# Patient Record
Sex: MALE | Race: OTHER | NOT HISPANIC OR LATINO
[De-identification: names, ages, dates, MRNs, and addresses within clinical notes are randomized per-mention and may not be internally consistent; named-entity substitution may affect disease eponyms.]

---

## 2021-10-12 ENCOUNTER — TRANSCRIPTION ENCOUNTER (OUTPATIENT)
Age: 51
End: 2021-10-12

## 2021-11-02 ENCOUNTER — TRANSCRIPTION ENCOUNTER (OUTPATIENT)
Age: 51
End: 2021-11-02

## 2021-12-03 ENCOUNTER — EMERGENCY (EMERGENCY)
Facility: HOSPITAL | Age: 51
LOS: 0 days | Discharge: HOME | End: 2021-12-03
Attending: EMERGENCY MEDICINE
Payer: COMMERCIAL

## 2021-12-03 ENCOUNTER — INPATIENT (INPATIENT)
Facility: HOSPITAL | Age: 51
LOS: 12 days | Discharge: HOME | DRG: 853 | End: 2021-12-16
Attending: INTERNAL MEDICINE | Admitting: INTERNAL MEDICINE
Payer: COMMERCIAL

## 2021-12-03 VITALS
SYSTOLIC BLOOD PRESSURE: 113 MMHG | DIASTOLIC BLOOD PRESSURE: 70 MMHG | OXYGEN SATURATION: 97 % | HEART RATE: 110 BPM | RESPIRATION RATE: 16 BRPM

## 2021-12-03 VITALS — OXYGEN SATURATION: 100 % | DIASTOLIC BLOOD PRESSURE: 83 MMHG | SYSTOLIC BLOOD PRESSURE: 134 MMHG | HEART RATE: 90 BPM

## 2021-12-03 VITALS — HEIGHT: 69 IN | WEIGHT: 169.98 LBS

## 2021-12-03 DIAGNOSIS — Z20.822 CONTACT WITH AND (SUSPECTED) EXPOSURE TO COVID-19: ICD-10-CM

## 2021-12-03 DIAGNOSIS — G40.89 OTHER SEIZURES: ICD-10-CM

## 2021-12-03 DIAGNOSIS — R45.1 RESTLESSNESS AND AGITATION: ICD-10-CM

## 2021-12-03 DIAGNOSIS — G40.401 OTHER GENERALIZED EPILEPSY AND EPILEPTIC SYNDROMES, NOT INTRACTABLE, WITH STATUS EPILEPTICUS: ICD-10-CM

## 2021-12-03 DIAGNOSIS — B95.61 METHICILLIN SUSCEPTIBLE STAPHYLOCOCCUS AUREUS INFECTION AS THE CAUSE OF DISEASES CLASSIFIED ELSEWHERE: ICD-10-CM

## 2021-12-03 DIAGNOSIS — K42.9 UMBILICAL HERNIA WITHOUT OBSTRUCTION OR GANGRENE: Chronic | ICD-10-CM

## 2021-12-03 DIAGNOSIS — R53.1 WEAKNESS: ICD-10-CM

## 2021-12-03 DIAGNOSIS — B96.89 OTHER SPECIFIED BACTERIAL AGENTS AS THE CAUSE OF DISEASES CLASSIFIED ELSEWHERE: ICD-10-CM

## 2021-12-03 DIAGNOSIS — H70.93 UNSPECIFIED MASTOIDITIS, BILATERAL: ICD-10-CM

## 2021-12-03 DIAGNOSIS — H60.22 MALIGNANT OTITIS EXTERNA, LEFT EAR: ICD-10-CM

## 2021-12-03 DIAGNOSIS — G06.0 INTRACRANIAL ABSCESS AND GRANULOMA: ICD-10-CM

## 2021-12-03 DIAGNOSIS — Z78.1 PHYSICAL RESTRAINT STATUS: ICD-10-CM

## 2021-12-03 DIAGNOSIS — F17.200 NICOTINE DEPENDENCE, UNSPECIFIED, UNCOMPLICATED: ICD-10-CM

## 2021-12-03 DIAGNOSIS — R56.9 UNSPECIFIED CONVULSIONS: ICD-10-CM

## 2021-12-03 DIAGNOSIS — A41.9 SEPSIS, UNSPECIFIED ORGANISM: ICD-10-CM

## 2021-12-03 DIAGNOSIS — G04.90 ENCEPHALITIS AND ENCEPHALOMYELITIS, UNSPECIFIED: ICD-10-CM

## 2021-12-03 DIAGNOSIS — F60.9 PERSONALITY DISORDER, UNSPECIFIED: ICD-10-CM

## 2021-12-03 DIAGNOSIS — M86.9 OSTEOMYELITIS, UNSPECIFIED: ICD-10-CM

## 2021-12-03 DIAGNOSIS — L89.222 PRESSURE ULCER OF LEFT HIP, STAGE 2: ICD-10-CM

## 2021-12-03 DIAGNOSIS — F10.231 ALCOHOL DEPENDENCE WITH WITHDRAWAL DELIRIUM: ICD-10-CM

## 2021-12-03 DIAGNOSIS — G40.901 EPILEPSY, UNSPECIFIED, NOT INTRACTABLE, WITH STATUS EPILEPTICUS: ICD-10-CM

## 2021-12-03 LAB
ALBUMIN SERPL ELPH-MCNC: 3.5 G/DL — SIGNIFICANT CHANGE UP (ref 3.3–5)
ALBUMIN SERPL ELPH-MCNC: 3.6 G/DL — SIGNIFICANT CHANGE UP (ref 3.3–5)
ALP SERPL-CCNC: 101 U/L — SIGNIFICANT CHANGE UP (ref 40–120)
ALP SERPL-CCNC: 86 U/L — SIGNIFICANT CHANGE UP (ref 40–120)
ALT FLD-CCNC: 22 U/L — SIGNIFICANT CHANGE UP (ref 10–45)
ALT FLD-CCNC: 38 U/L — SIGNIFICANT CHANGE UP (ref 12–78)
AMPHET UR-MCNC: NEGATIVE — SIGNIFICANT CHANGE UP
ANION GAP SERPL CALC-SCNC: 12 MMOL/L — SIGNIFICANT CHANGE UP (ref 5–17)
ANION GAP SERPL CALC-SCNC: 9 MMOL/L — SIGNIFICANT CHANGE UP (ref 5–17)
APPEARANCE UR: CLEAR — SIGNIFICANT CHANGE UP
AST SERPL-CCNC: 24 U/L — SIGNIFICANT CHANGE UP (ref 15–37)
AST SERPL-CCNC: 28 U/L — SIGNIFICANT CHANGE UP (ref 10–40)
BACTERIA # UR AUTO: ABNORMAL
BARBITURATES UR SCN-MCNC: NEGATIVE — SIGNIFICANT CHANGE UP
BASE EXCESS BLDA CALC-SCNC: -1.3 MMOL/L — SIGNIFICANT CHANGE UP (ref -2–3)
BASE EXCESS BLDA CALC-SCNC: -7.3 MMOL/L — LOW (ref -2–3)
BASOPHILS # BLD AUTO: 0 K/UL — SIGNIFICANT CHANGE UP (ref 0–0.2)
BASOPHILS # BLD AUTO: 0.06 K/UL — SIGNIFICANT CHANGE UP (ref 0–0.2)
BASOPHILS NFR BLD AUTO: 0 % — SIGNIFICANT CHANGE UP (ref 0–2)
BASOPHILS NFR BLD AUTO: 0.3 % — SIGNIFICANT CHANGE UP (ref 0–2)
BENZODIAZ UR-MCNC: NEGATIVE — SIGNIFICANT CHANGE UP
BILIRUB SERPL-MCNC: 0.3 MG/DL — SIGNIFICANT CHANGE UP (ref 0.2–1.2)
BILIRUB SERPL-MCNC: 0.3 MG/DL — SIGNIFICANT CHANGE UP (ref 0.2–1.2)
BILIRUB UR-MCNC: NEGATIVE — SIGNIFICANT CHANGE UP
BLD GP AB SCN SERPL QL: NEGATIVE — SIGNIFICANT CHANGE UP
BLD GP AB SCN SERPL QL: NEGATIVE — SIGNIFICANT CHANGE UP
BLOOD GAS COMMENTS: SIGNIFICANT CHANGE UP
BLOOD GAS COMMENTS: SIGNIFICANT CHANGE UP
BLOOD GAS SOURCE: SIGNIFICANT CHANGE UP
BUN SERPL-MCNC: 13 MG/DL — SIGNIFICANT CHANGE UP (ref 7–23)
BUN SERPL-MCNC: 19 MG/DL — SIGNIFICANT CHANGE UP (ref 7–23)
CALCIUM SERPL-MCNC: 10.1 MG/DL — SIGNIFICANT CHANGE UP (ref 8.5–10.1)
CALCIUM SERPL-MCNC: 8.3 MG/DL — LOW (ref 8.4–10.5)
CHLORIDE SERPL-SCNC: 105 MMOL/L — SIGNIFICANT CHANGE UP (ref 96–108)
CHLORIDE SERPL-SCNC: 112 MMOL/L — HIGH (ref 96–108)
CO2 BLDA-SCNC: 24 MMOL/L — SIGNIFICANT CHANGE UP (ref 19–24)
CO2 BLDA-SCNC: 25 MMOL/L — HIGH (ref 19–24)
CO2 SERPL-SCNC: 20 MMOL/L — LOW (ref 22–31)
CO2 SERPL-SCNC: 23 MMOL/L — SIGNIFICANT CHANGE UP (ref 22–31)
COCAINE METAB.OTHER UR-MCNC: NEGATIVE — SIGNIFICANT CHANGE UP
COLOR SPEC: YELLOW — SIGNIFICANT CHANGE UP
COMMENT - URINE: SIGNIFICANT CHANGE UP
CREAT SERPL-MCNC: 0.93 MG/DL — SIGNIFICANT CHANGE UP (ref 0.5–1.3)
CREAT SERPL-MCNC: 1.25 MG/DL — SIGNIFICANT CHANGE UP (ref 0.5–1.3)
CRP SERPL-MCNC: 34.1 MG/L — HIGH (ref 0–4)
DIFF PNL FLD: ABNORMAL
EOSINOPHIL # BLD AUTO: 0.09 K/UL — SIGNIFICANT CHANGE UP (ref 0–0.5)
EOSINOPHIL # BLD AUTO: 0.21 K/UL — SIGNIFICANT CHANGE UP (ref 0–0.5)
EOSINOPHIL NFR BLD AUTO: 0.5 % — SIGNIFICANT CHANGE UP (ref 0–6)
EOSINOPHIL NFR BLD AUTO: 1 % — SIGNIFICANT CHANGE UP (ref 0–6)
EPI CELLS # UR: SIGNIFICANT CHANGE UP
ERYTHROCYTE [SEDIMENTATION RATE] IN BLOOD: 10 MM/HR — SIGNIFICANT CHANGE UP
ETHANOL SERPL-MCNC: <10 MG/DL — SIGNIFICANT CHANGE UP (ref 0–10)
FLUAV AG NPH QL: SIGNIFICANT CHANGE UP
FLUBV AG NPH QL: SIGNIFICANT CHANGE UP
GAS PNL BLDA: SIGNIFICANT CHANGE UP
GLUCOSE BLDC GLUCOMTR-MCNC: 106 MG/DL — HIGH (ref 70–99)
GLUCOSE BLDC GLUCOMTR-MCNC: 156 MG/DL — HIGH (ref 70–99)
GLUCOSE BLDC GLUCOMTR-MCNC: 98 MG/DL — SIGNIFICANT CHANGE UP (ref 70–99)
GLUCOSE SERPL-MCNC: 103 MG/DL — HIGH (ref 70–99)
GLUCOSE SERPL-MCNC: 120 MG/DL — HIGH (ref 70–99)
GLUCOSE UR QL: 50 MG/DL
GRAM STN FLD: SIGNIFICANT CHANGE UP
HCO3 BLDA-SCNC: 23 MMOL/L — SIGNIFICANT CHANGE UP (ref 21–28)
HCO3 BLDA-SCNC: 24 MMOL/L — SIGNIFICANT CHANGE UP (ref 21–28)
HCT VFR BLD CALC: 40 % — SIGNIFICANT CHANGE UP (ref 39–50)
HCT VFR BLD CALC: 52.4 % — HIGH (ref 39–50)
HGB BLD-MCNC: 13.4 G/DL — SIGNIFICANT CHANGE UP (ref 13–17)
HGB BLD-MCNC: 16.5 G/DL — SIGNIFICANT CHANGE UP (ref 13–17)
HOROWITZ INDEX BLDA+IHG-RTO: 100 — SIGNIFICANT CHANGE UP
IMM GRANULOCYTES NFR BLD AUTO: 1.1 % — SIGNIFICANT CHANGE UP (ref 0–1.5)
KETONES UR-MCNC: ABNORMAL
LACTATE SERPL-SCNC: 1 MMOL/L — SIGNIFICANT CHANGE UP (ref 0.7–2)
LACTATE SERPL-SCNC: 1.9 MMOL/L — SIGNIFICANT CHANGE UP (ref 0.5–2)
LEUKOCYTE ESTERASE UR-ACNC: NEGATIVE — SIGNIFICANT CHANGE UP
LIDOCAIN IGE QN: 161 U/L — SIGNIFICANT CHANGE UP (ref 73–393)
LYMPHOCYTES # BLD AUTO: 1.96 K/UL — SIGNIFICANT CHANGE UP (ref 1–3.3)
LYMPHOCYTES # BLD AUTO: 25 % — SIGNIFICANT CHANGE UP (ref 13–44)
LYMPHOCYTES # BLD AUTO: 5.22 K/UL — HIGH (ref 1–3.3)
LYMPHOCYTES # BLD AUTO: 9.8 % — LOW (ref 13–44)
MAGNESIUM SERPL-MCNC: 2.6 MG/DL — SIGNIFICANT CHANGE UP (ref 1.6–2.6)
MANUAL SMEAR VERIFICATION: SIGNIFICANT CHANGE UP
MCHC RBC-ENTMCNC: 29.6 PG — SIGNIFICANT CHANGE UP (ref 27–34)
MCHC RBC-ENTMCNC: 30.2 PG — SIGNIFICANT CHANGE UP (ref 27–34)
MCHC RBC-ENTMCNC: 31.5 GM/DL — LOW (ref 32–36)
MCHC RBC-ENTMCNC: 33.5 GM/DL — SIGNIFICANT CHANGE UP (ref 32–36)
MCV RBC AUTO: 90.3 FL — SIGNIFICANT CHANGE UP (ref 80–100)
MCV RBC AUTO: 94.1 FL — SIGNIFICANT CHANGE UP (ref 80–100)
METHADONE UR-MCNC: NEGATIVE — SIGNIFICANT CHANGE UP
MONOCYTES # BLD AUTO: 1.78 K/UL — HIGH (ref 0–0.9)
MONOCYTES # BLD AUTO: 2.92 K/UL — HIGH (ref 0–0.9)
MONOCYTES NFR BLD AUTO: 14 % — SIGNIFICANT CHANGE UP (ref 2–14)
MONOCYTES NFR BLD AUTO: 8.9 % — SIGNIFICANT CHANGE UP (ref 2–14)
NEUTROPHILS # BLD AUTO: 12.53 K/UL — HIGH (ref 1.8–7.4)
NEUTROPHILS # BLD AUTO: 15.87 K/UL — HIGH (ref 1.8–7.4)
NEUTROPHILS NFR BLD AUTO: 60 % — SIGNIFICANT CHANGE UP (ref 43–77)
NEUTROPHILS NFR BLD AUTO: 79.4 % — HIGH (ref 43–77)
NITRITE UR-MCNC: NEGATIVE — SIGNIFICANT CHANGE UP
NRBC # BLD: 0 /100 WBCS — SIGNIFICANT CHANGE UP (ref 0–0)
NRBC # BLD: 0 /100 — SIGNIFICANT CHANGE UP (ref 0–0)
NRBC # BLD: SIGNIFICANT CHANGE UP /100 WBCS (ref 0–0)
NT-PROBNP SERPL-SCNC: 134 PG/ML — HIGH (ref 0–125)
OPIATES UR-MCNC: POSITIVE — SIGNIFICANT CHANGE UP
PCO2 BLDA: 40 MMHG — SIGNIFICANT CHANGE UP (ref 35–48)
PCO2 BLDA: 63 MMHG — HIGH (ref 32–46)
PCP SPEC-MCNC: SIGNIFICANT CHANGE UP
PCP UR-MCNC: NEGATIVE — SIGNIFICANT CHANGE UP
PH BLD: 7.16 — CRITICAL LOW (ref 7.35–7.45)
PH BLDA: 7.38 — SIGNIFICANT CHANGE UP (ref 7.35–7.45)
PH UR: 5 — SIGNIFICANT CHANGE UP (ref 5–8)
PHOSPHATE SERPL-MCNC: 3.2 MG/DL — SIGNIFICANT CHANGE UP (ref 2.5–4.5)
PLAT MORPH BLD: NORMAL — SIGNIFICANT CHANGE UP
PLATELET # BLD AUTO: 336 K/UL — SIGNIFICANT CHANGE UP (ref 150–400)
PLATELET # BLD AUTO: 411 K/UL — HIGH (ref 150–400)
PO2 BLDA: 157 MMHG — HIGH (ref 83–108)
PO2 BLDA: 203 MMHG — HIGH (ref 83–108)
POTASSIUM SERPL-MCNC: 4.9 MMOL/L — SIGNIFICANT CHANGE UP (ref 3.5–5.3)
POTASSIUM SERPL-MCNC: 5.9 MMOL/L — HIGH (ref 3.5–5.3)
POTASSIUM SERPL-SCNC: 4.9 MMOL/L — SIGNIFICANT CHANGE UP (ref 3.5–5.3)
POTASSIUM SERPL-SCNC: 5.9 MMOL/L — HIGH (ref 3.5–5.3)
PROT SERPL-MCNC: 6.8 G/DL — SIGNIFICANT CHANGE UP (ref 6–8.3)
PROT SERPL-MCNC: 8.7 GM/DL — HIGH (ref 6–8.3)
PROT UR-MCNC: 100 MG/DL
RBC # BLD: 4.43 M/UL — SIGNIFICANT CHANGE UP (ref 4.2–5.8)
RBC # BLD: 5.57 M/UL — SIGNIFICANT CHANGE UP (ref 4.2–5.8)
RBC # FLD: 14.1 % — SIGNIFICANT CHANGE UP (ref 10.3–14.5)
RBC # FLD: 14.2 % — SIGNIFICANT CHANGE UP (ref 10.3–14.5)
RBC BLD AUTO: NORMAL — SIGNIFICANT CHANGE UP
RBC CASTS # UR COMP ASSIST: SIGNIFICANT CHANGE UP /HPF (ref 0–4)
RH IG SCN BLD-IMP: NEGATIVE — SIGNIFICANT CHANGE UP
RH IG SCN BLD-IMP: NEGATIVE — SIGNIFICANT CHANGE UP
SAO2 % BLDA: 100 % — HIGH (ref 94–98)
SAO2 % BLDA: 100 % — HIGH (ref 94–98)
SARS-COV-2 RNA SPEC QL NAA+PROBE: SIGNIFICANT CHANGE UP
SODIUM SERPL-SCNC: 137 MMOL/L — SIGNIFICANT CHANGE UP (ref 135–145)
SODIUM SERPL-SCNC: 144 MMOL/L — SIGNIFICANT CHANGE UP (ref 135–145)
SP GR SPEC: 1.03 — HIGH (ref 1.01–1.02)
SPECIMEN SOURCE: SIGNIFICANT CHANGE UP
THC UR QL: POSITIVE — SIGNIFICANT CHANGE UP
UROBILINOGEN FLD QL: NEGATIVE MG/DL — SIGNIFICANT CHANGE UP
WBC # BLD: 19.97 K/UL — HIGH (ref 3.8–10.5)
WBC # BLD: 20.89 K/UL — HIGH (ref 3.8–10.5)
WBC # FLD AUTO: 19.97 K/UL — HIGH (ref 3.8–10.5)
WBC # FLD AUTO: 20.89 K/UL — HIGH (ref 3.8–10.5)
WBC UR QL: SIGNIFICANT CHANGE UP

## 2021-12-03 PROCEDURE — 31500 INSERT EMERGENCY AIRWAY: CPT

## 2021-12-03 PROCEDURE — 71045 X-RAY EXAM CHEST 1 VIEW: CPT | Mod: 26,76

## 2021-12-03 PROCEDURE — 99291 CRITICAL CARE FIRST HOUR: CPT

## 2021-12-03 PROCEDURE — 70450 CT HEAD/BRAIN W/O DYE: CPT | Mod: 26,MA

## 2021-12-03 PROCEDURE — 70487 CT MAXILLOFACIAL W/DYE: CPT | Mod: 26,MG

## 2021-12-03 PROCEDURE — G1004: CPT

## 2021-12-03 PROCEDURE — 71045 X-RAY EXAM CHEST 1 VIEW: CPT | Mod: 26

## 2021-12-03 PROCEDURE — 99291 CRITICAL CARE FIRST HOUR: CPT | Mod: 25

## 2021-12-03 RX ORDER — CEFEPIME 1 G/1
2000 INJECTION, POWDER, FOR SOLUTION INTRAMUSCULAR; INTRAVENOUS EVERY 8 HOURS
Refills: 0 | Status: DISCONTINUED | OUTPATIENT
Start: 2021-12-03 | End: 2021-12-13

## 2021-12-03 RX ORDER — CIPROFLOXACIN HCL 0.3 %
4 DROPS OPHTHALMIC (EYE)
Refills: 0 | Status: DISCONTINUED | OUTPATIENT
Start: 2021-12-03 | End: 2021-12-03

## 2021-12-03 RX ORDER — DEXTROSE 50 % IN WATER 50 %
15 SYRINGE (ML) INTRAVENOUS ONCE
Refills: 0 | Status: DISCONTINUED | OUTPATIENT
Start: 2021-12-03 | End: 2021-12-16

## 2021-12-03 RX ORDER — FENTANYL CITRATE 50 UG/ML
50 INJECTION INTRAVENOUS ONCE
Refills: 0 | Status: DISCONTINUED | OUTPATIENT
Start: 2021-12-03 | End: 2021-12-03

## 2021-12-03 RX ORDER — ROCURONIUM BROMIDE 10 MG/ML
100 VIAL (ML) INTRAVENOUS ONCE
Refills: 0 | Status: COMPLETED | OUTPATIENT
Start: 2021-12-03 | End: 2021-12-03

## 2021-12-03 RX ORDER — PIPERACILLIN AND TAZOBACTAM 4; .5 G/20ML; G/20ML
3.38 INJECTION, POWDER, LYOPHILIZED, FOR SOLUTION INTRAVENOUS EVERY 6 HOURS
Refills: 0 | Status: DISCONTINUED | OUTPATIENT
Start: 2021-12-03 | End: 2021-12-03

## 2021-12-03 RX ORDER — SODIUM CHLORIDE 9 MG/ML
1000 INJECTION INTRAMUSCULAR; INTRAVENOUS; SUBCUTANEOUS ONCE
Refills: 0 | Status: COMPLETED | OUTPATIENT
Start: 2021-12-03 | End: 2021-12-03

## 2021-12-03 RX ORDER — CHLORHEXIDINE GLUCONATE 213 G/1000ML
15 SOLUTION TOPICAL EVERY 12 HOURS
Refills: 0 | Status: DISCONTINUED | OUTPATIENT
Start: 2021-12-03 | End: 2021-12-03

## 2021-12-03 RX ORDER — DEXTROSE 50 % IN WATER 50 %
25 SYRINGE (ML) INTRAVENOUS ONCE
Refills: 0 | Status: DISCONTINUED | OUTPATIENT
Start: 2021-12-03 | End: 2021-12-16

## 2021-12-03 RX ORDER — PROPOFOL 10 MG/ML
5 INJECTION, EMULSION INTRAVENOUS
Qty: 500 | Refills: 0 | Status: DISCONTINUED | OUTPATIENT
Start: 2021-12-03 | End: 2021-12-03

## 2021-12-03 RX ORDER — ENOXAPARIN SODIUM 100 MG/ML
40 INJECTION SUBCUTANEOUS DAILY
Refills: 0 | Status: DISCONTINUED | OUTPATIENT
Start: 2021-12-03 | End: 2021-12-03

## 2021-12-03 RX ORDER — ETOMIDATE 2 MG/ML
20 INJECTION INTRAVENOUS ONCE
Refills: 0 | Status: COMPLETED | OUTPATIENT
Start: 2021-12-03 | End: 2021-12-03

## 2021-12-03 RX ORDER — PROPOFOL 10 MG/ML
5 INJECTION, EMULSION INTRAVENOUS
Qty: 1000 | Refills: 0 | Status: DISCONTINUED | OUTPATIENT
Start: 2021-12-03 | End: 2021-12-03

## 2021-12-03 RX ORDER — INSULIN HUMAN 100 [IU]/ML
INJECTION, SOLUTION SUBCUTANEOUS EVERY 6 HOURS
Refills: 0 | Status: DISCONTINUED | OUTPATIENT
Start: 2021-12-03 | End: 2021-12-10

## 2021-12-03 RX ORDER — VANCOMYCIN HCL 1 G
1500 VIAL (EA) INTRAVENOUS EVERY 12 HOURS
Refills: 0 | Status: COMPLETED | OUTPATIENT
Start: 2021-12-04 | End: 2021-12-04

## 2021-12-03 RX ORDER — SODIUM CHLORIDE 9 MG/ML
1000 INJECTION, SOLUTION INTRAVENOUS
Refills: 0 | Status: DISCONTINUED | OUTPATIENT
Start: 2021-12-03 | End: 2021-12-16

## 2021-12-03 RX ORDER — PIPERACILLIN AND TAZOBACTAM 4; .5 G/20ML; G/20ML
3.38 INJECTION, POWDER, LYOPHILIZED, FOR SOLUTION INTRAVENOUS ONCE
Refills: 0 | Status: COMPLETED | OUTPATIENT
Start: 2021-12-03 | End: 2021-12-03

## 2021-12-03 RX ORDER — INFLUENZA VIRUS VACCINE 15; 15; 15; 15 UG/.5ML; UG/.5ML; UG/.5ML; UG/.5ML
0.5 SUSPENSION INTRAMUSCULAR ONCE
Refills: 0 | Status: COMPLETED | OUTPATIENT
Start: 2021-12-03 | End: 2021-12-14

## 2021-12-03 RX ORDER — DEXMEDETOMIDINE HYDROCHLORIDE IN 0.9% SODIUM CHLORIDE 4 UG/ML
0.2 INJECTION INTRAVENOUS
Qty: 200 | Refills: 0 | Status: DISCONTINUED | OUTPATIENT
Start: 2021-12-03 | End: 2021-12-03

## 2021-12-03 RX ORDER — CEFEPIME 1 G/1
2000 INJECTION, POWDER, FOR SOLUTION INTRAMUSCULAR; INTRAVENOUS EVERY 8 HOURS
Refills: 0 | Status: DISCONTINUED | OUTPATIENT
Start: 2021-12-03 | End: 2021-12-03

## 2021-12-03 RX ORDER — CHLORHEXIDINE GLUCONATE 213 G/1000ML
1 SOLUTION TOPICAL
Refills: 0 | Status: DISCONTINUED | OUTPATIENT
Start: 2021-12-03 | End: 2021-12-16

## 2021-12-03 RX ORDER — LEVETIRACETAM 250 MG/1
1000 TABLET, FILM COATED ORAL ONCE
Refills: 0 | Status: COMPLETED | OUTPATIENT
Start: 2021-12-03 | End: 2021-12-03

## 2021-12-03 RX ORDER — GLUCAGON INJECTION, SOLUTION 0.5 MG/.1ML
1 INJECTION, SOLUTION SUBCUTANEOUS ONCE
Refills: 0 | Status: DISCONTINUED | OUTPATIENT
Start: 2021-12-03 | End: 2021-12-16

## 2021-12-03 RX ORDER — HEPARIN SODIUM 5000 [USP'U]/ML
5000 INJECTION INTRAVENOUS; SUBCUTANEOUS EVERY 8 HOURS
Refills: 0 | Status: DISCONTINUED | OUTPATIENT
Start: 2021-12-03 | End: 2021-12-08

## 2021-12-03 RX ORDER — VANCOMYCIN HCL 1 G
1000 VIAL (EA) INTRAVENOUS ONCE
Refills: 0 | Status: COMPLETED | OUTPATIENT
Start: 2021-12-03 | End: 2021-12-03

## 2021-12-03 RX ORDER — FENTANYL CITRATE 50 UG/ML
0.5 INJECTION INTRAVENOUS
Qty: 2500 | Refills: 0 | Status: DISCONTINUED | OUTPATIENT
Start: 2021-12-03 | End: 2021-12-05

## 2021-12-03 RX ORDER — CHLORHEXIDINE GLUCONATE 213 G/1000ML
15 SOLUTION TOPICAL EVERY 12 HOURS
Refills: 0 | Status: DISCONTINUED | OUTPATIENT
Start: 2021-12-03 | End: 2021-12-06

## 2021-12-03 RX ORDER — PROPOFOL 10 MG/ML
5 INJECTION, EMULSION INTRAVENOUS
Qty: 1000 | Refills: 0 | Status: DISCONTINUED | OUTPATIENT
Start: 2021-12-03 | End: 2021-12-05

## 2021-12-03 RX ORDER — PANTOPRAZOLE SODIUM 20 MG/1
40 TABLET, DELAYED RELEASE ORAL DAILY
Refills: 0 | Status: DISCONTINUED | OUTPATIENT
Start: 2021-12-03 | End: 2021-12-08

## 2021-12-03 RX ADMIN — FENTANYL CITRATE 50 MICROGRAM(S): 50 INJECTION INTRAVENOUS at 23:42

## 2021-12-03 RX ADMIN — Medication 1 MILLIGRAM(S): at 10:10

## 2021-12-03 RX ADMIN — LEVETIRACETAM 400 MILLIGRAM(S): 250 TABLET, FILM COATED ORAL at 11:20

## 2021-12-03 RX ADMIN — Medication 100 MILLIGRAM(S): at 10:25

## 2021-12-03 RX ADMIN — Medication 2 MILLIGRAM(S): at 23:02

## 2021-12-03 RX ADMIN — CHLORHEXIDINE GLUCONATE 1 APPLICATION(S): 213 SOLUTION TOPICAL at 20:26

## 2021-12-03 RX ADMIN — Medication 250 MILLIGRAM(S): at 13:30

## 2021-12-03 RX ADMIN — ETOMIDATE 20 MILLIGRAM(S): 2 INJECTION INTRAVENOUS at 10:25

## 2021-12-03 RX ADMIN — LEVETIRACETAM 1000 MILLIGRAM(S): 250 TABLET, FILM COATED ORAL at 11:45

## 2021-12-03 RX ADMIN — FENTANYL CITRATE 50 MICROGRAM(S): 50 INJECTION INTRAVENOUS at 23:15

## 2021-12-03 RX ADMIN — PROPOFOL 2.31 MICROGRAM(S)/KG/MIN: 10 INJECTION, EMULSION INTRAVENOUS at 11:27

## 2021-12-03 RX ADMIN — PROPOFOL 2.58 MICROGRAM(S)/KG/MIN: 10 INJECTION, EMULSION INTRAVENOUS at 18:34

## 2021-12-03 RX ADMIN — Medication 2 MILLIGRAM(S): at 10:20

## 2021-12-03 RX ADMIN — Medication 1 MILLIGRAM(S): at 10:00

## 2021-12-03 RX ADMIN — HEPARIN SODIUM 5000 UNIT(S): 5000 INJECTION INTRAVENOUS; SUBCUTANEOUS at 22:05

## 2021-12-03 RX ADMIN — CEFEPIME 100 MILLIGRAM(S): 1 INJECTION, POWDER, FOR SOLUTION INTRAMUSCULAR; INTRAVENOUS at 18:35

## 2021-12-03 RX ADMIN — PIPERACILLIN AND TAZOBACTAM 200 GRAM(S): 4; .5 INJECTION, POWDER, LYOPHILIZED, FOR SOLUTION INTRAVENOUS at 13:30

## 2021-12-03 RX ADMIN — PANTOPRAZOLE SODIUM 40 MILLIGRAM(S): 20 TABLET, DELAYED RELEASE ORAL at 18:34

## 2021-12-03 RX ADMIN — SODIUM CHLORIDE 1000 MILLILITER(S): 9 INJECTION INTRAMUSCULAR; INTRAVENOUS; SUBCUTANEOUS at 13:00

## 2021-12-03 RX ADMIN — CEFEPIME 100 MILLIGRAM(S): 1 INJECTION, POWDER, FOR SOLUTION INTRAMUSCULAR; INTRAVENOUS at 22:05

## 2021-12-03 NOTE — H&P ADULT - NSHPLABSRESULTS_GEN_ALL_CORE
LABS:                         16.5   20.89 )-----------( 411      ( 03 Dec 2021 10:37 )             52.4         144  |  112<H>  |  19  ----------------------------<  120<H>  5.9<H>   |  20<L>  |  1.25    Ca    10.1      03 Dec 2021 10:37    TPro  8.7<H>  /  Alb  3.6  /  TBili  0.3  /  DBili  x   /  AST  24  /  ALT  38  /  AlkPhos  101      Urinalysis Basic - ( 03 Dec 2021 11:09 )    Color: Yellow / Appearance: Clear / S.030 / pH: x  Gluc: x / Ketone: Trace  / Bili: Negative / Urobili: Negative mg/dL   Blood: x / Protein: 100 mg/dL / Nitrite: Negative   Leuk Esterase: Negative / RBC: 0-2 /HPF / WBC 0-2   Sq Epi: x / Non Sq Epi: Occasional / Bacteria: Few    Serum Pro-Brain Natriuretic Peptide: 134 pg/mL ( @ 10:37)    Lactate, Blood: 1.9 mmol/L ( @ 16:31)  Lactate, Blood: 1.0 mmol/L ( @ 14:04)    RADIOLOGY, EKG & ADDITIONAL TESTS:

## 2021-12-03 NOTE — H&P ADULT - ATTENDING COMMENTS
Acute onset seizure in setting of ear infection treated as outpt x 2 to 3 months. Active smoker and drinker but apparently has not stopped drinking so DTs unlikely. 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 Involve ENT. Needs further contrast imaging for evaluation. Abx with BBB penetrance; low threshold for fungal coverage. Video EEG.

## 2021-12-03 NOTE — CONSULT NOTE ADULT - SUBJECTIVE AND OBJECTIVE BOX
ENT Consult Note    HARRIET GOLDMAN  [id number]    51y Male w/ unremarkable PMHx (hasn't seen a doctor in many years) presents to [facility] as a transfer for evaluation of seizure of unknown origin. ENT consults for evaluation of left ear infection, r/o malignant ear otitis infections. per wife and brother at bedside, pt has been complaining of bilateral ear pain, left greater than right, since late october. pt went to urgent care at that time and was given oral cipro. pt returned to urgent care one week later after finishing his antibiotics without any ear pain relief. he was given oral and otic     PMHX  SEIZURES    Handoff    Acute mastoiditis, left    Convulsive status epilepticus    Alcohol abuse, daily use    Umbilical hernia    SysAdmin_VstLnk      PSHx  Allergies  No Known Allergies    Meds  chlorhexidine 2% Cloths 1 Application(s) Topical <User Schedule>  dextrose 40% Gel 15 Gram(s) Oral once  dextrose 5%. 1000 milliLiter(s) IV Continuous <Continuous>  dextrose 50% Injectable 25 Gram(s) IV Push once  enoxaparin Injectable 40 milliGRAM(s) SubCutaneous daily  glucagon  Injectable 1 milliGRAM(s) IntraMuscular once  insulin regular  human corrective regimen sliding scale   SubCutaneous every 6 hours  pantoprazole  Injectable 40 milliGRAM(s) IV Push daily  piperacillin/tazobactam IVPB.. 3.375 Gram(s) IV Intermittent every 6 hours      PE  Vitals  T(C): 37.6 (12-03-21 @ 15:45), Max: 37.6 (12-03-21 @ 15:45)  HR: 79 (12-03-21 @ 16:45) (79 - 131)  BP: 122/76 (12-03-21 @ 16:45) (113/70 - 207/110)  RR: 16 (12-03-21 @ 11:32) (16 - 21)  SpO2: 100% (12-03-21 @ 16:45) (95% - 100%)        Labs          AP: ENT Consult Note    HARRIET GOLDMAN  [id number]    51y Male w/ unremarkable PMHx (hasn't seen a doctor in many years) presents to [facility] as a transfer for evaluation of seizure of unknown origin. ENT consults for evaluation of left ear infection, r/o malignant ear otitis infections. per wife and brother at bedside, pt has been complaining of bilateral ear pain, left greater than right, since late october. pt went to urgent care at that time and was given oral cipro. pt returned to urgent care one week later after finishing his antibiotics without any ear pain relief. he was given oral and otic drops, however, sxs did not resolve. pt was then seen by an ENT on Nov 15, who diagnosed him w/ bilateral otitis externa, and placed ear james bilaterally. pt was given ciprodex otic drops and a follow up. Today, wife states  was not acting like his usual self. around noon today, she went to visit him and work and pt seemed very lethargic and was only responding with one word answers. At this time, wife decided to call an ambulance. while being placed in to the ambulance, pt began having is seizure. pt was taken to [facility], and was intubated for airway protection. CT maxillofacial at [place] which showed distal external auditory canal and foci of possible tegmen tympani/mastoideum dehiscence versus severe thinning, concerning for malignant/necrotizing otitis. Per wife, pt had severe otalgia and hearing, but did not have any otorrhea, facial weakness, vertigo, or tinnitus    PMHX  SEIZURES    Handoff    Acute mastoiditis, left    Convulsive status epilepticus    Alcohol abuse, daily use    Umbilical hernia    SysAdmin_VstLnk      PSHx: inguinal hernia    Allergies  No Known Allergies    Meds  chlorhexidine 2% Cloths 1 Application(s) Topical <User Schedule>  dextrose 40% Gel 15 Gram(s) Oral once  dextrose 5%. 1000 milliLiter(s) IV Continuous <Continuous>  dextrose 50% Injectable 25 Gram(s) IV Push once  enoxaparin Injectable 40 milliGRAM(s) SubCutaneous daily  glucagon  Injectable 1 milliGRAM(s) IntraMuscular once  insulin regular  human corrective regimen sliding scale   SubCutaneous every 6 hours  pantoprazole  Injectable 40 milliGRAM(s) IV Push daily  piperacillin/tazobactam IVPB.. 3.375 Gram(s) IV Intermittent every 6 hours      PE  Vitals  T(C): 37.6 (12-03-21 @ 15:45), Max: 37.6 (12-03-21 @ 15:45)  HR: 79 (12-03-21 @ 16:45) (79 - 131)  BP: 122/76 (12-03-21 @ 16:45) (113/70 - 207/110)  RR: 16 (12-03-21 @ 11:32) (16 - 21)  SpO2: 100% (12-03-21 @ 16:45) (95% - 100%)    PHYSICAL EXAM:    CONSTITUTIONAL: Well nourished, well developed, sedated  HEAD: normocephalic, atraumatic.  EARS  AD: Mastoid non-tender/non-edematous. non-proptotic , non-erythematous right pinna. Right EAC edematous; unable to assess beyond membranous canal. no granulation tissue or masses appreciated of limited EAC. Ear wick placed  AS: Mastoid non-tender/non-edematous. non-proptotic pinna. Left pinna edematous and erythematous. Erythema and edema extends medially into EAC. Purulence appreciated. culture taken. unable assess beyond membranous canal. No granulation or masses appreciated.  Ear wick placed  NOSE: Normal external nose.   ORAL CAVITY/OROPHARYNX: normal mucosa. exam limited by ETT  NECK: No cervical lymphadenopathy  RESPIRATORY: intubated connected to vent    Labs                        16.5   20.89 )-----------( 411      ( 03 Dec 2021 10:37 )             52.4     12-03    137  |  105  |  13  ----------------------------<  103<H>  4.9   |  23  |  0.93    Ca    8.3<L>      03 Dec 2021 16:31  Phos  3.2     12-03  Mg     2.6     12-03    TPro  6.8  /  Alb  3.5  /  TBili  0.3  /  DBili  x   /  AST  28  /  ALT  22  /  AlkPhos  86  12-03          AP: 51 M w/ roughly one month hx of bilateral otitis externa, left greater than right, now presenting w/ seizures. PE does not reveal any granulation tissue in left EAC, however, b/l EACs are edematous and tender to manipulation.     -recommend ciprodex bilaterally; 5 drops to each ear wick twice a day  -recommend CT temporal bone w/ and w/o con  -agree w/ MRI IAC to assess for osteo and skull base  -agree w/ ID consult  -agree w/ broad spectrum abx at this time  -f/u left EAC ear culture   -ENT to follow up imaging  -rest of care per primary team    Seen w/ senior resident. Discussed with attending.

## 2021-12-03 NOTE — H&P ADULT - ASSESSMENT
52 yo M with PMHx no past medical history of epilepsy, trauma or CNS infection, who presents to the ED with an episode of sudden onset speech disturbance talking incoherently and repetitive and altered behavior while working witnessed, no fever. In the last 2 days much more pain on left ear and headache. Pt uses frequently his swimming pool has had left ear infection for 2 months with hypoacusia treated with long course ciprofloxacin and a week of prednisone. In ED presented, repetitive generalized seizures. 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 Started on vancomycin and Zosyn IV. Admitted to ICU to continue his care, on cefepime and vanc IV (to cover CNS pseudomonal infection) propofol drip and placed on ventilator.  52 yo M with PMHx no past medical history of epilepsy, trauma or CNS infection, who presents to the ED with an episode of sudden onset speech disturbance talking incoherently and repetitive and altered behavior while working witnessed, no fever. In the last 2 days much more pain on left ear and headache. Pt uses frequently his swimming pool has had left ear infection for 2 months with hypoacusia treated with long course ciprofloxacin and a week of prednisone. In ED presented, repetitive generalized seizures. 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 Started on vancomycin and Zosyn IV. Admitted to ICU to continue his care, on cefepime and vanc IV (to cover CNS pseudomonal infection) propofol drip and placed on ventilator.     NEURO 50 yo M with PMHx no past medical history of epilepsy, trauma or CNS infection, who presents to the ED with an episode of sudden onset speech disturbance talking incoherently and repetitive and altered behavior while working witnessed, no fever. In the last 2 days much more pain on left ear and headache. Pt uses frequently his swimming pool has had left ear infection for 2 months with hypoacusia treated with long course ciprofloxacin and a week of prednisone. In ED presented, repetitive generalized seizures. 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 Started on vancomycin and Zosyn IV. Admitted to ICU to continue his care, on cefepime and vanc IV (to cover CNS pseudomonal infection) propofol drip and placed on ventilator.     NEURO  intubated and sedated  on propofol for sedation and crisis control  daily wean off sedation for neurochecks   MRI IAC with contrast IV  Video EEG    CARDIO  None    RESPIRATORY  Intubated for protecting airways in patient with status epilepticus  - wean vent as tolerated  - spontaneous breathing trials daily  - CxR w/o significant findings    GI  #diet  - NPO today    ENDO  None    RENAL  None    HEM/ONC  None    ID  #External otitis  s/p left mastoiditis s/p encephalities In ED, vanco and Zosyn IV. In ICU, started on vancomycin 1500mg q12h IV and cefepime 2g q8h IV.    F: none  E: replete PRN  N: NPO for today  DVT: Heparin IV q8h  GI: Pantoprazol 40mg IV q24h    FULL code   50 yo M with PMHx no past medical history of epilepsy, trauma or CNS infection, who presents to the ED with an episode of sudden onset speech disturbance talking incoherently and repetitive and altered behavior while working witnessed, no fever. In the last 2 days much more pain on left ear and headache. Pt uses frequently his swimming pool has had left ear infection for 2 months with hypoacusia treated with long course ciprofloxacin and a week of prednisone. In ED presented, repetitive generalized seizures. 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 Started on vancomycin and Zosyn IV. Admitted to ICU to continue his care, on cefepime and vanc IV (to cover CNS pseudomonal infection) propofol drip and placed on ventilator.     NEURO  #Convulsive status epilepticus  Likely 2/2 to left temporal lobe encephalitis 2/2 to left mastoiditis. Frequent baths, high pseudomonal suspicion.  -intubated and sedated  -on propofol for sedation and crisis control  -daily wean off sedation for neurochecks   -MRI IAC with contrast IV  -Continue vEEG monitoring  -Maintain seizure and fall precautions    CARDIO  None.  EKG sinus rhythm 96 bmp    RESPIRATORY  -Intubated for protecting airways in patient with status epilepticus  -wean vent as tolerated  -spontaneous breathing trials daily  -CxR w/o significant findings    GI  #diet  -NPO today    ENDO  None    RENAL  None    HEM/ONC  None    ID  #Left external otitis  -s/p left mastoiditis s/p left temporal lobe encephalitis. In ED, vanco and Zosyn IV. In ICU, started on vancomycin 1500mg q12h IV and cefepime 2g q8h IV.  -cultures if new fever    F: none  E: replete PRN  N: NPO for today  DVT: Heparin IV q8h  GI: Pantoprazol 40mg IV q24h    FULL code

## 2021-12-03 NOTE — ED PROVIDER NOTE - PROGRESS NOTE DETAILS
Pt found to have mastoiditis on CT, ENT consulted, unable to be reached, empiric antibiotics started. Transferred to [facility] for ENT and video EEG. Pt stable in ED.

## 2021-12-03 NOTE — PATIENT PROFILE ADULT - FALL HARM RISK - TYPE OF ASSESSMENT
----- Message from India Hernandez sent at 7/9/2019 12:33 PM CDT -----  Contact: ANJEL----[phone]  Type:  Needs Medical Advice    Who Called: ANJELWould the patient rather a call back   Best Call Back Number:[phone]  Additional Information:    Anjel dropped off some forms to be filled out by the provider. The forms were to be faxed back to Anjel @ [phone]   Admission

## 2021-12-03 NOTE — ED ADULT NURSE NOTE - OBJECTIVE STATEMENT
Pt BIBEMS actively having tonic-clonic seizure, pt last known normal around 9:00am this morning at work while on the phone with his wife he became aphasic and had sudden onset weakness. Wife denies hx of seizures. Per wife, pt is an everyday EtOH drinker, has about 6 beers daily. Wife also states pt has been complaining of right ear pain for weeks, is taking PO abx for an ear infection. No obvious deformities noted. Pt had 3 more seizures while in ED, within the span of 15 min, received a total of 4 mg ativan. Decision made to intubate pt for airway protection. Dr. Kong at bedside.

## 2021-12-03 NOTE — ED ADULT TRIAGE NOTE - CHIEF COMPLAINT QUOTE
Stroke notification to ED with confusion and aphasia, active seizure upon entry to ED, MD summoned to bedside Stroke notification to ED with confusion and aphasia, active seizure upon entry to ED, MD summoned to bedside, while at work changes occurred 9a while speaking to wife on phone, daily beer drinking and doesn't like to go to the doctor however has ear pain for months, on antibiotics currently for ear infection, smoking cigarettes and marijuana usage

## 2021-12-03 NOTE — H&P ADULT - NSICDXPASTMEDICALHX_GEN_ALL_CORE_FT
PAST MEDICAL HISTORY:  Acute mastoiditis, left     Alcohol abuse, daily use     Convulsive status epilepticus

## 2021-12-03 NOTE — H&P ADULT - NSHPPHYSICALEXAM_GEN_ALL_CORE
VITALS:   T(C): 37.6 (12-03-21 @ 15:45), Max: 37.6 (12-03-21 @ 15:45)  HR: 110 (12-03-21 @ 11:32) (93 - 131)  BP: 113/70 (12-03-21 @ 11:32) (113/70 - 207/110)  RR: 16 (12-03-21 @ 11:32) (16 - 21)  SpO2: 97% (12-03-21 @ 11:32) (95% - 97%)    GENERAL: NAD, lying in bed comfortably  HEAD:  Atraumatic, normocephalic  EYES: EOMI, PERRLA, conjunctiva and sclera clear  ENT: Moist mucous membranes  NECK: Supple, no JVD  HEART: Regular rate and rhythm, no murmurs, rubs, or gallops  LUNGS: Unlabored respirations.  Clear to auscultation bilaterally, no crackles, wheezing, or rhonchi  ABDOMEN: Soft, nontender, nondistended, +BS  EXTREMITIES: 2+ peripheral pulses bilaterally. No clubbing, cyanosis, or edema  NERVOUS SYSTEM:  A&Ox3, no focal deficits   SKIN: No rashes or lesions VITALS:   T(C): 37.6 (12-03-21 @ 15:45), Max: 37.6 (12-03-21 @ 15:45)  HR: 110 (12-03-21 @ 11:32) (93 - 131)  BP: 113/70 (12-03-21 @ 11:32) (113/70 - 207/110)  RR: 16 (12-03-21 @ 11:32) (16 - 21)  SpO2: 97% (12-03-21 @ 11:32) (95% - 97%)    GENERAL: Intubated and sedated.  HEAD:  Atraumatic, normocephalic  EYES: conjunctiva and sclera clear  ENT: Moist mucous membranes, rash on left ear  NECK: Supple, no JVD  HEART: Regular rate and rhythm, no murmurs, rubs, or gallops  LUNGS: Unlabored respirations.  Clear to auscultation bilaterally, no crackles, wheezing, or rhonchi  ABDOMEN: Soft, nontender, nondistended, +BS  EXTREMITIES: 2+ peripheral pulses bilaterally. No clubbing, cyanosis, or edema  NERVOUS SYSTEM:  Induced coma (miotic pupils minimally reactive to light), no response to pain.  SKIN: No rashes or lesions

## 2021-12-03 NOTE — H&P ADULT - NSHPSOCIALHISTORY_GEN_ALL_CORE
Lives with wife and son. Works in a [employer]. Takes baths a few times per week in his private swimming pool. Drink (6 packs/carmel, he drank beers). Heavy smoker (1/2 pack/day), Drug use (sometimes smokes marihuana but not often).

## 2021-12-03 NOTE — H&P ADULT - NSHPREVIEWOFSYSTEMS_GEN_ALL_CORE
CONSTITUTIONAL: No weakness, fevers or chills  EYES/ENT: No visual changes;  No vertigo or throat pain   NECK: No pain or stiffness  RESPIRATORY: No cough, wheezing, hemoptysis; No shortness of breath  CARDIOVASCULAR: No chest pain or palpitations  GASTROINTESTINAL: No abdominal or epigastric pain. No nausea, vomiting, or hematemesis; No diarrhea or constipation. No melena or hematochezia.  GENITOURINARY: No dysuria, frequency or hematuria  NEUROLOGICAL: No numbness or weakness  SKIN: No itching, rashes CONSTITUTIONAL: No weakness, fevers or chills  EYES/ENT: No visual changes;  No vertigo or throat pain   NECK: No pain or stiffness  RESPIRATORY: No cough, wheezing, hemoptysis; No shortness of breath  CARDIOVASCULAR: No chest pain or palpitations  GASTROINTESTINAL: No abdominal or epigastric pain. No nausea, vomiting, or hematemesis; No diarrhea or constipation. No melena or hematochezia.  GENITOURINARY: No dysuria, frequency or hematuria  NEUROLOGICAL: No numbness or weakness  SKIN: No itching, rashes    went to work, talking incohorent, sweating, all of a suden 9 am, left 7:30 am and he was fine. last couple of months, and much more in the left ear. no secretion, last two months. Last days he could, but in last 2 days a lot of pain. No fever, pain in ear and heachaid, no rigid neck, rash. Cipro 500mg q12h oral (3-4w), before cipro drops (3-4w), prednisone 20 mg q12h oral (past weeks). Pale and sweeing.    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 CONSTITUTIONAL: No weakness, fevers or chills  EYES/ENT: No visual changes;  No vertigo or throat pain   NECK: No pain or stiffness  RESPIRATORY: No cough, wheezing, hemoptysis; No shortness of breath  CARDIOVASCULAR: No chest pain or palpitations  GASTROINTESTINAL: No abdominal or epigastric pain. No nausea, vomiting, or hematemesis; No diarrhea or constipation. No melena or hematochezia.  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 CONSTITUTIONAL: No weakness, fevers or chills  EYES/ENT: Hypoacusia and ear pain 2/2 to infection. No visual changes;  No vertigo or throat pain   NECK: No pain or stiffness  RESPIRATORY: No cough, wheezing, hemoptysis; No shortness of breath  CARDIOVASCULAR: No chest pain or palpitations  GASTROINTESTINAL: No abdominal or epigastric pain. No nausea, vomiting, or hematemesis; No diarrhea or constipation. No melena or hematochezia.  GENITOURINARY: No dysuria, frequency or hematuria  NEUROLOGICAL: Hedache last 2 days.  SKIN: No itching, rash on left ear

## 2021-12-03 NOTE — H&P ADULT - HISTORY OF PRESENT ILLNESS
52 yo gentleman with no significant past medical history but does not see doctors who presents to the ED with seizure. He had slower comprehension at work, and ambulance witnessed seizure en route. Pt has had L. ear infection and has been on prednisone and Cipro otic and po. Does drink 6 beers daily, has not missed any drinks per wife. No hx of seizure, no trauma, no other complaints. Pt continues to seize in ED.    ED Course:  -Labs: WBC 20.8 K 5.9 Cl 112 HCO3 20 BNP 1341 pH 7.16 pCO2 63 sat O2 100%  Utox: THC and opiate positive  UA: No UTI. Gravity 1030  CxR: bibasilar pneumonia and atelectasis and small effusions.  Head CT and angioCT  There is no evidence of mass or acute intracranial hemorrhage. No midline shift or other significant mass effect is noted. There is no CT evidence of acute territorial infarct. There is opacification of the left mastoid air cells and left middle ear. Orbits and orbital contents are unremarkable.  MGMT: Vanco, Zosyn, mechanical ventilation on propofol and on mechanical ventilation 50 yo gentleman with PMHx of umbilical hernia (5-6 years ago) with no past medical history of epilepsy, trauma or CNS infection, but does not see doctors who presents to the ED with seizure. He had slower comprehension at work, and ambulance witnessed seizure en route. Pt has had L. ear infection and has been on prednisone and Cipro otic and po. Does drink 6 beers daily, has not missed any drinks per wife. No hx of seizure, no trauma, no other complaints. Pt continues to seize in ED.  went to work, talking incohorent, sweating, all of a suden 9 am, left 7:30 am and he was fine. last couple of months, and much more in the left ear. no secretion, last two months. Last days he could, but in last 2 days a lot of pain. No fever, pain in ear and heachaid, no rigid neck, rash. Cipro 500mg q12h oral (3-4w), before cipro drops (3-4w), prednisone 20 mg q12h oral (past weeks). Pale and sweeing.     ED Course:  -Labs: WBC 20.8 K 5.9 Cl 112 HCO3 20 BNP 1341 pH 7.16 pCO2 63 sat O2 100%  -Utox: THC and opiate positive  -UA: No UTI. Gravity 1030  -CxR: bibasilar pneumonia and atelectasis and small effusions.  -Head CT and angioCT  There is no evidence of mass or acute intracranial hemorrhage. No midline shift or other significant mass effect is noted. There is no CT evidence of acute territorial infarct. There is opacification of the left mastoid air cells and left middle ear. Orbits and orbital contents are unremarkable. Likely hypodensity of left temporal lobe in possible relationship with encephalitis.  -MGMT: Vanco 1g IV, Zosyn 3.375mg IV, propofol IV and on mechanical ventilation. 52 yo gentleman with PMHx of umbilical hernia (5-6 years ago) with no past medical history of epilepsy, trauma or CNS infection, who presents to the ED with an episode of sudden onset at 8:45am speech disturbance talking incoherently and altered behavior while working witnessed by his colleagues. His wife called him by the phone at 9am when she was driving to meet him. When she arrives the patient only can repeat "I know" but he is unable to understand or articulate different speech, associated to sweating and paleness. He has slower comprehension, and ambulance witnessed seizure en route. When he is at the emergency department he has repeated seizures (his wife says that maybe 3 or 4, unwitnessed but he had some foam). In the last 2 days much more pain on left ear and and head. Pt has had left ear infection with hypoacusia, no secretion, but rash for two months treated with a course of 2 months of ciprofloxacin 500mg q12h and drops of ciprofloxacin (unknown dose) and in the last week a course of one week of prednisone 20mg q12h oral. His wife and pt relates the infection with his repeated baths on their private swimming pool. He does consistently drink 6 beers daily, has not missed any drinks per wife. No hx of seizure, no trauma, no other complaints. Pt continues to seize in ED.    ED Course:  -Labs: WBC 20.8 K 5.9 Cl 112 HCO3 20 BNP 1341 pH 7.16 pCO2 63 sat O2 100%  -Utox: THC and opiate positive  -UA: No UTI. Gravity 1030  -CxR: bibasilar pneumonia and atelectasis and small effusions.  -Head and maxillo CT and angioCT: There is no evidence of mass or acute intracranial hemorrhage. No midline shift or other significant mass effect is noted. There is no CT evidence of acute territorial infarct. There is opacification of the left mastoid air cells and left middle ear. Orbits and orbital contents are unremarkable. Likely hypodensity of left temporal lobe in possible relationship with encephalitis.  -MGMT: Vanco 1g IV, Zosyn 3.375mg IV, propofol IV and on mechanical ventilation.

## 2021-12-03 NOTE — ED PROVIDER NOTE - CLINICAL SUMMARY MEDICAL DECISION MAKING FREE TEXT BOX
<<-----Click here for Discharge Medication Review Ddx: Status epilepticus/ ro ich/ ro alcohol withdrawal/ mastoiditis  Plan: Cbc, cmp, lactate, ct head, airway protection and intubation, seizure medications/ and benzos, admit.

## 2021-12-03 NOTE — ED PROVIDER NOTE - OBJECTIVE STATEMENT
Pt is a 52 yo gentleman with no significant past medical history but does not see doctors who presents to the ED with seizure. He had slower comprehension at work, and ambulance witnessed seizure en route. Pt has had L. ear infection and has been on prednisone and Cipro otic and po. Does drink 6 beers daily, has not missed any drinks per wife. No hx of seizure, no trauma, no other complaints. Pt continues to seize in ED.

## 2021-12-03 NOTE — H&P ADULT - CRITICAL CARE SERVICES PROVIDED
Pt agrees to sched colonoscopy & cancel appt with Dr Rodriguez info mailed  
Critical care services provided

## 2021-12-03 NOTE — ED ADULT NURSE NOTE - CHIEF COMPLAINT QUOTE
Stroke notification to ED with confusion and aphasia, active seizure upon entry to ED, MD summoned to bedside, while at work changes occurred 9a while speaking to wife on phone, daily beer drinking and doesn't like to go to the doctor however has ear pain for months, on antibiotics currently for ear infection, smoking cigarettes and marijuana usage

## 2021-12-03 NOTE — CHART NOTE - NSCHARTNOTEFT_GEN_A_CORE
Infectious Diseases Anti-infective Approval Note    Medication:  Cefepime  Dose:  2 grams  Route:   IV  Frequency:  q8hrs  Duration:  3 days    **Duration  refers to duration of approval, not recommended duration of antibiotics    Dose may be adjusted as needed for alterations in renal function.    *THIS IS NOT AN INFECTIOUS DISEASES CONSULTATION*

## 2021-12-03 NOTE — PATIENT PROFILE ADULT - FALL HARM RISK - HARM RISK INTERVENTIONS

## 2021-12-04 LAB
A1C WITH ESTIMATED AVERAGE GLUCOSE RESULT: 5.3 % — SIGNIFICANT CHANGE UP (ref 4–5.6)
ALBUMIN SERPL ELPH-MCNC: 3.1 G/DL — LOW (ref 3.3–5)
ALP SERPL-CCNC: 77 U/L — SIGNIFICANT CHANGE UP (ref 40–120)
ALT FLD-CCNC: 17 U/L — SIGNIFICANT CHANGE UP (ref 10–45)
ANION GAP SERPL CALC-SCNC: 7 MMOL/L — SIGNIFICANT CHANGE UP (ref 5–17)
APTT BLD: 32.9 SEC — SIGNIFICANT CHANGE UP (ref 27.5–35.5)
AST SERPL-CCNC: 27 U/L — SIGNIFICANT CHANGE UP (ref 10–40)
BASE EXCESS BLDA CALC-SCNC: 0 MMOL/L — SIGNIFICANT CHANGE UP (ref -2–3)
BASOPHILS # BLD AUTO: 0.05 K/UL — SIGNIFICANT CHANGE UP (ref 0–0.2)
BASOPHILS NFR BLD AUTO: 0.3 % — SIGNIFICANT CHANGE UP (ref 0–2)
BILIRUB SERPL-MCNC: 0.3 MG/DL — SIGNIFICANT CHANGE UP (ref 0.2–1.2)
BUN SERPL-MCNC: 9 MG/DL — SIGNIFICANT CHANGE UP (ref 7–23)
CALCIUM SERPL-MCNC: 8.2 MG/DL — LOW (ref 8.4–10.5)
CHLORIDE SERPL-SCNC: 103 MMOL/L — SIGNIFICANT CHANGE UP (ref 96–108)
CO2 BLDA-SCNC: 27 MMOL/L — HIGH (ref 19–24)
CO2 SERPL-SCNC: 23 MMOL/L — SIGNIFICANT CHANGE UP (ref 22–31)
CREAT SERPL-MCNC: 0.84 MG/DL — SIGNIFICANT CHANGE UP (ref 0.5–1.3)
EOSINOPHIL # BLD AUTO: 0.08 K/UL — SIGNIFICANT CHANGE UP (ref 0–0.5)
EOSINOPHIL NFR BLD AUTO: 0.4 % — SIGNIFICANT CHANGE UP (ref 0–6)
ESTIMATED AVERAGE GLUCOSE: 105 MG/DL — SIGNIFICANT CHANGE UP (ref 68–114)
GLUCOSE BLDC GLUCOMTR-MCNC: 83 MG/DL — SIGNIFICANT CHANGE UP (ref 70–99)
GLUCOSE BLDC GLUCOMTR-MCNC: 88 MG/DL — SIGNIFICANT CHANGE UP (ref 70–99)
GLUCOSE BLDC GLUCOMTR-MCNC: 90 MG/DL — SIGNIFICANT CHANGE UP (ref 70–99)
GLUCOSE BLDC GLUCOMTR-MCNC: 95 MG/DL — SIGNIFICANT CHANGE UP (ref 70–99)
GLUCOSE BLDC GLUCOMTR-MCNC: 97 MG/DL — SIGNIFICANT CHANGE UP (ref 70–99)
GLUCOSE SERPL-MCNC: 115 MG/DL — HIGH (ref 70–99)
HCO3 BLDA-SCNC: 25 MMOL/L — SIGNIFICANT CHANGE UP (ref 21–28)
HCT VFR BLD CALC: 38.8 % — LOW (ref 39–50)
HGB BLD-MCNC: 12.7 G/DL — LOW (ref 13–17)
IMM GRANULOCYTES NFR BLD AUTO: 0.7 % — SIGNIFICANT CHANGE UP (ref 0–1.5)
INR BLD: 0.98 — SIGNIFICANT CHANGE UP (ref 0.88–1.16)
LYMPHOCYTES # BLD AUTO: 1.9 K/UL — SIGNIFICANT CHANGE UP (ref 1–3.3)
LYMPHOCYTES # BLD AUTO: 10.3 % — LOW (ref 13–44)
MAGNESIUM SERPL-MCNC: 2.5 MG/DL — SIGNIFICANT CHANGE UP (ref 1.6–2.6)
MCHC RBC-ENTMCNC: 30.2 PG — SIGNIFICANT CHANGE UP (ref 27–34)
MCHC RBC-ENTMCNC: 32.7 GM/DL — SIGNIFICANT CHANGE UP (ref 32–36)
MCV RBC AUTO: 92.2 FL — SIGNIFICANT CHANGE UP (ref 80–100)
MONOCYTES # BLD AUTO: 1.9 K/UL — HIGH (ref 0–0.9)
MONOCYTES NFR BLD AUTO: 10.3 % — SIGNIFICANT CHANGE UP (ref 2–14)
NEUTROPHILS # BLD AUTO: 14.37 K/UL — HIGH (ref 1.8–7.4)
NEUTROPHILS NFR BLD AUTO: 78 % — HIGH (ref 43–77)
NRBC # BLD: 0 /100 WBCS — SIGNIFICANT CHANGE UP (ref 0–0)
PCO2 BLDA: 43 MMHG — SIGNIFICANT CHANGE UP (ref 35–48)
PH BLDA: 7.38 — SIGNIFICANT CHANGE UP (ref 7.35–7.45)
PHOSPHATE SERPL-MCNC: 2.4 MG/DL — LOW (ref 2.5–4.5)
PLATELET # BLD AUTO: 320 K/UL — SIGNIFICANT CHANGE UP (ref 150–400)
PO2 BLDA: 94 MMHG — SIGNIFICANT CHANGE UP (ref 83–108)
POTASSIUM SERPL-MCNC: 4 MMOL/L — SIGNIFICANT CHANGE UP (ref 3.5–5.3)
POTASSIUM SERPL-SCNC: 4 MMOL/L — SIGNIFICANT CHANGE UP (ref 3.5–5.3)
PROT SERPL-MCNC: 6.3 G/DL — SIGNIFICANT CHANGE UP (ref 6–8.3)
PROTHROM AB SERPL-ACNC: 11.8 SEC — SIGNIFICANT CHANGE UP (ref 10.6–13.6)
RBC # BLD: 4.21 M/UL — SIGNIFICANT CHANGE UP (ref 4.2–5.8)
RBC # FLD: 14.3 % — SIGNIFICANT CHANGE UP (ref 10.3–14.5)
SAO2 % BLDA: 98.5 % — HIGH (ref 94–98)
SODIUM SERPL-SCNC: 133 MMOL/L — LOW (ref 135–145)
WBC # BLD: 18.42 K/UL — HIGH (ref 3.8–10.5)
WBC # FLD AUTO: 18.42 K/UL — HIGH (ref 3.8–10.5)

## 2021-12-04 PROCEDURE — 70480 CT ORBIT/EAR/FOSSA W/O DYE: CPT | Mod: 26

## 2021-12-04 PROCEDURE — 99291 CRITICAL CARE FIRST HOUR: CPT

## 2021-12-04 RX ORDER — SODIUM CHLORIDE 9 MG/ML
1000 INJECTION, SOLUTION INTRAVENOUS
Refills: 0 | Status: DISCONTINUED | OUTPATIENT
Start: 2021-12-04 | End: 2021-12-04

## 2021-12-04 RX ORDER — ACETAMINOPHEN 500 MG
1000 TABLET ORAL ONCE
Refills: 0 | Status: COMPLETED | OUTPATIENT
Start: 2021-12-04 | End: 2021-12-04

## 2021-12-04 RX ORDER — CIPROFLOXACIN HCL 0.3 %
5 DROPS OPHTHALMIC (EYE)
Refills: 0 | Status: DISCONTINUED | OUTPATIENT
Start: 2021-12-04 | End: 2021-12-04

## 2021-12-04 RX ADMIN — PROPOFOL 2.58 MICROGRAM(S)/KG/MIN: 10 INJECTION, EMULSION INTRAVENOUS at 14:12

## 2021-12-04 RX ADMIN — CHLORHEXIDINE GLUCONATE 15 MILLILITER(S): 213 SOLUTION TOPICAL at 06:12

## 2021-12-04 RX ADMIN — PROPOFOL 2.58 MICROGRAM(S)/KG/MIN: 10 INJECTION, EMULSION INTRAVENOUS at 12:05

## 2021-12-04 RX ADMIN — Medication 1000 MILLIGRAM(S): at 16:47

## 2021-12-04 RX ADMIN — HEPARIN SODIUM 5000 UNIT(S): 5000 INJECTION INTRAVENOUS; SUBCUTANEOUS at 21:12

## 2021-12-04 RX ADMIN — CEFEPIME 100 MILLIGRAM(S): 1 INJECTION, POWDER, FOR SOLUTION INTRAMUSCULAR; INTRAVENOUS at 14:13

## 2021-12-04 RX ADMIN — Medication 300 MILLIGRAM(S): at 12:05

## 2021-12-04 RX ADMIN — CEFEPIME 100 MILLIGRAM(S): 1 INJECTION, POWDER, FOR SOLUTION INTRAMUSCULAR; INTRAVENOUS at 21:12

## 2021-12-04 RX ADMIN — HEPARIN SODIUM 5000 UNIT(S): 5000 INJECTION INTRAVENOUS; SUBCUTANEOUS at 14:13

## 2021-12-04 RX ADMIN — HEPARIN SODIUM 5000 UNIT(S): 5000 INJECTION INTRAVENOUS; SUBCUTANEOUS at 06:12

## 2021-12-04 RX ADMIN — Medication 300 MILLIGRAM(S): at 01:21

## 2021-12-04 RX ADMIN — PROPOFOL 2.58 MICROGRAM(S)/KG/MIN: 10 INJECTION, EMULSION INTRAVENOUS at 21:34

## 2021-12-04 RX ADMIN — CEFEPIME 100 MILLIGRAM(S): 1 INJECTION, POWDER, FOR SOLUTION INTRAMUSCULAR; INTRAVENOUS at 07:21

## 2021-12-04 RX ADMIN — PANTOPRAZOLE SODIUM 40 MILLIGRAM(S): 20 TABLET, DELAYED RELEASE ORAL at 12:05

## 2021-12-04 RX ADMIN — CHLORHEXIDINE GLUCONATE 1 APPLICATION(S): 213 SOLUTION TOPICAL at 06:14

## 2021-12-04 RX ADMIN — CHLORHEXIDINE GLUCONATE 15 MILLILITER(S): 213 SOLUTION TOPICAL at 19:03

## 2021-12-04 RX ADMIN — PROPOFOL 2.58 MICROGRAM(S)/KG/MIN: 10 INJECTION, EMULSION INTRAVENOUS at 18:08

## 2021-12-04 RX ADMIN — Medication 400 MILLIGRAM(S): at 14:13

## 2021-12-04 NOTE — PROGRESS NOTE ADULT - SUBJECTIVE AND OBJECTIVE BOX
OVERNIGHT EVENTS:  - Self extubated, had to be sedated with fentanyl and propofl and reintubated     SUBJECTIVE:   - Intubated and sedated     VITAL SIGNS:  T(F): 100.6 (21 @ 14:00)  HR: 56 (21 @ 14:00)  BP: 109/59 (21 @ 14:00)  RR: 16 (21 @ 14:00)  SpO2: 97% (21 @ 14:00      21 @ 07:01  -  21 @ 07:00  --------------------------------------------------------  IN: 659.1 mL / OUT: 2280 mL / NET: -1620.9 mL    21 @ 07:01  -  21 @ 14:44  --------------------------------------------------------  IN: 141.6 mL / OUT: 495 mL / NET: -353.4 mL    PHYSICAL EXAM:    GENERAL: Intubated and sedated.  HEAD:  Atraumatic, normocephalic  EYES: conjunctiva and sclera clear  ENT: Moist mucous membranes, rash on left ear, swollen, and erythematous   NECK: Supple, no JVD  HEART: Regular rate and rhythm, no murmurs, rubs, or gallops  LUNGS: Unlabored respirations.  Clear to auscultation bilaterally, no crackles, wheezing, or rhonchi  ABDOMEN: Soft, nontender, nondistended, +BS  EXTREMITIES: 2+ peripheral pulses bilaterally. No clubbing, cyanosis, or edema  NERVOUS SYSTEM:  Induced coma (miotic pupils minimally reactive to light), no response to pain.  SKIN: No rashes or lesions    MEDICATIONS  (STANDING):  cefepime   IVPB 2000 milliGRAM(s) IV Intermittent every 8 hours  chlorhexidine 0.12% Liquid 15 milliLiter(s) Oral Mucosa every 12 hours  chlorhexidine 2% Cloths 1 Application(s) Topical <User Schedule>  dextrose 40% Gel 15 Gram(s) Oral once  dextrose 5%. 1000 milliLiter(s) (50 mL/Hr) IV Continuous <Continuous>  dextrose 50% Injectable 25 Gram(s) IV Push once  fentaNYL   Infusion. 0.5 MICROgram(s)/kG/Hr (4.3 mL/Hr) IV Continuous <Continuous>  glucagon  Injectable 1 milliGRAM(s) IntraMuscular once  heparin   Injectable 5000 Unit(s) SubCutaneous every 8 hours  influenza   Vaccine 0.5 milliLiter(s) IntraMuscular once  insulin regular  human corrective regimen sliding scale   SubCutaneous every 6 hours  pantoprazole  Injectable 40 milliGRAM(s) IV Push daily  propofol Infusion 5 MICROgram(s)/kG/Min (2.58 mL/Hr) IV Continuous <Continuous>    Allergies    No Known Allergies    Intolerances        LABS:                        12.7   18.42 )-----------( 320      ( 04 Dec 2021 05:02 )             38.8     12-04    133<L>  |  103  |  9   ----------------------------<  115<H>  4.0   |  23  |  0.84    Ca    8.2<L>      04 Dec 2021 05:02  Phos  2.4     12-04  Mg     2.5     12-04    TPro  6.3  /  Alb  3.1<L>  /  TBili  0.3  /  DBili  x   /  AST  27  /  ALT  17  /  AlkPhos  77  12-04    PT/INR - ( 04 Dec 2021 05:02 )   PT: 11.8 sec;   INR: 0.98          PTT - ( 04 Dec 2021 05:02 )  PTT:32.9 sec  Urinalysis Basic - ( 03 Dec 2021 11:09 )    Color: Yellow / Appearance: Clear / S.030 / pH: x  Gluc: x / Ketone: Trace  / Bili: Negative / Urobili: Negative mg/dL   Blood: x / Protein: 100 mg/dL / Nitrite: Negative   Leuk Esterase: Negative / RBC: 0-2 /HPF / WBC 0-2   Sq Epi: x / Non Sq Epi: Occasional / Bacteria: Few    RADIOLOGY & ADDITIONAL TESTS:  Reviewed    MICRO:     Culture - Other (collected 21 @ 18:31)  Source: .Other L ear  Gram Stain (21 @ 19:21):    Rare Gram positive cocci in pairs    Rare Gram Positive Rods    Rare WBC's  Preliminary Report (21 @ 13:02):    Moderate Staphylococcus aureus for PBP2 confirmation    Moderate Staphylococcus epidermidis    Culture in progress

## 2021-12-04 NOTE — CHART NOTE - NSCHARTNOTEFT_GEN_A_CORE
Code grey called overhead for agitated patient. On arrival to , patient had self-extubated. Not following commands and very agitated. However, hemodynamically stable and oxygen requirements stable with ambu bag. Given inability to follow commands, severe agitation, and severity of infection/recent seizure, decision made to re-intubate patient with anesthesia for airway protection. Patient intubated and started on fentanyl in addition to propofol for sedation. 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 Attending physician overnight, nursing team, and primary team made aware.

## 2021-12-04 NOTE — PROGRESS NOTE ADULT - SUBJECTIVE AND OBJECTIVE BOX
ENT Consult Note    HARRIET GOLDMAN  [id number]    51y Male w/ unremarkable PMHx (hasn't seen a doctor in many years) presents to [facility] as a transfer for evaluation of seizure of unknown origin. ENT consults for evaluation of left ear infection, r/o malignant ear otitis infections. per wife and brother at bedside, pt has been complaining of bilateral ear pain, left greater than right, since late october. pt went to urgent care at that time and was given oral cipro. pt returned to urgent care one week later after finishing his antibiotics without any ear pain relief. he was given oral and otic drops, however, sxs did not resolve. pt was then seen by an ENT on Nov 15, who diagnosed him w/ bilateral otitis externa, and placed ear sunday bilaterally. pt was given ciprodex otic drops and a follow up. Today, wife states  was not acting like his usual self. around noon today, she went to visit him and work and pt seemed very lethargic and was only responding with one word answers. At this time, wife decided to call an ambulance. while being placed in to the ambulance, pt began having is seizure. pt was taken to [facility], and was intubated for airway protection. CT maxillofacial at [facility] which showed distal external auditory canal and foci of possible tegmen tympani/mastoideum dehiscence versus severe thinning, concerning for malignant/necrotizing otitis. Per wife, pt had severe otalgia and hearing, but did not have any otorrhea, facial weakness, vertigo, or tinnitus    INTERVAL:    12/4: Self extubation last night with emergent reintubation. Otherwise started on cefepime/vanc. MRI/CT pending. Ear cx growing Gram+ cocci and rods. Low grade fevers overnight with WBC 18.4 (down from 100.2). Sunday in place at bedside. Pt sedated.    PMHX  SEIZURES    Handoff    Acute mastoiditis, left    Convulsive status epilepticus    Alcohol abuse, daily use    Umbilical hernia    [id number]      PSHx: inguinal hernia    Allergies  No Known Allergies    Meds  chlorhexidine 2% Cloths 1 Application(s) Topical <User Schedule>  dextrose 40% Gel 15 Gram(s) Oral once  dextrose 5%. 1000 milliLiter(s) IV Continuous <Continuous>  dextrose 50% Injectable 25 Gram(s) IV Push once  enoxaparin Injectable 40 milliGRAM(s) SubCutaneous daily  glucagon  Injectable 1 milliGRAM(s) IntraMuscular once  insulin regular  human corrective regimen sliding scale   SubCutaneous every 6 hours  pantoprazole  Injectable 40 milliGRAM(s) IV Push daily  piperacillin/tazobactam IVPB.. 3.375 Gram(s) IV Intermittent every 6 hours      PE  Vitals  T(C): 37.6 (12-03-21 @ 15:45), Max: 37.6 (12-03-21 @ 15:45)  HR: 79 (12-03-21 @ 16:45) (79 - 131)  BP: 122/76 (12-03-21 @ 16:45) (113/70 - 207/110)  RR: 16 (12-03-21 @ 11:32) (16 - 21)  SpO2: 100% (12-03-21 @ 16:45) (95% - 100%)    PHYSICAL EXAM:    CONSTITUTIONAL: Well nourished, well developed, sedated  HEAD: normocephalic, atraumatic.  EARS  AD: Mastoid non-tender/non-edematous. non-proptotic , non-erythematous right pinna. Wick in place. (BEFORE: Right EAC edematous; unable to assess beyond membranous canal. no granulation tissue or masses appreciated of limited EAC)  AS: Mastoid non-tender/non-edematous. non-proptotic pinna. Left pinna edematous and erythematous. Wick in place. (BEFORE: Erythema and edema extends medially into EAC. Purulence appreciated. culture taken. unable assess beyond membranous canal. No granulation or masses appreciated.)  NOSE: Normal external nose.   ORAL CAVITY/OROPHARYNX: normal mucosa. exam limited by ETT  NECK: No cervical lymphadenopathy  RESPIRATORY: intubated connected to vent    Labs                        16.5   20.89 )-----------( 411      ( 03 Dec 2021 10:37 )             52.4     12-03    137  |  105  |  13  ----------------------------<  103<H>  4.9   |  23  |  0.93    Ca    8.3<L>      03 Dec 2021 16:31  Phos  3.2     12-03  Mg     2.6     12-03    TPro  6.8  /  Alb  3.5  /  TBili  0.3  /  DBili  x   /  AST  28  /  ALT  22  /  AlkPhos  86  12-03          AP: 51 M w/ roughly one month hx of bilateral otitis externa, left greater than right, now presenting w/ seizures. PE does not reveal any granulation tissue in left EAC, however, b/l EACs are edematous and tender to manipulation.     -recommend ciprodex bilaterally; 5 drops to each ear wick twice a day  -recommend CT temporal bone w/ and w/o con  -agree w/ MRI IAC to assess for osteo and skull base  -vanc/cefepime per ID  -f/u left EAC ear culture   -ENT to follow up imaging  -rest of care per primary team    Seen w/ senior resident. Discussed with attending.

## 2021-12-04 NOTE — PROGRESS NOTE ADULT - ASSESSMENT
52 yo M with PMHx no past medical history of epilepsy, trauma or CNS infection, who presents to the ED with an episode of sudden onset speech disturbance talking incoherently and repetitive and altered behavior while working witnessed, no fever. In the last 2 days much more pain on left ear and headache. Pt uses frequently his swimming pool has had left ear infection for 2 months with hypoacusia treated with long course ciprofloxacin and a week of prednisone. In ED presented, repetitive generalized seizures. 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 Started on vancomycin and Zosyn IV. Admitted to ICU to continue his care, on cefepime and vanc IV (to cover CNS pseudomonal infection) propofol drip and placed on ventilator.     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 Frequent baths, high pseudomonal suspicion.  -intubated and sedatedl  -daily wean off sedation for neurochecks   - CT temporal bones done   -MRI IAC with contrast IV  - vEEG monitoring  -Maintain seizure and fall precautions  - no AED at this time     CARDIO  None.  EKG sinus rhythm 96 bmp    RESPIRATORY  -Intubated for protecting airways in patient with status epilepticus  -wean vent as tolerated  -spontaneous breathing trials daily  -CxR w/o significant findings    GI  #diet  -NPO today    ENDO  None    RENAL  None    HEM/ONC  None    ID  #Left external otitis  - FU ear cultures   -s/p left mastoiditis s/p left temporal lobe encephalitis. In ED, vanco and Zosyn IV. In ICU, started on vancomycin 1500mg q12h IV and cefepime 2g q8h IV.  - Blood and urine cultures done  -cultures if new fever    F: none  E: replete PRN  N: NPO for today  DVT: Heparin IV q8h  GI: Pantoprazol 40mg IV q24h    FULL code 52 yo M with PMHx no past medical history of epilepsy, trauma or CNS infection, who presents to the ED with an episode of sudden onset speech disturbance talking incoherently and repetitive and altered behavior while working witnessed, no fever. In the last 2 days much more pain on left ear and headache. Pt uses frequently his swimming pool has had left ear infection for 2 months with hypoacusia treated with long course ciprofloxacin and a week of prednisone. In ED presented, repetitive generalized seizures. 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 Started on vancomycin and Zosyn IV. Admitted to ICU to continue his care, on cefepime and vanc IV (to cover CNS pseudomonal infection) propofol drip and placed on ventilator.     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 Frequent baths, high pseudomonal suspicion.  -intubated and sedatedl  -daily wean off sedation for neurochecks   - CT temporal bones done   -MRI IAC with contrast IV  - vEEG monitoring  -Maintain seizure and fall precautions  - no AED at this time     CARDIO  None.  EKG sinus rhythm 96 bmp    RESPIRATORY  -Intubated for protecting airways in patient with status epilepticus  -wean vent as tolerated  -spontaneous breathing trials daily  -CxR w/o significant findings    GI  #diet  -NPO today    ENDO  None    RENAL  None    HEM/ONC  None    ID  #Left external otitis  s/p left mastoiditis s/p left temporal lobe encephalitis.   - FU ear cultures- showing GPR and GPC in pairs   - ciprodex bilaterally; 5 drops to each ear wick twice a day  - RI IAC to assess for osteo and skull base  - FU CT temporal bones  - F/U with ENT on whether findings on CT require surgical intervention  - started on vancomycin 1500mg q12h IV and cefepime 2g q8h IV.  - Blood and urine cultures done today   - cultures if new fever    F: none  E: replete PRN  N: NPO for today  DVT: Heparin IV q8h  GI: Pantoprazol 40mg IV q24h    FULL code

## 2021-12-05 ENCOUNTER — FORM ENCOUNTER (OUTPATIENT)
Age: 51
End: 2021-12-05

## 2021-12-05 LAB
ALBUMIN SERPL ELPH-MCNC: 3 G/DL — LOW (ref 3.3–5)
ALP SERPL-CCNC: 81 U/L — SIGNIFICANT CHANGE UP (ref 40–120)
ALT FLD-CCNC: 20 U/L — SIGNIFICANT CHANGE UP (ref 10–45)
ANION GAP SERPL CALC-SCNC: 9 MMOL/L — SIGNIFICANT CHANGE UP (ref 5–17)
AST SERPL-CCNC: 39 U/L — SIGNIFICANT CHANGE UP (ref 10–40)
BILIRUB SERPL-MCNC: 0.3 MG/DL — SIGNIFICANT CHANGE UP (ref 0.2–1.2)
BUN SERPL-MCNC: 7 MG/DL — SIGNIFICANT CHANGE UP (ref 7–23)
CALCIUM SERPL-MCNC: 8.4 MG/DL — SIGNIFICANT CHANGE UP (ref 8.4–10.5)
CHLORIDE SERPL-SCNC: 103 MMOL/L — SIGNIFICANT CHANGE UP (ref 96–108)
CO2 SERPL-SCNC: 24 MMOL/L — SIGNIFICANT CHANGE UP (ref 22–31)
CREAT SERPL-MCNC: 0.81 MG/DL — SIGNIFICANT CHANGE UP (ref 0.5–1.3)
CULTURE RESULTS: NO GROWTH — SIGNIFICANT CHANGE UP
CULTURE RESULTS: NO GROWTH — SIGNIFICANT CHANGE UP
GLUCOSE BLDC GLUCOMTR-MCNC: 105 MG/DL — HIGH (ref 70–99)
GLUCOSE BLDC GLUCOMTR-MCNC: 86 MG/DL — SIGNIFICANT CHANGE UP (ref 70–99)
GLUCOSE SERPL-MCNC: 80 MG/DL — SIGNIFICANT CHANGE UP (ref 70–99)
HCT VFR BLD CALC: 41.5 % — SIGNIFICANT CHANGE UP (ref 39–50)
HGB BLD-MCNC: 13.5 G/DL — SIGNIFICANT CHANGE UP (ref 13–17)
MAGNESIUM SERPL-MCNC: 2.2 MG/DL — SIGNIFICANT CHANGE UP (ref 1.6–2.6)
MCHC RBC-ENTMCNC: 29.9 PG — SIGNIFICANT CHANGE UP (ref 27–34)
MCHC RBC-ENTMCNC: 32.5 GM/DL — SIGNIFICANT CHANGE UP (ref 32–36)
MCV RBC AUTO: 91.8 FL — SIGNIFICANT CHANGE UP (ref 80–100)
NRBC # BLD: 0 /100 WBCS — SIGNIFICANT CHANGE UP (ref 0–0)
PHOSPHATE SERPL-MCNC: 2.3 MG/DL — LOW (ref 2.5–4.5)
PLATELET # BLD AUTO: 282 K/UL — SIGNIFICANT CHANGE UP (ref 150–400)
POTASSIUM SERPL-MCNC: 3.8 MMOL/L — SIGNIFICANT CHANGE UP (ref 3.5–5.3)
POTASSIUM SERPL-SCNC: 3.8 MMOL/L — SIGNIFICANT CHANGE UP (ref 3.5–5.3)
PROT SERPL-MCNC: 6.7 G/DL — SIGNIFICANT CHANGE UP (ref 6–8.3)
RBC # BLD: 4.52 M/UL — SIGNIFICANT CHANGE UP (ref 4.2–5.8)
RBC # FLD: 14.3 % — SIGNIFICANT CHANGE UP (ref 10.3–14.5)
SODIUM SERPL-SCNC: 136 MMOL/L — SIGNIFICANT CHANGE UP (ref 135–145)
SPECIMEN SOURCE: SIGNIFICANT CHANGE UP
SPECIMEN SOURCE: SIGNIFICANT CHANGE UP
VANCOMYCIN FLD-MCNC: 8.3 UG/ML — SIGNIFICANT CHANGE UP
WBC # BLD: 13.58 K/UL — HIGH (ref 3.8–10.5)
WBC # FLD AUTO: 13.58 K/UL — HIGH (ref 3.8–10.5)

## 2021-12-05 PROCEDURE — 71045 X-RAY EXAM CHEST 1 VIEW: CPT | Mod: 26

## 2021-12-05 PROCEDURE — 70481 CT ORBIT/EAR/FOSSA W/DYE: CPT | Mod: 26

## 2021-12-05 PROCEDURE — 99291 CRITICAL CARE FIRST HOUR: CPT

## 2021-12-05 RX ORDER — ACETAMINOPHEN 500 MG
1000 TABLET ORAL ONCE
Refills: 0 | Status: COMPLETED | OUTPATIENT
Start: 2021-12-05 | End: 2021-12-05

## 2021-12-05 RX ORDER — VANCOMYCIN HCL 1 G
1750 VIAL (EA) INTRAVENOUS EVERY 12 HOURS
Refills: 0 | Status: COMPLETED | OUTPATIENT
Start: 2021-12-05 | End: 2021-12-06

## 2021-12-05 RX ORDER — FENTANYL CITRATE 50 UG/ML
100 INJECTION INTRAVENOUS ONCE
Refills: 0 | Status: DISCONTINUED | OUTPATIENT
Start: 2021-12-05 | End: 2021-12-05

## 2021-12-05 RX ORDER — SODIUM,POTASSIUM PHOSPHATES 278-250MG
1 POWDER IN PACKET (EA) ORAL ONCE
Refills: 0 | Status: COMPLETED | OUTPATIENT
Start: 2021-12-05 | End: 2021-12-05

## 2021-12-05 RX ORDER — PHENOBARBITAL 60 MG
130 TABLET ORAL
Refills: 0 | Status: DISCONTINUED | OUTPATIENT
Start: 2021-12-05 | End: 2021-12-08

## 2021-12-05 RX ORDER — DEXMEDETOMIDINE HYDROCHLORIDE IN 0.9% SODIUM CHLORIDE 4 UG/ML
0.2 INJECTION INTRAVENOUS
Qty: 400 | Refills: 0 | Status: DISCONTINUED | OUTPATIENT
Start: 2021-12-05 | End: 2021-12-08

## 2021-12-05 RX ORDER — FOLIC ACID 0.8 MG
1 TABLET ORAL DAILY
Refills: 0 | Status: DISCONTINUED | OUTPATIENT
Start: 2021-12-05 | End: 2021-12-16

## 2021-12-05 RX ORDER — ACETAMINOPHEN 500 MG
1000 TABLET ORAL EVERY 8 HOURS
Refills: 0 | Status: COMPLETED | OUTPATIENT
Start: 2021-12-05 | End: 2021-12-05

## 2021-12-05 RX ORDER — THIAMINE MONONITRATE (VIT B1) 100 MG
100 TABLET ORAL DAILY
Refills: 0 | Status: COMPLETED | OUTPATIENT
Start: 2021-12-05 | End: 2021-12-08

## 2021-12-05 RX ORDER — MIDAZOLAM HYDROCHLORIDE 1 MG/ML
2 INJECTION, SOLUTION INTRAMUSCULAR; INTRAVENOUS ONCE
Refills: 0 | Status: DISCONTINUED | OUTPATIENT
Start: 2021-12-05 | End: 2021-12-05

## 2021-12-05 RX ORDER — PROPOFOL 10 MG/ML
40 INJECTION, EMULSION INTRAVENOUS ONCE
Refills: 0 | Status: COMPLETED | OUTPATIENT
Start: 2021-12-05 | End: 2021-12-05

## 2021-12-05 RX ADMIN — FENTANYL CITRATE 100 MICROGRAM(S): 50 INJECTION INTRAVENOUS at 17:00

## 2021-12-05 RX ADMIN — Medication 1000 MILLIGRAM(S): at 14:56

## 2021-12-05 RX ADMIN — Medication 250 MILLIGRAM(S): at 17:37

## 2021-12-05 RX ADMIN — CHLORHEXIDINE GLUCONATE 15 MILLILITER(S): 213 SOLUTION TOPICAL at 17:37

## 2021-12-05 RX ADMIN — PROPOFOL 2.58 MICROGRAM(S)/KG/MIN: 10 INJECTION, EMULSION INTRAVENOUS at 08:55

## 2021-12-05 RX ADMIN — HEPARIN SODIUM 5000 UNIT(S): 5000 INJECTION INTRAVENOUS; SUBCUTANEOUS at 21:46

## 2021-12-05 RX ADMIN — Medication 250 MILLIGRAM(S): at 09:35

## 2021-12-05 RX ADMIN — DEXMEDETOMIDINE HYDROCHLORIDE IN 0.9% SODIUM CHLORIDE 4.3 MICROGRAM(S)/KG/HR: 4 INJECTION INTRAVENOUS at 23:22

## 2021-12-05 RX ADMIN — PROPOFOL 2.58 MICROGRAM(S)/KG/MIN: 10 INJECTION, EMULSION INTRAVENOUS at 12:05

## 2021-12-05 RX ADMIN — CHLORHEXIDINE GLUCONATE 1 APPLICATION(S): 213 SOLUTION TOPICAL at 05:13

## 2021-12-05 RX ADMIN — DEXMEDETOMIDINE HYDROCHLORIDE IN 0.9% SODIUM CHLORIDE 4.3 MICROGRAM(S)/KG/HR: 4 INJECTION INTRAVENOUS at 21:46

## 2021-12-05 RX ADMIN — CHLORHEXIDINE GLUCONATE 15 MILLILITER(S): 213 SOLUTION TOPICAL at 05:13

## 2021-12-05 RX ADMIN — HEPARIN SODIUM 5000 UNIT(S): 5000 INJECTION INTRAVENOUS; SUBCUTANEOUS at 14:23

## 2021-12-05 RX ADMIN — FENTANYL CITRATE 100 MICROGRAM(S): 50 INJECTION INTRAVENOUS at 16:59

## 2021-12-05 RX ADMIN — PROPOFOL 40 MILLIGRAM(S): 10 INJECTION, EMULSION INTRAVENOUS at 16:00

## 2021-12-05 RX ADMIN — CEFEPIME 100 MILLIGRAM(S): 1 INJECTION, POWDER, FOR SOLUTION INTRAMUSCULAR; INTRAVENOUS at 14:23

## 2021-12-05 RX ADMIN — DEXMEDETOMIDINE HYDROCHLORIDE IN 0.9% SODIUM CHLORIDE 4.3 MICROGRAM(S)/KG/HR: 4 INJECTION INTRAVENOUS at 17:34

## 2021-12-05 RX ADMIN — CEFEPIME 100 MILLIGRAM(S): 1 INJECTION, POWDER, FOR SOLUTION INTRAMUSCULAR; INTRAVENOUS at 05:13

## 2021-12-05 RX ADMIN — PROPOFOL 2.58 MICROGRAM(S)/KG/MIN: 10 INJECTION, EMULSION INTRAVENOUS at 01:16

## 2021-12-05 RX ADMIN — PROPOFOL 2.58 MICROGRAM(S)/KG/MIN: 10 INJECTION, EMULSION INTRAVENOUS at 06:20

## 2021-12-05 RX ADMIN — HEPARIN SODIUM 5000 UNIT(S): 5000 INJECTION INTRAVENOUS; SUBCUTANEOUS at 05:13

## 2021-12-05 RX ADMIN — Medication 400 MILLIGRAM(S): at 14:23

## 2021-12-05 RX ADMIN — PANTOPRAZOLE SODIUM 40 MILLIGRAM(S): 20 TABLET, DELAYED RELEASE ORAL at 11:59

## 2021-12-05 RX ADMIN — FENTANYL CITRATE 4.3 MICROGRAM(S)/KG/HR: 50 INJECTION INTRAVENOUS at 06:05

## 2021-12-05 RX ADMIN — CEFEPIME 100 MILLIGRAM(S): 1 INJECTION, POWDER, FOR SOLUTION INTRAMUSCULAR; INTRAVENOUS at 21:46

## 2021-12-05 RX ADMIN — Medication 400 MILLIGRAM(S): at 23:52

## 2021-12-05 NOTE — PROGRESS NOTE ADULT - SUBJECTIVE AND OBJECTIVE BOX
OVERNIGHT EVENTS: restraints renewed. Pending CT- will get done at 7 AM.     SUBJECTIVE / INTERVAL HPI: Patient seen and examined at bedside.     Remaining ROS negative     PHYSICAL EXAM:  GENERAL: Intubated and sedated.  HEAD:  Atraumatic, normocephalic  EYES: conjunctiva and sclera clear  ENT: Moist mucous membranes, rash on left ear, swollen, and erythematous   NECK: Supple, no JVD  HEART: Regular rate and rhythm, no murmurs, rubs, or gallops  LUNGS: Unlabored respirations.  Clear to auscultation bilaterally, no crackles, wheezing, or rhonchi  ABDOMEN: Soft, nontender, nondistended, +BS  EXTREMITIES: 2+ peripheral pulses bilaterally. No clubbing, cyanosis, or edema  NERVOUS SYSTEM:  Induced coma (miotic pupils minimally reactive to light), no response to pain.  SKIN: No rashes or lesions    VITAL SIGNS:  Vital Signs Last 24 Hrs  T(C): 37.9 (05 Dec 2021 10:00), Max: 38.1 (04 Dec 2021 14:00)  T(F): 100.2 (05 Dec 2021 10:00), Max: 100.6 (04 Dec 2021 14:00)  HR: 61 (05 Dec 2021 11:00) (56 - 73)  BP: 125/63 (05 Dec 2021 11:00) (103/58 - 147/75)  BP(mean): 88 (05 Dec 2021 11:00) (76 - 104)  RR: 16 (05 Dec 2021 11:00) (14 - 16)  SpO2: 100% (05 Dec 2021 11:00) (93% - 100%)    MEDICATIONS:  MEDICATIONS  (STANDING):  cefepime   IVPB 2000 milliGRAM(s) IV Intermittent every 8 hours  chlorhexidine 0.12% Liquid 15 milliLiter(s) Oral Mucosa every 12 hours  chlorhexidine 2% Cloths 1 Application(s) Topical <User Schedule>  CIPROFLOXACIN(CIPRO) OPTHALMIC SOLUTION 5 Drop(s) 5 Drop(s) Both Ears two times a day  dextrose 40% Gel 15 Gram(s) Oral once  dextrose 5%. 1000 milliLiter(s) (50 mL/Hr) IV Continuous <Continuous>  dextrose 50% Injectable 25 Gram(s) IV Push once  fentaNYL   Infusion. 0.5 MICROgram(s)/kG/Hr (4.3 mL/Hr) IV Continuous <Continuous>  glucagon  Injectable 1 milliGRAM(s) IntraMuscular once  heparin   Injectable 5000 Unit(s) SubCutaneous every 8 hours  influenza   Vaccine 0.5 milliLiter(s) IntraMuscular once  insulin regular  human corrective regimen sliding scale   SubCutaneous every 6 hours  pantoprazole  Injectable 40 milliGRAM(s) IV Push daily  propofol Infusion 5 MICROgram(s)/kG/Min (2.58 mL/Hr) IV Continuous <Continuous>  vancomycin  IVPB 1750 milliGRAM(s) IV Intermittent every 12 hours    ALLERGIES:  No Known Allergies    LABS:                        13.5   13.58 )-----------( 282      ( 05 Dec 2021 05:27 )             41.5     12-05    136  |  103  |  7   ----------------------------<  80  3.8   |  24  |  0.81    Ca    8.4      05 Dec 2021 05:27  Phos  2.3     12-05  Mg     2.2     12-05    TPro  6.7  /  Alb  3.0<L>  /  TBili  0.3  /  DBili  x   /  AST  39  /  ALT  20  /  AlkPhos  81  12-05    PT/INR - ( 04 Dec 2021 05:02 )   PT: 11.8 sec;   INR: 0.98       PTT - ( 04 Dec 2021 05:02 )  PTT:32.9 sec    CAPILLARY BLOOD GLUCOSE  POCT Blood Glucose.: 105 mg/dL (05 Dec 2021 11:48)    RADIOLOGY & ADDITIONAL TESTS: Reviewed.

## 2021-12-05 NOTE — DIETITIAN INITIAL EVALUATION ADULT. - OTHER INFO
50 yo M presents to the ED with an episode of sudden onset speech disturbance talking incoherently and repetitive and altered behavior. In ED presented, repetitive generalized seizures. 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 Pt Self extubated last night with emergent reintubation 12/4. Pt Remains intubated in CCU under MICU. Seen on vent AC/CMV Mode. NPO/No feeding tube at this time, RN reports pt pulled out. RN unsure if tube to be replaced as pt may be extubated s/p pending MRI. MAP 88. Ordered for drips of fentaNYL, propofol Infusion (rate ~31, x24hrs provides ~818kcal). No pain. Glynn 10. No edema/pressure ulcers. +BM12/3.   Please see below for nutritions recommendations.

## 2021-12-05 NOTE — CHART NOTE - NSCHARTNOTEFT_GEN_A_CORE
Called to bedside for patient tonguing out tube during spontaneous awakening trials. ETT not in place. ETT removed. Holding reintubation at this time. Patient awake, speaking full sentences. Wet cough. Intensivist at bedside.

## 2021-12-05 NOTE — DIETITIAN INITIAL EVALUATION ADULT. - PERTINENT MEDS FT
Heparin  Vanco  cefepime    Protonix  insulin regular  human corrective regimen sliding scale  Ordered for drips of fentaNYL, propofol Infusion (rate ~31, x24hrs provides ~818kcal)

## 2021-12-05 NOTE — PROGRESS NOTE ADULT - ASSESSMENT
50 yo M with PMHx no past medical history of epilepsy, trauma or CNS infection, who presents to the ED with an episode of sudden onset speech disturbance talking incoherently and repetitive and altered behavior while working witnessed, no fever. In the last 2 days much more pain on left ear and headache. Pt uses frequently his swimming pool has had left ear infection for 2 months with hypoacusia treated with long course ciprofloxacin and a week of prednisone. In ED presented, repetitive generalized seizures. 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 Started on vancomycin and Zosyn IV. Admitted to ICU to continue his care, on cefepime and vanc IV (to cover CNS pseudomonal infection) propofol drip and placed on ventilator.     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 Frequent baths, high pseudomonal suspicion.  -intubated and sedatedl  -daily wean off sedation for neurochecks   - CT temporal bones done   -MRI IAC with contrast IV  - vEEG monitoring  -Maintain seizure and fall precautions  - no AED at this time   - f/u IAC CT scan    CARDIO  None.  EKG sinus rhythm 96 bmp    RESPIRATORY  -Intubated for protecting airways in patient with status epilepticus  -wean vent as tolerated  -spontaneous breathing trials daily  -CxR w/o significant findings    GI  #diet  -NPO today    ENDO  None    RENAL  None    HEM/ONC  None    ID  #Left external otitis  s/p left mastoiditis s/p left temporal lobe encephalitis.   - FU ear cultures- showing GPR and GPC in pairs   - ciprodex bilaterally; 5 drops to each ear wick twice a day  - RI IAC to assess for osteo and skull base  - FU CT temporal bones  - F/U with ENT on whether findings on CT require surgical intervention  - started on vancomycin 1500mg q12h IV and cefepime 2g q8h IV.  - Blood and urine cultures done today   - cultures if new fever    F: none  E: replete PRN  N: NPO for today  DVT: Heparin IV q8h  GI: Pantoprazol 40mg IV q24h    FULL code 50 yo M with PMHx no past medical history of epilepsy, trauma or CNS infection, who presents to the ED with an episode of sudden onset speech disturbance talking incoherently and repetitive and altered behavior while working witnessed, no fever. In the last 2 days much more pain on left ear and headache. Pt uses frequently his swimming pool has had left ear infection for 2 months with hypoacusia treated with long course ciprofloxacin and a week of prednisone. In ED presented, repetitive generalized seizures. 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 Started on vancomycin and Zosyn IV. Admitted to ICU to continue his care, on cefepime and vanc IV (to cover CNS pseudomonal infection) propofol drip and placed on ventilator. On ear cultures found staph. aureus and epidermidis. Today will try to extubate.    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 Frequent baths, high pseudomonal suspicion.  -intubated and sedatedl  -daily wean off sedation for neurochecks   - CT temporal bones done   -MRI IAC with contrast IV  - vEEG monitoring  -Maintain seizure and fall precautions  -no AED at this time     CARDIO  None.  EKG sinus rhythm 96 bmp    RESPIRATORY  -Intubated for protecting airways in patient with status epilepticus  -wean vent as tolerated  -spontaneous breathing trials daily  -CxR w/o significant findings    GI  #diet  -NPO today    ENDO  None    RENAL  None    HEM/ONC  None    ID  #Left external otitis  s/p left mastoiditis s/p left temporal lobe encephalitis.   - FU ear cultures- showing GPR and GPC in pairs   - ciprodex bilaterally; 5 drops to each ear wick twice a day  - RI IAC to assess for osteo and skull base  - C/w vancomycin 1500mg q12h IV and cefepime 2g q8h IV.  - F/u blood (NGTD), urine (NGTD) and ear cultures. On ear cultures found staph. aureus and epidermidis  - Vanc sub therapeutic, redosed x3, MSSA from ear culture    - f/u IAC CT scan w/wo contrast. ENT to follow up imaging  - ID if needs ampho B   - C/w ciprodex bilaterally; 5 drops to each ear wick twice a day  - No acute ENT intervention at this time    F: none  E: replete PRN  N: NPO for today  DVT: Heparin IV q8h  GI: Pantoprazol 40mg IV q24h    FULL code

## 2021-12-05 NOTE — DIETITIAN INITIAL EVALUATION ADULT. - ADD RECOMMEND
If extubated recommend bedside dys screen prior to PO diet. Should pt pass adv to regular diet - if pt fails or s/s of issues swallowing are noted or suspected NPO/SLP. If pt remains intubated however, begin trickle feeds as medically feasible. Jevity 1.2 @20ml/hr x24hrs to provide ~480ml, 576kcal/1394kcal with propofol at current rate, 27gm prot. Final goal rate to be adjusted if pt Remains on Vent with adjustments for propofol. Monitor Skin, Wts, Labs, Pain, GI. RD to remain available for additional nutrition interventions as needed.

## 2021-12-05 NOTE — PROGRESS NOTE ADULT - SUBJECTIVE AND OBJECTIVE BOX
ENT Consult Note    HARRIET GOLDMAN  [id number]    51y Male w/ unremarkable PMHx (hasn't seen a doctor in many years) presents to [facility] as a transfer for evaluation of seizure of unknown origin. ENT consults for evaluation of left ear infection, r/o malignant ear otitis infections. per wife and brother at bedside, pt has been complaining of bilateral ear pain, left greater than right, since late october. pt went to urgent care at that time and was given oral cipro. pt returned to urgent care one week later after finishing his antibiotics without any ear pain relief. he was given oral and otic drops, however, sxs did not resolve. pt was then seen by an ENT on Nov 15, who diagnosed him w/ bilateral otitis externa, and placed ear sunday bilaterally. pt was given ciprodex otic drops and a follow up. Today, wife states  was not acting like his usual self. around noon today, she went to visit him and work and pt seemed very lethargic and was only responding with one word answers. At this time, wife decided to call an ambulance. while being placed in to the ambulance, pt began having is seizure. pt was taken to [facility], and was intubated for airway protection. CT maxillofacial at [place] which showed distal external auditory canal and foci of possible tegmen tympani/mastoideum dehiscence versus severe thinning, concerning for malignant/necrotizing otitis. Per wife, pt had severe otalgia and hearing, but did not have any otorrhea, facial weakness, vertigo, or tinnitus    INTERVAL:    12/4: Self extubation last night with emergent reintubation. Otherwise started on cefepime/vanc. MRI/CT pending. Ear cx growing Gram+ cocci and rods. Low grade fevers overnight with WBC 18.4 (down from 100.2). Sunday in place at bedside. Pt sedated.    12/5: NAEON. Still on vanc/cef and ciprodex eardrops. Afebrile overnight. Ear cx sensitivities pending. CT scan temp bones with similar findings as CT maxillofacial from [place]. WBC downtrending today (down to 13.6). Pt was seen and examined at bedside. Stable exam findings, no mastoid swelling. Sedated and intubated on vent.      ICU Vital Signs Last 24 Hrs  T(C): 37.9 (05 Dec 2021 10:00), Max: 38.1 (04 Dec 2021 13:00)  T(F): 100.2 (05 Dec 2021 10:00), Max: 100.6 (04 Dec 2021 13:00)  HR: 64 (05 Dec 2021 09:24) (52 - 73)  BP: 129/66 (05 Dec 2021 08:00) (98/59 - 147/75)  BP(mean): 92 (05 Dec 2021 08:00) (73 - 104)  ABP: --  ABP(mean): --  RR: 16 (05 Dec 2021 09:24) (14 - 16)  SpO2: 98% (05 Dec 2021 09:24) (93% - 99%)    PHYSICAL EXAM:    CONSTITUTIONAL: Well nourished, well developed, sedated  HEAD: normocephalic, atraumatic.  EARS  AD: Mastoid non-tender/non-edematous. non-proptotic , non-erythematous right pinna. Wick in place. (BEFORE: Right EAC edematous; unable to assess beyond membranous canal. no granulation tissue or masses appreciated of limited EAC)  AS: Mastoid non-tender/non-edematous. non-proptotic pinna. Left pinna edematous and erythematous. Wick in place. (BEFORE: Erythema and edema extends medially into EAC. Purulence appreciated. culture taken. unable assess beyond membranous canal. No granulation or masses appreciated.)  NOSE: Normal external nose.   ORAL CAVITY/OROPHARYNX: normal mucosa. exam limited by ETT  NECK: No cervical lymphadenopathy  RESPIRATORY: intubated connected to vent    Labs                        16.5   20.89 )-----------( 411      ( 03 Dec 2021 10:37 )             52.4     12-03    137  |  105  |  13  ----------------------------<  103<H>  4.9   |  23  |  0.93    Ca    8.3<L>      03 Dec 2021 16:31  Phos  3.2     12-03  Mg     2.6     12-03    TPro  6.8  /  Alb  3.5  /  TBili  0.3  /  DBili  x   /  AST  28  /  ALT  22  /  AlkPhos  86  12-03          AP: 51 M w/ roughly one month hx of bilateral otitis externa, left greater than right, now presenting w/ seizures. PE does not reveal any granulation tissue in left EAC, however, b/l EACs are edematous and tender to manipulation. Currently on cefepime/vancomycin per ID with ciprodex ear drops b/l with improvement in fevers and WBC. No signs of mastoiditis on clinical examination.    -C/w ciprodex bilaterally; 5 drops to each ear wick twice a day  -Recommend CT temporal bone w/ con  -Agree w/ MRI IAC to assess for osteo and skull base  -Vanc/cefepime per ID  -F/u left EAC ear culture (staph aureus/epidermiditis, f/u sensitivities)  -ENT to follow up imaging  -Rest of care per primary team  -No acute ENT intervention at this time    Seen w/ senior resident. Discussed with attending.

## 2021-12-06 LAB
-  CEFAZOLIN: SIGNIFICANT CHANGE UP
-  CLINDAMYCIN: SIGNIFICANT CHANGE UP
-  ERYTHROMYCIN: SIGNIFICANT CHANGE UP
-  LINEZOLID: SIGNIFICANT CHANGE UP
-  OXACILLIN: SIGNIFICANT CHANGE UP
-  RIFAMPIN: SIGNIFICANT CHANGE UP
-  TRIMETHOPRIM/SULFAMETHOXAZOLE: SIGNIFICANT CHANGE UP
-  VANCOMYCIN: SIGNIFICANT CHANGE UP
ANION GAP SERPL CALC-SCNC: 10 MMOL/L — SIGNIFICANT CHANGE UP (ref 5–17)
BASOPHILS # BLD AUTO: 0.09 K/UL — SIGNIFICANT CHANGE UP (ref 0–0.2)
BASOPHILS NFR BLD AUTO: 0.4 % — SIGNIFICANT CHANGE UP (ref 0–2)
BUN SERPL-MCNC: 8 MG/DL — SIGNIFICANT CHANGE UP (ref 7–23)
CALCIUM SERPL-MCNC: 9 MG/DL — SIGNIFICANT CHANGE UP (ref 8.4–10.5)
CHLORIDE SERPL-SCNC: 103 MMOL/L — SIGNIFICANT CHANGE UP (ref 96–108)
CO2 SERPL-SCNC: 25 MMOL/L — SIGNIFICANT CHANGE UP (ref 22–31)
CREAT SERPL-MCNC: 0.77 MG/DL — SIGNIFICANT CHANGE UP (ref 0.5–1.3)
CULTURE RESULTS: SIGNIFICANT CHANGE UP
EOSINOPHIL # BLD AUTO: 0.16 K/UL — SIGNIFICANT CHANGE UP (ref 0–0.5)
EOSINOPHIL NFR BLD AUTO: 0.8 % — SIGNIFICANT CHANGE UP (ref 0–6)
GLUCOSE BLDC GLUCOMTR-MCNC: 89 MG/DL — SIGNIFICANT CHANGE UP (ref 70–99)
GLUCOSE BLDC GLUCOMTR-MCNC: 95 MG/DL — SIGNIFICANT CHANGE UP (ref 70–99)
GLUCOSE SERPL-MCNC: 110 MG/DL — HIGH (ref 70–99)
HCT VFR BLD CALC: 42.4 % — SIGNIFICANT CHANGE UP (ref 39–50)
HGB BLD-MCNC: 14.3 G/DL — SIGNIFICANT CHANGE UP (ref 13–17)
IMM GRANULOCYTES NFR BLD AUTO: 0.8 % — SIGNIFICANT CHANGE UP (ref 0–1.5)
LYMPHOCYTES # BLD AUTO: 1.81 K/UL — SIGNIFICANT CHANGE UP (ref 1–3.3)
LYMPHOCYTES # BLD AUTO: 8.9 % — LOW (ref 13–44)
MAGNESIUM SERPL-MCNC: 1.9 MG/DL — SIGNIFICANT CHANGE UP (ref 1.6–2.6)
MCHC RBC-ENTMCNC: 30.1 PG — SIGNIFICANT CHANGE UP (ref 27–34)
MCHC RBC-ENTMCNC: 33.7 GM/DL — SIGNIFICANT CHANGE UP (ref 32–36)
MCV RBC AUTO: 89.3 FL — SIGNIFICANT CHANGE UP (ref 80–100)
METHOD TYPE: SIGNIFICANT CHANGE UP
MONOCYTES # BLD AUTO: 1.63 K/UL — HIGH (ref 0–0.9)
MONOCYTES NFR BLD AUTO: 8 % — SIGNIFICANT CHANGE UP (ref 2–14)
NEUTROPHILS # BLD AUTO: 16.4 K/UL — HIGH (ref 1.8–7.4)
NEUTROPHILS NFR BLD AUTO: 81.1 % — HIGH (ref 43–77)
NRBC # BLD: 0 /100 WBCS — SIGNIFICANT CHANGE UP (ref 0–0)
ORGANISM # SPEC MICROSCOPIC CNT: SIGNIFICANT CHANGE UP
ORGANISM # SPEC MICROSCOPIC CNT: SIGNIFICANT CHANGE UP
PHOSPHATE SERPL-MCNC: 2.7 MG/DL — SIGNIFICANT CHANGE UP (ref 2.5–4.5)
PLATELET # BLD AUTO: 336 K/UL — SIGNIFICANT CHANGE UP (ref 150–400)
POTASSIUM SERPL-MCNC: 3.9 MMOL/L — SIGNIFICANT CHANGE UP (ref 3.5–5.3)
POTASSIUM SERPL-SCNC: 3.9 MMOL/L — SIGNIFICANT CHANGE UP (ref 3.5–5.3)
RBC # BLD: 4.75 M/UL — SIGNIFICANT CHANGE UP (ref 4.2–5.8)
RBC # FLD: 13.3 % — SIGNIFICANT CHANGE UP (ref 10.3–14.5)
SODIUM SERPL-SCNC: 138 MMOL/L — SIGNIFICANT CHANGE UP (ref 135–145)
SPECIMEN SOURCE: SIGNIFICANT CHANGE UP
VANCOMYCIN TROUGH SERPL-MCNC: 9.8 UG/ML — LOW (ref 10–20)
WBC # BLD: 20.26 K/UL — HIGH (ref 3.8–10.5)
WBC # FLD AUTO: 20.26 K/UL — HIGH (ref 3.8–10.5)

## 2021-12-06 PROCEDURE — 99233 SBSQ HOSP IP/OBS HIGH 50: CPT | Mod: GC

## 2021-12-06 PROCEDURE — 70552 MRI BRAIN STEM W/DYE: CPT | Mod: 26

## 2021-12-06 PROCEDURE — 99222 1ST HOSP IP/OBS MODERATE 55: CPT

## 2021-12-06 PROCEDURE — 95718 EEG PHYS/QHP 2-12 HR W/VEEG: CPT

## 2021-12-06 PROCEDURE — 99254 IP/OBS CNSLTJ NEW/EST MOD 60: CPT | Mod: GC

## 2021-12-06 RX ORDER — VANCOMYCIN HCL 1 G
2000 VIAL (EA) INTRAVENOUS EVERY 12 HOURS
Refills: 0 | Status: DISCONTINUED | OUTPATIENT
Start: 2021-12-06 | End: 2021-12-07

## 2021-12-06 RX ORDER — MAGNESIUM SULFATE 500 MG/ML
1 VIAL (ML) INJECTION ONCE
Refills: 0 | Status: COMPLETED | OUTPATIENT
Start: 2021-12-06 | End: 2021-12-06

## 2021-12-06 RX ORDER — SODIUM,POTASSIUM PHOSPHATES 278-250MG
1 POWDER IN PACKET (EA) ORAL ONCE
Refills: 0 | Status: COMPLETED | OUTPATIENT
Start: 2021-12-06 | End: 2021-12-06

## 2021-12-06 RX ORDER — VANCOMYCIN HCL 1 G
2000 VIAL (EA) INTRAVENOUS EVERY 12 HOURS
Refills: 0 | Status: DISCONTINUED | OUTPATIENT
Start: 2021-12-06 | End: 2021-12-06

## 2021-12-06 RX ADMIN — PANTOPRAZOLE SODIUM 40 MILLIGRAM(S): 20 TABLET, DELAYED RELEASE ORAL at 11:13

## 2021-12-06 RX ADMIN — CEFEPIME 100 MILLIGRAM(S): 1 INJECTION, POWDER, FOR SOLUTION INTRAMUSCULAR; INTRAVENOUS at 21:44

## 2021-12-06 RX ADMIN — CEFEPIME 100 MILLIGRAM(S): 1 INJECTION, POWDER, FOR SOLUTION INTRAMUSCULAR; INTRAVENOUS at 15:17

## 2021-12-06 RX ADMIN — Medication 250 MILLIGRAM(S): at 05:23

## 2021-12-06 RX ADMIN — DEXMEDETOMIDINE HYDROCHLORIDE IN 0.9% SODIUM CHLORIDE 4.3 MICROGRAM(S)/KG/HR: 4 INJECTION INTRAVENOUS at 05:23

## 2021-12-06 RX ADMIN — HEPARIN SODIUM 5000 UNIT(S): 5000 INJECTION INTRAVENOUS; SUBCUTANEOUS at 21:45

## 2021-12-06 RX ADMIN — CEFEPIME 100 MILLIGRAM(S): 1 INJECTION, POWDER, FOR SOLUTION INTRAMUSCULAR; INTRAVENOUS at 06:51

## 2021-12-06 RX ADMIN — Medication 100 GRAM(S): at 07:09

## 2021-12-06 RX ADMIN — HEPARIN SODIUM 5000 UNIT(S): 5000 INJECTION INTRAVENOUS; SUBCUTANEOUS at 14:22

## 2021-12-06 RX ADMIN — HEPARIN SODIUM 5000 UNIT(S): 5000 INJECTION INTRAVENOUS; SUBCUTANEOUS at 05:46

## 2021-12-06 RX ADMIN — Medication 1000 MILLIGRAM(S): at 02:27

## 2021-12-06 RX ADMIN — Medication 250 MILLIGRAM(S): at 20:22

## 2021-12-06 NOTE — CONSULT NOTE ADULT - SUBJECTIVE AND OBJECTIVE BOX
BRIEF HPI: 51y Male w/ unremarkable PMHx (hasn't seen a doctor in many years) presents to [facility] as a transfer for evaluation of seizure of unknown origin. ENT consults for evaluation of left ear infection, r/o malignant ear otitis infections. per wife and brother at bedside, pt has been complaining of bilateral ear pain, left greater than right, since late october. Pt diag with left ear infection with hypoacusia, no secretion, treated with ciprofloxacin 500mg q12h and drops of ciprofloxacin (unknown dose). After finishing this, returned to urgent care one week later after finishing his antibiotics without any ear pain relief. he was given oral and otic drops, however, sxs did not resolve. last week was given a course of one week of prednisone 20mg q12h oral. Of note, pt was seen by an ENT on Nov 15, who diagnosed him w/ bilateral otitis externa, and placed ear james bilaterally. pt was given ciprodex otic drops and a follow up. On admission, seized in the ambulance, was taken to [facility], and was intubated for airway protection. CT maxillofacial at [place] which showed distal external auditory canal and foci of possible tegmen tympani/mastoideum dehiscence versus severe thinning, concerning for malignant/necrotizing otitis. His wife and pt relates the infection with his repeated baths on their private swimming pool.  ID consulted for temporal bone osteomyelitis.    CT Temporal: Right mastoid disease. Findings suggestive of left malignant otitis externa with left mastoid extension.  CT Internal Auditory Canal: Findings are compatible with necrotizing otitis externa. By definition, this is osteomyelitis, and no change is appreciated since 12/3/2021 which was also done with contrast. There is soft tissue extent to the retrocondylar fat and infratemporal fossa, which **may be of high virulence if the patient is immunocompromised**. No drainable abscess. No intracranial spread appreciated on CT.    Cultures: BCX neative, Left Ear Cultures: Staph Aureus, Epidermis, Coryebacterium.     SUBJECTIVE / INTERVAL HPI: Patient seen and examined at bedside.     VITAL SIGNS:  Vital Signs Last 24 Hrs  T(C): 37.7 (06 Dec 2021 07:00), Max: 38.9 (05 Dec 2021 23:00)  T(F): 99.8 (06 Dec 2021 07:00), Max: 102 (05 Dec 2021 23:00)  HR: 76 (06 Dec 2021 11:00) (53 - 85)  BP: 118/63 (06 Dec 2021 11:00) (113/61 - 164/77)  BP(mean): 84 (06 Dec 2021 11:00) (81 - 115)  RR: 24 (06 Dec 2021 11:00) (15 - 29)  SpO2: 93% (06 Dec 2021 11:00) (87% - 100%)    PHYSICAL EXAM:    General: WDWN  HEENT: NC/AT; PERRL, anicteric sclera; MMM  Neck: supple  Cardiovascular: +S1/S2; RRR  Respiratory: CTA B/L; no W/R/R  Gastrointestinal: soft, NT/ND; +BSx4  Extremities: WWP; no edema, clubbing or cyanosis  Vascular: 2+ radial, DP/PT pulses B/L  Neurological: AAOx3; no focal deficits    MEDICATIONS:  MEDICATIONS  (STANDING):  cefepime   IVPB 2000 milliGRAM(s) IV Intermittent every 8 hours  chlorhexidine 2% Cloths 1 Application(s) Topical <User Schedule>  CIPROFLOXACIN(CIPRO) OPTHALMIC SOLUTION 5 Drop(s) 5 Drop(s) Both Ears two times a day  dexAMETHasone 0.1% Ophthalmic Solution for OTIC Use 5 Drop(s) Both Ears two times a day  dexMEDEtomidine Infusion 0.2 MICROgram(s)/kG/Hr (4.3 mL/Hr) IV Continuous <Continuous>  dextrose 40% Gel 15 Gram(s) Oral once  dextrose 5%. 1000 milliLiter(s) (50 mL/Hr) IV Continuous <Continuous>  dextrose 50% Injectable 25 Gram(s) IV Push once  folic acid 1 milliGRAM(s) Oral daily  glucagon  Injectable 1 milliGRAM(s) IntraMuscular once  heparin   Injectable 5000 Unit(s) SubCutaneous every 8 hours  influenza   Vaccine 0.5 milliLiter(s) IntraMuscular once  insulin regular  human corrective regimen sliding scale   SubCutaneous every 6 hours  multivitamin 1 Tablet(s) Oral daily  pantoprazole  Injectable 40 milliGRAM(s) IV Push daily  thiamine 100 milliGRAM(s) Oral daily    MEDICATIONS  (PRN):  PHENobarbital Injectable 130 milliGRAM(s) IV Push every 15 minutes PRN CIWA >8      ALLERGIES:  Allergies    No Known Allergies    Intolerances        LABS:                        14.3   20.26 )-----------( 336      ( 06 Dec 2021 05:23 )             42.4     12-06    138  |  103  |  8   ----------------------------<  110<H>  3.9   |  25  |  0.77    Ca    9.0      06 Dec 2021 05:23  Phos  2.7     12-06  Mg     1.9     12-06    TPro  6.7  /  Alb  3.0<L>  /  TBili  0.3  /  DBili  x   /  AST  39  /  ALT  20  /  AlkPhos  81  12-05        CAPILLARY BLOOD GLUCOSE      POCT Blood Glucose.: 89 mg/dL (06 Dec 2021 11:15)      RADIOLOGY & ADDITIONAL TESTS: Reviewed.    ASSESSMENT:    PLAN:  BRIEF HPI: 51y Male w/ unremarkable PMHx (hasn't seen a doctor in many years) presents to [facility] as a transfer for evaluation of seizure of unknown origin. ENT consults for evaluation of left ear infection, r/o malignant ear otitis infections. per wife and brother at bedside, pt has been complaining of bilateral ear pain, left greater than right, since late october. Pt diag with left ear infection with hypoacusia, no secretion, treated with ciprofloxacin 500mg q12h and drops of ciprofloxacin (unknown dose). After finishing this, returned to urgent care one week later after finishing his antibiotics without any ear pain relief. he was given oral and otic drops, however, sxs did not resolve. last week was given a course of one week of prednisone 20mg q12h oral. Of note, pt was seen by an ENT on Nov 15, who diagnosed him w/ bilateral otitis externa, and placed ear james bilaterally. pt was given ciprodex otic drops and a follow up. On admission, seized in the ambulance, was taken to [facility], and was intubated for airway protection. CT maxillofacial at [place] which showed distal external auditory canal and foci of possible tegmen tympani/mastoideum dehiscence versus severe thinning, concerning for malignant/necrotizing otitis.  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  Imaging demonstrates mastoid/temporal bone osteomyelitis.  L ear culture is currently growing MSSA, cornybacterium, staph epi, and patient is currently on vanco/cefepime. ID consulted for temporal bone osteomyelitis.    CT Temporal: Right mastoid disease. Findings suggestive of left malignant otitis externa with left mastoid extension.  CT Internal Auditory Canal: Findings are compatible with necrotizing otitis externa. By definition, this is osteomyelitis, and no change is appreciated since 12/3/2021 which was also done with contrast. There is soft tissue extent to the retrocondylar fat and infratemporal fossa, which **may be of high virulence if the patient is immunocompromised**. No drainable abscess. No intracranial spread appreciated on CT.    Cultures: BCX neative, Left Ear Cultures: Staph Aureus, Epidermis, Coryebacterium.     SUBJECTIVE / INTERVAL HPI: Patient seen and examined at bedside.     VITAL SIGNS:  Vital Signs Last 24 Hrs  T(C): 37.7 (06 Dec 2021 07:00), Max: 38.9 (05 Dec 2021 23:00)  T(F): 99.8 (06 Dec 2021 07:00), Max: 102 (05 Dec 2021 23:00)  HR: 76 (06 Dec 2021 11:00) (53 - 85)  BP: 118/63 (06 Dec 2021 11:00) (113/61 - 164/77)  BP(mean): 84 (06 Dec 2021 11:00) (81 - 115)  RR: 24 (06 Dec 2021 11:00) (15 - 29)  SpO2: 93% (06 Dec 2021 11:00) (87% - 100%)    PHYSICAL EXAM:    General: WDWN  HEENT: NC/AT; PERRL, anicteric sclera; MMM  Neck: supple  Cardiovascular: +S1/S2; RRR  Respiratory: CTA B/L; no W/R/R  Gastrointestinal: soft, NT/ND; +BSx4  Extremities: WWP; no edema, clubbing or cyanosis  Vascular: 2+ radial, DP/PT pulses B/L  Neurological: AAOx3; no focal deficits    MEDICATIONS:  MEDICATIONS  (STANDING):  cefepime   IVPB 2000 milliGRAM(s) IV Intermittent every 8 hours  chlorhexidine 2% Cloths 1 Application(s) Topical <User Schedule>  CIPROFLOXACIN(CIPRO) OPTHALMIC SOLUTION 5 Drop(s) 5 Drop(s) Both Ears two times a day  dexAMETHasone 0.1% Ophthalmic Solution for OTIC Use 5 Drop(s) Both Ears two times a day  dexMEDEtomidine Infusion 0.2 MICROgram(s)/kG/Hr (4.3 mL/Hr) IV Continuous <Continuous>  dextrose 40% Gel 15 Gram(s) Oral once  dextrose 5%. 1000 milliLiter(s) (50 mL/Hr) IV Continuous <Continuous>  dextrose 50% Injectable 25 Gram(s) IV Push once  folic acid 1 milliGRAM(s) Oral daily  glucagon  Injectable 1 milliGRAM(s) IntraMuscular once  heparin   Injectable 5000 Unit(s) SubCutaneous every 8 hours  influenza   Vaccine 0.5 milliLiter(s) IntraMuscular once  insulin regular  human corrective regimen sliding scale   SubCutaneous every 6 hours  multivitamin 1 Tablet(s) Oral daily  pantoprazole  Injectable 40 milliGRAM(s) IV Push daily  thiamine 100 milliGRAM(s) Oral daily    MEDICATIONS  (PRN):  PHENobarbital Injectable 130 milliGRAM(s) IV Push every 15 minutes PRN CIWA >8      ALLERGIES:  Allergies    No Known Allergies    Intolerances        LABS:                        14.3   20.26 )-----------( 336      ( 06 Dec 2021 05:23 )             42.4     12-06    138  |  103  |  8   ----------------------------<  110<H>  3.9   |  25  |  0.77    Ca    9.0      06 Dec 2021 05:23  Phos  2.7     12-06  Mg     1.9     12-06    TPro  6.7  /  Alb  3.0<L>  /  TBili  0.3  /  DBili  x   /  AST  39  /  ALT  20  /  AlkPhos  81  12-05        CAPILLARY BLOOD GLUCOSE      POCT Blood Glucose.: 89 mg/dL (06 Dec 2021 11:15)      RADIOLOGY & ADDITIONAL TESTS: Reviewed.    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  ENT consults for evaluation of left ear infection, r/o malignant ear otitis infections. Per wife and brother at bedside, pt has been complaining of bilateral ear pain, left greater than right, since late october. Pt diag with left ear infection with hypoacusia, no secretion, treated with ciprofloxacin 500mg q12h and drops of ciprofloxacin (unknown dose). After finishing this, returned to urgent care one week later after finishing his antibiotics without any ear pain relief. he was given oral and otic drops, however, sxs did not resolve. last week was given a course of one week of prednisone 20mg q12h oral. Of note, pt was seen by an ENT on Nov 15, who diagnosed him w/ bilateral otitis externa, and placed ear james bilaterally. pt was given ciprodex otic drops and a follow up. On admission, seized in the ambulance, was taken to [facility], and was intubated for airway protection. CT maxillofacial at [place] which showed distal external auditory canal and foci of possible tegmen tympani/mastoideum dehiscence versus severe thinning, concerning for malignant/necrotizing otitis.  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  Imaging demonstrates mastoid/temporal bone osteomyelitis.  L ear culture is currently growing MSSA, cornybacterium, staph epi, and patient is currently on vanco/cefepime. ID consulted for temporal bone osteomyelitis.    CT Temporal: Right mastoid disease. Findings suggestive of left malignant otitis externa with left mastoid extension.  CT Internal Auditory Canal: Findings are compatible with necrotizing otitis externa. By definition, this is osteomyelitis, and no change is appreciated since 12/3/2021 which was also done with contrast. There is soft tissue extent to the retrocondylar fat and infratemporal fossa, which **may be of high virulence if the patient is immunocompromised**. No drainable abscess. No intracranial spread appreciated on CT.    Cultures: BCX neative, Left Ear Cultures: Staph Aureus, Epidermis, Coryebacterium.     SUBJECTIVE / INTERVAL HPI: Patient seen and examined at bedside.     VITAL SIGNS:  Vital Signs Last 24 Hrs  T(C): 37.7 (06 Dec 2021 07:00), Max: 38.9 (05 Dec 2021 23:00)  T(F): 99.8 (06 Dec 2021 07:00), Max: 102 (05 Dec 2021 23:00)  HR: 76 (06 Dec 2021 11:00) (53 - 85)  BP: 118/63 (06 Dec 2021 11:00) (113/61 - 164/77)  BP(mean): 84 (06 Dec 2021 11:00) (81 - 115)  RR: 24 (06 Dec 2021 11:00) (15 - 29)  SpO2: 93% (06 Dec 2021 11:00) (87% - 100%)    PHYSICAL EXAM:    General: WDWN  HEENT: NC/AT; PERRL, anicteric sclera; MMM  Neck: supple  Cardiovascular: +S1/S2; RRR  Respiratory: CTA B/L; no W/R/R  Gastrointestinal: soft, NT/ND; +BSx4  Extremities: WWP; no edema, clubbing or cyanosis  Vascular: 2+ radial, DP/PT pulses B/L  Neurological: AAOx3; no focal deficits    MEDICATIONS:  MEDICATIONS  (STANDING):  cefepime   IVPB 2000 milliGRAM(s) IV Intermittent every 8 hours  chlorhexidine 2% Cloths 1 Application(s) Topical <User Schedule>  CIPROFLOXACIN(CIPRO) OPTHALMIC SOLUTION 5 Drop(s) 5 Drop(s) Both Ears two times a day  dexAMETHasone 0.1% Ophthalmic Solution for OTIC Use 5 Drop(s) Both Ears two times a day  dexMEDEtomidine Infusion 0.2 MICROgram(s)/kG/Hr (4.3 mL/Hr) IV Continuous <Continuous>  dextrose 40% Gel 15 Gram(s) Oral once  dextrose 5%. 1000 milliLiter(s) (50 mL/Hr) IV Continuous <Continuous>  dextrose 50% Injectable 25 Gram(s) IV Push once  folic acid 1 milliGRAM(s) Oral daily  glucagon  Injectable 1 milliGRAM(s) IntraMuscular once  heparin   Injectable 5000 Unit(s) SubCutaneous every 8 hours  influenza   Vaccine 0.5 milliLiter(s) IntraMuscular once  insulin regular  human corrective regimen sliding scale   SubCutaneous every 6 hours  multivitamin 1 Tablet(s) Oral daily  pantoprazole  Injectable 40 milliGRAM(s) IV Push daily  thiamine 100 milliGRAM(s) Oral daily    MEDICATIONS  (PRN):  PHENobarbital Injectable 130 milliGRAM(s) IV Push every 15 minutes PRN CIWA >8      ALLERGIES:  Allergies    No Known Allergies    Intolerances        LABS:                        14.3   20.26 )-----------( 336      ( 06 Dec 2021 05:23 )             42.4     12-06    138  |  103  |  8   ----------------------------<  110<H>  3.9   |  25  |  0.77    Ca    9.0      06 Dec 2021 05:23  Phos  2.7     12-06  Mg     1.9     12-06    TPro  6.7  /  Alb  3.0<L>  /  TBili  0.3  /  DBili  x   /  AST  39  /  ALT  20  /  AlkPhos  81  12-05        CAPILLARY BLOOD GLUCOSE      POCT Blood Glucose.: 89 mg/dL (06 Dec 2021 11:15)      RADIOLOGY & ADDITIONAL TESTS: Reviewed.    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  ENT consults for evaluation of left ear infection, r/o malignant ear otitis infections. Per wife and brother at bedside, pt has been complaining of bilateral ear pain, left greater than right, since late october. Pt initially diag with left ear infection with hypoacusia, no secretion, treated with ciprofloxacin 500mg q12h and drops of ciprofloxacin (unknown dose) for ten day coiurse. After finishing this, returned to urgent care one week later and he was given oral cipro again for ten day course and otic drops, however, sxs did not resolve. On November 15th, saw ENT and given Prednisone and Cipro ten day course. On admission, seized in the ambulance, was taken to [facility], and was intubated for airway protection. CT maxillofacial at [place] which showed distal external auditory canal and foci of possible tegmen tympani/mastoideum dehiscence versus severe thinning, concerning for malignant/necrotizing otitis.  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  Imaging demonstrates mastoid/temporal bone osteomyelitis.  L ear culture is currently growing MSSA, cornybacterium, staph epi, and patient is currently on vanco/cefepime. ID consulted for temporal bone osteomyelitis.    CT Temporal: Right mastoid disease. Findings suggestive of left malignant otitis externa with left mastoid extension.  CT Internal Auditory Canal: Findings are compatible with necrotizing otitis externa. By definition, this is osteomyelitis, and no change is appreciated since 12/3/2021 which was also done with contrast. There is soft tissue extent to the retrocondylar fat and infratemporal fossa, which **may be of high virulence if the patient is immunocompromised**. No drainable abscess. No intracranial spread appreciated on CT.    Cultures: BCX neative, Left Ear Cultures: Staph Aureus, Epidermis, Coryebacterium.     SUBJECTIVE / INTERVAL HPI: Patient seen and examined at bedside.     VITAL SIGNS:  Vital Signs Last 24 Hrs  T(C): 37.7 (06 Dec 2021 07:00), Max: 38.9 (05 Dec 2021 23:00)  T(F): 99.8 (06 Dec 2021 07:00), Max: 102 (05 Dec 2021 23:00)  HR: 76 (06 Dec 2021 11:00) (53 - 85)  BP: 118/63 (06 Dec 2021 11:00) (113/61 - 164/77)  BP(mean): 84 (06 Dec 2021 11:00) (81 - 115)  RR: 24 (06 Dec 2021 11:00) (15 - 29)  SpO2: 93% (06 Dec 2021 11:00) (87% - 100%)    PHYSICAL EXAM:    General: WDWN  HEENT: NC/AT; PERRL, anicteric sclera; MMM  Neck: supple  Cardiovascular: +S1/S2; RRR  Respiratory: CTA B/L; no W/R/R  Gastrointestinal: soft, NT/ND; +BSx4  Extremities: WWP; no edema, clubbing or cyanosis  Vascular: 2+ radial, DP/PT pulses B/L  Neurological: AAOx3; no focal deficits    MEDICATIONS:  MEDICATIONS  (STANDING):  cefepime   IVPB 2000 milliGRAM(s) IV Intermittent every 8 hours  chlorhexidine 2% Cloths 1 Application(s) Topical <User Schedule>  CIPROFLOXACIN(CIPRO) OPTHALMIC SOLUTION 5 Drop(s) 5 Drop(s) Both Ears two times a day  dexAMETHasone 0.1% Ophthalmic Solution for OTIC Use 5 Drop(s) Both Ears two times a day  dexMEDEtomidine Infusion 0.2 MICROgram(s)/kG/Hr (4.3 mL/Hr) IV Continuous <Continuous>  dextrose 40% Gel 15 Gram(s) Oral once  dextrose 5%. 1000 milliLiter(s) (50 mL/Hr) IV Continuous <Continuous>  dextrose 50% Injectable 25 Gram(s) IV Push once  folic acid 1 milliGRAM(s) Oral daily  glucagon  Injectable 1 milliGRAM(s) IntraMuscular once  heparin   Injectable 5000 Unit(s) SubCutaneous every 8 hours  influenza   Vaccine 0.5 milliLiter(s) IntraMuscular once  insulin regular  human corrective regimen sliding scale   SubCutaneous every 6 hours  multivitamin 1 Tablet(s) Oral daily  pantoprazole  Injectable 40 milliGRAM(s) IV Push daily  thiamine 100 milliGRAM(s) Oral daily    MEDICATIONS  (PRN):  PHENobarbital Injectable 130 milliGRAM(s) IV Push every 15 minutes PRN CIWA >8      ALLERGIES:  Allergies    No Known Allergies    Intolerances        LABS:                        14.3   20.26 )-----------( 336      ( 06 Dec 2021 05:23 )             42.4     12-06    138  |  103  |  8   ----------------------------<  110<H>  3.9   |  25  |  0.77    Ca    9.0      06 Dec 2021 05:23  Phos  2.7     12-06  Mg     1.9     12-06    TPro  6.7  /  Alb  3.0<L>  /  TBili  0.3  /  DBili  x   /  AST  39  /  ALT  20  /  AlkPhos  81  12-05        CAPILLARY BLOOD GLUCOSE      POCT Blood Glucose.: 89 mg/dL (06 Dec 2021 11:15)      RADIOLOGY & ADDITIONAL TESTS: Reviewed.    ASSESSMENT:    PLAN:

## 2021-12-06 NOTE — PROGRESS NOTE ADULT - SUBJECTIVE AND OBJECTIVE BOX
ENT Consult Note    HARRIET GOLDMAN  [id number]    51y Male w/ unremarkable PMHx (hasn't seen a doctor in many years) presents to [facility] as a transfer for evaluation of seizure of unknown origin. ENT consults for evaluation of left ear infection, r/o malignant ear otitis infections. per wife and brother at bedside, pt has been complaining of bilateral ear pain, left greater than right, since late october. pt went to urgent care at that time and was given oral cipro. pt returned to urgent care one week later after finishing his antibiotics without any ear pain relief. he was given oral and otic drops, however, sxs did not resolve. pt was then seen by an ENT on Nov 15, who diagnosed him w/ bilateral otitis externa, and placed ear sunday bilaterally. pt was given ciprodex otic drops and a follow up. Today, wife states  was not acting like his usual self. around noon today, she went to visit him and work and pt seemed very lethargic and was only responding with one word answers. At this time, wife decided to call an ambulance. while being placed in to the ambulance, pt began having is seizure. pt was taken to [facility], and was intubated for airway protection. CT maxillofacial at [place] which showed distal external auditory canal and foci of possible tegmen tympani/mastoideum dehiscence versus severe thinning, concerning for malignant/necrotizing otitis. Per wife, pt had severe otalgia and hearing, but did not have any otorrhea, facial weakness, vertigo, or tinnitus    INTERVAL:    12/4: Self extubation last night with emergent reintubation. Otherwise started on cefepime/vanc. MRI/CT pending. Ear cx growing Gram+ cocci and rods. Low grade fevers overnight with WBC 18.4 (down from 100.2). Sunday in place at bedside. Pt sedated.    12/5: NAEON. Still on vanc/cef and ciprodex eardrops. Afebrile overnight. Ear cx sensitivities pending. CT scan temp bones with similar findings as CT maxillofacial from [place]. WBC downtrending today (down to 13.6). Pt was seen and examined at bedside. Stable exam findings, no mastoid swelling. Sedated and intubated on vent.    12/6: NAEON. On vanc/cef and ciprodex eardrops. Febrile overnight to 102. Ear cx pending sens. CT IAC w/ con with L ZARI, no drainable abscess, no intracranial extension, and R OE. WBC up to 20.3 today from 13.6. Pt's sunday were removed at bedside and both sides were debrided. Still with swelling of b/l EAC (L > R). Sunday reinserted and ciprofloxacin bedside eardrops placed.    ICU Vital Signs Last 24 Hrs  T(C): 37.8 (06 Dec 2021 01:00), Max: 38.9 (05 Dec 2021 23:00)  T(F): 100 (06 Dec 2021 01:00), Max: 102 (05 Dec 2021 23:00)  HR: 62 (06 Dec 2021 07:00) (53 - 85)  BP: 128/70 (06 Dec 2021 07:00) (109/59 - 161/81)  BP(mean): 105 (06 Dec 2021 05:00) (79 - 115)  ABP: --  ABP(mean): --  RR: 28 (06 Dec 2021 07:00) (15 - 29)  SpO2: 90% (06 Dec 2021 07:00) (87% - 100%)    PHYSICAL EXAM:    CONSTITUTIONAL: Well nourished, well developed, sedated  HEAD: normocephalic, atraumatic.  EARS  AD: Mastoid non-tender/non-edematous. non-proptotic , non-erythematous right pinna. Wick in place. Right EAC edematous; unable to assess beyond membranous canal. no granulation tissue or masses appreciated of limited EAC  AS: Mastoid non-tender/non-edematous. non-proptotic pinna. Left pinna edematous and erythematous. Wick in place. Erythema and edema extends medially into EAC. Unable assess beyond membranous canal. No granulation or masses appreciated.  NOSE: Normal external nose.   ORAL CAVITY/OROPHARYNX: normal mucosa. exam limited by ETT  NECK: No cervical lymphadenopathy  RESPIRATORY: intubated connected to vent      EXAM:  CT TEMPORAL BONES                          PROCEDURE DATE:  12/04/2021          INTERPRETATION:  PROCEDURE: CT temporal bones    INDICATION: Severe otitis media with concern for temporal bone osteomyelitis    TECHNIQUE: Contiguous 1 mm thickaxial and modified coronal images were obtained through the temporal bones without the intravenous administration of contrast. Target axial and coronal review images were created through each temporal bone utilizing bone algorithm.    COMPARISON: Maxillofacial CT 12/3/2021    FINDINGS: Target review images of the right temporal bone demonstrates soft tissue filling the osseous segment of the right tympanic membrane. The bony cortex appears intact. The mastoid air cell system to be well developed.There is soft tissue opacification and fluid within right mastoid air cells as well as fluid within the right mastoid antrum. There is soft tissue within the middle ear cavity. The ossicles appear intact without erosive changes or displacement. The scutum is intact. The visualized portions of the right inner appear within normal limits.    Target review images of the left temporal bone demonstrates soft tissue completely filling the left osseous segment of the external auditory canal. There are cortical bony erosive changes involving the walls of bony external canal especially the floor and posterior wall.  There is complete soft tissue opacification of left mastoid air cells. The mastoid septae appear intact. The scutum appears eroded. Therealso is complete soft tissue opacification of the middle ear cavity. There is dehiscence of the tegmen tympani and mastoid roof. The ossicles appear intact without erosive changes or displacement. The visualized portions of the left inner ear appearsintact.    The course and caliber of the internal carotid artery, jugular vein and facial nerve within each temporal bones are unremarkable. The internal auditory canals are symmetric in size and configuration. Neither the cochlear nor vestibular aqueducts are enlarged.    Soft-tissue windows fail to demonstrate intracranial abnormality.    IMPRESSION: Right mastoid disease. Findings suggestive of left malignant otitis externa with left mastoid extension.      EXAM:  CT IAC IC                          PROCEDURE DATE:  12/05/2021          INTERPRETATION:  CT TEMPORAL BONES    Technique: A subcentimeter axial acquisition was performed through the temporal bones and reconstructed at submillimeter thickness and spacing in the axial and coronal planes, and furthermore in oblique planes both parallel and perpendicular to assess the semicircular canals. Each series right and left sides targeted/magnified views.    Contrast: Isovue-370 given IV    Clinical Information: Left malignant otitis externa    Prior Studies: Noncontrast exam 12/04/2021, and contrast-exam 12/03/2021.    Findings:    This is the third consecutive and daily exam. There is no change compared to 12/3/2021 which is also a contrast exam.    A.S.    There is permeative bony erosion involving the anterior wall of the external auditory canal and inferior mastoid cells most compatible with necrotizing otitis media. By definition, this is an osteomyelitis. Remaining mastoids more posteriorly show preserved trabeculae, and the tegmen appears thin throughout, but the postcontrast images when viewed in the coronal plane show no inflammatory dural thickening or any fluid collection to imply intracranial spread of infection. There is complete soft tissue opacification of the ear canal compatible with inflammatory skin thickening.    Middle ear is totally opacified, but the ossicular chain appears grossly preserved. Otic capsule also appears intact, aside from minor site of thinning ofthe superior semicircular canal (series 12, image 84 and series 6, image 42, of likely incidental note. There is no dehiscence of the carotid canal or jugular bulb, and the bony facial nerve is of similar size but its canal margins are somewhat hazy in the setting of mastoiditis. Vestibular aqueduct is not enlarged.    On soft tissue inspection and of most salience, there is cellulitis that extends anteriorly by the fissures of [place] with induration of the retrocondylar fat planes. There is also fat stranding in the upper parapharyngeal space without drainable fluid collection.      A.D.      *  External canal/TM: There is diffuse skin thickening, but without similar bony erosion or extraosseous soft tissue abnormality as on the contralateral side.  *  Mastoid cavity: There is moderate air cell opacification without bony rarefaction as seen on the other side.  *  Temporal bone cortices and Tegmen: There are sites of tegmen thinning, which are not dissimilar to the contralateral side. No leijah or measurable defect.  *  Middle ear/ossicles: Opacified but to a lesser degree than the other side. Ossicular chain appears intact  *  Oval/round windows: Both opacified but nonstenotic  *  Inner ear: Otic capsule appears preserved without evidence of semicircular canal dehiscence or otosclerosis. IACs appear symmetric caliber.  *  Facial nerve: Normal course with intact bony canal  *  ICA/IJV: Normal morphology without dehiscence to the tympanic cavity  *  Vestibular aqueduct: Not enlarged      SOFT TISSUES: On the soft tissue inspection without contrast, there is diffuse left temporal scalp swelling and periauricular swelling compatible with cellulitis secondary to the necrotizing external otitis. There is induration of the retrocondylar fat pads and some haziness in the parapharyngeal and  fat pads. Intracranially, there is preserved contrast filling of the left jugular bulb, sphenoid and transverse sinuses. No empyema identified, nor abscess in the extracranial softtissues below the skull base or laterally at the occipital SCM muscle.    BONY WINDOW: The more central skull base is intact. There is mild inflammatory thickening of the paranasal sinuses. The patient is intubated.      IMPRESSION:    A.S.  Findingsare compatible with necrotizing otitis externa. By definition, this is osteomyelitis, and no change is appreciated since 12/3/2021 which was also done with contrast. There is soft tissue extent to the retrocondylar fat and infratemporal fossa, which **may be of high virulence if the patient is immunocompromised**. No drainable abscess. No intracranial spread appreciated on CT.    A.D.  Lesser findings of both otitis externa and media but without bony erosion or osteomyelitis as seen A.S.        AP: 51 M w/ roughly one month hx of bilateral otitis externa, left greater than right, now presenting w/ seizures. PE does not reveal any granulation tissue in left EAC, however, b/l EACs are edematous and tender to manipulation. Currently on cefepime/vancomycin per ID with ciprodex ear drops b/l with improvement in fevers and WBC. No signs of mastoiditis on clinical examination.    -C/w ciprodex bilaterally; 5 drops to each ear wick 2-3 times a day - PLEASE MAKE SURE CIPRODEX ear drops, not ciprofloxacin only, need steroid component to decrease inflammation in ear canal  -CT temp w/ con with L ZARI, no abscess, no intracranial extension, R OE  -Agree w/ MRI IAC to assess for osteo and skull base  -Vanc/cefepime per ID  -F/u left EAC ear culture (staph aureus/epidermiditis, f/u sensitivities)  -ENT to follow up imaging  -Rest of care per primary team  -No acute ENT intervention at this time    Seen w/ senior resident. Discussed with attending.

## 2021-12-06 NOTE — PROGRESS NOTE ADULT - ASSESSMENT
52 yo M with PMHx no past medical history of epilepsy, trauma or CNS infection, who presents to the ED with an episode of sudden onset speech disturbance talking incoherently and repetitive and altered behavior while working witnessed, no fever. In the last 2 days much more pain on left ear and headache. Pt uses frequently his swimming pool has had left ear infection for 2 months with hypoacusia treated with long course ciprofloxacin and a week of prednisone. In ED presented, repetitive generalized seizures. 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 Started on vancomycin and Zosyn IV. Admitted to ICU to continue his care, on cefepime and vanc IV (to cover CNS pseudomonal infection) propofol drip and placed on ventilator. On ear cultures found staph. aureus and epidermidis. On 12/5, pt was extubated. On IAC CT scan w/wo contrast necrotizing otitis externa with osteomyelitis and soft tissue extent to the retrocondylar fat and infratemporal fosse. ENT and neurosurgery didn't consider beneficial acute intervention at this point.    NEURO  #Sedated and intubate  JOMAR of -3 to -4  On precedex  No agitated today  Extubated    #Convulsive status epilepticus  Likely 2/2 to left temporal lobe encephalitis 2/2 to left mastoiditis. Frequent baths, high pseudomonal suspicion.  -intubated and sedatedl  -daily wean off sedation for neurochecks   - CT temporal bones done   - MRI IAC with contrast IV  - vEEG monitoring  - Maintain seizure and fall precautions  - no AED at this time   - f/u brain MRI  - neurosurgery doesn't consider beneficial acute intervention    CARDIO  None.  EKG sinus rhythm 96 bmp    RESPIRATORY  -Intubated for protecting airways in patient with status epilepticus  -wean vent as tolerated  -spontaneous breathing trials daily  -CxR w/o significant findings    GI  #diet  -NPO today    ENDO  None    RENAL  None    HEM/ONC  None    ID  #Left external otitis  s/p left mastoiditis s/p left temporal lobe encephalitis.   - FU ear cultures- showing GPR and GPC in pairs   - ciprodex bilaterally; 5 drops to each ear wick twice a day  - RI IAC to assess for osteo and skull base  - C/w vancomycin 1500mg q12h IV and cefepime 2g q8h IV.  - F/u blood (NGTD), urine (NGTD) and ear cultures (staph. aureus and epidermidis)  - Redose vanc for ear culture    - IAC CT scan w/wo contrast:  necrotizing otitis externa with osteomyelitis and soft tissue extent to the retrocondylar fat and infratemporal fosse.   - No acute ENT intervention at this time  - C/w ciprodex bilaterally; 5 drops to each ear wick twice a day plus corticoids  - WBC elevation 13.5-->20.2    F: none  E: replete PRN  N: NPO for today  DVT: Heparin IV q8h  GI: Pantoprazol 40mg IV q24h    FULL code

## 2021-12-06 NOTE — CONSULT NOTE ADULT - SUBJECTIVE AND OBJECTIVE BOX
HISTORY OF PRESENT ILLNESS:   51 M, no significant PMH, social hx notable for ETOH use, admitted to MICU service with new onset tonic clonic seizures on 12/3 requiring intubation (now s/p self extubation). Denies hx of prior seizures, trauma, CNS infection.     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  Imaging demonstrates mastoid/temporal bone osteomyelitis.  L ear culture is currently growing MSSA, cornybacterium, staph epi, and patient is currently on vanco/cefepime. ENT following.     Imaging:  CTH non con: remarkable for mastoiditis  CT temporal bones: remarkable for left necrotizing otitis externa with spread to mastoid  CT IAC with contrast: notes no intracranial spread    CSF studies: not performed      PAST MEDICAL & SURGICAL HISTORY:  Acute mastoiditis, left  Convulsive status epilepticus  Alcohol abuse, daily use  Umbilical hernia      Allergies  No Known Allergies      MEDICATIONS:  Antibiotics:  cefepime   IVPB 2000 milliGRAM(s) IV Intermittent every 8 hours  vancomycin  IVPB 1750 milliGRAM(s) IV Intermittent every 12 hours    Neuro:  dexMEDEtomidine Infusion 0.2 MICROgram(s)/kG/Hr IV Continuous <Continuous>  LORazepam   Injectable 2 milliGRAM(s) IV Push every 2 hours PRN  LORazepam   Injectable 2 milliGRAM(s) IV Push once  PHENobarbital Injectable 130 milliGRAM(s) IV Push every 15 minutes PRN    Anticoagulation:  heparin   Injectable 5000 Unit(s) SubCutaneous every 8 hours    OTHER:  chlorhexidine 0.12% Liquid 15 milliLiter(s) Oral Mucosa every 12 hours  chlorhexidine 2% Cloths 1 Application(s) Topical <User Schedule>  CIPROFLOXACIN(CIPRO) OPTHALMIC SOLUTION 5 Drop(s) 5 Drop(s) Both Ears two times a day  dextrose 40% Gel 15 Gram(s) Oral once  dextrose 50% Injectable 25 Gram(s) IV Push once  glucagon  Injectable 1 milliGRAM(s) IntraMuscular once  influenza   Vaccine 0.5 milliLiter(s) IntraMuscular once  insulin regular  human corrective regimen sliding scale   SubCutaneous every 6 hours  pantoprazole  Injectable 40 milliGRAM(s) IV Push daily    IVF:  dextrose 5%. 1000 milliLiter(s) IV Continuous <Continuous>  folic acid 1 milliGRAM(s) Oral daily  multivitamin 1 Tablet(s) Oral daily  thiamine 100 milliGRAM(s) Oral daily      Vital Signs Last 24 Hrs  T(C): 37.8 (06 Dec 2021 01:00), Max: 38.9 (05 Dec 2021 23:00)  T(F): 100 (06 Dec 2021 01:00), Max: 102 (05 Dec 2021 23:00)  HR: 59 (06 Dec 2021 02:00) (59 - 85)  BP: 115/59 (06 Dec 2021 02:00) (103/70 - 161/81)  BP(mean): 81 (06 Dec 2021 02:00) (79 - 115)  RR: 22 (06 Dec 2021 02:00) (14 - 29)  SpO2: 95% (06 Dec 2021 02:00) (87% - 100%)    PHYSICAL EXAM:  AAOX3. Verbal function intact  Cranial Nerves: II-XII intact  Motor: 5/5 strength in b/l UE and LE  Sensation: intact to touch in all extremities  Pronator Drift: none  Dysmetria: none        LABS:                        13.5   13.58 )-----------( 282      ( 05 Dec 2021 05:27 )             41.5     12-05    136  |  103  |  7   ----------------------------<  80  3.8   |  24  |  0.81    Ca    8.4      05 Dec 2021 05:27  Phos  2.3     12-05  Mg     2.2     12-05    TPro  6.7  /  Alb  3.0<L>  /  TBili  0.3  /  DBili  x   /  AST  39  /  ALT  20  /  AlkPhos  81  12-05    PT/INR - ( 04 Dec 2021 05:02 )   PT: 11.8 sec;   INR: 0.98          PTT - ( 04 Dec 2021 05:02 )  PTT:32.9 sec    CULTURES:  Culture Results:   No growth at 1 day. (12-04 @ 14:25)  Culture Results:   No growth (12-04 @ 12:48)      RADIOLOGY & ADDITIONAL STUDIES:  < from: CT Internal Auditory Canals w/ IV Cont (12.05.21 @ 16:54) >    IMPRESSION:    A.S.  Findingsare compatible with necrotizing otitis externa. By definition, this is osteomyelitis, and no change is appreciated since 12/3/2021 which was also done with contrast. There is soft tissue extent to the retrocondylar fat and infratemporal fossa, which **may be of high virulence if the patient is immunocompromised**. No drainable abscess. No intracranial spread appreciated on CT.    A.D.  Lesser findings of both otitis externa and media but without bony erosion or osteomyelitis as seen A.S.    < end of copied text >      < from: CT Temporal Bones No Cont (12.04.21 @ 10:44) >  IMPRESSION: Right mastoid disease. Findings suggestive of left malignant otitis externa with left mastoid extension.      < end of copied text >    < from: CT Head No Cont (12.03.21 @ 10:12) >  IMPRESSION:    Motion limited study. No evidence of acute intracranial hemorrhage, midline shift or CT evidence of acute territorial infarct.    Opacification of the left mastoid air cells and left middle ear. Correlate clinically for mastoiditis.    If the patient's symptoms persist, consider short interval follow-up head CT or brain MRI if there are no MRI contraindications.      < end of copied text >      Assessment:  51 M admitted with new tonic/clonic seizures secondary to necrotizing otitis externa.  Imaging demonstrates mastoid/temporal bone osteomyelitis. Neuro intact.     Plan:  - Recommend MRI brain w/wo contrast  - Care per primary team/ENT consult    Discussed with Dr. Clarke

## 2021-12-06 NOTE — CONSULT NOTE ADULT - ASSESSMENT
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      Recommendations:   51y Male w/ unremarkable PMHx (hasn't seen a doctor in many years) presents to [facility] as a transfer for evaluation of seizure of unknown origin found to have temporal bone osteomyelitis,  ID consulted for antibiotic recommendation and management.      Recommendations:  -Please continue with Cefepime 2gQH  -Please follow up final read of MRI    ID to continue to follow, plan discussed with attending and primary team.  51y Male w/ unremarkable PMHx (hasn't seen a doctor in many years) presents to [facility] as a transfer for evaluation of seizure of unknown origin found to have temporal bone osteomyelitis,  ID consulted for antibiotic recommendation and management.    MRI 12/6: C/w In this patient with documented left necrotizing otitis externa, there is an approximately 1 cm abscess in the left inferolateral temporal lobe, best seen on enhanced axial image 16 and coronal image 4. There is associated restricted diffusion and mild surrounding edema. There is also dural thickening along the floor and inferolateral wall of the left middle cranial fossa. The remainder of the brain, including the left posterior fossa, is unremarkable in appearance.     Recommendations:  -Please continue with Cefepime 2gQH  -Please continue with Vancomycin-pending results of next trough, redose accordingly.   -Given abscess, please follow up with Neurosurgery and ENT recommendations in regards to possible drainage.       ID to continue to follow, plan discussed with attending and primary team.  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    MRI 12/6: C/w In this patient with documented left necrotizing otitis externa, there is an approximately 1 cm abscess in the left inferolateral temporal lobe, best seen on enhanced axial image 16 and coronal image 4. There is associated restricted diffusion and mild surrounding edema. There is also dural thickening along the floor and inferolateral wall of the left middle cranial fossa. The remainder of the brain, including the left posterior fossa, is unremarkable in appearance.     Recommendations:  -Please continue with Cefepime 2gQH  -Please continue with Vancomycin-pending results of next trough, redose accordingly.   -Given abscess, please follow up with Neurosurgery and ENT recommendations in regards to possible drainage.   -Please obtain HIV testing.     ID to continue to follow, plan discussed with attending and primary team.

## 2021-12-06 NOTE — PROGRESS NOTE ADULT - SUBJECTIVE AND OBJECTIVE BOX
OVERNIGHT EVENTS: ordered for phenobarb for CIWA protocol. Overnight tried to get out of bed twice, redirectable, CIWA < 8, did not get ativan or phenobarb. Repleted K and mag    SUBJECTIVE / INTERVAL HPI: Patient seen and examined at bedside. Pt feels uncomfortable, thirsty and requires his wife presence.    Remaining ROS negative     PHYSICAL EXAM:  General: NAD, lying in bed uncomfortable  HEAD:  Atraumatic, normocephalic  EYES: conjunctiva and sclera clear  ENT: Moist mucous membranes, rash on left ear, swollen, and erythematous   NECK: Supple, no JVD  HEART: Regular rate and rhythm, no murmurs, rubs, or gallops  LUNGS: Unlabored respirations.  Clear to auscultation bilaterally, no crackles, wheezing, or rhonchi  ABDOMEN: Soft, nontender, nondistended, +BS  EXTREMITIES: 2+ peripheral pulses bilaterally. No clubbing, cyanosis, or edema  NERVOUS SYSTEM:  Induced coma (miotic pupils minimally reactive to light), no response to pain.  SKIN: No rashes or lesions    VITAL SIGNS:  Vital Signs Last 24 Hrs  T(C): 37.7 (06 Dec 2021 07:00), Max: 38.9 (05 Dec 2021 23:00)  T(F): 99.8 (06 Dec 2021 07:00), Max: 102 (05 Dec 2021 23:00)  HR: 64 (06 Dec 2021 10:00) (53 - 85)  BP: 127/70 (06 Dec 2021 10:00) (110/64 - 164/77)  BP(mean): 91 (06 Dec 2021 10:00) (81 - 115)  RR: 29 (06 Dec 2021 10:00) (15 - 29)  SpO2: 90% (06 Dec 2021 10:00) (87% - 100%)    MEDICATIONS:  MEDICATIONS  (STANDING):  cefepime   IVPB 2000 milliGRAM(s) IV Intermittent every 8 hours  chlorhexidine 2% Cloths 1 Application(s) Topical <User Schedule>  CIPROFLOXACIN(CIPRO) OPTHALMIC SOLUTION 5 Drop(s) 5 Drop(s) Both Ears two times a day  dexAMETHasone 0.1% Ophthalmic Solution for OTIC Use 5 Drop(s) Both Ears two times a day  dexMEDEtomidine Infusion 0.2 MICROgram(s)/kG/Hr (4.3 mL/Hr) IV Continuous <Continuous>  dextrose 40% Gel 15 Gram(s) Oral once  dextrose 5%. 1000 milliLiter(s) (50 mL/Hr) IV Continuous <Continuous>  dextrose 50% Injectable 25 Gram(s) IV Push once  folic acid 1 milliGRAM(s) Oral daily  glucagon  Injectable 1 milliGRAM(s) IntraMuscular once  heparin   Injectable 5000 Unit(s) SubCutaneous every 8 hours  influenza   Vaccine 0.5 milliLiter(s) IntraMuscular once  insulin regular  human corrective regimen sliding scale   SubCutaneous every 6 hours  multivitamin 1 Tablet(s) Oral daily  pantoprazole  Injectable 40 milliGRAM(s) IV Push daily  thiamine 100 milliGRAM(s) Oral daily    MEDICATIONS  (PRN):  PHENobarbital Injectable 130 milliGRAM(s) IV Push every 15 minutes PRN CIWA >8    ALLERGIES:  No Known Allergies    LABS:                        14.3   20.26 )-----------( 336      ( 06 Dec 2021 05:23 )             42.4     12-06    138  |  103  |  8   ----------------------------<  110<H>  3.9   |  25  |  0.77    Ca    9.0      06 Dec 2021 05:23  Phos  2.7     12-06  Mg     1.9     12-06    TPro  6.7  /  Alb  3.0<L>  /  TBili  0.3  /  DBili  x   /  AST  39  /  ALT  20  /  AlkPhos  81  12-05    CAPILLARY BLOOD GLUCOSE  POCT Blood Glucose.: 89 mg/dL (06 Dec 2021 11:15)    RADIOLOGY & ADDITIONAL TESTS: Reviewed. TRANSFER NOTE FROM ICU TO [facility]: 52 yo M with PMHx no past medical history of epilepsy, trauma or CNS infection, who presents to the ED with an episode of sudden onset speech disturbance talking incoherently and repetitive and altered behavior while working witnessed, no fever. In the last 2 days much more pain on left ear and headache. Pt uses frequently his swimming pool has had left ear infection for 2 months with hypoacusia treated with long course ciprofloxacin and a week of prednisone. In ED presented, repetitive generalized seizures. 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 Started on vancomycin and Zosyn IV. Admitted to ICU to continue his care, on cefepime and vanc IV (to cover CNS pseudomonal infection) propofol drip and placed on ventilator. On ear cultures found staph. aureus and epidermidis. ENT recommends ciprodex bilaterally; 5 drops to each ear wick twice a day plus topical corticoids. On 12/5, pt was extubated. On IAC CT scan w/wo contrast necrotizing otitis externa with osteomyelitis and soft tissue extent to the retrocondylar fat and infratemporal fosse. ENT and neurosurgery didn't consider beneficial acute intervention at this point.     OVERNIGHT EVENTS: ordered for phenobarb for CIWA protocol. Overnight tried to get out of bed twice, redirectable, CIWA < 8, did not get ativan or phenobarb. Repleted K and mag    SUBJECTIVE / INTERVAL HPI: Patient seen and examined at bedside. Pt feels uncomfortable, thirsty and requires his wife presence.    Remaining ROS negative     PHYSICAL EXAM:  General: NAD, lying in bed uncomfortable  HEAD:  Atraumatic, normocephalic  EYES: conjunctiva and sclera clear  ENT: Moist mucous membranes, rash on left ear, swollen, and erythematous   NECK: Supple, no JVD  HEART: Regular rate and rhythm, no murmurs, rubs, or gallops  LUNGS: Unlabored respirations.  Clear to auscultation bilaterally, no crackles, wheezing, or rhonchi  ABDOMEN: Soft, nontender, nondistended, +BS  EXTREMITIES: 2+ peripheral pulses bilaterally. No clubbing, cyanosis, or edema  NERVOUS SYSTEM:  Induced coma (miotic pupils minimally reactive to light), no response to pain.  SKIN: No rashes or lesions    VITAL SIGNS:  Vital Signs Last 24 Hrs  T(C): 37.7 (06 Dec 2021 07:00), Max: 38.9 (05 Dec 2021 23:00)  T(F): 99.8 (06 Dec 2021 07:00), Max: 102 (05 Dec 2021 23:00)  HR: 64 (06 Dec 2021 10:00) (53 - 85)  BP: 127/70 (06 Dec 2021 10:00) (110/64 - 164/77)  BP(mean): 91 (06 Dec 2021 10:00) (81 - 115)  RR: 29 (06 Dec 2021 10:00) (15 - 29)  SpO2: 90% (06 Dec 2021 10:00) (87% - 100%)    MEDICATIONS:  MEDICATIONS  (STANDING):  cefepime   IVPB 2000 milliGRAM(s) IV Intermittent every 8 hours  chlorhexidine 2% Cloths 1 Application(s) Topical <User Schedule>  CIPROFLOXACIN(CIPRO) OPTHALMIC SOLUTION 5 Drop(s) 5 Drop(s) Both Ears two times a day  dexAMETHasone 0.1% Ophthalmic Solution for OTIC Use 5 Drop(s) Both Ears two times a day  dexMEDEtomidine Infusion 0.2 MICROgram(s)/kG/Hr (4.3 mL/Hr) IV Continuous <Continuous>  dextrose 40% Gel 15 Gram(s) Oral once  dextrose 5%. 1000 milliLiter(s) (50 mL/Hr) IV Continuous <Continuous>  dextrose 50% Injectable 25 Gram(s) IV Push once  folic acid 1 milliGRAM(s) Oral daily  glucagon  Injectable 1 milliGRAM(s) IntraMuscular once  heparin   Injectable 5000 Unit(s) SubCutaneous every 8 hours  influenza   Vaccine 0.5 milliLiter(s) IntraMuscular once  insulin regular  human corrective regimen sliding scale   SubCutaneous every 6 hours  multivitamin 1 Tablet(s) Oral daily  pantoprazole  Injectable 40 milliGRAM(s) IV Push daily  thiamine 100 milliGRAM(s) Oral daily    MEDICATIONS  (PRN):  PHENobarbital Injectable 130 milliGRAM(s) IV Push every 15 minutes PRN CIWA >8    ALLERGIES:  No Known Allergies    LABS:                        14.3   20.26 )-----------( 336      ( 06 Dec 2021 05:23 )             42.4     12-06    138  |  103  |  8   ----------------------------<  110<H>  3.9   |  25  |  0.77    Ca    9.0      06 Dec 2021 05:23  Phos  2.7     12-06  Mg     1.9     12-06    TPro  6.7  /  Alb  3.0<L>  /  TBili  0.3  /  DBili  x   /  AST  39  /  ALT  20  /  AlkPhos  81  12-05    CAPILLARY BLOOD GLUCOSE  POCT Blood Glucose.: 89 mg/dL (06 Dec 2021 11:15)    RADIOLOGY & ADDITIONAL TESTS: Reviewed.

## 2021-12-07 LAB
ALBUMIN SERPL ELPH-MCNC: 3.3 G/DL — SIGNIFICANT CHANGE UP (ref 3.3–5)
ALP SERPL-CCNC: 73 U/L — SIGNIFICANT CHANGE UP (ref 40–120)
ALT FLD-CCNC: 60 U/L — HIGH (ref 10–45)
ANION GAP SERPL CALC-SCNC: 11 MMOL/L — SIGNIFICANT CHANGE UP (ref 5–17)
ANISOCYTOSIS BLD QL: SLIGHT — SIGNIFICANT CHANGE UP
AST SERPL-CCNC: 133 U/L — HIGH (ref 10–40)
BASOPHILS # BLD AUTO: 0 K/UL — SIGNIFICANT CHANGE UP (ref 0–0.2)
BASOPHILS NFR BLD AUTO: 0 % — SIGNIFICANT CHANGE UP (ref 0–2)
BILIRUB SERPL-MCNC: 0.6 MG/DL — SIGNIFICANT CHANGE UP (ref 0.2–1.2)
BUN SERPL-MCNC: 10 MG/DL — SIGNIFICANT CHANGE UP (ref 7–23)
CALCIUM SERPL-MCNC: 8.6 MG/DL — SIGNIFICANT CHANGE UP (ref 8.4–10.5)
CHLORIDE SERPL-SCNC: 101 MMOL/L — SIGNIFICANT CHANGE UP (ref 96–108)
CO2 SERPL-SCNC: 23 MMOL/L — SIGNIFICANT CHANGE UP (ref 22–31)
CREAT SERPL-MCNC: 0.7 MG/DL — SIGNIFICANT CHANGE UP (ref 0.5–1.3)
EOSINOPHIL # BLD AUTO: 0.15 K/UL — SIGNIFICANT CHANGE UP (ref 0–0.5)
EOSINOPHIL NFR BLD AUTO: 0.9 % — SIGNIFICANT CHANGE UP (ref 0–6)
GIANT PLATELETS BLD QL SMEAR: PRESENT — SIGNIFICANT CHANGE UP
GLUCOSE BLDC GLUCOMTR-MCNC: 105 MG/DL — HIGH (ref 70–99)
GLUCOSE BLDC GLUCOMTR-MCNC: 117 MG/DL — HIGH (ref 70–99)
GLUCOSE BLDC GLUCOMTR-MCNC: 99 MG/DL — SIGNIFICANT CHANGE UP (ref 70–99)
GLUCOSE SERPL-MCNC: 112 MG/DL — HIGH (ref 70–99)
HCT VFR BLD CALC: 38.7 % — LOW (ref 39–50)
HGB BLD-MCNC: 13.1 G/DL — SIGNIFICANT CHANGE UP (ref 13–17)
LYMPHOCYTES # BLD AUTO: 1.91 K/UL — SIGNIFICANT CHANGE UP (ref 1–3.3)
LYMPHOCYTES # BLD AUTO: 11.3 % — LOW (ref 13–44)
MAGNESIUM SERPL-MCNC: 2 MG/DL — SIGNIFICANT CHANGE UP (ref 1.6–2.6)
MANUAL SMEAR VERIFICATION: SIGNIFICANT CHANGE UP
MCHC RBC-ENTMCNC: 30.2 PG — SIGNIFICANT CHANGE UP (ref 27–34)
MCHC RBC-ENTMCNC: 33.9 GM/DL — SIGNIFICANT CHANGE UP (ref 32–36)
MCV RBC AUTO: 89.2 FL — SIGNIFICANT CHANGE UP (ref 80–100)
MICROCYTES BLD QL: SLIGHT — SIGNIFICANT CHANGE UP
MONOCYTES # BLD AUTO: 1.32 K/UL — HIGH (ref 0–0.9)
MONOCYTES NFR BLD AUTO: 7.8 % — SIGNIFICANT CHANGE UP (ref 2–14)
NEUTROPHILS # BLD AUTO: 13.55 K/UL — HIGH (ref 1.8–7.4)
NEUTROPHILS NFR BLD AUTO: 80 % — HIGH (ref 43–77)
NRBC # BLD: 0 /100 WBCS — SIGNIFICANT CHANGE UP (ref 0–0)
PHOSPHATE SERPL-MCNC: 2.2 MG/DL — LOW (ref 2.5–4.5)
PLAT MORPH BLD: ABNORMAL
PLATELET # BLD AUTO: 315 K/UL — SIGNIFICANT CHANGE UP (ref 150–400)
POTASSIUM SERPL-MCNC: 3.8 MMOL/L — SIGNIFICANT CHANGE UP (ref 3.5–5.3)
POTASSIUM SERPL-SCNC: 3.8 MMOL/L — SIGNIFICANT CHANGE UP (ref 3.5–5.3)
PROT SERPL-MCNC: 6.8 G/DL — SIGNIFICANT CHANGE UP (ref 6–8.3)
RBC # BLD: 4.34 M/UL — SIGNIFICANT CHANGE UP (ref 4.2–5.8)
RBC # FLD: 13.1 % — SIGNIFICANT CHANGE UP (ref 10.3–14.5)
RBC BLD AUTO: ABNORMAL
SODIUM SERPL-SCNC: 135 MMOL/L — SIGNIFICANT CHANGE UP (ref 135–145)
WBC # BLD: 16.94 K/UL — HIGH (ref 3.8–10.5)
WBC # FLD AUTO: 16.94 K/UL — HIGH (ref 3.8–10.5)

## 2021-12-07 PROCEDURE — 99232 SBSQ HOSP IP/OBS MODERATE 35: CPT | Mod: GC

## 2021-12-07 PROCEDURE — 99291 CRITICAL CARE FIRST HOUR: CPT

## 2021-12-07 PROCEDURE — 76937 US GUIDE VASCULAR ACCESS: CPT | Mod: 26,59

## 2021-12-07 PROCEDURE — 95720 EEG PHY/QHP EA INCR W/VEEG: CPT

## 2021-12-07 PROCEDURE — 36573 INSJ PICC RS&I 5 YR+: CPT

## 2021-12-07 RX ORDER — QUETIAPINE FUMARATE 200 MG/1
25 TABLET, FILM COATED ORAL AT BEDTIME
Refills: 0 | Status: DISCONTINUED | OUTPATIENT
Start: 2021-12-07 | End: 2021-12-08

## 2021-12-07 RX ORDER — VANCOMYCIN HCL 1 G
1.25 VIAL (EA) INTRAVENOUS EVERY 8 HOURS
Refills: 0 | Status: DISCONTINUED | OUTPATIENT
Start: 2021-12-07 | End: 2021-12-07

## 2021-12-07 RX ORDER — SODIUM CHLORIDE 9 MG/ML
10 INJECTION INTRAMUSCULAR; INTRAVENOUS; SUBCUTANEOUS
Refills: 0 | Status: DISCONTINUED | OUTPATIENT
Start: 2021-12-07 | End: 2021-12-16

## 2021-12-07 RX ORDER — VANCOMYCIN HCL 1 G
1250 VIAL (EA) INTRAVENOUS EVERY 8 HOURS
Refills: 0 | Status: DISCONTINUED | OUTPATIENT
Start: 2021-12-07 | End: 2021-12-09

## 2021-12-07 RX ADMIN — Medication 166.67 MILLIGRAM(S): at 18:25

## 2021-12-07 RX ADMIN — PANTOPRAZOLE SODIUM 40 MILLIGRAM(S): 20 TABLET, DELAYED RELEASE ORAL at 12:29

## 2021-12-07 RX ADMIN — HEPARIN SODIUM 5000 UNIT(S): 5000 INJECTION INTRAVENOUS; SUBCUTANEOUS at 06:41

## 2021-12-07 RX ADMIN — Medication 250 MILLIGRAM(S): at 07:31

## 2021-12-07 RX ADMIN — CEFEPIME 100 MILLIGRAM(S): 1 INJECTION, POWDER, FOR SOLUTION INTRAMUSCULAR; INTRAVENOUS at 14:00

## 2021-12-07 RX ADMIN — HEPARIN SODIUM 5000 UNIT(S): 5000 INJECTION INTRAVENOUS; SUBCUTANEOUS at 21:07

## 2021-12-07 RX ADMIN — CEFEPIME 100 MILLIGRAM(S): 1 INJECTION, POWDER, FOR SOLUTION INTRAMUSCULAR; INTRAVENOUS at 21:07

## 2021-12-07 RX ADMIN — Medication 1 TABLET(S): at 12:29

## 2021-12-07 RX ADMIN — QUETIAPINE FUMARATE 25 MILLIGRAM(S): 200 TABLET, FILM COATED ORAL at 21:07

## 2021-12-07 RX ADMIN — Medication 1 MILLIGRAM(S): at 12:29

## 2021-12-07 RX ADMIN — CEFEPIME 100 MILLIGRAM(S): 1 INJECTION, POWDER, FOR SOLUTION INTRAMUSCULAR; INTRAVENOUS at 06:47

## 2021-12-07 RX ADMIN — HEPARIN SODIUM 5000 UNIT(S): 5000 INJECTION INTRAVENOUS; SUBCUTANEOUS at 14:00

## 2021-12-07 RX ADMIN — CHLORHEXIDINE GLUCONATE 1 APPLICATION(S): 213 SOLUTION TOPICAL at 06:48

## 2021-12-07 RX ADMIN — Medication 100 MILLIGRAM(S): at 12:29

## 2021-12-07 NOTE — EEG REPORT - NS EEG TEXT BOX
[facility] Department of Neurology  Inpatient Continuous video-Electroencephalogram    Patient Name:	HARRIET GOLDMAN    :	1970  MRN:	[id number]    Study Start Date/Time:  2021, 4:40:48 PM  Study End Date/Time:	    Referred by: select    Brief Clinical History:  HARRIET GOLDMAN is a 51 year old Male; study performed to investigate for seizures or markers of epilepsy.      Diagnosis Code:   R56.9 convulsions/seizure  CPT: 86003 EEG with video 12-26h  Technical CPT: 73724 set-up +  36422 vEEG unmonitored 12-26h    Pertinent Medications:  n/a    Acquisition Details:  Electroencephalography was acquired using a minimum of 21 channels on an [employer] Neu[facility] system v 8.5.1 with electrode placement according to the standard International 10-20 system following ACNS (American Clinical Neurophysiology Society) guidelines for Long-Term Video EEG monitoring.  Anterior temporal T1 and T2 electrodes were utilized whenever possible.   The [employer] automated spike & seizure detections were all reviewed in detail, in addition to extensive portions of raw EEG.    Day 1: 2021 @ 4:40:48 PM to next morning @ 07:00 am  Background:  continuous, with predominantly alpha and beta frequencies.  Symmetry:  No persistent asymmetries of voltage or frequency.  Posterior Dominant Rhythm:  11 Hz symmetric, well-organized, and well-modulated.  Organization: Appropriate anterior-posterior gradient.  Voltage:  Normal (20+ uV)  Variability: Yes. 		Reactivity: Yes.  N2 sleep: Symmetric, synchronous spindles and K complexes.  Spontaneous Activity:  No epileptiform discharges.  Periodic/rhythmic activity:  None.  Events:  No electrographic seizures or significant clinical events.  Provocations:  Hyperventilation and Photic stimulation: was not performed.    Daily Summary:    Mild excess diffuse beta activity, which may be a medication effect of benzodiazepines.  Unremarkable awake and sleep overnight EEG recording  No epileptiform activity and no significant clinical events occurred.    Dillon Barber MD  Attending Neurologist, [facility]ilepsy Program   ADDN/modified report    [facility] Department of Neurology  Inpatient Continuous video-Electroencephalogram    Patient Name:	HARRIET GOLDMAN    :	1970  MRN:	[id number]    Study Start Date/Time:  2021, 4:40:48 PM  Study End Date/Time:	    Referred by: select    Brief Clinical History:  HARRIET GOLDMAN is a 51 year old Male with left mastoiditis and question of left temporal encephalitis; study performed to investigate for seizures or markers of epilepsy.      Diagnosis Code:   R56.9 convulsions/seizure  CPT: 12290 EEG with video 12-26h  Technical CPT: 79805 set-up +  97113 vEEG unmonitored 12-26h    Pertinent Medications:  n/a    Acquisition Details:  Electroencephalography was acquired using a minimum of 21 channels on an [employer] system v 8.5.1 with electrode placement according to the standard International 10-20 system following ACNS (American Clinical Neurophysiology Society) guidelines for Long-Term Video EEG monitoring.  Anterior temporal T1 and T2 electrodes were utilized whenever possible.   The [employer] automated spike & seizure detections were all reviewed in detail, in addition to extensive portions of raw EEG.    Day 1: 2021 @ 4:40:48 PM to next morning @ 07:00 am  Background:  continuous, with predominantly alpha and beta frequencies.  Symmetry:  No persistent asymmetries of voltage or frequency.  Posterior Dominant Rhythm:  11 Hz symmetric, well-organized, and well-modulated.  Organization: Appropriate anterior-posterior gradient.  Voltage:  Normal (20+ uV)  Variability: Yes. 		Reactivity: Yes.  N2 sleep: Symmetric, synchronous spindles and K complexes.  Spontaneous Activity:  No epileptiform discharges.  Periodic/rhythmic activity:  None.  Events:  No electrographic seizures or significant clinical events.  Provocations:  Hyperventilation and Photic stimulation: was not performed.    Daily Summary:    Technically difficult study, as the left temporal electrodes were not recording well, however the rest of the leads recorded well enough to report. Consider  Mild excess diffuse beta activity, which may be a medication effect of benzodiazepines.  No epileptiform activity and no significant clinical events occurred.  Will consider re-hook when patient is more compliant.    Dillon Barber MD  Attending Neurologist, [facility] Epilepsy Program

## 2021-12-07 NOTE — PROCEDURAL SAFETY CHECKLIST WITH OR WITHOUT SEDATION - NSPREANESCONSENT_GEN_ALL_CORE
n/a Ideal body weight was utilized to determine the target body weight for ordered crystalloid fluids.

## 2021-12-07 NOTE — PROGRESS NOTE ADULT - ASSESSMENT
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    MRI 12/6: C/w In this patient with documented left necrotizing otitis externa, there is an approximately 1 cm abscess in the left inferolateral temporal lobe.    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    MRI 12/6: C/w In this patient with documented left necrotizing otitis externa, there is an approximately 1 cm abscess in the left inferolateral temporal lobe.    Recommendations:    -Please continue with Cefepime 2gQH  -Please continue with Vancomycin 1.25gq8-Please wait until 12 hours after last dose before this is given.   -Please obtain HIV testing.     ID to follow, plan discussed with primary team and attending.

## 2021-12-07 NOTE — PROGRESS NOTE ADULT - SUBJECTIVE AND OBJECTIVE BOX
OVERNIGHT EVENTS: MAXINE    SUBJECTIVE / INTERVAL HPI: Patient seen and examined at bedside. He is markedly confused, thinks we are in a [employer]. Irritable about "all the wires with no purpose." Seems to not understand severity of his infection, insistent on leaving today. Denying headaches, visual disturbances, nausea, vomiting.     VITAL SIGNS:  Vital Signs Last 24 Hrs  T(C): 37.3 (07 Dec 2021 01:00), Max: 37.6 (06 Dec 2021 18:08)  T(F): 99.2 (07 Dec 2021 01:00), Max: 99.7 (06 Dec 2021 18:08)  HR: 74 (07 Dec 2021 08:00) (56 - 80)  BP: 159/70 (07 Dec 2021 08:00) (95/53 - 162/77)  BP(mean): 101 (07 Dec 2021 08:00) (66 - 111)  RR: 38 (07 Dec 2021 08:00) (20 - 40)  SpO2: 95% (07 Dec 2021 08:00) (90% - 96%)    PHYSICAL EXAM:    General: AOx1-only to person. Irritable.   HEENT: NC/AT; PERRL, anicteric sclera; MMM. Left ear markedly swollen, warm, erythematous, slightly tender to palpation.   Neck: supple  Cardiovascular: +S1/S2; RRR  Respiratory: CTA B/L; no W/R/R  Gastrointestinal: soft, NT/ND; +BSx4  Extremities: WWP; no edema, clubbing or cyanosis  Vascular: 2+ radial, DP/PT pulses B/L  Neurological: AAOx1, no focal deficits noted. CN 2-12 intact. Strength 5/5 UE and LE.     MEDICATIONS:  MEDICATIONS  (STANDING):  cefepime   IVPB 2000 milliGRAM(s) IV Intermittent every 8 hours  chlorhexidine 2% Cloths 1 Application(s) Topical <User Schedule>  CIPROFLOXACIN(CIPRO) OPTHALMIC SOLUTION 5 Drop(s) 5 Drop(s) Both Ears two times a day  dexAMETHasone 0.1% Ophthalmic Solution for OTIC Use 5 Drop(s) Both Ears two times a day  dexMEDEtomidine Infusion 0.2 MICROgram(s)/kG/Hr (4.3 mL/Hr) IV Continuous <Continuous>  dextrose 40% Gel 15 Gram(s) Oral once  dextrose 5%. 1000 milliLiter(s) (50 mL/Hr) IV Continuous <Continuous>  dextrose 50% Injectable 25 Gram(s) IV Push once  folic acid 1 milliGRAM(s) Oral daily  glucagon  Injectable 1 milliGRAM(s) IntraMuscular once  heparin   Injectable 5000 Unit(s) SubCutaneous every 8 hours  influenza   Vaccine 0.5 milliLiter(s) IntraMuscular once  insulin regular  human corrective regimen sliding scale   SubCutaneous every 6 hours  multivitamin 1 Tablet(s) Oral daily  pantoprazole  Injectable 40 milliGRAM(s) IV Push daily  thiamine 100 milliGRAM(s) Oral daily  vancomycin  IVPB 2000 milliGRAM(s) IV Intermittent every 12 hours    MEDICATIONS  (PRN):  PHENobarbital Injectable 130 milliGRAM(s) IV Push every 15 minutes PRN CIWA >8      ALLERGIES:  Allergies    No Known Allergies    Intolerances        LABS:                        13.1   16.94 )-----------( 315      ( 07 Dec 2021 06:04 )             38.7     12-07    135  |  101  |  10  ----------------------------<  112<H>  3.8   |  23  |  0.70    Ca    8.6      07 Dec 2021 06:04  Phos  2.2     12-07  Mg     2.0     12-07    TPro  6.8  /  Alb  3.3  /  TBili  0.6  /  DBili  x   /  AST  133<H>  /  ALT  60<H>  /  AlkPhos  73  12-07        CAPILLARY BLOOD GLUCOSE      POCT Blood Glucose.: 105 mg/dL (07 Dec 2021 07:18)      RADIOLOGY & ADDITIONAL TESTS: Reviewed.    ASSESSMENT:    PLAN:

## 2021-12-07 NOTE — CONSULT NOTE ADULT - SUBJECTIVE AND OBJECTIVE BOX
Vascular Access Service Consult Note    [facility]             Diagnosis: malignant otitis externa    Indications for Vascular Access (Check all that apply)  [x  ]  Antibiotic Therapy       Antibiotic Prescribed:   vancomycin and cefepime                                                                                [  ]  IV Hydration  [  ]  Total Parenteral Nutrition  [  ]  Chemotherapy  [  ]  Difficult Venous Access  [  ]  CVP monitoring  [  ]  Medications with high potential for tissue necrosis on extravasation  [  ]  Other    Screening (Check all that apply)  Previous Radiation to chest  [  ] Yes      [x  ]  No  Breast Cancer                          [  ] Left     [  ]  Right    [x  ]  No  Lymph Node Dissection         [  ] Left     [  ]  Right    [x ]  No  Pacemaker or ICD                   [  ] Left     [  ]  Right    [ x ]  No  Upper Extremity DVT             [  ] Left     [  ]  Right    [ x ]  No  Chronic Kidney Disease         [  ]  Yes     [ x ]  No  Hemodialysis                           [  ]  Yes     [  x]  No  AV Fistula/ Graft                     [  ]  Left    [  ]  Right    [ x ]  No  Temp>101F in past 24 H       [  ]  Yes     [ x ]  No  H/O PICC/Midline                   [  ]  Yes     [ x ]  No    Lab data:                        13.1   16.94 )-----------( 315      ( 07 Dec 2021 06:04 )             38.7     12-07    135  |  101  |  10  ----------------------------<  112<H>  3.8   |  23  |  0.70    Ca    8.6      07 Dec 2021 06:04  Phos  2.2     12-07  Mg     2.0     12-07    TPro  6.8  /  Alb  3.3  /  TBili  0.6  /  DBili  x   /  AST  133<H>  /  ALT  60<H>  /  AlkPhos  73  12-07              I have reviewed the chart, interviewed and examined the patient and determined that this patient:  [ x ] Is a candidate for a PICC line  [  ] Is a candidate for a Midline  [  ] Is not a candidate for vascular access device (reason)    Lumens:    [  ] Single  [ x ] Double

## 2021-12-07 NOTE — PROGRESS NOTE ADULT - SUBJECTIVE AND OBJECTIVE BOX
ENT Consult Note    HARRIET GOLDMAN  [id number]    51y Male w/ unremarkable PMHx (hasn't seen a doctor in many years) presents to [facility] as a transfer for evaluation of seizure of unknown origin. ENT consults for evaluation of left ear infection, r/o malignant ear otitis infections. per wife and brother at bedside, pt has been complaining of bilateral ear pain, left greater than right, since late october. pt went to urgent care at that time and was given oral cipro. pt returned to urgent care one week later after finishing his antibiotics without any ear pain relief. he was given oral and otic drops, however, sxs did not resolve. pt was then seen by an ENT on Nov 15, who diagnosed him w/ bilateral otitis externa, and placed ear sunday bilaterally. pt was given ciprodex otic drops and a follow up. Today, wife states  was not acting like his usual self. around noon today, she went to visit him and work and pt seemed very lethargic and was only responding with one word answers. At this time, wife decided to call an ambulance. while being placed in to the ambulance, pt began having is seizure. pt was taken to [facility], and was intubated for airway protection. CT maxillofacial at [place] which showed distal external auditory canal and foci of possible tegmen tympani/mastoideum dehiscence versus severe thinning, concerning for malignant/necrotizing otitis. Per wife, pt had severe otalgia and hearing, but did not have any otorrhea, facial weakness, vertigo, or tinnitus    INTERVAL:    12/4: Self extubation last night with emergent reintubation. Otherwise started on cefepime/vanc. MRI/CT pending. Ear cx growing Gram+ cocci and rods. Low grade fevers overnight with WBC 18.4 (down from 100.2). Sunday in place at bedside. Pt sedated.    12/5: NAEON. Still on vanc/cef and ciprodex eardrops. Afebrile overnight. Ear cx sensitivities pending. CT scan temp bones with similar findings as CT maxillofacial from [place]. WBC downtrending today (down to 13.6). Pt was seen and examined at bedside. Stable exam findings, no mastoid swelling. Sedated and intubated on vent.    12/6: NAEON. On vanc/cef and ciprodex eardrops. Febrile overnight to 102. Ear cx pending sens. CT IAC w/ con with L ZARI, no drainable abscess, no intracranial extension, and R OE. WBC up to 20.3 today from 13.6. Pt's sunday were removed at bedside and both sides were debrided. Still with swelling of b/l EAC (L > R). Sunday reinserted and ciprofloxacin bedside eardrops placed.    12/7: NAEON. MRI shows 1 cm abscess in left temporal lobe. Given small size and absence of neurologic sxs, NSGY recommend c/w IV medications and repeat imaging to evaluation for resolution    ICU Vital Signs Last 24 Hrs  T(C): 37.3 (07 Dec 2021 01:00), Max: 37.6 (06 Dec 2021 18:08)  T(F): 99.2 (07 Dec 2021 01:00), Max: 99.7 (06 Dec 2021 18:08)  HR: 62 (07 Dec 2021 07:00) (56 - 80)  BP: 162/77 (07 Dec 2021 07:00) (95/53 - 164/77)  BP(mean): 111 (07 Dec 2021 07:00) (66 - 111)  ABP: --  ABP(mean): --  RR: 32 (07 Dec 2021 07:00) (20 - 40)  SpO2: 94% (07 Dec 2021 07:00) (90% - 96%)      PHYSICAL EXAM:    CONSTITUTIONAL: Well nourished, well developed, sedated  HEAD: normocephalic, atraumatic.  EARS  AD: Air>Bone Mastoid non-tender/non-edematous. non-proptotic , non-erythematous right pinna. Ear wick missing Right EAC edema improved. debris in EAC likely 2/2 to otic drops. TM intact. no perfs no granulation tissue or masses  AS: Bone> Air Mastoid non-tender/non-edematous. non-proptotic pinna. Left pinna edema and erythema improved. Wick in place.   Christiansen lateralized to the left  Face: HB 1  NOSE: Normal external nose.   ORAL CAVITY/OROPHARYNX: normal mucosa.   NECK: No cervical lymphadenopathy  RESPIRATORY: intubated connected to vent      EXAM:  CT TEMPORAL BONES                          PROCEDURE DATE:  12/04/2021          INTERPRETATION:  PROCEDURE: CT temporal bones    INDICATION: Severe otitis media with concern for temporal bone osteomyelitis    TECHNIQUE: Contiguous 1 mm thickaxial and modified coronal images were obtained through the temporal bones without the intravenous administration of contrast. Target axial and coronal review images were created through each temporal bone utilizing bone algorithm.    COMPARISON: Maxillofacial CT 12/3/2021    FINDINGS: Target review images of the right temporal bone demonstrates soft tissue filling the osseous segment of the right tympanic membrane. The bony cortex appears intact. The mastoid air cell system to be well developed.There is soft tissue opacification and fluid within right mastoid air cells as well as fluid within the right mastoid antrum. There is soft tissue within the middle ear cavity. The ossicles appear intact without erosive changes or displacement. The scutum is intact. The visualized portions of the right inner appear within normal limits.    Target review images of the left temporal bone demonstrates soft tissue completely filling the left osseous segment of the external auditory canal. There are cortical bony erosive changes involving the walls of bony external canal especially the floor and posterior wall.  There is complete soft tissue opacification of left mastoid air cells. The mastoid septae appear intact. The scutum appears eroded. Therealso is complete soft tissue opacification of the middle ear cavity. There is dehiscence of the tegmen tympani and mastoid roof. The ossicles appear intact without erosive changes or displacement. The visualized portions of the left inner ear appearsintact.    The course and caliber of the internal carotid artery, jugular vein and facial nerve within each temporal bones are unremarkable. The internal auditory canals are symmetric in size and configuration. Neither the cochlear nor vestibular aqueducts are enlarged.    Soft-tissue windows fail to demonstrate intracranial abnormality.    IMPRESSION: Right mastoid disease. Findings suggestive of left malignant otitis externa with left mastoid extension.      EXAM:  CT IAC IC                          PROCEDURE DATE:  12/05/2021          INTERPRETATION:  CT TEMPORAL BONES    Technique: A subcentimeter axial acquisition was performed through the temporal bones and reconstructed at submillimeter thickness and spacing in the axial and coronal planes, and furthermore in oblique planes both parallel and perpendicular to assess the semicircular canals. Each series right and left sides targeted/magnified views.    Contrast: Isovue-[id number] given IV    Clinical Information: Left malignant otitis externa    Prior Studies: Noncontrast exam 12/04/2021, and contrast-exam 12/03/2021.    Findings:    This is the third consecutive and daily exam. There is no change compared to 12/3/2021 which is also a contrast exam.    A.S.    There is permeative bony erosion involving the anterior wall of the external auditory canal and inferior mastoid cells most compatible with necrotizing otitis media. By definition, this is an osteomyelitis. Remaining mastoids more posteriorly show preserved trabeculae, and the tegmen appears thin throughout, but the postcontrast images when viewed in the coronal plane show no inflammatory dural thickening or any fluid collection to imply intracranial spread of infection. There is complete soft tissue opacification of the ear canal compatible with inflammatory skin thickening.    Middle ear is totally opacified, but the ossicular chain appears grossly preserved. Otic capsule also appears intact, aside from minor site of thinning ofthe superior semicircular canal (series 12, image 84 and series 6, image 42, of likely incidental note. There is no dehiscence of the carotid canal or jugular bulb, and the bony facial nerve is of similar size but its canal margins are somewhat hazy in the setting of mastoiditis. Vestibular aqueduct is not enlarged.    On soft tissue inspection and of most salience, there is cellulitis that extends anteriorly by the fissures of [place] with induration of the retrocondylar fat planes. There is also fat stranding in the upper parapharyngeal space without drainable fluid collection.      A.D.      *  External canal/TM: There is diffuse skin thickening, but without similar bony erosion or extraosseous soft tissue abnormality as on the contralateral side.  *  Mastoid cavity: There is moderate air cell opacification without bony rarefaction as seen on the other side.  *  Temporal bone cortices and Tegmen: There are sites of tegmen thinning, which are not dissimilar to the contralateral side. No elijah or measurable defect.  *  Middle ear/ossicles: Opacified but to a lesser degree than the other side. Ossicular chain appears intact  *  Oval/round windows: Both opacified but nonstenotic  *  Inner ear: Otic capsule appears preserved without evidence of semicircular canal dehiscence or otosclerosis. IACs appear symmetric caliber.  *  Facial nerve: Normal course with intact bony canal  *  ICA/IJV: Normal morphology without dehiscence to the tympanic cavity  *  Vestibular aqueduct: Not enlarged      SOFT TISSUES: On the soft tissue inspection without contrast, there is diffuse left temporal scalp swelling and periauricular swelling compatible with cellulitis secondary to the necrotizing external otitis. There is induration of the retrocondylar fat pads and some haziness in the parapharyngeal and  fat pads. Intracranially, there is preserved contrast filling of the left jugular bulb, sphenoid and transverse sinuses. No empyema identified, nor abscess in the extracranial softtissues below the skull base or laterally at the occipital SCM muscle.    BONY WINDOW: The more central skull base is intact. There is mild inflammatory thickening of the paranasal sinuses. The patient is intubated.      IMPRESSION:    A.S.  Findingsare compatible with necrotizing otitis externa. By definition, this is osteomyelitis, and no change is appreciated since 12/3/2021 which was also done with contrast. There is soft tissue extent to the retrocondylar fat and infratemporal fossa, which **may be of high virulence if the patient is immunocompromised**. No drainable abscess. No intracranial spread appreciated on CT.    A.D.  Lesser findings of both otitis externa and media but without bony erosion or osteomyelitis as seen A.S.        AP: 51 M w/ roughly one month hx of bilateral otitis externa, left greater than right, now presenting w/ seizures. PE does not reveal any granulation tissue in left EAC, however, b/l EACs are edematous and tender to manipulation. Currently on cefepime/vancomycin per ID with ciprodex ear drops b/l with improvement in fevers and WBC. No signs of mastoiditis on clinical examination.    -C/w ciprodex bilaterally; 5 drops to each ear wick 2-3 times a day - PLEASE MAKE SURE CIPRODEX ear drops, not ciprofloxacin only, need steroid component to decrease inflammation in ear canal  -CT temp w/ con with L ZARI, no abscess, no intracranial extension, R OE  -f/u w/ nsgy  -Vanc/cefepime per ID  -F/u left EAC ear culture (staph aureus/epidermiditis, f/u sensitivities)  -ENT to follow   -Rest of care per primary team  -No acute ENT intervention at this time    Seen w/ senior resident. Discussed with attending.

## 2021-12-07 NOTE — PROGRESS NOTE ADULT - SUBJECTIVE AND OBJECTIVE BOX
***Note in progress***    OVERNIGHT EVENTS: NAEO    SUBJECTIVE / INTERVAL HPI: Patient seen and examined at bedside. Patient denying chest pain, SOB, palpitations, cough. Patient denies fever, chills, HA, Dizziness, change in vision/hearing, N/V, abdominal pain, diarrhea, constipation, hematochezia/melena, dysuria, hematuria, new onset weakness/numbness, LE pain and/or swelling.    Remaining ROS negative     PHYSICAL EXAM:  General: NAD, lying in bed comfortably  HEENT: NC/AT; PERRL, anicteric sclera; MMM  Neck: supple  Cardiovascular: +S1/S2, RRR  Respiratory: CTA B/L; no W/R/R  Gastrointestinal: soft, NT/ND; +BSx4  Extremities: WWP; no edema, clubbing or cyanosis  Vascular: 2+ radial, DP/PT pulses B/L  Neurological: AAOx3; no focal deficits  Psychiatric: pleasant mood and affect  Dermatologic: no appreciable wounds or damage to the skin    VITAL SIGNS:  Vital Signs Last 24 Hrs  T(C): 36.3 (07 Dec 2021 09:24), Max: 37.6 (06 Dec 2021 18:08)  T(F): 97.4 (07 Dec 2021 09:24), Max: 99.7 (06 Dec 2021 18:08)  HR: 69 (07 Dec 2021 14:00) (55 - 80)  BP: 135/73 (07 Dec 2021 14:00) (95/53 - 162/77)  BP(mean): 97 (07 Dec 2021 14:00) (66 - 111)  RR: 29 (07 Dec 2021 14:00) (20 - 40)  SpO2: 96% (07 Dec 2021 14:00) (90% - 96%)    MEDICATIONS:  MEDICATIONS  (STANDING):  cefepime   IVPB 2000 milliGRAM(s) IV Intermittent every 8 hours  chlorhexidine 2% Cloths 1 Application(s) Topical <User Schedule>  CIPROFLOXACIN(CIPRO) OPTHALMIC SOLUTION 5 Drop(s) 5 Drop(s) Both Ears two times a day  dexAMETHasone 0.1% Ophthalmic Solution for OTIC Use 5 Drop(s) Both Ears two times a day  dexMEDEtomidine Infusion 0.2 MICROgram(s)/kG/Hr (4.3 mL/Hr) IV Continuous <Continuous>  dextrose 40% Gel 15 Gram(s) Oral once  dextrose 5%. 1000 milliLiter(s) (50 mL/Hr) IV Continuous <Continuous>  dextrose 50% Injectable 25 Gram(s) IV Push once  folic acid 1 milliGRAM(s) Oral daily  glucagon  Injectable 1 milliGRAM(s) IntraMuscular once  heparin   Injectable 5000 Unit(s) SubCutaneous every 8 hours  influenza   Vaccine 0.5 milliLiter(s) IntraMuscular once  insulin regular  human corrective regimen sliding scale   SubCutaneous every 6 hours  multivitamin 1 Tablet(s) Oral daily  pantoprazole  Injectable 40 milliGRAM(s) IV Push daily  thiamine 100 milliGRAM(s) Oral daily  vancomycin  IVPB 1250 milliGRAM(s) IV Intermittent every 8 hours    MEDICATIONS  (PRN):  PHENobarbital Injectable 130 milliGRAM(s) IV Push every 15 minutes PRN CIWA >8      ALLERGIES:  Allergies    No Known Allergies    Intolerances        LABS:                        13.1   16.94 )-----------( 315      ( 07 Dec 2021 06:04 )             38.7     12-07    135  |  101  |  10  ----------------------------<  112<H>  3.8   |  23  |  0.70    Ca    8.6      07 Dec 2021 06:04  Phos  2.2     12-07  Mg     2.0     12-07    TPro  6.8  /  Alb  3.3  /  TBili  0.6  /  DBili  x   /  AST  133<H>  /  ALT  60<H>  /  AlkPhos  73  12-07        CAPILLARY BLOOD GLUCOSE      POCT Blood Glucose.: 117 mg/dL (07 Dec 2021 11:14)      RADIOLOGY & ADDITIONAL TESTS: Reviewed. OVERNIGHT EVENTS: seen by neurosurgery attending who recommended continuing w/ abx per ID recs    SUBJECTIVE / INTERVAL HPI: Patient seen and examined at bedside. Uncomfortable, describes bugs moving all around the room.     Remaining ROS negative     PHYSICAL EXAM:  General: NAD, lying in bed uncomfortable, visual hallucinations   HEAD:  Atraumatic, normocephalic  EYES: conjunctiva and sclera clear  ENT: Moist mucous membranes, rash on left ear, swollen, and erythematous   NECK: Supple, no JVD  HEART: Regular rate and rhythm, no murmurs, rubs, or gallops  LUNGS: Unlabored respirations.  Clear to auscultation bilaterally, no crackles, wheezing, or rhonchi  ABDOMEN: Soft, nontender, nondistended, +BS  EXTREMITIES: 2+ peripheral pulses bilaterally. No clubbing, cyanosis, or edema  NERVOUS SYSTEM:  Induced coma (miotic pupils minimally reactive to light), no response to pain.  SKIN: No rashes or lesions    VITAL SIGNS:  Vital Signs Last 24 Hrs  T(C): 36.3 (07 Dec 2021 09:24), Max: 37.6 (06 Dec 2021 18:08)  T(F): 97.4 (07 Dec 2021 09:24), Max: 99.7 (06 Dec 2021 18:08)  HR: 69 (07 Dec 2021 14:00) (55 - 80)  BP: 135/73 (07 Dec 2021 14:00) (95/53 - 162/77)  BP(mean): 97 (07 Dec 2021 14:00) (66 - 111)  RR: 29 (07 Dec 2021 14:00) (20 - 40)  SpO2: 96% (07 Dec 2021 14:00) (90% - 96%)    MEDICATIONS:  MEDICATIONS  (STANDING):  cefepime   IVPB 2000 milliGRAM(s) IV Intermittent every 8 hours  chlorhexidine 2% Cloths 1 Application(s) Topical <User Schedule>  CIPROFLOXACIN(CIPRO) OPTHALMIC SOLUTION 5 Drop(s) 5 Drop(s) Both Ears two times a day  dexAMETHasone 0.1% Ophthalmic Solution for OTIC Use 5 Drop(s) Both Ears two times a day  dexMEDEtomidine Infusion 0.2 MICROgram(s)/kG/Hr (4.3 mL/Hr) IV Continuous <Continuous>  dextrose 40% Gel 15 Gram(s) Oral once  dextrose 5%. 1000 milliLiter(s) (50 mL/Hr) IV Continuous <Continuous>  dextrose 50% Injectable 25 Gram(s) IV Push once  folic acid 1 milliGRAM(s) Oral daily  glucagon  Injectable 1 milliGRAM(s) IntraMuscular once  heparin   Injectable 5000 Unit(s) SubCutaneous every 8 hours  influenza   Vaccine 0.5 milliLiter(s) IntraMuscular once  insulin regular  human corrective regimen sliding scale   SubCutaneous every 6 hours  multivitamin 1 Tablet(s) Oral daily  pantoprazole  Injectable 40 milliGRAM(s) IV Push daily  thiamine 100 milliGRAM(s) Oral daily  vancomycin  IVPB 1250 milliGRAM(s) IV Intermittent every 8 hours    MEDICATIONS  (PRN):  PHENobarbital Injectable 130 milliGRAM(s) IV Push every 15 minutes PRN CIWA >8      ALLERGIES:  Allergies    No Known Allergies    Intolerances        LABS:                        13.1   16.94 )-----------( 315      ( 07 Dec 2021 06:04 )             38.7     12-07    135  |  101  |  10  ----------------------------<  112<H>  3.8   |  23  |  0.70    Ca    8.6      07 Dec 2021 06:04  Phos  2.2     12-07  Mg     2.0     12-07    TPro  6.8  /  Alb  3.3  /  TBili  0.6  /  DBili  x   /  AST  133<H>  /  ALT  60<H>  /  AlkPhos  73  12-07        CAPILLARY BLOOD GLUCOSE      POCT Blood Glucose.: 117 mg/dL (07 Dec 2021 11:14)      RADIOLOGY & ADDITIONAL TESTS: Reviewed.

## 2021-12-07 NOTE — PROGRESS NOTE ADULT - ASSESSMENT
50 yo M with PMHx no past medical history of epilepsy, trauma or CNS infection, who presents to the ED with an episode of sudden onset speech disturbance talking incoherently and repetitive and altered behavior while working witnessed, no fever. In the last 2 days much more pain on left ear and headache. Pt uses frequently his swimming pool has had left ear infection for 2 months with hypoacusia treated with long course ciprofloxacin and a week of prednisone. In ED presented, repetitive generalized seizures. 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 Started on vancomycin and Zosyn IV. Admitted to ICU to continue his care, on cefepime and vanc IV (to cover CNS pseudomonal infection) propofol drip and placed on ventilator. On ear cultures found staph. aureus and epidermidis. On 12/5, pt was extubated. On IAC CT scan w/wo contrast necrotizing otitis externa with osteomyelitis and soft tissue extent to the retrocondylar fat and infratemporal fosse. ENT and neurosurgery didn't consider beneficial acute intervention at this point.    NEURO  #Sedated and intubate  JOMAR of -3 to -4  On precedex  No agitated today  Extubated    #Convulsive status epilepticus  Likely 2/2 to left temporal lobe encephalitis 2/2 to left mastoiditis. Frequent baths, high pseudomonal suspicion.  -intubated and sedatedl  -daily wean off sedation for neurochecks   - CT temporal bones done   - MRI IAC with contrast IV  - vEEG monitoring  - Maintain seizure and fall precautions  - no AED at this time   - f/u brain MRI  - neurosurgery doesn't consider beneficial acute intervention    CARDIO  None.  EKG sinus rhythm 96 bmp    RESPIRATORY  -Intubated for protecting airways in patient with status epilepticus  -wean vent as tolerated  -spontaneous breathing trials daily  -CxR w/o significant findings    GI  #diet  -NPO today    ENDO  None    RENAL  None    HEM/ONC  None    ID  #Left external otitis  s/p left mastoiditis s/p left temporal lobe encephalitis.   - FU ear cultures- showing GPR and GPC in pairs   - ciprodex bilaterally; 5 drops to each ear wick twice a day  - RI IAC to assess for osteo and skull base  - C/w vancomycin 1500mg q12h IV and cefepime 2g q8h IV.  - F/u blood (NGTD), urine (NGTD) and ear cultures (staph. aureus and epidermidis)  - Redose vanc for ear culture    - IAC CT scan w/wo contrast:  necrotizing otitis externa with osteomyelitis and soft tissue extent to the retrocondylar fat and infratemporal fosse.   - No acute ENT intervention at this time  - C/w ciprodex bilaterally; 5 drops to each ear wick twice a day plus corticoids  - WBC elevation 13.5-->20.2    F: none  E: replete PRN  N: NPO for today  DVT: Heparin IV q8h  GI: Pantoprazol 40mg IV q24h    FULL code 50 yo M with PMHx no past medical history of epilepsy, trauma or CNS infection, who presents to the ED with an episode of sudden onset speech disturbance talking incoherently and repetitive and altered behavior while working witnessed, no fever. In the last 2 days much more pain on left ear and headache. Pt uses frequently his swimming pool has had left ear infection for 2 months with hypoacusia treated with long course ciprofloxacin and a week of prednisone. In ED presented, repetitive generalized seizures. 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 Started on vancomycin and Zosyn IV. Admitted to ICU to continue his care, on cefepime and vanc IV (to cover CNS pseudomonal infection) propofol drip and placed on ventilator. On ear cultures found staph. aureus and epidermidis. On 12/5, pt was extubated. On IAC CT scan w/wo contrast necrotizing otitis externa with osteomyelitis and soft tissue extent to the retrocondylar fat and infratemporal fosse. ENT and neurosurgery didn't consider beneficial acute intervention at this point. MRI shows left necrotizing otitis externa, 1 cm abscess in the left temporal lobe with adjacent dural thickening. Neurosurgery  recommended abx per ID recs    NEURO  On Precedex   #Delirium tremens  Important alcohol use (3-4 beers per day)  - RASS 1-2  PLAN:  - Off of Precedex  - Phenobarbital 130mg q15min PRN (CIWA>8, stop after 12 doses)    #Convulsive status epilepticus  Likely one focal seizure on left temporal lobe with secondarily 4-5 generalized seizures s/p convulsive status epilepticus. 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 Frequent baths, high pseudomonal suspicion. Intubated and sedated for 2 days. CT temporal bones done and MRI IAC with contrast IV showed left necrotizing otitis externa, 1 cm abscess in the left temporal lobe with adjacent dural thickening. 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   - 1cm abscess.  - FU ear cultures- showing GPR and GPC in pairs   - Blood (NGTD), urine (NGTD) and ear cultures (staph. aureus and epidermidis)  - IAC CT scan w/wo contrast necrotizing otitis externa with osteomyelitis and soft tissue extent to the retrocondylar fat and infratemporal fosse.   - MRI shows left necrotizing otitis externa, 1 cm abscess in the left temporal lobe with adjacent dural thickening.   - ENT and neurosurgery no acute intervention.   PLAN:  - F/u surveillance cultures  - Vanc troughs  - C/w Ciprodex bilaterally; 5 drops to each ear wick twice a day; with Dexamethasone 0.1% 5 drops  - C/w vancomycin 1250mg q12h IV and cefepime 2g q8h IV.  - Neurosurgery recommended abx per ID recs  - F/u ID recs    F: none  E: replete PRN  N: Regular diet  DVT: Heparin IV q8h  GI: Pantoprazol 40mg IV q24h    FULL code

## 2021-12-08 ENCOUNTER — TRANSCRIPTION ENCOUNTER (OUTPATIENT)
Age: 51
End: 2021-12-08

## 2021-12-08 LAB
ALBUMIN SERPL ELPH-MCNC: 3.4 G/DL — SIGNIFICANT CHANGE UP (ref 3.3–5)
ALP SERPL-CCNC: 76 U/L — SIGNIFICANT CHANGE UP (ref 40–120)
ALT FLD-CCNC: 84 U/L — HIGH (ref 10–45)
ANION GAP SERPL CALC-SCNC: 11 MMOL/L — SIGNIFICANT CHANGE UP (ref 5–17)
ANION GAP SERPL CALC-SCNC: 11 MMOL/L — SIGNIFICANT CHANGE UP (ref 5–17)
AST SERPL-CCNC: 166 U/L — HIGH (ref 10–40)
BASOPHILS # BLD AUTO: 0.09 K/UL — SIGNIFICANT CHANGE UP (ref 0–0.2)
BASOPHILS NFR BLD AUTO: 0.5 % — SIGNIFICANT CHANGE UP (ref 0–2)
BILIRUB SERPL-MCNC: 0.6 MG/DL — SIGNIFICANT CHANGE UP (ref 0.2–1.2)
BUN SERPL-MCNC: 9 MG/DL — SIGNIFICANT CHANGE UP (ref 7–23)
BUN SERPL-MCNC: 9 MG/DL — SIGNIFICANT CHANGE UP (ref 7–23)
CALCIUM SERPL-MCNC: 8.6 MG/DL — SIGNIFICANT CHANGE UP (ref 8.4–10.5)
CALCIUM SERPL-MCNC: 8.9 MG/DL — SIGNIFICANT CHANGE UP (ref 8.4–10.5)
CHLORIDE SERPL-SCNC: 100 MMOL/L — SIGNIFICANT CHANGE UP (ref 96–108)
CHLORIDE SERPL-SCNC: 98 MMOL/L — SIGNIFICANT CHANGE UP (ref 96–108)
CO2 SERPL-SCNC: 24 MMOL/L — SIGNIFICANT CHANGE UP (ref 22–31)
CO2 SERPL-SCNC: 26 MMOL/L — SIGNIFICANT CHANGE UP (ref 22–31)
CREAT SERPL-MCNC: 0.61 MG/DL — SIGNIFICANT CHANGE UP (ref 0.5–1.3)
CREAT SERPL-MCNC: 0.68 MG/DL — SIGNIFICANT CHANGE UP (ref 0.5–1.3)
CULTURE RESULTS: SIGNIFICANT CHANGE UP
CULTURE RESULTS: SIGNIFICANT CHANGE UP
EOSINOPHIL # BLD AUTO: 0.14 K/UL — SIGNIFICANT CHANGE UP (ref 0–0.5)
EOSINOPHIL NFR BLD AUTO: 0.8 % — SIGNIFICANT CHANGE UP (ref 0–6)
GLUCOSE BLDC GLUCOMTR-MCNC: 111 MG/DL — HIGH (ref 70–99)
GLUCOSE BLDC GLUCOMTR-MCNC: 117 MG/DL — HIGH (ref 70–99)
GLUCOSE BLDC GLUCOMTR-MCNC: 119 MG/DL — HIGH (ref 70–99)
GLUCOSE BLDC GLUCOMTR-MCNC: 121 MG/DL — HIGH (ref 70–99)
GLUCOSE BLDC GLUCOMTR-MCNC: 133 MG/DL — HIGH (ref 70–99)
GLUCOSE SERPL-MCNC: 100 MG/DL — HIGH (ref 70–99)
GLUCOSE SERPL-MCNC: 125 MG/DL — HIGH (ref 70–99)
GRAM STN FLD: SIGNIFICANT CHANGE UP
HCT VFR BLD CALC: 38.2 % — LOW (ref 39–50)
HGB BLD-MCNC: 13.1 G/DL — SIGNIFICANT CHANGE UP (ref 13–17)
HIV 1+2 AB+HIV1 P24 AG SERPL QL IA: SIGNIFICANT CHANGE UP
IMM GRANULOCYTES NFR BLD AUTO: 0.8 % — SIGNIFICANT CHANGE UP (ref 0–1.5)
LYMPHOCYTES # BLD AUTO: 13.9 % — SIGNIFICANT CHANGE UP (ref 13–44)
LYMPHOCYTES # BLD AUTO: 2.48 K/UL — SIGNIFICANT CHANGE UP (ref 1–3.3)
MAGNESIUM SERPL-MCNC: 1.9 MG/DL — SIGNIFICANT CHANGE UP (ref 1.6–2.6)
MAGNESIUM SERPL-MCNC: 2.2 MG/DL — SIGNIFICANT CHANGE UP (ref 1.6–2.6)
MCHC RBC-ENTMCNC: 30.3 PG — SIGNIFICANT CHANGE UP (ref 27–34)
MCHC RBC-ENTMCNC: 34.3 GM/DL — SIGNIFICANT CHANGE UP (ref 32–36)
MCV RBC AUTO: 88.4 FL — SIGNIFICANT CHANGE UP (ref 80–100)
MONOCYTES # BLD AUTO: 2.13 K/UL — HIGH (ref 0–0.9)
MONOCYTES NFR BLD AUTO: 12 % — SIGNIFICANT CHANGE UP (ref 2–14)
NEUTROPHILS # BLD AUTO: 12.81 K/UL — HIGH (ref 1.8–7.4)
NEUTROPHILS NFR BLD AUTO: 72 % — SIGNIFICANT CHANGE UP (ref 43–77)
NRBC # BLD: 0 /100 WBCS — SIGNIFICANT CHANGE UP (ref 0–0)
PHOSPHATE SERPL-MCNC: 1.6 MG/DL — LOW (ref 2.5–4.5)
PHOSPHATE SERPL-MCNC: 4.4 MG/DL — SIGNIFICANT CHANGE UP (ref 2.5–4.5)
PLATELET # BLD AUTO: 342 K/UL — SIGNIFICANT CHANGE UP (ref 150–400)
POTASSIUM SERPL-MCNC: 3.1 MMOL/L — LOW (ref 3.5–5.3)
POTASSIUM SERPL-MCNC: SIGNIFICANT CHANGE UP MMOL/L (ref 3.5–5.3)
POTASSIUM SERPL-SCNC: 3.1 MMOL/L — LOW (ref 3.5–5.3)
POTASSIUM SERPL-SCNC: SIGNIFICANT CHANGE UP MMOL/L (ref 3.5–5.3)
PROT SERPL-MCNC: 7 G/DL — SIGNIFICANT CHANGE UP (ref 6–8.3)
RBC # BLD: 4.32 M/UL — SIGNIFICANT CHANGE UP (ref 4.2–5.8)
RBC # FLD: 13 % — SIGNIFICANT CHANGE UP (ref 10.3–14.5)
SARS-COV-2 RNA SPEC QL NAA+PROBE: SIGNIFICANT CHANGE UP
SODIUM SERPL-SCNC: 135 MMOL/L — SIGNIFICANT CHANGE UP (ref 135–145)
SODIUM SERPL-SCNC: 135 MMOL/L — SIGNIFICANT CHANGE UP (ref 135–145)
SPECIMEN SOURCE: SIGNIFICANT CHANGE UP
VANCOMYCIN TROUGH SERPL-MCNC: 22.9 UG/ML — HIGH (ref 10–20)
WBC # BLD: 17.8 K/UL — HIGH (ref 3.8–10.5)
WBC # FLD AUTO: 17.8 K/UL — HIGH (ref 3.8–10.5)

## 2021-12-08 PROCEDURE — 76705 ECHO EXAM OF ABDOMEN: CPT | Mod: 26

## 2021-12-08 PROCEDURE — 99232 SBSQ HOSP IP/OBS MODERATE 35: CPT | Mod: GC

## 2021-12-08 PROCEDURE — 99291 CRITICAL CARE FIRST HOUR: CPT

## 2021-12-08 PROCEDURE — 99253 IP/OBS CNSLTJ NEW/EST LOW 45: CPT

## 2021-12-08 PROCEDURE — 99221 1ST HOSP IP/OBS SF/LOW 40: CPT

## 2021-12-08 RX ORDER — PHENOBARBITAL 60 MG
130 TABLET ORAL
Refills: 0 | Status: DISCONTINUED | OUTPATIENT
Start: 2021-12-08 | End: 2021-12-08

## 2021-12-08 RX ORDER — NICOTINE POLACRILEX 2 MG
1 GUM BUCCAL DAILY
Refills: 0 | Status: DISCONTINUED | OUTPATIENT
Start: 2021-12-08 | End: 2021-12-16

## 2021-12-08 RX ORDER — DEXMEDETOMIDINE HYDROCHLORIDE IN 0.9% SODIUM CHLORIDE 4 UG/ML
0.2 INJECTION INTRAVENOUS
Qty: 400 | Refills: 0 | Status: DISCONTINUED | OUTPATIENT
Start: 2021-12-08 | End: 2021-12-11

## 2021-12-08 RX ORDER — ACETAMINOPHEN 500 MG
1000 TABLET ORAL ONCE
Refills: 0 | Status: COMPLETED | OUTPATIENT
Start: 2021-12-08 | End: 2021-12-08

## 2021-12-08 RX ORDER — ENOXAPARIN SODIUM 100 MG/ML
40 INJECTION SUBCUTANEOUS DAILY
Refills: 0 | Status: DISCONTINUED | OUTPATIENT
Start: 2021-12-08 | End: 2021-12-16

## 2021-12-08 RX ORDER — PHENOBARBITAL 60 MG
130 TABLET ORAL
Refills: 0 | Status: DISCONTINUED | OUTPATIENT
Start: 2021-12-08 | End: 2021-12-09

## 2021-12-08 RX ORDER — DIVALPROEX SODIUM 500 MG/1
500 TABLET, DELAYED RELEASE ORAL
Refills: 0 | Status: DISCONTINUED | OUTPATIENT
Start: 2021-12-08 | End: 2021-12-16

## 2021-12-08 RX ORDER — PHENOBARBITAL 60 MG
130 TABLET ORAL ONCE
Refills: 0 | Status: DISCONTINUED | OUTPATIENT
Start: 2021-12-08 | End: 2021-12-08

## 2021-12-08 RX ORDER — POTASSIUM CHLORIDE 20 MEQ
20 PACKET (EA) ORAL
Refills: 0 | Status: COMPLETED | OUTPATIENT
Start: 2021-12-08 | End: 2021-12-08

## 2021-12-08 RX ORDER — THIAMINE MONONITRATE (VIT B1) 100 MG
500 TABLET ORAL DAILY
Refills: 0 | Status: DISCONTINUED | OUTPATIENT
Start: 2021-12-08 | End: 2021-12-09

## 2021-12-08 RX ORDER — POTASSIUM CHLORIDE 20 MEQ
40 PACKET (EA) ORAL EVERY 4 HOURS
Refills: 0 | Status: DISCONTINUED | OUTPATIENT
Start: 2021-12-08 | End: 2021-12-08

## 2021-12-08 RX ADMIN — Medication 1 MILLIGRAM(S): at 17:21

## 2021-12-08 RX ADMIN — Medication 130 MILLIGRAM(S): at 03:20

## 2021-12-08 RX ADMIN — Medication 2 MILLIGRAM(S): at 00:10

## 2021-12-08 RX ADMIN — Medication 130 MILLIGRAM(S): at 02:20

## 2021-12-08 RX ADMIN — Medication 130 MILLIGRAM(S): at 10:10

## 2021-12-08 RX ADMIN — Medication 400 MILLIGRAM(S): at 07:48

## 2021-12-08 RX ADMIN — Medication 166.67 MILLIGRAM(S): at 12:18

## 2021-12-08 RX ADMIN — Medication 1 PATCH: at 11:04

## 2021-12-08 RX ADMIN — Medication 130 MILLIGRAM(S): at 09:26

## 2021-12-08 RX ADMIN — CEFEPIME 100 MILLIGRAM(S): 1 INJECTION, POWDER, FOR SOLUTION INTRAMUSCULAR; INTRAVENOUS at 21:50

## 2021-12-08 RX ADMIN — Medication 130 MILLIGRAM(S): at 05:06

## 2021-12-08 RX ADMIN — Medication 166.67 MILLIGRAM(S): at 02:32

## 2021-12-08 RX ADMIN — Medication 130 MILLIGRAM(S): at 01:37

## 2021-12-08 RX ADMIN — Medication 130 MILLIGRAM(S): at 01:00

## 2021-12-08 RX ADMIN — Medication 50 MILLIEQUIVALENT(S): at 13:59

## 2021-12-08 RX ADMIN — Medication 50 MILLIEQUIVALENT(S): at 11:32

## 2021-12-08 RX ADMIN — Medication 2 MILLIGRAM(S): at 00:35

## 2021-12-08 RX ADMIN — CHLORHEXIDINE GLUCONATE 1 APPLICATION(S): 213 SOLUTION TOPICAL at 06:27

## 2021-12-08 RX ADMIN — CEFEPIME 100 MILLIGRAM(S): 1 INJECTION, POWDER, FOR SOLUTION INTRAMUSCULAR; INTRAVENOUS at 06:23

## 2021-12-08 RX ADMIN — ENOXAPARIN SODIUM 40 MILLIGRAM(S): 100 INJECTION SUBCUTANEOUS at 11:04

## 2021-12-08 RX ADMIN — Medication 1 TABLET(S): at 17:21

## 2021-12-08 RX ADMIN — DEXMEDETOMIDINE HYDROCHLORIDE IN 0.9% SODIUM CHLORIDE 4.3 MICROGRAM(S)/KG/HR: 4 INJECTION INTRAVENOUS at 10:10

## 2021-12-08 RX ADMIN — Medication 100 MILLIGRAM(S): at 17:21

## 2021-12-08 RX ADMIN — Medication 130 MILLIGRAM(S): at 05:21

## 2021-12-08 RX ADMIN — Medication 1 PATCH: at 18:38

## 2021-12-08 RX ADMIN — Medication 130 MILLIGRAM(S): at 06:30

## 2021-12-08 RX ADMIN — HEPARIN SODIUM 5000 UNIT(S): 5000 INJECTION INTRAVENOUS; SUBCUTANEOUS at 07:23

## 2021-12-08 RX ADMIN — Medication 1000 MILLIGRAM(S): at 08:13

## 2021-12-08 RX ADMIN — Medication 130 MILLIGRAM(S): at 04:57

## 2021-12-08 RX ADMIN — DIVALPROEX SODIUM 500 MILLIGRAM(S): 500 TABLET, DELAYED RELEASE ORAL at 17:20

## 2021-12-08 RX ADMIN — CEFEPIME 100 MILLIGRAM(S): 1 INJECTION, POWDER, FOR SOLUTION INTRAMUSCULAR; INTRAVENOUS at 14:18

## 2021-12-08 RX ADMIN — Medication 85 MILLIMOLE(S): at 14:00

## 2021-12-08 RX ADMIN — Medication 50 MILLIEQUIVALENT(S): at 09:29

## 2021-12-08 RX ADMIN — Medication 105 MILLIGRAM(S): at 11:05

## 2021-12-08 RX ADMIN — Medication 130 MILLIGRAM(S): at 00:45

## 2021-12-08 NOTE — BH CONSULTATION LIAISON ASSESSMENT NOTE - NSBHADMITCOUNSEL_PSY_A_CORE
diagnostic results/impressions and/or recommended studies/risks and benefits of treatment options/risk factor reduction/client/family/caregiver education

## 2021-12-08 NOTE — PROGRESS NOTE ADULT - SUBJECTIVE AND OBJECTIVE BOX
OVERNIGHT EVENTS: Agitated, combative, requiring 5 doses of Phenobarbital.     SUBJECTIVE / INTERVAL HPI: Patient seen and examined at bedside. Extremely agitated, unwilling to cooperate with exam.     VITAL SIGNS:  Vital Signs Last 24 Hrs  T(C): 37.1 (08 Dec 2021 10:57), Max: 37.9 (08 Dec 2021 06:00)  T(F): 98.7 (08 Dec 2021 10:57), Max: 100.2 (08 Dec 2021 06:00)  HR: 79 (08 Dec 2021 10:00) (63 - 121)  BP: 98/56 (08 Dec 2021 10:00) (98/56 - 170/75)  BP(mean): 69 (08 Dec 2021 10:00) (69 - 126)  RR: 28 (08 Dec 2021 10:00) (24 - 50)  SpO2: 91% (08 Dec 2021 10:00) (90% - 97%)    PHYSICAL EXAM:    General: WDWN  HEENT: NC/AT; swollen and erythematous left ear. no drainage.   Neck: supple  Cardiovascular: +S1/S2; RRR  Respiratory: CTA B/L; no W/R/R  Gastrointestinal: soft, NT/ND; +BSx4  Extremities: WWP; no edema, clubbing or cyanosis  Vascular: 2+ radial, DP/PT pulses B/L  Neurological: AAOx1.    MEDICATIONS:  MEDICATIONS  (STANDING):  cefepime   IVPB 2000 milliGRAM(s) IV Intermittent every 8 hours  chlorhexidine 2% Cloths 1 Application(s) Topical <User Schedule>  CIPROFLOXACIN(CIPRO) OPTHALMIC SOLUTION 5 Drop(s) 5 Drop(s) Both Ears two times a day  dexAMETHasone 0.1% Ophthalmic Solution for OTIC Use 5 Drop(s) Both Ears two times a day  dexMEDEtomidine Infusion 0.2 MICROgram(s)/kG/Hr (4.3 mL/Hr) IV Continuous <Continuous>  dextrose 40% Gel 15 Gram(s) Oral once  dextrose 5%. 1000 milliLiter(s) (50 mL/Hr) IV Continuous <Continuous>  dextrose 50% Injectable 25 Gram(s) IV Push once  enoxaparin Injectable 40 milliGRAM(s) SubCutaneous daily  folic acid 1 milliGRAM(s) Oral daily  glucagon  Injectable 1 milliGRAM(s) IntraMuscular once  influenza   Vaccine 0.5 milliLiter(s) IntraMuscular once  insulin regular  human corrective regimen sliding scale   SubCutaneous every 6 hours  multivitamin 1 Tablet(s) Oral daily  nicotine - 21 mG/24Hr(s) Patch 1 patch Transdermal daily  potassium chloride  20 mEq/100 mL IVPB 20 milliEquivalent(s) IV Intermittent every 2 hours  sodium phosphate IVPB 30 milliMole(s) IV Intermittent once  thiamine 100 milliGRAM(s) Oral daily  thiamine IVPB 500 milliGRAM(s) IV Intermittent daily  vancomycin  IVPB 1250 milliGRAM(s) IV Intermittent every 8 hours    MEDICATIONS  (PRN):  PHENobarbital Injectable 130 milliGRAM(s) IV Push every 1 hour PRN CIWA >8  sodium chloride 0.9% lock flush 10 milliLiter(s) IV Push every 1 hour PRN Pre/post blood products, medications, blood draw, and to maintain line patency      ALLERGIES:  Allergies    No Known Allergies    Intolerances        LABS:                        13.1   17.80 )-----------( 342      ( 08 Dec 2021 06:20 )             38.2     12-08    135  |  98  |  9   ----------------------------<  100<H>  3.1<L>   |  26  |  0.68    Ca    8.9      08 Dec 2021 06:20  Phos  1.6     12-08  Mg     1.9     12-08    TPro  7.0  /  Alb  3.4  /  TBili  0.6  /  DBili  x   /  AST  166<H>  /  ALT  84<H>  /  AlkPhos  76  12-08        CAPILLARY BLOOD GLUCOSE      POCT Blood Glucose.: 133 mg/dL (08 Dec 2021 06:52)      RADIOLOGY & ADDITIONAL TESTS: Reviewed.    ASSESSMENT:    PLAN:

## 2021-12-08 NOTE — CONSULT NOTE ADULT - ASSESSMENT
52 yo gentleman with PMHx of umbilical hernia (5-6 years ago) and alcohol use disorder (no withdrawal hx) with no past medical history of epilepsy, trauma or CNS infection, who presents due to an episode of sudden onset speech disturbance on 12/3/21 talking incoherently and altered behavior with excessive diaphoresis followed by 4-5 seizures witnessed by EMS and ED found to have necrotizing otitis externa of the left ear currently being treated on IV vancomycin and cefepime.     Epilepsy consulted for seizures. MRI 12/6/21 showed L temporal lobe 1cm abscess. Seizure likely 2/2 L abscess/encephalitis considering current state of necrotizing otitis externa, no seizure hx and no discontinuation of alcohol intake prior to admission.  Plan  - c/w Depakote 500 mg BID (started 12/8)  - maintain seizure and fall precautions  - neurosurgery doesn't consider beneficial acute intervention   50 yo gentleman with PMHx of umbilical hernia (5-6 years ago) and alcohol use disorder (no withdrawal hx) with no past medical history of epilepsy, trauma or CNS infection, who presents due to an episode of sudden onset speech disturbance on 12/3/21 talking incoherently and altered behavior with excessive diaphoresis followed by 4-5 seizures witnessed by EMS and ED found to have necrotizing otitis externa of the left ear currently being treated on IV vancomycin and cefepime.     Epilepsy consulted for seizures. MRI 12/6/21 showed L temporal lobe 1cm abscess. Seizure likely 2/2 L abscess/encephalitis considering current state of necrotizing otitis externa, no seizure hx and no discontinuation of alcohol intake prior to admission.    Plan  - c/w Depakote 500 mg BID (started 12/8)  - maintain seizure and fall precautions  - neurosurgery doesn't consider beneficial acute intervention

## 2021-12-08 NOTE — BH CONSULTATION LIAISON ASSESSMENT NOTE - RISK ASSESSMENT
Patient is currently sleeping. Patient is under 1:1 observation with wrist restraints and vest in place. Will reassess at a later time to determine what is his level of risk.

## 2021-12-08 NOTE — CONSULT NOTE ADULT - SUBJECTIVE AND OBJECTIVE BOX
Neurology consult    HARRIET [id number]    HPI:  52 yo gentleman with PMHx of umbilical hernia (5-6 years ago) with no past medical history of epilepsy, trauma or CNS infection, who presents to the ED with an episode of sudden onset at 8:45am on 12/3 speech disturbance talking incoherently and altered behavior while working witnessed by his colleagues. His wife called him by the phone at 9am when she was driving to meet him. When she arrives the patient only can repeat "I know" but he is unable to understand or articulate different speech, associated to sweating and paleness. He has slower comprehension, and ambulance witnessed seizure en route. When he is at the emergency department he has repeated seizures (his wife says that maybe 3 or 4, unwitnessed but he had some foam). In the last 2 days much more pain on left ear and head. Pt has had left ear infection with hypoacusia, no secretion, but rash for two months treated with a course of 2 months of ciprofloxacin 500mg q12h and drops of ciprofloxacin (unknown dose) and in the last week a course of one week of prednisone 20mg q12h oral. His wife and pt relates the infection with his repeated baths on their private swimming pool. He does consistently drink 6 beers daily, has not missed any drinks per wife. No hx of seizure, no trauma, no other complaints. Pt continued to seize in ED.    ED Course:  -Labs: WBC 20.8 K 5.9 Cl 112 HCO3 20 BNP 1341 pH 7.16 pCO2 63 sat O2 100%  -Utox: THC and opiate positive  -UA: No UTI. Gravity 1030  -CxR: bibasilar pneumonia and atelectasis and small effusions.  -Head and maxillo CT and angioCT: There is no evidence of mass or acute intracranial hemorrhage. No midline shift or other significant mass effect is noted. There is no CT evidence of acute territorial infarct. There is opacification of the left mastoid air cells and left middle ear. Orbits and orbital contents are unremarkable. Likely hypodensity of left temporal lobe in possible relationship with encephalitis.  -MGMT: Vanco 1g IV, Zosyn 3.375mg IV, propofol IV and on mechanical ventilation. (03 Dec 2021 15:33)    Epilepsy consulted for seizures. Pt seen in the MICU, limited recollection of series of events surrounding seizures. He is able to recognize that he has changed hospital rooms. AAOx2; he is oriented to self and time, not place. Wife and pt confirm no history of seizures and no missed drinks in days leading up to the event. Wife denies any trauma or other complaints other than persistent L ear infection.       MEDICATIONS  cefepime   IVPB 2000 milliGRAM(s) IV Intermittent every 8 hours  chlorhexidine 2% Cloths 1 Application(s) Topical <User Schedule>  CIPROFLOXACIN(CIPRO) OPTHALMIC SOLUTION 5 Drop(s) 5 Drop(s) Both Ears two times a day  dexAMETHasone 0.1% Ophthalmic Solution for OTIC Use 5 Drop(s) Both Ears two times a day  dexMEDEtomidine Infusion 0.2 MICROgram(s)/kG/Hr IV Continuous <Continuous>  dextrose 40% Gel 15 Gram(s) Oral once  dextrose 5%. 1000 milliLiter(s) IV Continuous <Continuous>  dextrose 50% Injectable 25 Gram(s) IV Push once  diVALproex  milliGRAM(s) Oral two times a day  enoxaparin Injectable 40 milliGRAM(s) SubCutaneous daily  folic acid 1 milliGRAM(s) Oral daily  glucagon  Injectable 1 milliGRAM(s) IntraMuscular once  influenza   Vaccine 0.5 milliLiter(s) IntraMuscular once  insulin regular  human corrective regimen sliding scale   SubCutaneous every 6 hours  multivitamin 1 Tablet(s) Oral daily  nicotine - 21 mG/24Hr(s) Patch 1 patch Transdermal daily  PHENobarbital Injectable 130 milliGRAM(s) IV Push every 1 hour PRN  sodium chloride 0.9% lock flush 10 milliLiter(s) IV Push every 1 hour PRN  thiamine IVPB 500 milliGRAM(s) IV Intermittent daily  vancomycin  IVPB 1250 milliGRAM(s) IV Intermittent every 8 hours    Family history: No history of seizures.    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 Works in a warehouse. Takes baths a few times per week in his private swimming pool. Drink (6 packs/carmel, he drank beers). Heavy smoker (1/2 pack/day), Drug use (sometimes smokes marijuana but not often).    Allergies  No Known Allergies    Vital Signs Last 24 Hrs  T(C): 37 (08 Dec 2021 14:25), Max: 37.9 (08 Dec 2021 06:00)  T(F): 98.6 (08 Dec 2021 14:25), Max: 100.2 (08 Dec 2021 06:00)  HR: 75 (08 Dec 2021 17:00) (56 - 121)  BP: 121/62 (08 Dec 2021 17:00) (93/59 - 170/75)  BP(mean): 84 (08 Dec 2021 17:00) (69 - 126)  RR: 30 (08 Dec 2021 17:00) (24 - 50)  SpO2: 92% (08 Dec 2021 17:00) (89% - 97%)    REVIEW OF SYSTEMS:  Constitutional: No fever, chills  Eyes: Denies  ENT:  L ear pain  Neck: B/l neck soarness and stiffness  Respiratory: Denies  Cardiovascular: Denies  Gastrointestinal: Denies  Genitourinary: Denies  Neurological: No numbness, does not complain of tremors  Psychiatric: Denies  Musculoskeletal: B/l shoulder muscle tenderness and weakness  Skin: Denies    Physical Exam:  Gen: no acute distress; interactive, appears disoriented  HEENT: NC/AT; no conjunctivitis or scleral icterus; no nasal discharge; no nasal congestion; oropharynx without exudates/erythema; mucus membranes moist  Neck: ROM limited due to pain  Chest: clear to auscultation bilaterally, no crackles/wheezes, good air entry, no tachypnea or retractions  CV: regular rate and rhythm, no murmurs   Abd: soft, nontender, nondistended, no HSM appreciated, NABS  : Not assessed.  Extrem: FROM of all joints  Neurologic:  -Mental Status: AAOx2, not oriented to place. Speech is fluent, though slowed (possibly due to recent sedation) with intact comprehension, no dysarthria. Recent and remote memory intact, though limited surrounding the seizure. Follows commands. Attention/concentration intact. Fund of knowledge appropriate.  -Cranial Nerves:          II: Visual fields are full to confrontation.          III, IV, VI: EOMI without nystagmus. PERRL b/l          V:  Facial sensation V1-V3 equal and intact           VII: Face is symmetric with normal eye closure and smile          VIII: Hearing R>L, coincides with necrotizing otitis externa of the L ear          IX, X: Uvula is midline and soft palate rises symmetrically          XI: Head turning and shoulder shrug are intact.          XII: Tongue protrudes midline  -Motor: Normal bulk and tone. Limited due to musculoskeletal pain. No pronator drift. Rapid alternating movements intact and symmetric  -Sensation: Intact to light touch bilaterally.  -Coordination: Mild dysmetria on finger-to-nose, not significant.  -Reflexes: Downgoing toes bilaterally   -Gait: Unable to asses, strapped to chair.    LABS:  CBC Full  -  ( 08 Dec 2021 06:20 )  WBC Count : 17.80 K/uL  RBC Count : 4.32 M/uL  Hemoglobin : 13.1 g/dL  Hematocrit : 38.2 %  Platelet Count - Automated : 342 K/uL  Mean Cell Volume : 88.4 fl  Mean Cell Hemoglobin : 30.3 pg  Mean Cell Hemoglobin Concentration : 34.3 gm/dL  Auto Neutrophil # : 12.81 K/uL  Auto Lymphocyte # : 2.48 K/uL  Auto Monocyte # : 2.13 K/uL  Auto Eosinophil # : 0.14 K/uL  Auto Basophil # : 0.09 K/uL  Auto Neutrophil % : 72.0 %  Auto Lymphocyte % : 13.9 %  Auto Monocyte % : 12.0 %  Auto Eosinophil % : 0.8 %  Auto Basophil % : 0.5 %    12-08    135  |  100  |  9   ----------------------------<  x   x    |  24  |  0.61    Ca    8.6      08 Dec 2021 17:04  Phos  4.4     12-08  Mg     2.2     12-08    TPro  7.0  /  Alb  3.4  /  TBili  0.6  /  DBili  x   /  AST  166<H>  /  ALT  84<H>  /  AlkPhos  76  12-08    Hemoglobin A1C: 5.3    LIVER FUNCTIONS - ( 08 Dec 2021 06:20 )  Alb: 3.4 g/dL / Pro: 7.0 g/dL / ALK PHOS: 76 U/L / ALT: 84 U/L / AST: 166 U/L / GGT: x           RADIOLOGY  < from: MR IAC w/ IV Cont (12.06.21 @ 15:07) >  EXAM:  MR IAC ONLY IC                          PROCEDURE DATE:  12/06/2021          INTERPRETATION:  Sagittal, axial and coronal imaging of the brain was performed with attention to the internal auditory canals bilaterally.  T1 and T2 weighted sequences were acquired both before and following intravenous contrast administration, including volumetric and fat-suppressed sequences.    Contrast dose: 7.5 cc of intravenous Gadavist.    Clinical information: Left external otitis status post seizures.    The current study is interpreted in conjunction with images from temporal bone CT dated 12/5/2021.    It is considerably degraded by motion artifact.    In this patient with documented left necrotizing otitis externa, there is an approximately 1 cm abscess in the left inferolateral temporal lobe, best seen on enhanced axial image 16 and coronal image 4. There is associated restricted diffusion and mild surrounding edema. There is also dural thickening along the floor and inferolateral wall of the left middle cranial fossa. The remainder of the brain, including the left posterior fossa, is unremarkable in appearance. There is no evidence of dural venous sinus thrombosis.    There is marked thickening of the left auricle and skin of the external auditory canal, with complete opacification of the left tympanomastoid cavity, as on the prior CT. Extracranially, pathologic enhancement is present in the left temporomandibular joint, though signal within the mandibular condylar head is unremarkable. There is also pathologic enhancement in the left parotid and  spaces without evidence of fluid collection. As on the prior CT, there is also opacification of the right tympanomastoid cavity, with the included right extracranial soft tissues noted to be normal. Minimal mucosal thickening is noted in the right frontal and bilateral ethmoid sinuses.    IMPRESSION:    In this patient with known left necrotizing otitis externa, a 1 cm abscess is identified in the left temporal lobe with adjacent dural thickening. Inflammatory changes also present extracranially without abscess formation, as above.    --- End of Report ---    Thank you for the opportunity to participate in the care of this patient.      PIPER COLÓN MD; Attending Radiologist  This document has been electronically signed. Dec  6 2021  4:26PM    < end of copied text >                 Neurology consult    HARRIET [id number]    HPI:  50 yo gentleman with PMHx of umbilical hernia (5-6 years ago) with no past medical history of epilepsy, trauma or CNS infection, who presents to the ED with an episode of sudden onset at 8:45am on 12/3 speech disturbance talking incoherently and altered behavior while working witnessed by his colleagues. His wife called him by the phone at 9am when she was driving to meet him. When she arrives the patient only can repeat "I know" but he is unable to understand or articulate different speech, associated to sweating and paleness. He has slower comprehension, and ambulance witnessed seizure en route. When he is at the emergency department he has repeated seizures (his wife says that maybe 3 or 4, unwitnessed but he had some foam). In the last 2 days much more pain on left ear and head. Pt has had left ear infection with hypoacusia, no secretion, but rash for two months treated with a course of 2 months of ciprofloxacin 500mg q12h and drops of ciprofloxacin (unknown dose) and in the last week a course of one week of prednisone 20mg q12h oral. His wife and pt relates the infection with his repeated baths on their private swimming pool. He does consistently drink 6 beers daily, has not missed any drinks per wife. No hx of seizure, no trauma, no other complaints. Pt continued to seize in ED.    ED Course:  -Labs: WBC 20.8 K 5.9 Cl 112 HCO3 20 BNP 1341 pH 7.16 pCO2 63 sat O2 100%  -Utox: THC and opiate positive  -UA: No UTI. Gravity 1030  -CxR: bibasilar pneumonia and atelectasis and small effusions.  -Head and maxillo CT and angioCT: There is no evidence of mass or acute intracranial hemorrhage. No midline shift or other significant mass effect is noted. There is no CT evidence of acute territorial infarct. There is opacification of the left mastoid air cells and left middle ear. Orbits and orbital contents are unremarkable. Likely hypodensity of left temporal lobe in possible relationship with encephalitis.  -MGMT: Vanco 1g IV, Zosyn 3.375mg IV, propofol IV and on mechanical ventilation. (03 Dec 2021 15:33)    Epilepsy consulted for seizures. Pt seen in the MICU, limited recollection of series of events surrounding seizures. He is able to recognize that he has changed hospital rooms. AAOx2; he is oriented to self and time, not place. Wife and pt confirm no history of seizures and no missed drinks in days leading up to the event. Wife denies any trauma or other complaints other than persistent L ear infection.       MEDICATIONS  cefepime   IVPB 2000 milliGRAM(s) IV Intermittent every 8 hours  chlorhexidine 2% Cloths 1 Application(s) Topical <User Schedule>  CIPROFLOXACIN(CIPRO) OPTHALMIC SOLUTION 5 Drop(s) 5 Drop(s) Both Ears two times a day  dexAMETHasone 0.1% Ophthalmic Solution for OTIC Use 5 Drop(s) Both Ears two times a day  dexMEDEtomidine Infusion 0.2 MICROgram(s)/kG/Hr IV Continuous <Continuous>  dextrose 40% Gel 15 Gram(s) Oral once  dextrose 5%. 1000 milliLiter(s) IV Continuous <Continuous>  dextrose 50% Injectable 25 Gram(s) IV Push once  diVALproex  milliGRAM(s) Oral two times a day  enoxaparin Injectable 40 milliGRAM(s) SubCutaneous daily  folic acid 1 milliGRAM(s) Oral daily  glucagon  Injectable 1 milliGRAM(s) IntraMuscular once  influenza   Vaccine 0.5 milliLiter(s) IntraMuscular once  insulin regular  human corrective regimen sliding scale   SubCutaneous every 6 hours  multivitamin 1 Tablet(s) Oral daily  nicotine - 21 mG/24Hr(s) Patch 1 patch Transdermal daily  PHENobarbital Injectable 130 milliGRAM(s) IV Push every 1 hour PRN  sodium chloride 0.9% lock flush 10 milliLiter(s) IV Push every 1 hour PRN  thiamine IVPB 500 milliGRAM(s) IV Intermittent daily  vancomycin  IVPB 1250 milliGRAM(s) IV Intermittent every 8 hours    Family history: No history of seizures.    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 Works in a warehouse. Takes baths a few times per week in his private swimming pool. Drink (6 packs/carmel, he drank beers). Heavy smoker (1/2 pack/day), Drug use (sometimes smokes marijuana but not often).    Allergies  No Known Allergies    Vital Signs Last 24 Hrs  T(C): 37 (08 Dec 2021 14:25), Max: 37.9 (08 Dec 2021 06:00)  T(F): 98.6 (08 Dec 2021 14:25), Max: 100.2 (08 Dec 2021 06:00)  HR: 75 (08 Dec 2021 17:00) (56 - 121)  BP: 121/62 (08 Dec 2021 17:00) (93/59 - 170/75)  BP(mean): 84 (08 Dec 2021 17:00) (69 - 126)  RR: 30 (08 Dec 2021 17:00) (24 - 50)  SpO2: 92% (08 Dec 2021 17:00) (89% - 97%)    REVIEW OF SYSTEMS:  Constitutional: No fever, chills  Eyes: Denies  ENT:  L ear pain  Neck: B/l neck soreness and stiffness  Respiratory: Denies  Cardiovascular: Denies  Gastrointestinal: Denies  Genitourinary: Denies  Neurological: No numbness, does not complain of tremors  Psychiatric: Denies  Musculoskeletal: B/l shoulder muscle tenderness and weakness  Skin: Denies    Physical Exam:  Gen: no acute distress; interactive, appears disoriented  HEENT: NC/AT; no conjunctivitis or scleral icterus; no nasal discharge; no nasal congestion; oropharynx without exudates/erythema; mucus membranes moist  Neck: ROM limited due to pain  Chest: clear to auscultation bilaterally, no crackles/wheezes, good air entry, no tachypnea or retractions  CV: regular rate and rhythm, no murmurs   Abd: soft, nontender, nondistended, no HSM appreciated, NABS  : Not assessed.  Extrem: FROM of all joints  Neurologic:  -Mental Status: AAOx2, not oriented to place. Speech is fluent, though slowed (possibly due to recent sedation) with intact comprehension, no dysarthria. Recent and remote memory intact, though limited surrounding the seizure. Follows commands. Attention/concentration intact. Fund of knowledge appropriate.  -Cranial Nerves:          II: Visual fields are full to confrontation.          III, IV, VI: EOMI without nystagmus. PERRL b/l          V:  Facial sensation V1-V3 equal and intact           VII: Face is symmetric with normal eye closure and smile          VIII: Hearing R>L, coincides with necrotizing otitis externa of the L ear          IX, X: Uvula is midline and soft palate rises symmetrically          XI: Head turning and shoulder shrug are intact.          XII: Tongue protrudes midline  -Motor: Normal bulk and tone. Limited due to musculoskeletal pain. No pronator drift. Rapid alternating movements intact and symmetric  -Sensation: Intact to light touch bilaterally.  -Coordination: Mild dysmetria on finger-to-nose, not significant.  -Reflexes: Downgoing toes bilaterally   -Gait: Unable to asses, strapped to chair.    LABS:  CBC Full  -  ( 08 Dec 2021 06:20 )  WBC Count : 17.80 K/uL  RBC Count : 4.32 M/uL  Hemoglobin : 13.1 g/dL  Hematocrit : 38.2 %  Platelet Count - Automated : 342 K/uL  Mean Cell Volume : 88.4 fl  Mean Cell Hemoglobin : 30.3 pg  Mean Cell Hemoglobin Concentration : 34.3 gm/dL  Auto Neutrophil # : 12.81 K/uL  Auto Lymphocyte # : 2.48 K/uL  Auto Monocyte # : 2.13 K/uL  Auto Eosinophil # : 0.14 K/uL  Auto Basophil # : 0.09 K/uL  Auto Neutrophil % : 72.0 %  Auto Lymphocyte % : 13.9 %  Auto Monocyte % : 12.0 %  Auto Eosinophil % : 0.8 %  Auto Basophil % : 0.5 %    12-08    135  |  100  |  9   ----------------------------<  x   x    |  24  |  0.61    Ca    8.6      08 Dec 2021 17:04  Phos  4.4     12-08  Mg     2.2     12-08    TPro  7.0  /  Alb  3.4  /  TBili  0.6  /  DBili  x   /  AST  166<H>  /  ALT  84<H>  /  AlkPhos  76  12-08    Hemoglobin A1C: 5.3    LIVER FUNCTIONS - ( 08 Dec 2021 06:20 )  Alb: 3.4 g/dL / Pro: 7.0 g/dL / ALK PHOS: 76 U/L / ALT: 84 U/L / AST: 166 U/L / GGT: x           RADIOLOGY  < from: MR IAC w/ IV Cont (12.06.21 @ 15:07) >  EXAM:  MR IAC ONLY IC                          PROCEDURE DATE:  12/06/2021          INTERPRETATION:  Sagittal, axial and coronal imaging of the brain was performed with attention to the internal auditory canals bilaterally.  T1 and T2 weighted sequences were acquired both before and following intravenous contrast administration, including volumetric and fat-suppressed sequences.    Contrast dose: 7.5 cc of intravenous Gadavist.    Clinical information: Left external otitis status post seizures.    The current study is interpreted in conjunction with images from temporal bone CT dated 12/5/2021.    It is considerably degraded by motion artifact.    In this patient with documented left necrotizing otitis externa, there is an approximately 1 cm abscess in the left inferolateral temporal lobe, best seen on enhanced axial image 16 and coronal image 4. There is associated restricted diffusion and mild surrounding edema. There is also dural thickening along the floor and inferolateral wall of the left middle cranial fossa. The remainder of the brain, including the left posterior fossa, is unremarkable in appearance. There is no evidence of dural venous sinus thrombosis.    There is marked thickening of the left auricle and skin of the external auditory canal, with complete opacification of the left tympanomastoid cavity, as on the prior CT. Extracranially, pathologic enhancement is present in the left temporomandibular joint, though signal within the mandibular condylar head is unremarkable. There is also pathologic enhancement in the left parotid and  spaces without evidence of fluid collection. As on the prior CT, there is also opacification of the right tympanomastoid cavity, with the included right extracranial soft tissues noted to be normal. Minimal mucosal thickening is noted in the right frontal and bilateral ethmoid sinuses.    IMPRESSION:    In this patient with known left necrotizing otitis externa, a 1 cm abscess is identified in the left temporal lobe with adjacent dural thickening. Inflammatory changes also present extracranially without abscess formation, as above.    --- End of Report ---    Thank you for the opportunity to participate in the care of this patient.      PIPER COLÓN MD; Attending Radiologist  This document has been electronically signed. Dec  6 2021  4:26PM    < end of copied text >

## 2021-12-08 NOTE — BH CONSULTATION LIAISON ASSESSMENT NOTE - HPI (INCLUDE ILLNESS QUALITY, SEVERITY, DURATION, TIMING, CONTEXT, MODIFYING FACTORS, ASSOCIATED SIGNS AND SYMPTOMS)
51y Male w/ PMHx of umbilical hernia (5-6 years ago) with NO past medical history of epilepsy, trauma or CNS infection (hasn't seen a doctor in many years) presents to [facility] as a transfer from [facility] for evaluation of seizure of unknown origin. ENT consults for evaluation of left ear infection, r/o malignant ear otitis infections. Per wife at bedside, pt has been complaining of bilateral ear pain, left greater than right, since late october. Pt went to urgent care at that time and was given oral cipro. Pt returned to urgent care one week later after finishing his antibiotics without any ear pain relief. He was given oral and otic drops, however, sxs did not resolve. Pt was then seen by an ENT on Nov 15, who diagnosed him w/ bilateral otitis externa, and placed ear james bilaterally. Pt was given ciprodex otic drops and a follow up. On 12/3/21, wife states  was not acting like his usual self; she went to visit him at work and pt seemed very lethargic and was only responding with one word answers and repetitive movements. Wife decided to call an ambulance; on arrival to [facility] the patient had a witnessed seizure and was intubated for airway protection. CT maxillofacial at [place] which showed distal external auditory canal and foci of possible tegmen tympani/mastoideum dehiscence versus severe thinning, concerning for malignant/necrotizing otitis. Per wife, pt had severe otalgia and hearing, but did not have any otorrhea, facial weakness, vertigo, or tinnitus. Since 12/3/21 patient has not had another seizure.     Psychiatry consulted on 12/8/21 for evaluation of delirium vs underlying personality disorder following severe agitation, altered and aggressive behavior overnight. Overnight patient's HR was elevated (afebrile 98.6) and CIWA 8 for agitation, mild nausea and tremor, ativan 2mg IVx2 without improvement in CIWA. Frequently trying to get out of bed, tangential, but oriented to person, year, president. Started on phenobarbital protocol and had improvement after 3 doses with CIWA <8. However, required 2 more 1 hour check phenobarbs. Patient kicking and trying to get out of bed, danger to self and staff, bilateral wrist restraints/posey vest and re-dosed for phenobarb until CIWA <8. Gave ofirmev 1g for suspected fever (afebrile when checked orally twice and axillary 99.6.).     Patient was sleeping during today's interview; patient's wife oRxanne present at bedside provided collateral - she was able to reiterate the details leading up to his hospital admission as written above. Wife confirmed that prior to this hospital admission, the patient never had a seizure/suffered from epilepsy or has been diagnosed with a known mental illness that could explain his agitation and altered behavior last night. Wife visited with the patient yesterday and said that his his responses when spoken to about different topics occasionally did not make sense but overall he was able to carry a conversation - even spoke to the son over the phone about a hockey game. During her visit the patient never became agitated or aggressive. Patient's family has a known hx of mental illness; mother had dementia, brother has bipolar disorder and paranoia. Wife states that the patient is occasionally "cranky" and can have a tough personality at times. Denies having ever seen a psychiatrist or therapist.

## 2021-12-08 NOTE — BH CONSULTATION LIAISON ASSESSMENT NOTE - NSBHMSESPEECH_PSY_A_CORE
Problem: Safety  Goal: Will remain free from injury  Outcome: PROGRESSING AS EXPECTED  Pt remained free from injury. Bed locked in low position with bed alarm on. Hourly rounding completed.     Problem: Pain Management  Goal: Pain level will decrease to patient's comfort goal  Outcome: PROGRESSING AS EXPECTED  Pt having a headache during shift. Pain medication given per eMAR. Pt able to sleep comfortably during shift.        Non-verbal: unable to assess speech further

## 2021-12-08 NOTE — BH CONSULTATION LIAISON ASSESSMENT NOTE - NSBHCHARTREVIEWVS_PSY_A_CORE FT
Vital Signs Last 24 Hrs  T(C): 37 (08 Dec 2021 14:25), Max: 37.9 (08 Dec 2021 06:00)  T(F): 98.6 (08 Dec 2021 14:25), Max: 100.2 (08 Dec 2021 06:00)  HR: 56 (08 Dec 2021 14:00) (56 - 121)  BP: 99/60 (08 Dec 2021 14:00) (98/56 - 170/75)  BP(mean): 73 (08 Dec 2021 14:00) (69 - 126)  RR: 30 (08 Dec 2021 14:00) (24 - 50)  SpO2: 92% (08 Dec 2021 14:00) (89% - 97%)

## 2021-12-08 NOTE — BH CONSULTATION LIAISON ASSESSMENT NOTE - OCCUPATION
Works in a [employer]. Takes baths a few times per week in his private swimming pool. Drink (6 pack of beer/day). Heavy smoker (1/2 pack/day), Drug use (sometimes smokes marihuana but not often).

## 2021-12-08 NOTE — CONSULT NOTE ADULT - REASON FOR ADMISSION
Male complaining of seizures.

## 2021-12-08 NOTE — PROGRESS NOTE ADULT - SUBJECTIVE AND OBJECTIVE BOX
***Note in progress***    OVERNIGHT EVENTS: NAEO    SUBJECTIVE / INTERVAL HPI: Patient seen and examined at bedside. Patient denying chest pain, SOB, palpitations, cough. Patient denies fever, chills, HA, Dizziness, change in vision/hearing, N/V, abdominal pain, diarrhea, constipation, hematochezia/melena, dysuria, hematuria, new onset weakness/numbness, LE pain and/or swelling.    Remaining ROS negative     PHYSICAL EXAM:  General: NAD, lying in bed comfortably  HEENT: NC/AT; PERRL, anicteric sclera; MMM  Neck: supple  Cardiovascular: +S1/S2, RRR  Respiratory: CTA B/L; no W/R/R  Gastrointestinal: soft, NT/ND; +BSx4  Extremities: WWP; no edema, clubbing or cyanosis  Vascular: 2+ radial, DP/PT pulses B/L  Neurological: AAOx3; no focal deficits  Psychiatric: pleasant mood and affect  Dermatologic: no appreciable wounds or damage to the skin    VITAL SIGNS:  Vital Signs Last 24 Hrs  T(C): 37.1 (08 Dec 2021 10:57), Max: 37.9 (08 Dec 2021 06:00)  T(F): 98.7 (08 Dec 2021 10:57), Max: 100.2 (08 Dec 2021 06:00)  HR: 79 (08 Dec 2021 10:00) (67 - 121)  BP: 98/56 (08 Dec 2021 10:00) (98/56 - 170/75)  BP(mean): 69 (08 Dec 2021 10:00) (69 - 126)  RR: 28 (08 Dec 2021 10:00) (24 - 50)  SpO2: 91% (08 Dec 2021 10:00) (90% - 97%)    MEDICATIONS:  MEDICATIONS  (STANDING):  cefepime   IVPB 2000 milliGRAM(s) IV Intermittent every 8 hours  chlorhexidine 2% Cloths 1 Application(s) Topical <User Schedule>  CIPROFLOXACIN(CIPRO) OPTHALMIC SOLUTION 5 Drop(s) 5 Drop(s) Both Ears two times a day  dexAMETHasone 0.1% Ophthalmic Solution for OTIC Use 5 Drop(s) Both Ears two times a day  dexMEDEtomidine Infusion 0.2 MICROgram(s)/kG/Hr (4.3 mL/Hr) IV Continuous <Continuous>  dextrose 40% Gel 15 Gram(s) Oral once  dextrose 5%. 1000 milliLiter(s) (50 mL/Hr) IV Continuous <Continuous>  dextrose 50% Injectable 25 Gram(s) IV Push once  enoxaparin Injectable 40 milliGRAM(s) SubCutaneous daily  folic acid 1 milliGRAM(s) Oral daily  glucagon  Injectable 1 milliGRAM(s) IntraMuscular once  influenza   Vaccine 0.5 milliLiter(s) IntraMuscular once  insulin regular  human corrective regimen sliding scale   SubCutaneous every 6 hours  multivitamin 1 Tablet(s) Oral daily  nicotine - 21 mG/24Hr(s) Patch 1 patch Transdermal daily  potassium chloride  20 mEq/100 mL IVPB 20 milliEquivalent(s) IV Intermittent every 2 hours  sodium phosphate IVPB 30 milliMole(s) IV Intermittent once  thiamine 100 milliGRAM(s) Oral daily  thiamine IVPB 500 milliGRAM(s) IV Intermittent daily  vancomycin  IVPB 1250 milliGRAM(s) IV Intermittent every 8 hours    MEDICATIONS  (PRN):  PHENobarbital Injectable 130 milliGRAM(s) IV Push every 1 hour PRN CIWA >8  sodium chloride 0.9% lock flush 10 milliLiter(s) IV Push every 1 hour PRN Pre/post blood products, medications, blood draw, and to maintain line patency      ALLERGIES:  Allergies    No Known Allergies    Intolerances        LABS:                        13.1   17.80 )-----------( 342      ( 08 Dec 2021 06:20 )             38.2     12-08    135  |  98  |  9   ----------------------------<  100<H>  3.1<L>   |  26  |  0.68    Ca    8.9      08 Dec 2021 06:20  Phos  1.6     12-08  Mg     1.9     12-08    TPro  7.0  /  Alb  3.4  /  TBili  0.6  /  DBili  x   /  AST  166<H>  /  ALT  84<H>  /  AlkPhos  76  12-08        CAPILLARY BLOOD GLUCOSE      POCT Blood Glucose.: 119 mg/dL (08 Dec 2021 11:31)      RADIOLOGY & ADDITIONAL TESTS: Reviewed. ***Note in progress***    OVERNIGHT EVENTS: HR elevated (afebrile 98.6) and CIWA 8 for agitation, mild nausea and tremor, ativan 2mg IVx2 without improvement in CIWA. Frequently trying to get out of bed, tangential, but oriented to person, year, president. Started on phenobarbital protocol and had improvement after 3 doses with CIWA <8. However, required 2 more 1 hour check phenobarbs. Patient kicking and trying to get out of bed, danger to self and staff, bilateral wrist restraints/posey vest and re-dosed for phenobarb until CIWA <8. Gave ofirmev 1g for suspected fever (afebrile when checked orally twice and axillary 99.6.). Continued to re-direct patient.     SUBJECTIVE / INTERVAL HPI: Patient seen and examined at bedside. Patient denying chest pain, SOB, palpitations, cough. Patient denies fever, chills, HA, Dizziness, change in vision/hearing, N/V, abdominal pain, diarrhea, constipation, hematochezia/melena, dysuria, hematuria, new onset weakness/numbness, LE pain and/or swelling.    Remaining ROS negative     PHYSICAL EXAM:  General: NAD, lying in bed uncomfortable, visual hallucinations   HEAD:  Atraumatic, normocephalic  EYES: conjunctiva and sclera clear  ENT: Moist mucous membranes, rash on left ear, swollen, and erythematous   NECK: Supple, no JVD  HEART: Regular rate and rhythm, no murmurs, rubs, or gallops  LUNGS: Unlabored respirations.  Clear to auscultation bilaterally, no crackles, wheezing, or rhonchi  ABDOMEN: Soft, nontender, nondistended, +BS  EXTREMITIES: 2+ peripheral pulses bilaterally. No clubbing, cyanosis, or edema  NERVOUS SYSTEM:  Induced coma (miotic pupils minimally reactive to light), no response to pain.  SKIN: No rashes or lesions    VITAL SIGNS:  Vital Signs Last 24 Hrs  T(C): 37.1 (08 Dec 2021 10:57), Max: 37.9 (08 Dec 2021 06:00)  T(F): 98.7 (08 Dec 2021 10:57), Max: 100.2 (08 Dec 2021 06:00)  HR: 79 (08 Dec 2021 10:00) (67 - 121)  BP: 98/56 (08 Dec 2021 10:00) (98/56 - 170/75)  BP(mean): 69 (08 Dec 2021 10:00) (69 - 126)  RR: 28 (08 Dec 2021 10:00) (24 - 50)  SpO2: 91% (08 Dec 2021 10:00) (90% - 97%)    MEDICATIONS:  MEDICATIONS  (STANDING):  cefepime   IVPB 2000 milliGRAM(s) IV Intermittent every 8 hours  chlorhexidine 2% Cloths 1 Application(s) Topical <User Schedule>  CIPROFLOXACIN(CIPRO) OPTHALMIC SOLUTION 5 Drop(s) 5 Drop(s) Both Ears two times a day  dexAMETHasone 0.1% Ophthalmic Solution for OTIC Use 5 Drop(s) Both Ears two times a day  dexMEDEtomidine Infusion 0.2 MICROgram(s)/kG/Hr (4.3 mL/Hr) IV Continuous <Continuous>  dextrose 40% Gel 15 Gram(s) Oral once  dextrose 5%. 1000 milliLiter(s) (50 mL/Hr) IV Continuous <Continuous>  dextrose 50% Injectable 25 Gram(s) IV Push once  enoxaparin Injectable 40 milliGRAM(s) SubCutaneous daily  folic acid 1 milliGRAM(s) Oral daily  glucagon  Injectable 1 milliGRAM(s) IntraMuscular once  influenza   Vaccine 0.5 milliLiter(s) IntraMuscular once  insulin regular  human corrective regimen sliding scale   SubCutaneous every 6 hours  multivitamin 1 Tablet(s) Oral daily  nicotine - 21 mG/24Hr(s) Patch 1 patch Transdermal daily  potassium chloride  20 mEq/100 mL IVPB 20 milliEquivalent(s) IV Intermittent every 2 hours  sodium phosphate IVPB 30 milliMole(s) IV Intermittent once  thiamine 100 milliGRAM(s) Oral daily  thiamine IVPB 500 milliGRAM(s) IV Intermittent daily  vancomycin  IVPB 1250 milliGRAM(s) IV Intermittent every 8 hours    MEDICATIONS  (PRN):  PHENobarbital Injectable 130 milliGRAM(s) IV Push every 1 hour PRN CIWA >8  sodium chloride 0.9% lock flush 10 milliLiter(s) IV Push every 1 hour PRN Pre/post blood products, medications, blood draw, and to maintain line patency    ALLERGIES:  No Known Allergies    LABS:                        13.1   17.80 )-----------( 342      ( 08 Dec 2021 06:20 )             38.2     12-08    135  |  98  |  9   ----------------------------<  100<H>  3.1<L>   |  26  |  0.68    Ca    8.9      08 Dec 2021 06:20  Phos  1.6     12-08  Mg     1.9     12-08    TPro  7.0  /  Alb  3.4  /  TBili  0.6  /  DBili  x   /  AST  166<H>  /  ALT  84<H>  /  AlkPhos  76  12-08    CAPILLARY BLOOD GLUCOSE  POCT Blood Glucose.: 119 mg/dL (08 Dec 2021 11:31)    RADIOLOGY & ADDITIONAL TESTS: Reviewed. OVERNIGHT EVENTS: HR elevated (afebrile 98.6) and CIWA 8 for agitation, mild nausea and tremor, ativan 2mg IVx2 without improvement in CIWA. Frequently trying to get out of bed, tangential, but oriented to person, year, president. Started on phenobarbital protocol and had improvement after 3 doses with CIWA <8. However, required 2 more 1 hour check phenobarbs. Patient kicking and trying to get out of bed, danger to self and staff, bilateral wrist restraints/posey vest and re-dosed for phenobarb until CIWA <8. Gave ofirmev 1g for suspected fever (afebrile when checked orally twice and axillary 99.6.). Continued to re-direct patient.     SUBJECTIVE / INTERVAL HPI: Patient seen and examined at bedside. Patient denying chest pain, SOB, palpitations, cough. Patient denies fever, chills, HA, Dizziness, change in vision/hearing, N/V, abdominal pain, diarrhea, constipation, hematochezia/melena, dysuria, hematuria, new onset weakness/numbness, LE pain and/or swelling.    Remaining ROS negative     PHYSICAL EXAM:  General: NAD, lying in bed uncomfortable, visual hallucinations   HEAD:  Atraumatic, normocephalic  EYES: conjunctiva and sclera clear  ENT: Moist mucous membranes, rash on left ear, swollen, and erythematous   NECK: Supple, no JVD  HEART: Regular rate and rhythm, no murmurs, rubs, or gallops  LUNGS: Unlabored respirations.  Clear to auscultation bilaterally, no crackles, wheezing, or rhonchi  ABDOMEN: Soft, nontender, nondistended, +BS  EXTREMITIES: 2+ peripheral pulses bilaterally. No clubbing, cyanosis, or edema  NERVOUS SYSTEM:  Induced coma (miotic pupils minimally reactive to light), no response to pain.  SKIN: No rashes or lesions    VITAL SIGNS:  Vital Signs Last 24 Hrs  T(C): 37.1 (08 Dec 2021 10:57), Max: 37.9 (08 Dec 2021 06:00)  T(F): 98.7 (08 Dec 2021 10:57), Max: 100.2 (08 Dec 2021 06:00)  HR: 79 (08 Dec 2021 10:00) (67 - 121)  BP: 98/56 (08 Dec 2021 10:00) (98/56 - 170/75)  BP(mean): 69 (08 Dec 2021 10:00) (69 - 126)  RR: 28 (08 Dec 2021 10:00) (24 - 50)  SpO2: 91% (08 Dec 2021 10:00) (90% - 97%)    MEDICATIONS:  MEDICATIONS  (STANDING):  cefepime   IVPB 2000 milliGRAM(s) IV Intermittent every 8 hours  chlorhexidine 2% Cloths 1 Application(s) Topical <User Schedule>  CIPROFLOXACIN(CIPRO) OPTHALMIC SOLUTION 5 Drop(s) 5 Drop(s) Both Ears two times a day  dexAMETHasone 0.1% Ophthalmic Solution for OTIC Use 5 Drop(s) Both Ears two times a day  dexMEDEtomidine Infusion 0.2 MICROgram(s)/kG/Hr (4.3 mL/Hr) IV Continuous <Continuous>  dextrose 40% Gel 15 Gram(s) Oral once  dextrose 5%. 1000 milliLiter(s) (50 mL/Hr) IV Continuous <Continuous>  dextrose 50% Injectable 25 Gram(s) IV Push once  enoxaparin Injectable 40 milliGRAM(s) SubCutaneous daily  folic acid 1 milliGRAM(s) Oral daily  glucagon  Injectable 1 milliGRAM(s) IntraMuscular once  influenza   Vaccine 0.5 milliLiter(s) IntraMuscular once  insulin regular  human corrective regimen sliding scale   SubCutaneous every 6 hours  multivitamin 1 Tablet(s) Oral daily  nicotine - 21 mG/24Hr(s) Patch 1 patch Transdermal daily  potassium chloride  20 mEq/100 mL IVPB 20 milliEquivalent(s) IV Intermittent every 2 hours  sodium phosphate IVPB 30 milliMole(s) IV Intermittent once  thiamine 100 milliGRAM(s) Oral daily  thiamine IVPB 500 milliGRAM(s) IV Intermittent daily  vancomycin  IVPB 1250 milliGRAM(s) IV Intermittent every 8 hours    MEDICATIONS  (PRN):  PHENobarbital Injectable 130 milliGRAM(s) IV Push every 1 hour PRN CIWA >8  sodium chloride 0.9% lock flush 10 milliLiter(s) IV Push every 1 hour PRN Pre/post blood products, medications, blood draw, and to maintain line patency    ALLERGIES:  No Known Allergies    LABS:                        13.1   17.80 )-----------( 342      ( 08 Dec 2021 06:20 )             38.2     12-08    135  |  98  |  9   ----------------------------<  100<H>  3.1<L>   |  26  |  0.68    Ca    8.9      08 Dec 2021 06:20  Phos  1.6     12-08  Mg     1.9     12-08    TPro  7.0  /  Alb  3.4  /  TBili  0.6  /  DBili  x   /  AST  166<H>  /  ALT  84<H>  /  AlkPhos  76  12-08    CAPILLARY BLOOD GLUCOSE  POCT Blood Glucose.: 119 mg/dL (08 Dec 2021 11:31)    RADIOLOGY & ADDITIONAL TESTS: Reviewed.

## 2021-12-08 NOTE — BH CONSULTATION LIAISON ASSESSMENT NOTE - CASE SUMMARY
Patient is a 52 yo M with PMHx no past medical history of epilepsy, trauma or CNS infection, who presentsed to the ED [place] with an episode of sudden onset speech disturbance talking incoherently and repetitive and altered behavior while working witnessed, no fever. In ED presented, repetitive generalized seizures. 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 Pt was transferred to [facility] ICU to continue his care, on cefepime and vanc IV (to cover CNS pseudomonal infection) propofol drip and placed on ventilator. On ear cultures found staph. aureus and epidermidis. On 12/5, pt was extubated. On 12/7 patient presented very agitated and verbally abusive. Psychiatry was consulted to evaluate the pt for delirium vs personality disorder. Per collaterals from pt's wife and the staff, patient;s presentation is consistent with delirium, likely multifactorial due to infection vs withdrawal (pt was driking approx 6 beers per day). Pt presents now sedated, unable to be interviewed (started on precedex and phenobarbital). Recommend to monitor with CIWA for withdrawal; prns for agitation haldol 2mg po/im/IV qhs 6; -1:1 observation; -CL to follow

## 2021-12-08 NOTE — BH CONSULTATION LIAISON ASSESSMENT NOTE - SUMMARY
51y Male w/ PMHx of umbilical hernia (5-6 years ago) with NO past medical history of epilepsy, trauma or CNS infection (hasn't seen a doctor in many years) presents to [facility] as a transfer from [facility] for evaluation of seizure of unknown origin. Psychiatry consulted to evaluate for delirium vs underlying personality disorder following an overnight episode of agitation and aggressive behavior requiring the patient to be restrained with 1:1 observation.      P:   Symptoms of agitation, aggression are likely secondary due to delirium of mixed origin - alcohol withdrawal, infectious etiology (necrotizing otitis externa with osteomyelitis)  For breakthrough agitation, can administer haloperidol PO 2mg q6h  Will continue to follow 51y Male w/ PMHx of umbilical hernia (5-6 years ago) with NO past medical history of epilepsy, trauma or CNS infection (hasn't seen a doctor in many years) presents to [facility] as a transfer from [facility] for evaluation of seizure of unknown origin. Psychiatry consulted to evaluate for delirium vs underlying personality disorder following an overnight episode of agitation and aggressive behavior requiring the patient to be restrained and under 1:1 observation.      P:   Symptoms of agitation, aggression are likely secondary due to delirium of mixed origin - alcohol withdrawal, infectious etiology (necrotizing otitis externa with osteomyelitis)  For breakthrough agitation, can administer haloperidol PO 2mg q6h  Will continue to follow

## 2021-12-08 NOTE — CONSULT NOTE ADULT - ATTENDING COMMENTS
Patient seen and examined by me 12/6/2021. MRI brain showing small left infero-lateral temporal area of ring enhancement consistent with abscess; no mass effect, source likely from ear infection. SOme delerium/confusion on examination, EEG in place. Given small size of brain abscess, recommend treatment with antibiotics per ID at this time. Would recommend followup MRI+/-C brain in 2 weeks to gauge antibiotic treatment response.     Parish Clarke M.D.
51-year-old man presenting with multiple seizures i/s/o newly diagnosed left temporal lesion (suspected abscess i/s/o chronic ear infection).  On today's exam he is awake, alert, calm, oriented to self and time but not place, moving all 4 extremities symmetrically with good strength.  I independently reviewed his MRI which is notable for an enhancing circular left temporal lesion.  Will continue Depakote 500 mg BID for seizures; recommend checking trough before the 5th dose.  Abscess work up per ID, ENT, neurosurgery.
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  MRI with 1 cm abscess in L temporal lobe with adjacent dural thickening, inflammatory changes extracranially without abscess formation.  Superficial culture in the presence of marked EAC edema is growing MSSA, Staph epi and Corynebacterium, blood cultures [facility] ([facility] and [facility]).  Await ENT plan re: surgical intervention? Would cover MSSA and include Pseudomonas coverage with agent with good CNS penetration – accomplished by cefepime but would continue vancomycin for now.  Will follow with you – team 1.

## 2021-12-08 NOTE — PROGRESS NOTE ADULT - SUBJECTIVE AND OBJECTIVE BOX
ENT Consult Note    HARRIET GOLDMAN  [id number]    51y Male w/ unremarkable PMHx (hasn't seen a doctor in many years) presents to [facility] as a transfer for evaluation of seizure of unknown origin. ENT consults for evaluation of left ear infection, r/o malignant ear otitis infections. per wife and brother at bedside, pt has been complaining of bilateral ear pain, left greater than right, since late october. pt went to urgent care at that time and was given oral cipro. pt returned to urgent care one week later after finishing his antibiotics without any ear pain relief. he was given oral and otic drops, however, sxs did not resolve. pt was then seen by an ENT on Nov 15, who diagnosed him w/ bilateral otitis externa, and placed ear snuday bilaterally. pt was given ciprodex otic drops and a follow up. Today, wife states  was not acting like his usual self. around noon today, she went to visit him and work and pt seemed very lethargic and was only responding with one word answers. At this time, wife decided to call an ambulance. while being placed in to the ambulance, pt began having is seizure. pt was taken to [facility], and was intubated for airway protection. CT maxillofacial at [place] which showed distal external auditory canal and foci of possible tegmen tympani/mastoideum dehiscence versus severe thinning, concerning for malignant/necrotizing otitis. Per wife, pt had severe otalgia and hearing, but did not have any otorrhea, facial weakness, vertigo, or tinnitus    INTERVAL:    12/4: Self extubation last night with emergent reintubation. Otherwise started on cefepime/vanc. MRI/CT pending. Ear cx growing Gram+ cocci and rods. Low grade fevers overnight with WBC 18.4 (down from 100.2). Sunday in place at bedside. Pt sedated.    12/5: NAEON. Still on vanc/cef and ciprodex eardrops. Afebrile overnight. Ear cx sensitivities pending. CT scan temp bones with similar findings as CT maxillofacial from [place]. WBC downtrending today (down to 13.6). Pt was seen and examined at bedside. Stable exam findings, no mastoid swelling. Sedated and intubated on vent.    12/6: NAEON. On vanc/cef and ciprodex eardrops. Febrile overnight to 102. Ear cx pending sens. CT IAC w/ con with L ZARI, no drainable abscess, no intracranial extension, and R OE. WBC up to 20.3 today from 13.6. Pt's sunday were removed at bedside and both sides were debrided. Still with swelling of b/l EAC (L > R). Sunday reinserted and ciprofloxacin bedside eardrops placed.    12/7: NAEON. MRI shows 1 cm abscess in left temporal lobe. Given small size and absence of neurologic sxs, NSGY recommend c/w IV medications and repeat imaging to evaluation for resolution    12/8: Tachycardic overnight and agitated. On CIWA 8 for agitation, ativan 2x without improvement. Started on phenobarbital protocol with improvement after 3 doses. B/l wrist restraints applied. Otherwise afebrile. On vanc/cef + ciprodex drops. ENT removed sunday and assessed the ear canals with scope. Sunday reapplied.      Vital Signs Last 24 Hrs  T(C): 36.8 (12-08-21 @ 01:00), Max: 36.8 (12-08-21 @ 01:00)  T(F): 98.3 (12-08-21 @ 01:00), Max: 98.3 (12-08-21 @ 01:00)  HR: 103 (12-08-21 @ 07:00) (55 - 121)  BP: 146/80 (12-08-21 @ 07:00) (111/66 - 170/75)  BP(mean): 104 (12-08-21 @ 07:00) (77 - 112)  RR: 42 (12-08-21 @ 07:00) (21 - 50)  SpO2: 94% (12-08-21 @ 07:00) (90% - 97%)          PHYSICAL EXAM:    CONSTITUTIONAL: Well nourished, well developed, sedated  HEAD: normocephalic, atraumatic.  EARS  AD: Air>Bone Mastoid non-tender/non-edematous. non-proptotic , non-erythematous right pinna. Debris in EAC likely 2/2 to otic drops. TM intact. no perfs no granulation tissue or masses. Wick in place.  AS: Bone> Air Mastoid non-tender/non-edematous. non-proptotic pinna. Left pinna edema and erythema improved. Wick in place.   Christiansen lateralized to the left  Face: HB 1  NOSE: Normal external nose.   ORAL CAVITY/OROPHARYNX: normal mucosa.   NECK: No cervical lymphadenopathy  RESPIRATORY: intubated connected to vent      EXAM:  CT TEMPORAL BONES                          PROCEDURE DATE:  12/04/2021          INTERPRETATION:  PROCEDURE: CT temporal bones    INDICATION: Severe otitis media with concern for temporal bone osteomyelitis    TECHNIQUE: Contiguous 1 mm thickaxial and modified coronal images were obtained through the temporal bones without the intravenous administration of contrast. Target axial and coronal review images were created through each temporal bone utilizing bone algorithm.    COMPARISON: Maxillofacial CT 12/3/2021    FINDINGS: Target review images of the right temporal bone demonstrates soft tissue filling the osseous segment of the right tympanic membrane. The bony cortex appears intact. The mastoid air cell system to be well developed.There is soft tissue opacification and fluid within right mastoid air cells as well as fluid within the right mastoid antrum. There is soft tissue within the middle ear cavity. The ossicles appear intact without erosive changes or displacement. The scutum is intact. The visualized portions of the right inner appear within normal limits.    Target review images of the left temporal bone demonstrates soft tissue completely filling the left osseous segment of the external auditory canal. There are cortical bony erosive changes involving the walls of bony external canal especially the floor and posterior wall.  There is complete soft tissue opacification of left mastoid air cells. The mastoid septae appear intact. The scutum appears eroded. Therealso is complete soft tissue opacification of the middle ear cavity. There is dehiscence of the tegmen tympani and mastoid roof. The ossicles appear intact without erosive changes or displacement. The visualized portions of the left inner ear appearsintact.    The course and caliber of the internal carotid artery, jugular vein and facial nerve within each temporal bones are unremarkable. The internal auditory canals are symmetric in size and configuration. Neither the cochlear nor vestibular aqueducts are enlarged.    Soft-tissue windows fail to demonstrate intracranial abnormality.    IMPRESSION: Right mastoid disease. Findings suggestive of left malignant otitis externa with left mastoid extension.      EXAM:  CT IAC IC                          PROCEDURE DATE:  12/05/2021          INTERPRETATION:  CT TEMPORAL BONES    Technique: A subcentimeter axial acquisition was performed through the temporal bones and reconstructed at submillimeter thickness and spacing in the axial and coronal planes, and furthermore in oblique planes both parallel and perpendicular to assess the semicircular canals. Each series right and left sides targeted/magnified views.    Contrast: [id number] given IV    Clinical Information: Left malignant otitis externa    Prior Studies: Noncontrast exam 12/04/2021, and contrast-exam 12/03/2021.    Findings:    This is the third consecutive and daily exam. There is no change compared to 12/3/2021 which is also a contrast exam.    A.S.    There is permeative bony erosion involving the anterior wall of the external auditory canal and inferior mastoid cells most compatible with necrotizing otitis media. By definition, this is an osteomyelitis. Remaining mastoids more posteriorly show preserved trabeculae, and the tegmen appears thin throughout, but the postcontrast images when viewed in the coronal plane show no inflammatory dural thickening or any fluid collection to imply intracranial spread of infection. There is complete soft tissue opacification of the ear canal compatible with inflammatory skin thickening.    Middle ear is totally opacified, but the ossicular chain appears grossly preserved. Otic capsule also appears intact, aside from minor site of thinning ofthe superior semicircular canal (series 12, image 84 and series 6, image 42, of likely incidental note. There is no dehiscence of the carotid canal or jugular bulb, and the bony facial nerve is of similar size but its canal margins are somewhat hazy in the setting of mastoiditis. Vestibular aqueduct is not enlarged.    On soft tissue inspection and of most salience, there is cellulitis that extends anteriorly by the fissures of [place] with induration of the retrocondylar fat planes. There is also fat stranding in the upper parapharyngeal space without drainable fluid collection.      A.D.      *  External canal/TM: There is diffuse skin thickening, but without similar bony erosion or extraosseous soft tissue abnormality as on the contralateral side.  *  Mastoid cavity: There is moderate air cell opacification without bony rarefaction as seen on the other side.  *  Temporal bone cortices and Tegmen: There are sites of tegmen thinning, which are not dissimilar to the contralateral side. No elijah or measurable defect.  *  Middle ear/ossicles: Opacified but to a lesser degree than the other side. Ossicular chain appears intact  *  Oval/round windows: Both opacified but nonstenotic  *  Inner ear: Otic capsule appears preserved without evidence of semicircular canal dehiscence or otosclerosis. IACs appear symmetric caliber.  *  Facial nerve: Normal course with intact bony canal  *  ICA/IJV: Normal morphology without dehiscence to the tympanic cavity  *  Vestibular aqueduct: Not enlarged      SOFT TISSUES: On the soft tissue inspection without contrast, there is diffuse left temporal scalp swelling and periauricular swelling compatible with cellulitis secondary to the necrotizing external otitis. There is induration of the retrocondylar fat pads and some haziness in the parapharyngeal and  fat pads. Intracranially, there is preserved contrast filling of the left jugular bulb, sphenoid and transverse sinuses. No empyema identified, nor abscess in the extracranial softtissues below the skull base or laterally at the occipital SCM muscle.    BONY WINDOW: The more central skull base is intact. There is mild inflammatory thickening of the paranasal sinuses. The patient is intubated.      IMPRESSION:    A.S.  Findingsare compatible with necrotizing otitis externa. By definition, this is osteomyelitis, and no change is appreciated since 12/3/2021 which was also done with contrast. There is soft tissue extent to the retrocondylar fat and infratemporal fossa, which **may be of high virulence if the patient is immunocompromised**. No drainable abscess. No intracranial spread appreciated on CT.    A.D.  Lesser findings of both otitis externa and media but without bony erosion or osteomyelitis as seen A.S.        AP: 51 M w/ roughly one month hx of bilateral otitis externa, left greater than right, now presenting w/ seizures. PE does not reveal any granulation tissue in left EAC, however, b/l EACs are edematous and tender to manipulation. Currently on cefepime/vancomycin per ID with ciprodex ear drops b/l with improvement in fevers and WBC. No signs of mastoiditis on clinical examination.    -C/w ciprodex bilaterally; 5 drops to each ear wick 2-3 times a day  -CT temp w/ con with L ZARI, no abscess, no intracranial extension, R OE  -F/u w/ nsgy: IV antibiotic management for temporal lobe abscess  -Vanc/cefepime per ID  -F/u left EAC ear culture (staph aureus/epidermiditis, f/u sensitivities)  -ENT to follow   -Rest of care per primary team    Seen w/ senior resident. Discussed with attending.

## 2021-12-08 NOTE — PROGRESS NOTE ADULT - ASSESSMENT
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    MRI 12/6: C/w In this patient with documented left necrotizing otitis externa, there is an approximately 1 cm abscess in the left inferolateral temporal lobe.    Recommendations:    -Please continue with Cefepime 2gQH  -Please continue with Vancomycin 1.25gq8  -Please obtain HIV testing.     ID to follow, plan discussed with primary team and attending.     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    MRI 12/6: C/w In this patient with documented left necrotizing otitis externa, there is an approximately 1 cm abscess in the left inferolateral temporal lobe.    Recommendations:  - If he goes to the OR, please send bacterial, fungal and AFB cultures  -Please continue with Cefepime 2gQH  -Please continue with Vancomycin 1.25gq8  -Please obtain HIV testing.     ID to follow, plan discussed with primary team and attending.     51y Male w/ unremarkable PMHx (hasn't seen a doctor in many years) presents to 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    MRI 12/6: C/w In this patient with documented left necrotizing otitis externa, there is an approximately 1 cm abscess in the left inferolateral temporal lobe.    Recommendations:  - If he goes to the OR, please send bacterial, fungal and AFB cultures  -Please continue with Cefepime 2gQH  -Please continue with Vancomycin 1.25gq8, trough prior to next dose.  Goal:  14-17  -Please obtain HIV testing.     ID to follow, plan discussed with primary team and attending.

## 2021-12-08 NOTE — BH CONSULTATION LIAISON ASSESSMENT NOTE - CURRENT MEDICATION
MEDICATIONS  (STANDING):  cefepime   IVPB 2000 milliGRAM(s) IV Intermittent every 8 hours  chlorhexidine 2% Cloths 1 Application(s) Topical <User Schedule>  CIPROFLOXACIN(CIPRO) OPTHALMIC SOLUTION 5 Drop(s) 5 Drop(s) Both Ears two times a day  dexAMETHasone 0.1% Ophthalmic Solution for OTIC Use 5 Drop(s) Both Ears two times a day  dexMEDEtomidine Infusion 0.2 MICROgram(s)/kG/Hr (4.3 mL/Hr) IV Continuous <Continuous>  dextrose 40% Gel 15 Gram(s) Oral once  dextrose 5%. 1000 milliLiter(s) (50 mL/Hr) IV Continuous <Continuous>  dextrose 50% Injectable 25 Gram(s) IV Push once  diVALproex  milliGRAM(s) Oral two times a day  enoxaparin Injectable 40 milliGRAM(s) SubCutaneous daily  folic acid 1 milliGRAM(s) Oral daily  glucagon  Injectable 1 milliGRAM(s) IntraMuscular once  influenza   Vaccine 0.5 milliLiter(s) IntraMuscular once  insulin regular  human corrective regimen sliding scale   SubCutaneous every 6 hours  multivitamin 1 Tablet(s) Oral daily  nicotine - 21 mG/24Hr(s) Patch 1 patch Transdermal daily  thiamine 100 milliGRAM(s) Oral daily  thiamine IVPB 500 milliGRAM(s) IV Intermittent daily  vancomycin  IVPB 1250 milliGRAM(s) IV Intermittent every 8 hours    MEDICATIONS  (PRN):  PHENobarbital Injectable 130 milliGRAM(s) IV Push every 1 hour PRN CIWA >8  sodium chloride 0.9% lock flush 10 milliLiter(s) IV Push every 1 hour PRN Pre/post blood products, medications, blood draw, and to maintain line patency

## 2021-12-08 NOTE — PROGRESS NOTE ADULT - ASSESSMENT
50 yo M with PMHx no past medical history of epilepsy, trauma or CNS infection, who presents to the ED with an episode of sudden onset speech disturbance talking incoherently and repetitive and altered behavior while working witnessed, no fever. In the last 2 days much more pain on left ear and headache. Pt uses frequently his swimming pool has had left ear infection for 2 months with hypoacusia treated with long course ciprofloxacin and a week of prednisone. In ED presented, repetitive generalized seizures. 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 Started on vancomycin and Zosyn IV. Admitted to ICU to continue his care, on cefepime and vanc IV (to cover CNS pseudomonal infection) propofol drip and placed on ventilator. On ear cultures found staph. aureus and epidermidis. On 12/5, pt was extubated. On IAC CT scan w/wo contrast necrotizing otitis externa with osteomyelitis and soft tissue extent to the retrocondylar fat and infratemporal fosse. ENT and neurosurgery didn't consider beneficial acute intervention at this point.    NEURO  #Sedated and intubate  JOMAR of -3 to -4  On precedex  No agitated today  Extubated    #Convulsive status epilepticus  Likely 2/2 to left temporal lobe encephalitis 2/2 to left mastoiditis. Frequent baths, high pseudomonal suspicion.  -intubated and sedatedl  -daily wean off sedation for neurochecks   - CT temporal bones done   - MRI IAC with contrast IV  - vEEG monitoring  - Maintain seizure and fall precautions  - no AED at this time   - f/u brain MRI  - neurosurgery doesn't consider beneficial acute intervention    CARDIO  None.  EKG sinus rhythm 96 bmp    RESPIRATORY  -Intubated for protecting airways in patient with status epilepticus  -wean vent as tolerated  -spontaneous breathing trials daily  -CxR w/o significant findings    GI  #diet  -NPO today    ENDO  None    RENAL  None    HEM/ONC  None    ID  #Left external otitis  s/p left mastoiditis s/p left temporal lobe encephalitis.   - FU ear cultures- showing GPR and GPC in pairs   - ciprodex bilaterally; 5 drops to each ear wick twice a day  - RI IAC to assess for osteo and skull base  - C/w vancomycin 1500mg q12h IV and cefepime 2g q8h IV.  - F/u blood (NGTD), urine (NGTD) and ear cultures (staph. aureus and epidermidis)  - Redose vanc for ear culture    - IAC CT scan w/wo contrast:  necrotizing otitis externa with osteomyelitis and soft tissue extent to the retrocondylar fat and infratemporal fosse.   - No acute ENT intervention at this time  - C/w ciprodex bilaterally; 5 drops to each ear wick twice a day plus corticoids  - WBC elevation 13.5-->20.2    F: none  E: replete PRN  N: NPO for today  DVT: Heparin IV q8h  GI: Pantoprazol 40mg IV q24h    FULL code 52 yo M with PMHx no past medical history of epilepsy, trauma or CNS infection, who presents to the ED with an episode of sudden onset speech disturbance talking incoherently and repetitive and altered behavior while working witnessed, no fever. In the last 2 days much more pain on left ear and headache. Pt uses frequently his swimming pool has had left ear infection for 2 months with hypoacusia treated with long course ciprofloxacin and a week of prednisone. In ED presented, repetitive generalized seizures. 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 Started on vancomycin and Zosyn IV. Admitted to ICU to continue his care, on cefepime and vanc IV (to cover CNS pseudomonal infection) propofol drip and placed on ventilator. On ear cultures found staph. aureus and epidermidis. On 12/5, pt was extubated. On IAC CT scan w/wo contrast necrotizing otitis externa with osteomyelitis and soft tissue extent to the retrocondylar fat and infratemporal fosse. ENT and neurosurgery didn't consider beneficial acute intervention at this point.    12/8: started on nicotine patch, high dose thiamine, switching phenobarb to precedex, switched from hep subcu to lovenox    NEURO  #Sedated and intubate  JOMAR of -3 to -4  On precedex  No agitated today  Extubated    #Convulsive status epilepticus  Likely 2/2 to left temporal lobe encephalitis 2/2 to left mastoiditis. Frequent baths, high pseudomonal suspicion.  -intubated and sedatedl  -daily wean off sedation for neurochecks   - CT temporal bones done   - MRI IAC with contrast IV  - vEEG monitoring  - Maintain seizure and fall precautions  - no AED at this time   - f/u brain MRI  - neurosurgery doesn't consider beneficial acute intervention    CARDIO  None.  EKG sinus rhythm 96 bmp    RESPIRATORY  -Intubated for protecting airways in patient with status epilepticus  -wean vent as tolerated  -spontaneous breathing trials daily  -CxR w/o significant findings    GI  #diet  -NPO today    ENDO  None    RENAL  None    HEM/ONC  None    ID  #Left external otitis  s/p left mastoiditis s/p left temporal lobe encephalitis.   - FU ear cultures- showing GPR and GPC in pairs   - ciprodex bilaterally; 5 drops to each ear wick twice a day  - RI IAC to assess for osteo and skull base  - C/w vancomycin 1500mg q12h IV and cefepime 2g q8h IV.  - F/u blood (NGTD), urine (NGTD) and ear cultures (staph. aureus and epidermidis)  - Redose vanc for ear culture    - IAC CT scan w/wo contrast:  necrotizing otitis externa with osteomyelitis and soft tissue extent to the retrocondylar fat and infratemporal fosse.   - No acute ENT intervention at this time  - C/w ciprodex bilaterally; 5 drops to each ear wick twice a day plus corticoids  - WBC elevation 13.5-->20.2    F: none  E: replete PRN  N: NPO for today  DVT: Heparin IV q8h  GI: Pantoprazol 40mg IV q24h    FULL code 52 yo M with PMHx no past medical history of epilepsy, trauma or CNS infection, who presents to the ED with an episode of sudden onset speech disturbance talking incoherently and repetitive and altered behavior while working witnessed, no fever. In the last 2 days much more pain on left ear and headache. Pt uses frequently his swimming pool has had left ear infection for 2 months with hypoacusia treated with long course ciprofloxacin and a week of prednisone. In ED presented, repetitive generalized seizures. 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 Started on vancomycin and Zosyn IV. Admitted to ICU to continue his care, on cefepime and vanc IV (to cover CNS pseudomonal infection) propofol drip and placed on ventilator. On ear cultures found staph. aureus and epidermidis. On 12/5, pt was extubated. On IAC CT scan w/wo contrast necrotizing otitis externa with osteomyelitis and soft tissue extent to the retrocondylar fat and infratemporal fosse. ENT and neurosurgery didn't consider beneficial acute intervention at this point.    12/8: started on nicotine patch, high dose thiamine, switching phenobarb to precedex, switched from hep subcu to lovenox    NEURO  #Sedated and intubate  JOMAR of -3 to -4  On precedex  No agitated today  Extubated    #Convulsive status epilepticus  Likely 2/2 to left temporal lobe encephalitis 2/2 to left mastoiditis. Frequent baths, high pseudomonal suspicion.  -intubated and sedatedl  -daily wean off sedation for neurochecks   - CT temporal bones done   - MRI IAC with contrast IV  - vEEG monitoring  - Maintain seizure and fall precautions  - no AED at this time   - f/u brain MRI  - neurosurgery doesn't consider beneficial acute intervention    CARDIO  #Preoperative risk assessment   No previous symptoms of CHF, no orthopnea, no dyspnea on excertion, no NPD, no B/L LE edema, he walks several blocks without, no problems to walk up stairs. EKG sinus rhythm 96 bmp.   Currently on phenobarbital due to alcohol withdrawal, but otherwise hemodynamically stable without evidence of worsening sepsis    Low risk for a low risk procedure (ENT procedure), RCRI class I Risk       RESPIRATORY  -Intubated for protecting airways in patient with status epilepticus  -wean vent as tolerated  -spontaneous breathing trials daily  -CxR w/o significant findings    GI  #diet  -NPO today    ENDO  None    RENAL  None    HEM/ONC  None    ID  #Left external otitis  s/p left mastoiditis s/p left temporal lobe encephalitis.   - FU ear cultures- showing GPR and GPC in pairs   - ciprodex bilaterally; 5 drops to each ear wick twice a day  - RI IAC to assess for osteo and skull base  - C/w vancomycin 1500mg q12h IV and cefepime 2g q8h IV.  - F/u blood (NGTD), urine (NGTD) and ear cultures (staph. aureus and epidermidis)  - Redose vanc for ear culture    - IAC CT scan w/wo contrast:  necrotizing otitis externa with osteomyelitis and soft tissue extent to the retrocondylar fat and infratemporal fosse.   - No acute ENT intervention at this time  - C/w ciprodex bilaterally; 5 drops to each ear wick twice a day plus corticoids  - WBC elevation 13.5-->20.2    F: none  E: replete PRN  N: NPO for today  DVT: Heparin IV q8h  GI: Pantoprazol 40mg IV q24h    FULL code 52 yo M with PMHx no past medical history of epilepsy, trauma or CNS infection, who presents to the ED with an episode of sudden onset speech disturbance talking incoherently and repetitive and altered behavior while working witnessed, no fever. In the last 2 days much more pain on left ear and headache. Pt uses frequently his swimming pool has had left ear infection for 2 months with hypoacusia treated with long course ciprofloxacin and a week of prednisone. In ED presented, repetitive generalized seizures. 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 Started on vancomycin and Zosyn IV. Admitted to ICU to continue his care, on cefepime and vanc IV (to cover CNS pseudomonal infection) propofol drip and placed on ventilator. On ear cultures found staph. aureus and epidermidis. On 12/5, pt was extubated. On IAC CT scan w/wo contrast necrotizing otitis externa with osteomyelitis and soft tissue extent to the retrocondylar fat and infratemporal fosse. ENT and neurosurgery didn't consider beneficial acute intervention at this point. MRI shows left necrotizing otitis externa, 1 cm abscess in the left temporal lobe with adjacent dural thickening. Neurosurgery  recommended abx per ID recs    NEURO  On Precedex   #Delirium tremens  Important alcohol use (3-4 beers per day)  - RASS 1-2  PLAN:  - Off of Precedex  - Phenobarbital 130mg q15min PRN (CIWA>8, stop after 12 doses)    #Convulsive status epilepticus  Likely one focal seizure on left temporal lobe with secondarily 4-5 generalized seizures s/p convulsive status epilepticus. 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 Frequent baths, high pseudomonal suspicion. Intubated and sedated for 2 days. CT temporal bones done and MRI IAC with contrast IV showed left necrotizing otitis externa, 1 cm abscess in the left temporal lobe with adjacent dural thickening. vEEG monitoring  PLAN:  - Maintain seizure and fall precautions  - No AED at this time     CARDIO  #Preoperative risk assessment   No previous symptoms of CHF, no orthopnea, no dyspnea on excertion, no NPD, no B/L LE edema, he walks several blocks without, no problems to walk up stairs. EKG sinus rhythm 96 bmp.   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   - 1cm abscess.  - FU ear cultures- showing GPR and GPC in pairs   - Blood (NGTD), urine (NGTD) and ear cultures (staph. aureus and epidermidis)  - IAC CT scan w/wo contrast necrotizing otitis externa with osteomyelitis and soft tissue extent to the retrocondylar fat and infratemporal fosse.   - MRI shows left necrotizing otitis externa, 1 cm abscess in the left temporal lobe with adjacent dural thickening.   - ENT and neurosurgery no acute intervention.   PLAN:  - F/u surveillance cultures  - Vanc troughs  - C/w Ciprodex bilaterally; 5 drops to each ear wick twice a day; with Dexamethasone 0.1% 5 drops  - C/w vancomycin 1250mg q12h IV and cefepime 2g q8h IV.  - Neurosurgery recommended abx per ID recs  - F/u ID recs    F: none  E: replete PRN  N: Regular diet  DVT: Heparin IV q8h  GI: Pantoprazol 40mg IV q24h

## 2021-12-08 NOTE — CHART NOTE - NSCHARTNOTEFT_GEN_A_CORE
Admitting Diagnosis:   Patient is a 51y old  Male who presents with a chief complaint of Male complaining of seizures. (08 Dec 2021 10:57)      PAST MEDICAL & SURGICAL HISTORY:  Acute mastoiditis, left    Convulsive status epilepticus    Alcohol abuse, daily use    Umbilical hernia        Current Nutrition Order:  Regular     PO Intake: Good (%) [   ]  Fair (50-75%) [   ] Poor (<25%) [X   ]    GI Issues: Unable to assess at this time 2/2 AMS  No episodes of emesis  BM 12/7    Pain: No complaints of pain per RN  Non-verbal indicators of pain absent     Skin Integrity: Glynn 17, no edema  Intact pressure-wise    Labs:   12-08    135  |  98  |  9   ----------------------------<  100<H>  3.1<L>   |  26  |  0.68    Ca    8.9      08 Dec 2021 06:20  Phos  1.6     12-08  Mg     1.9     12-08    TPro  7.0  /  Alb  3.4  /  TBili  0.6  /  DBili  x   /  AST  166<H>  /  ALT  84<H>  /  AlkPhos  76  12-08    CAPILLARY BLOOD GLUCOSE      POCT Blood Glucose.: 133 mg/dL (08 Dec 2021 06:52)  POCT Blood Glucose.: 111 mg/dL (08 Dec 2021 01:07)  POCT Blood Glucose.: 99 mg/dL (07 Dec 2021 18:06)  POCT Blood Glucose.: 117 mg/dL (07 Dec 2021 11:14)      Medications:  MEDICATIONS  (STANDING):  cefepime   IVPB 2000 milliGRAM(s) IV Intermittent every 8 hours  chlorhexidine 2% Cloths 1 Application(s) Topical <User Schedule>  CIPROFLOXACIN(CIPRO) OPTHALMIC SOLUTION 5 Drop(s) 5 Drop(s) Both Ears two times a day  dexAMETHasone 0.1% Ophthalmic Solution for OTIC Use 5 Drop(s) Both Ears two times a day  dexMEDEtomidine Infusion 0.2 MICROgram(s)/kG/Hr (4.3 mL/Hr) IV Continuous <Continuous>  dextrose 40% Gel 15 Gram(s) Oral once  dextrose 5%. 1000 milliLiter(s) (50 mL/Hr) IV Continuous <Continuous>  dextrose 50% Injectable 25 Gram(s) IV Push once  enoxaparin Injectable 40 milliGRAM(s) SubCutaneous daily  folic acid 1 milliGRAM(s) Oral daily  glucagon  Injectable 1 milliGRAM(s) IntraMuscular once  influenza   Vaccine 0.5 milliLiter(s) IntraMuscular once  insulin regular  human corrective regimen sliding scale   SubCutaneous every 6 hours  multivitamin 1 Tablet(s) Oral daily  nicotine - 21 mG/24Hr(s) Patch 1 patch Transdermal daily  potassium chloride  20 mEq/100 mL IVPB 20 milliEquivalent(s) IV Intermittent every 2 hours  sodium phosphate IVPB 30 milliMole(s) IV Intermittent once  thiamine 100 milliGRAM(s) Oral daily  thiamine IVPB 500 milliGRAM(s) IV Intermittent daily  vancomycin  IVPB 1250 milliGRAM(s) IV Intermittent every 8 hours    MEDICATIONS  (PRN):  PHENobarbital Injectable 130 milliGRAM(s) IV Push every 1 hour PRN CIWA >8  sodium chloride 0.9% lock flush 10 milliLiter(s) IV Push every 1 hour PRN Pre/post blood products, medications, blood draw, and to maintain line patency      Weight: 85.7kg    Weight Change: No new weights recorded since admit     Nutrition Focused Physical Exam: Completed [   ]  Not Pertinent [ X  ]    Estimated energy needs: Height 69"; ABW 85.7kg; IBW 72.5; 118%IBW; BMI 27.9  ActualBW used for calculations as pt between % of IBW. Needs estimated for age and adjusted for infection  Calories: 27-32 kcal/kg = 2042-3514 kcal/day  Protein: 1.0-1.2 g/kg = 86-103g pro/day  Fluids: 30-35 ml/kg = 8244-0515 ml/day    Subjective: 52 yo M with PMHx no past medical history of epilepsy, trauma or CNS infection, who presents to the ED with an episode of sudden onset speech disturbance talking incoherently and repetitive and altered behavior while working witnessed, no fever. In the last 2 days much more pain on left ear and headache. Pt uses frequently his swimming pool has had left ear infection for 2 months with hypoacusia treated with long course ciprofloxacin and a week of prednisone. In ED presented, repetitive generalized seizures. 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 Intubated i/s/o seizure activity/concern for inability to protect airway. On ear cultures found staph. aureus and epidermidis. On 12/5, pt was extubated. On IAC CT scan w/wo contrast necrotizing otitis externa with osteomyelitis and soft tissue extent to the retrocondylar fat and infratemporal fosse. MRI shows left necrotizing otitis externa, 1 cm abscess in the left temporal lobe with adjacent dural thickening. Neurosurgery recommended abx per ID recs. Pt w/elevated CIWAs overnight, requiring phenobarb pushes hourly. Pt seen in room and discussed during MICU rounds. Pt now transitioned to precedex gtt. Pt asleep at time of visit, on 1:1 observation, in posey vest. Left to rest. Breakfast tray at bedside- RN reported that pt endorsed lack of hunger. No apparent N/V or pain per RN. BM 12/7. Afebrile. Pertinent labs: WBC 17.80 (H), POC , 111, 99mg/dL, K 3.1 (L), Phos 1.6 (L). Will continue to follow per RD protocol.       Previous Nutrition Diagnosis:  Inadequate energy intake RT intake<EER AEB NPO.   Active [   ]  Resolved [ X  ]    If resolved, new PES: Inadequate oral intake RT inability to meet >60% of EER 2/2 sedation/AMS/lack of appetite AEB <50% intake per meal     Goal:  Pt will be encouraged to have >50% intake per meal.    Recommendations:  1. Encourage PO intake while awake/alert. Maintain aspiration precautions at all times   2. Monitor lytes and replete prn. POC BG q6hrs   3. Cont. micronutrient supplementation  4. Pain and bowel regimens per team     Education: N/A    Risk Level: High [ X  ] Moderate [   ] Low [   ]

## 2021-12-09 LAB
ALBUMIN SERPL ELPH-MCNC: 3.1 G/DL — LOW (ref 3.3–5)
ALP SERPL-CCNC: 71 U/L — SIGNIFICANT CHANGE UP (ref 40–120)
ALT FLD-CCNC: 82 U/L — HIGH (ref 10–45)
ANION GAP SERPL CALC-SCNC: 9 MMOL/L — SIGNIFICANT CHANGE UP (ref 5–17)
APTT BLD: 29.6 SEC — SIGNIFICANT CHANGE UP (ref 27.5–35.5)
AST SERPL-CCNC: 104 U/L — HIGH (ref 10–40)
BASOPHILS # BLD AUTO: 0.09 K/UL — SIGNIFICANT CHANGE UP (ref 0–0.2)
BASOPHILS NFR BLD AUTO: 0.6 % — SIGNIFICANT CHANGE UP (ref 0–2)
BILIRUB SERPL-MCNC: 0.4 MG/DL — SIGNIFICANT CHANGE UP (ref 0.2–1.2)
BLD GP AB SCN SERPL QL: NEGATIVE — SIGNIFICANT CHANGE UP
BUN SERPL-MCNC: 8 MG/DL — SIGNIFICANT CHANGE UP (ref 7–23)
CALCIUM SERPL-MCNC: 8.7 MG/DL — SIGNIFICANT CHANGE UP (ref 8.4–10.5)
CHLORIDE SERPL-SCNC: 101 MMOL/L — SIGNIFICANT CHANGE UP (ref 96–108)
CO2 SERPL-SCNC: 27 MMOL/L — SIGNIFICANT CHANGE UP (ref 22–31)
CREAT SERPL-MCNC: 0.65 MG/DL — SIGNIFICANT CHANGE UP (ref 0.5–1.3)
CULTURE RESULTS: SIGNIFICANT CHANGE UP
EOSINOPHIL # BLD AUTO: 0.41 K/UL — SIGNIFICANT CHANGE UP (ref 0–0.5)
EOSINOPHIL NFR BLD AUTO: 2.9 % — SIGNIFICANT CHANGE UP (ref 0–6)
GLUCOSE BLDC GLUCOMTR-MCNC: 117 MG/DL — HIGH (ref 70–99)
GLUCOSE BLDC GLUCOMTR-MCNC: 141 MG/DL — HIGH (ref 70–99)
GLUCOSE BLDC GLUCOMTR-MCNC: 159 MG/DL — HIGH (ref 70–99)
GLUCOSE BLDC GLUCOMTR-MCNC: 97 MG/DL — SIGNIFICANT CHANGE UP (ref 70–99)
GLUCOSE SERPL-MCNC: 104 MG/DL — HIGH (ref 70–99)
GRAM STN FLD: SIGNIFICANT CHANGE UP
GRAM STN FLD: SIGNIFICANT CHANGE UP
HCT VFR BLD CALC: 36.1 % — LOW (ref 39–50)
HGB BLD-MCNC: 12.1 G/DL — LOW (ref 13–17)
HIV 1+2 AB+HIV1 P24 AG SERPL QL IA: SIGNIFICANT CHANGE UP
IMM GRANULOCYTES NFR BLD AUTO: 0.8 % — SIGNIFICANT CHANGE UP (ref 0–1.5)
INR BLD: 1.15 — SIGNIFICANT CHANGE UP (ref 0.88–1.16)
LYMPHOCYTES # BLD AUTO: 16.9 % — SIGNIFICANT CHANGE UP (ref 13–44)
LYMPHOCYTES # BLD AUTO: 2.43 K/UL — SIGNIFICANT CHANGE UP (ref 1–3.3)
MAGNESIUM SERPL-MCNC: 2 MG/DL — SIGNIFICANT CHANGE UP (ref 1.6–2.6)
MCHC RBC-ENTMCNC: 29.4 PG — SIGNIFICANT CHANGE UP (ref 27–34)
MCHC RBC-ENTMCNC: 33.5 GM/DL — SIGNIFICANT CHANGE UP (ref 32–36)
MCV RBC AUTO: 87.8 FL — SIGNIFICANT CHANGE UP (ref 80–100)
MONOCYTES # BLD AUTO: 1.5 K/UL — HIGH (ref 0–0.9)
MONOCYTES NFR BLD AUTO: 10.4 % — SIGNIFICANT CHANGE UP (ref 2–14)
NEUTROPHILS # BLD AUTO: 9.83 K/UL — HIGH (ref 1.8–7.4)
NEUTROPHILS NFR BLD AUTO: 68.4 % — SIGNIFICANT CHANGE UP (ref 43–77)
NRBC # BLD: 0 /100 WBCS — SIGNIFICANT CHANGE UP (ref 0–0)
PHOSPHATE SERPL-MCNC: 3 MG/DL — SIGNIFICANT CHANGE UP (ref 2.5–4.5)
PLATELET # BLD AUTO: 327 K/UL — SIGNIFICANT CHANGE UP (ref 150–400)
POTASSIUM SERPL-MCNC: 3.1 MMOL/L — LOW (ref 3.5–5.3)
POTASSIUM SERPL-SCNC: 3.1 MMOL/L — LOW (ref 3.5–5.3)
PROT SERPL-MCNC: 6.6 G/DL — SIGNIFICANT CHANGE UP (ref 6–8.3)
PROTHROM AB SERPL-ACNC: 13.7 SEC — HIGH (ref 10.6–13.6)
RBC # BLD: 4.11 M/UL — LOW (ref 4.2–5.8)
RBC # FLD: 13.4 % — SIGNIFICANT CHANGE UP (ref 10.3–14.5)
RH IG SCN BLD-IMP: NEGATIVE — SIGNIFICANT CHANGE UP
SODIUM SERPL-SCNC: 137 MMOL/L — SIGNIFICANT CHANGE UP (ref 135–145)
SPECIMEN SOURCE: SIGNIFICANT CHANGE UP
VANCOMYCIN FLD-MCNC: 6.1 UG/ML — SIGNIFICANT CHANGE UP
WBC # BLD: 14.38 K/UL — HIGH (ref 3.8–10.5)
WBC # FLD AUTO: 14.38 K/UL — HIGH (ref 3.8–10.5)

## 2021-12-09 PROCEDURE — 99222 1ST HOSP IP/OBS MODERATE 55: CPT

## 2021-12-09 PROCEDURE — 99231 SBSQ HOSP IP/OBS SF/LOW 25: CPT

## 2021-12-09 PROCEDURE — 69436 CREATE EARDRUM OPENING: CPT | Mod: RT

## 2021-12-09 PROCEDURE — 71045 X-RAY EXAM CHEST 1 VIEW: CPT | Mod: 26

## 2021-12-09 PROCEDURE — 93010 ELECTROCARDIOGRAM REPORT: CPT

## 2021-12-09 PROCEDURE — 99291 CRITICAL CARE FIRST HOUR: CPT

## 2021-12-09 PROCEDURE — 92502 EAR AND THROAT EXAMINATION: CPT | Mod: 59

## 2021-12-09 RX ORDER — POTASSIUM CHLORIDE 20 MEQ
20 PACKET (EA) ORAL ONCE
Refills: 0 | Status: COMPLETED | OUTPATIENT
Start: 2021-12-09 | End: 2021-12-09

## 2021-12-09 RX ORDER — CIPROFLOXACIN HCL 0.3 %
20 DROPS OPHTHALMIC (EYE) ONCE
Refills: 0 | Status: COMPLETED | OUTPATIENT
Start: 2021-12-09 | End: 2021-12-09

## 2021-12-09 RX ORDER — MIDAZOLAM HYDROCHLORIDE 1 MG/ML
2 INJECTION, SOLUTION INTRAMUSCULAR; INTRAVENOUS ONCE
Refills: 0 | Status: DISCONTINUED | OUTPATIENT
Start: 2021-12-09 | End: 2021-12-09

## 2021-12-09 RX ORDER — THIAMINE MONONITRATE (VIT B1) 100 MG
500 TABLET ORAL EVERY 8 HOURS
Refills: 0 | Status: COMPLETED | OUTPATIENT
Start: 2021-12-09 | End: 2021-12-11

## 2021-12-09 RX ORDER — THIAMINE MONONITRATE (VIT B1) 100 MG
500 TABLET ORAL EVERY 8 HOURS
Refills: 0 | Status: DISCONTINUED | OUTPATIENT
Start: 2021-12-09 | End: 2021-12-09

## 2021-12-09 RX ORDER — QUETIAPINE FUMARATE 200 MG/1
25 TABLET, FILM COATED ORAL AT BEDTIME
Refills: 0 | Status: DISCONTINUED | OUTPATIENT
Start: 2021-12-09 | End: 2021-12-10

## 2021-12-09 RX ORDER — HALOPERIDOL DECANOATE 100 MG/ML
5 INJECTION INTRAMUSCULAR ONCE
Refills: 0 | Status: COMPLETED | OUTPATIENT
Start: 2021-12-09 | End: 2021-12-09

## 2021-12-09 RX ORDER — POTASSIUM CHLORIDE 20 MEQ
40 PACKET (EA) ORAL ONCE
Refills: 0 | Status: COMPLETED | OUTPATIENT
Start: 2021-12-09 | End: 2021-12-09

## 2021-12-09 RX ORDER — VANCOMYCIN HCL 1 G
1250 VIAL (EA) INTRAVENOUS EVERY 12 HOURS
Refills: 0 | Status: DISCONTINUED | OUTPATIENT
Start: 2021-12-09 | End: 2021-12-09

## 2021-12-09 RX ORDER — VANCOMYCIN HCL 1 G
1250 VIAL (EA) INTRAVENOUS EVERY 8 HOURS
Refills: 0 | Status: DISCONTINUED | OUTPATIENT
Start: 2021-12-09 | End: 2021-12-10

## 2021-12-09 RX ADMIN — Medication 40 MILLIEQUIVALENT(S): at 07:11

## 2021-12-09 RX ADMIN — HALOPERIDOL DECANOATE 5 MILLIGRAM(S): 100 INJECTION INTRAMUSCULAR at 15:03

## 2021-12-09 RX ADMIN — CHLORHEXIDINE GLUCONATE 1 APPLICATION(S): 213 SOLUTION TOPICAL at 06:35

## 2021-12-09 RX ADMIN — CEFEPIME 100 MILLIGRAM(S): 1 INJECTION, POWDER, FOR SOLUTION INTRAMUSCULAR; INTRAVENOUS at 21:31

## 2021-12-09 RX ADMIN — CEFEPIME 100 MILLIGRAM(S): 1 INJECTION, POWDER, FOR SOLUTION INTRAMUSCULAR; INTRAVENOUS at 15:10

## 2021-12-09 RX ADMIN — CEFEPIME 100 MILLIGRAM(S): 1 INJECTION, POWDER, FOR SOLUTION INTRAMUSCULAR; INTRAVENOUS at 05:36

## 2021-12-09 RX ADMIN — Medication 1 PATCH: at 11:41

## 2021-12-09 RX ADMIN — DIVALPROEX SODIUM 500 MILLIGRAM(S): 500 TABLET, DELAYED RELEASE ORAL at 07:20

## 2021-12-09 RX ADMIN — Medication 2 MILLIGRAM(S): at 16:03

## 2021-12-09 RX ADMIN — DEXMEDETOMIDINE HYDROCHLORIDE IN 0.9% SODIUM CHLORIDE 4.3 MICROGRAM(S)/KG/HR: 4 INJECTION INTRAVENOUS at 03:10

## 2021-12-09 RX ADMIN — Medication 105 MILLIGRAM(S): at 22:17

## 2021-12-09 RX ADMIN — Medication 105 MILLIGRAM(S): at 15:10

## 2021-12-09 RX ADMIN — Medication 50 MILLIEQUIVALENT(S): at 07:11

## 2021-12-09 RX ADMIN — Medication 20 DROP(S): at 17:36

## 2021-12-09 RX ADMIN — Medication 1 PATCH: at 06:29

## 2021-12-09 RX ADMIN — Medication 166.67 MILLIGRAM(S): at 15:10

## 2021-12-09 RX ADMIN — MIDAZOLAM HYDROCHLORIDE 2 MILLIGRAM(S): 1 INJECTION, SOLUTION INTRAMUSCULAR; INTRAVENOUS at 11:30

## 2021-12-09 RX ADMIN — DEXMEDETOMIDINE HYDROCHLORIDE IN 0.9% SODIUM CHLORIDE 4.3 MICROGRAM(S)/KG/HR: 4 INJECTION INTRAVENOUS at 17:53

## 2021-12-09 RX ADMIN — Medication 166.67 MILLIGRAM(S): at 23:24

## 2021-12-09 RX ADMIN — Medication 166.67 MILLIGRAM(S): at 02:48

## 2021-12-09 NOTE — PROGRESS NOTE ADULT - SUBJECTIVE AND OBJECTIVE BOX
***Note in progress***    OVERNIGHT EVENTS: NAEO    SUBJECTIVE / INTERVAL HPI: Patient seen and examined at bedside.     Remaining ROS negative     PHYSICAL EXAM:  General: NAD, lying in bed comfortably  HEENT: NC/AT; PERRL, anicteric sclera; MMM  Neck: supple  Cardiovascular: +S1/S2, RRR  Respiratory: CTA B/L; no W/R/R  Gastrointestinal: soft, NT/ND; +BSx4  Extremities: WWP; no edema, clubbing or cyanosis  Vascular: 2+ radial, DP/PT pulses B/L  Neurological: AAOx3; no focal deficits  Psychiatric: pleasant mood and affect  Dermatologic: no appreciable wounds or damage to the skin    VITAL SIGNS:  Vital Signs Last 24 Hrs  T(C): 36.9 (09 Dec 2021 01:04), Max: 37.9 (08 Dec 2021 06:00)  T(F): 98.4 (09 Dec 2021 01:04), Max: 100.2 (08 Dec 2021 06:00)  HR: 78 (09 Dec 2021 05:00) (56 - 114)  BP: 134/63 (09 Dec 2021 05:00) (93/59 - 156/73)  BP(mean): 89 (09 Dec 2021 05:00) (69 - 126)  RR: 18 (09 Dec 2021 05:00) (18 - 46)  SpO2: 92% (09 Dec 2021 05:00) (89% - 97%)    MEDICATIONS:  MEDICATIONS  (STANDING):  cefepime   IVPB 2000 milliGRAM(s) IV Intermittent every 8 hours  chlorhexidine 2% Cloths 1 Application(s) Topical <User Schedule>  CIPROFLOXACIN(CIPRO) OPTHALMIC SOLUTION 5 Drop(s) 5 Drop(s) Both Ears two times a day  dexAMETHasone 0.1% Ophthalmic Solution for OTIC Use 5 Drop(s) Both Ears two times a day  dexMEDEtomidine Infusion 0.2 MICROgram(s)/kG/Hr (4.3 mL/Hr) IV Continuous <Continuous>  dextrose 40% Gel 15 Gram(s) Oral once  dextrose 5%. 1000 milliLiter(s) (50 mL/Hr) IV Continuous <Continuous>  dextrose 50% Injectable 25 Gram(s) IV Push once  diVALproex  milliGRAM(s) Oral two times a day  enoxaparin Injectable 40 milliGRAM(s) SubCutaneous daily  folic acid 1 milliGRAM(s) Oral daily  glucagon  Injectable 1 milliGRAM(s) IntraMuscular once  influenza   Vaccine 0.5 milliLiter(s) IntraMuscular once  insulin regular  human corrective regimen sliding scale   SubCutaneous every 6 hours  multivitamin 1 Tablet(s) Oral daily  nicotine - 21 mG/24Hr(s) Patch 1 patch Transdermal daily  thiamine IVPB 500 milliGRAM(s) IV Intermittent daily  vancomycin  IVPB 1250 milliGRAM(s) IV Intermittent every 12 hours    MEDICATIONS  (PRN):  PHENobarbital Injectable 130 milliGRAM(s) IV Push every 1 hour PRN CIWA >8  sodium chloride 0.9% lock flush 10 milliLiter(s) IV Push every 1 hour PRN Pre/post blood products, medications, blood draw, and to maintain line patency    ALLERGIES:  No Known Allergies    LABS:                        13.1   17.80 )-----------( 342      ( 08 Dec 2021 06:20 )             38.2     12-08    135  |  100  |  9   ----------------------------<  125<H>  SEE  NOTE Moderate  hemolysis  observed   |  24  |  0.61    Ca    8.6      08 Dec 2021 17:04  Phos  4.4     12-08  Mg     2.2     12-08    TPro  7.0  /  Alb  3.4  /  TBili  0.6  /  DBili  x   /  AST  166<H>  /  ALT  84<H>  /  AlkPhos  76  12-08        CAPILLARY BLOOD GLUCOSE      POCT Blood Glucose.: 121 mg/dL (08 Dec 2021 23:17)      RADIOLOGY & ADDITIONAL TESTS: Reviewed. ***Note in progress***    OVERNIGHT EVENTS: COVID negative.    SUBJECTIVE / INTERVAL HPI: Patient seen and examined at bedside.     Remaining ROS negative     PHYSICAL EXAM:  General: NAD, lying in bed uncomfortable, visual hallucinations   HEAD:  Atraumatic, normocephalic  EYES: conjunctiva and sclera clear  ENT: Moist mucous membranes, rash on left ear, swollen, and erythematous   NECK: Supple, no JVD  HEART: Regular rate and rhythm, no murmurs, rubs, or gallops  LUNGS: Unlabored respirations.  Clear to auscultation bilaterally, no crackles, wheezing, or rhonchi  ABDOMEN: Soft, nontender, nondistended, +BS  EXTREMITIES: 2+ peripheral pulses bilaterally. No clubbing, cyanosis, or edema  NERVOUS SYSTEM:  Induced coma (miotic pupils minimally reactive to light), no response to pain.  SKIN: No rashes or lesions    VITAL SIGNS:  Vital Signs Last 24 Hrs  T(C): 36.9 (09 Dec 2021 01:04), Max: 37.9 (08 Dec 2021 06:00)  T(F): 98.4 (09 Dec 2021 01:04), Max: 100.2 (08 Dec 2021 06:00)  HR: 78 (09 Dec 2021 05:00) (56 - 114)  BP: 134/63 (09 Dec 2021 05:00) (93/59 - 156/73)  BP(mean): 89 (09 Dec 2021 05:00) (69 - 126)  RR: 18 (09 Dec 2021 05:00) (18 - 46)  SpO2: 92% (09 Dec 2021 05:00) (89% - 97%)    MEDICATIONS:  MEDICATIONS  (STANDING):  cefepime   IVPB 2000 milliGRAM(s) IV Intermittent every 8 hours  chlorhexidine 2% Cloths 1 Application(s) Topical <User Schedule>  CIPROFLOXACIN(CIPRO) OPTHALMIC SOLUTION 5 Drop(s) 5 Drop(s) Both Ears two times a day  dexAMETHasone 0.1% Ophthalmic Solution for OTIC Use 5 Drop(s) Both Ears two times a day  dexMEDEtomidine Infusion 0.2 MICROgram(s)/kG/Hr (4.3 mL/Hr) IV Continuous <Continuous>  dextrose 40% Gel 15 Gram(s) Oral once  dextrose 5%. 1000 milliLiter(s) (50 mL/Hr) IV Continuous <Continuous>  dextrose 50% Injectable 25 Gram(s) IV Push once  diVALproex  milliGRAM(s) Oral two times a day  enoxaparin Injectable 40 milliGRAM(s) SubCutaneous daily  folic acid 1 milliGRAM(s) Oral daily  glucagon  Injectable 1 milliGRAM(s) IntraMuscular once  influenza   Vaccine 0.5 milliLiter(s) IntraMuscular once  insulin regular  human corrective regimen sliding scale   SubCutaneous every 6 hours  multivitamin 1 Tablet(s) Oral daily  nicotine - 21 mG/24Hr(s) Patch 1 patch Transdermal daily  thiamine IVPB 500 milliGRAM(s) IV Intermittent daily  vancomycin  IVPB 1250 milliGRAM(s) IV Intermittent every 12 hours    MEDICATIONS  (PRN):  PHENobarbital Injectable 130 milliGRAM(s) IV Push every 1 hour PRN CIWA >8  sodium chloride 0.9% lock flush 10 milliLiter(s) IV Push every 1 hour PRN Pre/post blood products, medications, blood draw, and to maintain line patency    ALLERGIES:  No Known Allergies    LABS:                        13.1   17.80 )-----------( 342      ( 08 Dec 2021 06:20 )             38.2     12-08    135  |  100  |  9   ----------------------------<  125<H>  SEE  NOTE Moderate  hemolysis  observed   |  24  |  0.61    Ca    8.6      08 Dec 2021 17:04  Phos  4.4     12-08  Mg     2.2     12-08    TPro  7.0  /  Alb  3.4  /  TBili  0.6  /  DBili  x   /  AST  166<H>  /  ALT  84<H>  /  AlkPhos  76  12-08        CAPILLARY BLOOD GLUCOSE      POCT Blood Glucose.: 121 mg/dL (08 Dec 2021 23:17)      RADIOLOGY & ADDITIONAL TESTS: Reviewed. OVERNIGHT EVENTS: COVID negative.    SUBJECTIVE / INTERVAL HPI: Patient seen and examined at bedside.     Remaining ROS negative     PHYSICAL EXAM:  General: Sedated today but arousable. RASS -2 and -3.   HEAD:  Atraumatic, normocephalic  EYES: conjunctiva and sclera clear  ENT: Moist mucous membranes, rash on left ear, swollen, and erythematous   NECK: Supple, no JVD  HEART: Regular rate and rhythm, no murmurs, rubs, or gallops  LUNGS: Unlabored respirations.  Clear to auscultation bilaterally, no crackles, wheezing, or rhonchi  ABDOMEN: Soft, nontender, nondistended, +BS  EXTREMITIES: 2+ peripheral pulses bilaterally. No clubbing, cyanosis, or edema  NERVOUS SYSTEM:  Induced coma (miotic pupils minimally reactive to light), no response to pain.  SKIN: No rashes or lesions    VITAL SIGNS:  Vital Signs Last 24 Hrs  T(C): 36.9 (09 Dec 2021 01:04), Max: 37.9 (08 Dec 2021 06:00)  T(F): 98.4 (09 Dec 2021 01:04), Max: 100.2 (08 Dec 2021 06:00)  HR: 78 (09 Dec 2021 05:00) (56 - 114)  BP: 134/63 (09 Dec 2021 05:00) (93/59 - 156/73)  BP(mean): 89 (09 Dec 2021 05:00) (69 - 126)  RR: 18 (09 Dec 2021 05:00) (18 - 46)  SpO2: 92% (09 Dec 2021 05:00) (89% - 97%)    MEDICATIONS:  MEDICATIONS  (STANDING):  cefepime   IVPB 2000 milliGRAM(s) IV Intermittent every 8 hours  chlorhexidine 2% Cloths 1 Application(s) Topical <User Schedule>  CIPROFLOXACIN(CIPRO) OPTHALMIC SOLUTION 5 Drop(s) 5 Drop(s) Both Ears two times a day  dexAMETHasone 0.1% Ophthalmic Solution for OTIC Use 5 Drop(s) Both Ears two times a day  dexMEDEtomidine Infusion 0.2 MICROgram(s)/kG/Hr (4.3 mL/Hr) IV Continuous <Continuous>  dextrose 40% Gel 15 Gram(s) Oral once  dextrose 5%. 1000 milliLiter(s) (50 mL/Hr) IV Continuous <Continuous>  dextrose 50% Injectable 25 Gram(s) IV Push once  diVALproex  milliGRAM(s) Oral two times a day  enoxaparin Injectable 40 milliGRAM(s) SubCutaneous daily  folic acid 1 milliGRAM(s) Oral daily  glucagon  Injectable 1 milliGRAM(s) IntraMuscular once  influenza   Vaccine 0.5 milliLiter(s) IntraMuscular once  insulin regular  human corrective regimen sliding scale   SubCutaneous every 6 hours  multivitamin 1 Tablet(s) Oral daily  nicotine - 21 mG/24Hr(s) Patch 1 patch Transdermal daily  thiamine IVPB 500 milliGRAM(s) IV Intermittent daily  vancomycin  IVPB 1250 milliGRAM(s) IV Intermittent every 12 hours    MEDICATIONS  (PRN):  PHENobarbital Injectable 130 milliGRAM(s) IV Push every 1 hour PRN CIWA >8  sodium chloride 0.9% lock flush 10 milliLiter(s) IV Push every 1 hour PRN Pre/post blood products, medications, blood draw, and to maintain line patency    ALLERGIES:  No Known Allergies    LABS:                        13.1   17.80 )-----------( 342      ( 08 Dec 2021 06:20 )             38.2     12-08    135  |  100  |  9   ----------------------------<  125<H>  SEE  NOTE Moderate  hemolysis  observed   |  24  |  0.61    Ca    8.6      08 Dec 2021 17:04  Phos  4.4     12-08  Mg     2.2     12-08    TPro  7.0  /  Alb  3.4  /  TBili  0.6  /  DBili  x   /  AST  166<H>  /  ALT  84<H>  /  AlkPhos  76  12-0    CAPILLARY BLOOD GLUCOSE  POCT Blood Glucose.: 121 mg/dL (08 Dec 2021 23:17)    RADIOLOGY & ADDITIONAL TESTS: Reviewed. HOSPITAL COURSE: 50 yo M with PMHx ETOH use and polysubstance abuse,, who presents with sudden onset speech disturbance talking incoherently and altered behavior . In the last 2 days much more pain on left ear and headache. Pt uses frequently his swimming pool has had left ear infection for 2 months with hypoacusia treated with long course ciprofloxacin and a week of prednisone. In ED presented, repetitive generalized seizures. 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 Started on vancomycin and Zosyn IV. Admitted to ICU to continue his care, on cefepime and vanc IV (to cover CNS pseudomonal infection) propofol drip and placed on ventilator. On ear cultures found staph. aureus and epidermidis. On 12/5, pt was extubated. On IAC CT scan w/wo contrast necrotizing otitis externa with osteomyelitis and soft tissue extent to the retrocondylar fat and infratemporal fosse. ENT and neurosurgery didn't consider beneficial acute intervention at this point. MRI shows left necrotizing otitis externa, 1 cm abscess in the left temporal lobe with adjacent dural thickening. Neurosurgery  recommended abx per ID recs. Took to ENT OR with bilateral exam of ears, possible myringotomy/tympanostomy tube placement    OVERNIGHT EVENTS: COVID negative.    SUBJECTIVE / INTERVAL HPI: Patient seen and examined at bedside.     Remaining ROS negative     PHYSICAL EXAM:  General: Sedated today but arousable. RASS -2 and -3.   HEAD:  Atraumatic, normocephalic  EYES: conjunctiva and sclera clear  ENT: Moist mucous membranes, rash on left ear, swollen, and erythematous   NECK: Supple, no JVD  HEART: Regular rate and rhythm, no murmurs, rubs, or gallops  LUNGS: Unlabored respirations.  Clear to auscultation bilaterally, no crackles, wheezing, or rhonchi  ABDOMEN: Soft, nontender, nondistended, +BS  EXTREMITIES: 2+ peripheral pulses bilaterally. No clubbing, cyanosis, or edema  NERVOUS SYSTEM:  Induced coma (miotic pupils minimally reactive to light), no response to pain.  SKIN: No rashes or lesions    VITAL SIGNS:  Vital Signs Last 24 Hrs  T(C): 36.9 (09 Dec 2021 01:04), Max: 37.9 (08 Dec 2021 06:00)  T(F): 98.4 (09 Dec 2021 01:04), Max: 100.2 (08 Dec 2021 06:00)  HR: 78 (09 Dec 2021 05:00) (56 - 114)  BP: 134/63 (09 Dec 2021 05:00) (93/59 - 156/73)  BP(mean): 89 (09 Dec 2021 05:00) (69 - 126)  RR: 18 (09 Dec 2021 05:00) (18 - 46)  SpO2: 92% (09 Dec 2021 05:00) (89% - 97%)    MEDICATIONS:  MEDICATIONS  (STANDING):  cefepime   IVPB 2000 milliGRAM(s) IV Intermittent every 8 hours  chlorhexidine 2% Cloths 1 Application(s) Topical <User Schedule>  CIPROFLOXACIN(CIPRO) OPTHALMIC SOLUTION 5 Drop(s) 5 Drop(s) Both Ears two times a day  dexAMETHasone 0.1% Ophthalmic Solution for OTIC Use 5 Drop(s) Both Ears two times a day  dexMEDEtomidine Infusion 0.2 MICROgram(s)/kG/Hr (4.3 mL/Hr) IV Continuous <Continuous>  dextrose 40% Gel 15 Gram(s) Oral once  dextrose 5%. 1000 milliLiter(s) (50 mL/Hr) IV Continuous <Continuous>  dextrose 50% Injectable 25 Gram(s) IV Push once  diVALproex  milliGRAM(s) Oral two times a day  enoxaparin Injectable 40 milliGRAM(s) SubCutaneous daily  folic acid 1 milliGRAM(s) Oral daily  glucagon  Injectable 1 milliGRAM(s) IntraMuscular once  influenza   Vaccine 0.5 milliLiter(s) IntraMuscular once  insulin regular  human corrective regimen sliding scale   SubCutaneous every 6 hours  multivitamin 1 Tablet(s) Oral daily  nicotine - 21 mG/24Hr(s) Patch 1 patch Transdermal daily  thiamine IVPB 500 milliGRAM(s) IV Intermittent daily  vancomycin  IVPB 1250 milliGRAM(s) IV Intermittent every 12 hours    MEDICATIONS  (PRN):  PHENobarbital Injectable 130 milliGRAM(s) IV Push every 1 hour PRN CIWA >8  sodium chloride 0.9% lock flush 10 milliLiter(s) IV Push every 1 hour PRN Pre/post blood products, medications, blood draw, and to maintain line patency    ALLERGIES:  No Known Allergies    LABS:                        13.1   17.80 )-----------( 342      ( 08 Dec 2021 06:20 )             38.2     12-08    135  |  100  |  9   ----------------------------<  125<H>  SEE  NOTE Moderate  hemolysis  observed   |  24  |  0.61    Ca    8.6      08 Dec 2021 17:04  Phos  4.4     12-08  Mg     2.2     12-08    TPro  7.0  /  Alb  3.4  /  TBili  0.6  /  DBili  x   /  AST  166<H>  /  ALT  84<H>  /  AlkPhos  76  12-0    CAPILLARY BLOOD GLUCOSE  POCT Blood Glucose.: 121 mg/dL (08 Dec 2021 23:17)    RADIOLOGY & ADDITIONAL TESTS: Reviewed.

## 2021-12-09 NOTE — BH CONSULTATION LIAISON PROGRESS NOTE - NSBHFUPINTERVALHXFT_PSY_A_CORE
No acute events overnight. Patient observed at 11:30AM as he was being prepped to be taken to the OR. Pt appeared confused with a flat affect; was resisting placement of a mask on his face and kept taking it off. Wife attempted to explain to him why he needed to put the facial mask on but he continued to appear confused, dazed.      Patient observed again at 2:30PM following his OR procedure. Patient had been brought back directly down to the ICU following the procedure. According to a security member, the patient woke up and starting "punching and kicking." Unable to speak directly to patient or patient's wife as the episode of agitation/aggressive behavior was still being assessed and managed by other members of the clinical team. As of yet, only precedex was administered today at 3:10AM.  No acute events overnight. Patient observed at 11:30AM as he was being prepped to be taken to the OR. Pt appeared confused with a flat affect; was resisting placement of a mask on his face and kept taking it off. Wife attempted to explain to him why he needed to put the facial mask on but he continued to appear confused, dazed.      Patient observed again at 2:30PM following his OR procedure. Patient had been brought back directly down to the ICU following the procedure. According to a security member, the patient woke up and starting "punching and kicking." Unable to speak directly to patient or patient's wife as the episode of agitation/aggressive behavior was still being assessed and managed by other members of the clinical team. Administered today precedex at 03:10, haloperidol at 15:03.

## 2021-12-09 NOTE — PROGRESS NOTE ADULT - SUBJECTIVE AND OBJECTIVE BOX
ENT Consult Note    HARRIET GOLDMAN  [id number]    51y Male w/ unremarkable PMHx (hasn't seen a doctor in many years) presents to [facility] as a transfer for evaluation of seizure of unknown origin. ENT consults for evaluation of left ear infection, r/o malignant ear otitis infections. per wife and brother at bedside, pt has been complaining of bilateral ear pain, left greater than right, since late october. pt went to urgent care at that time and was given oral cipro. pt returned to urgent care one week later after finishing his antibiotics without any ear pain relief. he was given oral and otic drops, however, sxs did not resolve. pt was then seen by an ENT on Nov 15, who diagnosed him w/ bilateral otitis externa, and placed ear sunday bilaterally. pt was given ciprodex otic drops and a follow up. Today, wife states  was not acting like his usual self. around noon today, she went to visit him and work and pt seemed very lethargic and was only responding with one word answers. At this time, wife decided to call an ambulance. while being placed in to the ambulance, pt began having is seizure. pt was taken to [facility], and was intubated for airway protection. CT maxillofacial at [place] which showed distal external auditory canal and foci of possible tegmen tympani/mastoideum dehiscence versus severe thinning, concerning for malignant/necrotizing otitis. Per wife, pt had severe otalgia and hearing, but did not have any otorrhea, facial weakness, vertigo, or tinnitus    INTERVAL:    12/4: Self extubation last night with emergent reintubation. Otherwise started on cefepime/vanc. MRI/CT pending. Ear cx growing Gram+ cocci and rods. Low grade fevers overnight with WBC 18.4 (down from 100.2). Sunday in place at bedside. Pt sedated.    12/5: NAEON. Still on vanc/cef and ciprodex eardrops. Afebrile overnight. Ear cx sensitivities pending. CT scan temp bones with similar findings as CT maxillofacial from [place]. WBC downtrending today (down to 13.6). Pt was seen and examined at bedside. Stable exam findings, no mastoid swelling. Sedated and intubated on vent.    12/6: NAEON. On vanc/cef and ciprodex eardrops. Febrile overnight to 102. Ear cx pending sens. CT IAC w/ con with L ZARI, no drainable abscess, no intracranial extension, and R OE. WBC up to 20.3 today from 13.6. Pt's sunday were removed at bedside and both sides were debrided. Still with swelling of b/l EAC (L > R). Sunday reinserted and ciprofloxacin bedside eardrops placed.    12/7: NAEON. MRI shows 1 cm abscess in left temporal lobe. Given small size and absence of neurologic sxs, NSGY recommend c/w IV medications and repeat imaging to evaluation for resolution    12/8: Tachycardic overnight and agitated. On CIWA 8 for agitation, ativan 2x without improvement. Started on phenobarbital protocol with improvement after 3 doses. B/l wrist restraints applied. Otherwise afebrile. On vanc/cef + ciprodex drops. ENT removed sunday and assessed the ear canals with scope. Sunday reapplied.    12/9: NAEON. WBC down 17.8 to 14.4 today. Afebrile overnight. Plan for OR today.      ICU Vital Signs Last 24 Hrs  T(C): 36.8 (09 Dec 2021 06:02), Max: 37.1 (08 Dec 2021 10:57)  T(F): 98.3 (09 Dec 2021 06:02), Max: 98.7 (08 Dec 2021 10:57)  HR: 59 (09 Dec 2021 07:00) (56 - 108)  BP: 94/53 (09 Dec 2021 07:00) (92/52 - 152/104)  BP(mean): 68 (09 Dec 2021 07:00) (67 - 126)  ABP: --  ABP(mean): --  RR: 24 (09 Dec 2021 07:00) (15 - 46)  SpO2: 92% (09 Dec 2021 07:00) (89% - 96%)            PHYSICAL EXAM:    CONSTITUTIONAL: Well nourished, well developed, sedated  HEAD: normocephalic, atraumatic.  EARS  AD: Air>Bone Mastoid non-tender/non-edematous. non-proptotic , non-erythematous right pinna. Debris in EAC likely 2/2 to otic drops. TM intact. no perfs no granulation tissue or masses. Wick in place.  AS: Bone> Air Mastoid non-tender/non-edematous. non-proptotic pinna. Left pinna edema and erythema improved. Wick in place.   Christiansen lateralized to the left  Face: HB 1  NOSE: Normal external nose.   ORAL CAVITY/OROPHARYNX: normal mucosa.   NECK: No cervical lymphadenopathy  RESPIRATORY: intubated connected to vent      EXAM:  CT TEMPORAL BONES                          PROCEDURE DATE:  12/04/2021          INTERPRETATION:  PROCEDURE: CT temporal bones    INDICATION: Severe otitis media with concern for temporal bone osteomyelitis    TECHNIQUE: Contiguous 1 mm thickaxial and modified coronal images were obtained through the temporal bones without the intravenous administration of contrast. Target axial and coronal review images were created through each temporal bone utilizing bone algorithm.    COMPARISON: Maxillofacial CT 12/3/2021    FINDINGS: Target review images of the right temporal bone demonstrates soft tissue filling the osseous segment of the right tympanic membrane. The bony cortex appears intact. The mastoid air cell system to be well developed.There is soft tissue opacification and fluid within right mastoid air cells as well as fluid within the right mastoid antrum. There is soft tissue within the middle ear cavity. The ossicles appear intact without erosive changes or displacement. The scutum is intact. The visualized portions of the right inner appear within normal limits.    Target review images of the left temporal bone demonstrates soft tissue completely filling the left osseous segment of the external auditory canal. There are cortical bony erosive changes involving the walls of bony external canal especially the floor and posterior wall.  There is complete soft tissue opacification of left mastoid air cells. The mastoid septae appear intact. The scutum appears eroded. Therealso is complete soft tissue opacification of the middle ear cavity. There is dehiscence of the tegmen tympani and mastoid roof. The ossicles appear intact without erosive changes or displacement. The visualized portions of the left inner ear appearsintact.    The course and caliber of the internal carotid artery, jugular vein and facial nerve within each temporal bones are unremarkable. The internal auditory canals are symmetric in size and configuration. Neither the cochlear nor vestibular aqueducts are enlarged.    Soft-tissue windows fail to demonstrate intracranial abnormality.    IMPRESSION: Right mastoid disease. Findings suggestive of left malignant otitis externa with left mastoid extension.      EXAM:  CT IAC IC                          PROCEDURE DATE:  12/05/2021          INTERPRETATION:  CT TEMPORAL BONES    Technique: A subcentimeter axial acquisition was performed through the temporal bones and reconstructed at submillimeter thickness and spacing in the axial and coronal planes, and furthermore in oblique planes both parallel and perpendicular to assess the semicircular canals. Each series right and left sides targeted/magnified views.    Contrast: [id number] given IV    Clinical Information: Left malignant otitis externa    Prior Studies: Noncontrast exam 12/04/2021, and contrast-exam 12/03/2021.    Findings:    This is the third consecutive and daily exam. There is no change compared to 12/3/2021 which is also a contrast exam.    A.S.    There is permeative bony erosion involving the anterior wall of the external auditory canal and inferior mastoid cells most compatible with necrotizing otitis media. By definition, this is an osteomyelitis. Remaining mastoids more posteriorly show preserved trabeculae, and the tegmen appears thin throughout, but the postcontrast images when viewed in the coronal plane show no inflammatory dural thickening or any fluid collection to imply intracranial spread of infection. There is complete soft tissue opacification of the ear canal compatible with inflammatory skin thickening.    Middle ear is totally opacified, but the ossicular chain appears grossly preserved. Otic capsule also appears intact, aside from minor site of thinning ofthe superior semicircular canal (series 12, image 84 and series 6, image 42, of likely incidental note. There is no dehiscence of the carotid canal or jugular bulb, and the bony facial nerve is of similar size but its canal margins are somewhat hazy in the setting of mastoiditis. Vestibular aqueduct is not enlarged.    On soft tissue inspection and of most salience, there is cellulitis that extends anteriorly by the fissures of [place] with induration of the retrocondylar fat planes. There is also fat stranding in the upper parapharyngeal space without drainable fluid collection.      A.D.      *  External canal/TM: There is diffuse skin thickening, but without similar bony erosion or extraosseous soft tissue abnormality as on the contralateral side.  *  Mastoid cavity: There is moderate air cell opacification without bony rarefaction as seen on the other side.  *  Temporal bone cortices and Tegmen: There are sites of tegmen thinning, which are not dissimilar to the contralateral side. No elijah or measurable defect.  *  Middle ear/ossicles: Opacified but to a lesser degree than the other side. Ossicular chain appears intact  *  Oval/round windows: Both opacified but nonstenotic  *  Inner ear: Otic capsule appears preserved without evidence of semicircular canal dehiscence or otosclerosis. IACs appear symmetric caliber.  *  Facial nerve: Normal course with intact bony canal  *  ICA/IJV: Normal morphology without dehiscence to the tympanic cavity  *  Vestibular aqueduct: Not enlarged      SOFT TISSUES: On the soft tissue inspection without contrast, there is diffuse left temporal scalp swelling and periauricular swelling compatible with cellulitis secondary to the necrotizing external otitis. There is induration of the retrocondylar fat pads and some haziness in the parapharyngeal and  fat pads. Intracranially, there is preserved contrast filling of the left jugular bulb, sphenoid and transverse sinuses. No empyema identified, nor abscess in the extracranial softtissues below the skull base or laterally at the occipital SCM muscle.    BONY WINDOW: The more central skull base is intact. There is mild inflammatory thickening of the paranasal sinuses. The patient is intubated.      IMPRESSION:    A.S.  Findingsare compatible with necrotizing otitis externa. By definition, this is osteomyelitis, and no change is appreciated since 12/3/2021 which was also done with contrast. There is soft tissue extent to the retrocondylar fat and infratemporal fossa, which **may be of high virulence if the patient is immunocompromised**. No drainable abscess. No intracranial spread appreciated on CT.    A.D.  Lesser findings of both otitis externa and media but without bony erosion or osteomyelitis as seen A.S.        AP: 51 M w/ roughly one month hx of bilateral otitis externa, left greater than right, now presenting w/ seizures. PE does not reveal any granulation tissue in left EAC, however, b/l EACs are edematous and tender to manipulation. Currently on cefepime/vancomycin per ID with ciprodex ear drops b/l with improvement in fevers and WBC. No signs of mastoiditis on clinical examination.    -Plan for OR today (bilateral exam of ears, possible myringotomy/tympanostomy tube placement)  -C/w ciprodex bilaterally; 5 drops to each ear wick 2-3 times a day  -CT temp w/ con with L ZARI, no abscess, no intracranial extension, R OE  -F/u w/ nsgy: IV antibiotic management for temporal lobe abscess  -Vanc/cefepime per ID  -F/u new left EAC cx  -ENT to follow   -Rest of care per primary team    Seen w/ senior resident. Discussed with attending.

## 2021-12-09 NOTE — BH CONSULTATION LIAISON PROGRESS NOTE - NSBHCHARTREVIEWINVESTIGATE_PSY_A_CORE FT
< from: MR IAC w/ IV Cont (12.06.21 @ 15:07) >    IMPRESSION:    In this patient with known left necrotizing otitis externa, a 1 cm abscess is identified in the left temporal lobe with adjacent dural thickening. Inflammatory changes also present extracranially without abscess formation, as above.    < end of copied text >    < from: US Abdomen Limited (12.08.21 @ 13:29) >    INTERPRETATION:  RIGHT UPPER QUADRANT ULTRASOUND dated 12/8/2021 1:29 PM    INDICATION: transaminitis    PRIOR STUDIES: None.    FINDINGS: Ultrasound evaluation of the right upper quadrant demonstrates   no focal hepatic abnormalities. The liver parenchymal echogenicity is   within normal limits. The liver is not enlarged.  No dilated intrahepatic   bile ducts are seen.   The common bile duct is normal in diameter   measuring 0.5 cm.  The gallbladder is normal in appearance without   visible gallstone or wall thickening.   The visualized portions of the   pancreas are unremarkable.   The visualized portions of the abdominal   aorta and inferior vena cava are normal in appearance.   The right kidney   is 11.0 cm in length and normal in appearance. No ascites. The main   portal vein is patent on color flow Doppler and shows hepatopetal flow.   The visualized hepatic veins appear patent.    < end of copied text >

## 2021-12-09 NOTE — PROGRESS NOTE ADULT - ASSESSMENT
51y Male w/ unremarkable PMHx (hasn't seen a doctor in many years) presents to [facility] as a transfer for evaluation of seizure of unknown origin found to have temporal bone osteomyelitis,  ID consulted for antibiotic recommendation and management.    MRI 12/6: C/w In this patient with documented left necrotizing otitis externa, there is an approximately 1 cm abscess in the left inferolateral temporal lobe.    Recommendations:     51y Male w/ unremarkable PMHx (hasn't seen a doctor in many years) presents to [facility] as a transfer for evaluation of seizure of unknown origin found to have temporal bone osteomyelitis,  ID consulted for antibiotic recommendation and management.    MRI 12/6: C/w In this patient with documented left necrotizing otitis externa, there is an approximately 1 cm abscess in the left inferolateral temporal lobe.    Recommendations:    -With OR cultures, please send bacterial, fungal and AFB cultures  -Please continue with Cefepime 2gQH  -Please continue with Vancomycin 1.25gq8, trough prior to fourth dose.  Goal:  14-17    ID to follow, plan discussed with primary team and attending.

## 2021-12-09 NOTE — BRIEF OPERATIVE NOTE - NSICDXBRIEFPROCEDURE_GEN_ALL_CORE_FT
PROCEDURES:  Exam under anesthesia, ear 09-Dec-2021 13:26:38  Kojo Mabry  Unilateral myringotomy 09-Dec-2021 13:26:47  Kojo Mabry

## 2021-12-09 NOTE — BH CONSULTATION LIAISON PROGRESS NOTE - NSBHASSESSMENTFT_PSY_ALL_CORE
51y Male w/ PMHx of umbilical hernia (5-6 years ago) with NO past medical history of epilepsy, trauma or CNS infection (hasn't seen a doctor in many years) presents to [facility] as a transfer from [facility] for evaluation of seizure of unknown origin. Psychiatry consulted to evaluate for delirium vs underlying personality disorder following an overnight episode of agitation and aggressive behavior requiring the patient to be restrained and under 1:1 observation.      P:   Symptoms of agitation, aggression are likely secondary due to delirium of mixed origin - alcohol withdrawal, infectious etiology (necrotizing otitis externa with osteomyelitis)  For breakthrough agitation, can administer haloperidol PO 2mg q6h  Will continue to follow   51y Male w/ PMHx of umbilical hernia (5-6 years ago) with NO past medical history of epilepsy, trauma or CNS infection (hasn't seen a doctor in many years) presents to [facility] as a transfer from [facility] for evaluation of seizure of unknown origin. Psychiatry consulted to evaluate for delirium vs underlying personality disorder following an overnight episode of agitation and aggressive behavior requiring the patient to be restrained and under 1:1 observation.      P:   Symptoms of agitation, aggression are likely secondary due to delirium of mixed origin - alcohol withdrawal, infectious etiology (necrotizing otitis externa with osteomyelitis)  For breakthrough agitation, administer haloperidol PO 2mg q12h  Will continue to follow   51y Male w/ PMHx of umbilical hernia (5-6 years ago) with NO past medical history of epilepsy, trauma or CNS infection (hasn't seen a doctor in many years) presents to [facility] as a transfer from [facility] for evaluation of seizure of unknown origin. Psychiatry consulted to evaluate for delirium vs underlying personality disorder following an overnight episode of agitation and aggressive behavior requiring the patient to be restrained and under 1:1 observation.      Most recent episode of agitation, aggressive behavior could have been exacerbated by administration of sedation during OR procedure earlier today.     P:   Symptoms of agitation, aggression are likely secondary due to delirium of mixed origin - alcohol withdrawal, infectious etiology (necrotizing otitis externa with osteomyelitis)  For breakthrough agitation, administer haloperidol PO 2mg q12h  Will continue to follow

## 2021-12-09 NOTE — PROGRESS NOTE ADULT - SUBJECTIVE AND OBJECTIVE BOX
OVERNIGHT EVENTS: NAEON    SUBJECTIVE / INTERVAL HPI: Patient seen and examined at bedside. He is lethargic, but responding comfortably to questions. Denying any headaches, nausea, vomiting, visual disturbances.     VITAL SIGNS:  Vital Signs Last 24 Hrs  T(C): 36.8 (09 Dec 2021 06:02), Max: 37.1 (08 Dec 2021 10:57)  T(F): 98.3 (09 Dec 2021 06:02), Max: 98.7 (08 Dec 2021 10:57)  HR: 65 (09 Dec 2021 09:00) (56 - 98)  BP: 110/58 (09 Dec 2021 09:00) (92/52 - 134/63)  BP(mean): 77 (09 Dec 2021 09:00) (67 - 112)  RR: 31 (09 Dec 2021 09:00) (15 - 46)  SpO2: 93% (09 Dec 2021 09:00) (89% - 96%)    PHYSICAL EXAM:    General: WDWN  HEENT: left ear with swelling underneath, non tender, no drainage.   Neck: supple  Cardiovascular: +S1/S2; RRR  Respiratory: CTA B/L; no W/R/R  Gastrointestinal: soft, NT/ND; +BSx4  Extremities: WWP; no edema, clubbing or cyanosis  Vascular: 2+ radial, DP/PT pulses B/L  Neurological: AAOx2; no focal deficits (person and time)    MEDICATIONS:  MEDICATIONS  (STANDING):  cefepime   IVPB 2000 milliGRAM(s) IV Intermittent every 8 hours  chlorhexidine 2% Cloths 1 Application(s) Topical <User Schedule>  CIPROFLOXACIN(CIPRO) OPTHALMIC SOLUTION 5 Drop(s) 5 Drop(s) Both Ears two times a day  dexAMETHasone 0.1% Ophthalmic Solution for OTIC Use 5 Drop(s) Both Ears two times a day  dexMEDEtomidine Infusion 0.2 MICROgram(s)/kG/Hr (4.3 mL/Hr) IV Continuous <Continuous>  dextrose 40% Gel 15 Gram(s) Oral once  dextrose 5%. 1000 milliLiter(s) (50 mL/Hr) IV Continuous <Continuous>  dextrose 50% Injectable 25 Gram(s) IV Push once  diVALproex  milliGRAM(s) Oral two times a day  enoxaparin Injectable 40 milliGRAM(s) SubCutaneous daily  folic acid 1 milliGRAM(s) Oral daily  glucagon  Injectable 1 milliGRAM(s) IntraMuscular once  influenza   Vaccine 0.5 milliLiter(s) IntraMuscular once  insulin regular  human corrective regimen sliding scale   SubCutaneous every 6 hours  multivitamin 1 Tablet(s) Oral daily  nicotine - 21 mG/24Hr(s) Patch 1 patch Transdermal daily  thiamine IVPB 500 milliGRAM(s) IV Intermittent every 8 hours  vancomycin  IVPB 1250 milliGRAM(s) IV Intermittent every 12 hours    MEDICATIONS  (PRN):  sodium chloride 0.9% lock flush 10 milliLiter(s) IV Push every 1 hour PRN Pre/post blood products, medications, blood draw, and to maintain line patency      ALLERGIES:  Allergies    No Known Allergies    Intolerances        LABS:                        12.1   14.38 )-----------( 327      ( 09 Dec 2021 06:04 )             36.1     12-09    137  |  101  |  8   ----------------------------<  104<H>  3.1<L>   |  27  |  0.65    Ca    8.7      09 Dec 2021 06:04  Phos  3.0     12-09  Mg     2.0     12-09    TPro  6.6  /  Alb  3.1<L>  /  TBili  0.4  /  DBili  x   /  AST  104<H>  /  ALT  82<H>  /  AlkPhos  71  12-09    PT/INR - ( 09 Dec 2021 06:04 )   PT: 13.7 sec;   INR: 1.15          PTT - ( 09 Dec 2021 06:04 )  PTT:29.6 sec    CAPILLARY BLOOD GLUCOSE      POCT Blood Glucose.: 141 mg/dL (09 Dec 2021 06:20)      RADIOLOGY & ADDITIONAL TESTS: Reviewed.    ASSESSMENT:    PLAN:  OVERNIGHT EVENTS: NAEON    SUBJECTIVE / INTERVAL HPI: Patient seen and examined at bedside. He is lethargic, but responding comfortably to questions. Denying any headaches, nausea, vomiting, visual disturbances.     VITAL SIGNS:  Vital Signs Last 24 Hrs  T(C): 36.8 (09 Dec 2021 06:02), Max: 37.1 (08 Dec 2021 10:57)  T(F): 98.3 (09 Dec 2021 06:02), Max: 98.7 (08 Dec 2021 10:57)  HR: 65 (09 Dec 2021 09:00) (56 - 98)  BP: 110/58 (09 Dec 2021 09:00) (92/52 - 134/63)  BP(mean): 77 (09 Dec 2021 09:00) (67 - 112)  RR: 31 (09 Dec 2021 09:00) (15 - 46)  SpO2: 93% (09 Dec 2021 09:00) (89% - 96%)    PHYSICAL EXAM:    General: WDWN  HEENT: left ear with swelling underneath, non tender, no drainage.   Neck: supple  Cardiovascular: +S1/S2; RRR  Respiratory: CTA B/L; no W/R/R  Gastrointestinal: soft, NT/ND; +BSx4  Extremities: WWP; no edema, clubbing or cyanosis  Vascular: 2+ radial, DP/PT pulses B/L  Neurological: AAOx2; no focal deficits (person and time)    MEDICATIONS:  MEDICATIONS  (STANDING):  cefepime   IVPB 2000 milliGRAM(s) IV Intermittent every 8 hours  chlorhexidine 2% Cloths 1 Application(s) Topical <User Schedule>  CIPROFLOXACIN(CIPRO) OPTHALMIC SOLUTION 5 Drop(s) 5 Drop(s) Both Ears two times a day  dexAMETHasone 0.1% Ophthalmic Solution for OTIC Use 5 Drop(s) Both Ears two times a day  dexMEDEtomidine Infusion 0.2 MICROgram(s)/kG/Hr (4.3 mL/Hr) IV Continuous <Continuous>  dextrose 40% Gel 15 Gram(s) Oral once  dextrose 5%. 1000 milliLiter(s) (50 mL/Hr) IV Continuous <Continuous>  dextrose 50% Injectable 25 Gram(s) IV Push once  diVALproex  milliGRAM(s) Oral two times a day  enoxaparin Injectable 40 milliGRAM(s) SubCutaneous daily  folic acid 1 milliGRAM(s) Oral daily  glucagon  Injectable 1 milliGRAM(s) IntraMuscular once  influenza   Vaccine 0.5 milliLiter(s) IntraMuscular once  insulin regular  human corrective regimen sliding scale   SubCutaneous every 6 hours  multivitamin 1 Tablet(s) Oral daily  nicotine - 21 mG/24Hr(s) Patch 1 patch Transdermal daily  thiamine IVPB 500 milliGRAM(s) IV Intermittent every 8 hours  vancomycin  IVPB 1250 milliGRAM(s) IV Intermittent every 12 hours    MEDICATIONS  (PRN):  sodium chloride 0.9% lock flush 10 milliLiter(s) IV Push every 1 hour PRN Pre/post blood products, medications, blood draw, and to maintain line patency    ALLERGIES:  No Known Allergies    LABS:                        12.1   14.38 )-----------( 327      ( 09 Dec 2021 06:04 )             36.1     12-09    137  |  101  |  8   ----------------------------<  104<H>  3.1<L>   |  27  |  0.65    Ca    8.7      09 Dec 2021 06:04  Phos  3.0     12-09  Mg     2.0     12-09    TPro  6.6  /  Alb  3.1<L>  /  TBili  0.4  /  DBili  x   /  AST  104<H>  /  ALT  82<H>  /  AlkPhos  71  12-09    PT/INR - ( 09 Dec 2021 06:04 )   PT: 13.7 sec;   INR: 1.15       PTT - ( 09 Dec 2021 06:04 )  PTT:29.6 sec    CAPILLARY BLOOD GLUCOSE  POCT Blood Glucose.: 141 mg/dL (09 Dec 2021 06:20)    RADIOLOGY & ADDITIONAL TESTS: Reviewed.

## 2021-12-09 NOTE — PROGRESS NOTE ADULT - ASSESSMENT
50 yo M with PMHx no past medical history of epilepsy, trauma or CNS infection, who presents to the ED with an episode of sudden onset speech disturbance talking incoherently and repetitive and altered behavior while working witnessed, no fever. In the last 2 days much more pain on left ear and headache. Pt uses frequently his swimming pool has had left ear infection for 2 months with hypoacusia treated with long course ciprofloxacin and a week of prednisone. In ED presented, repetitive generalized seizures. 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 Started on vancomycin and Zosyn IV. Admitted to ICU to continue his care, on cefepime and vanc IV (to cover CNS pseudomonal infection) propofol drip and placed on ventilator. On ear cultures found staph. aureus and epidermidis. On 12/5, pt was extubated. On IAC CT scan w/wo contrast necrotizing otitis externa with osteomyelitis and soft tissue extent to the retrocondylar fat and infratemporal fosse. ENT and neurosurgery didn't consider beneficial acute intervention at this point.    NEURO  #Sedated and intubate  JOMAR of -3 to -4  On precedex  No agitated today  Extubated    #Convulsive status epilepticus  Likely 2/2 to left temporal lobe encephalitis 2/2 to left mastoiditis. Frequent baths, high pseudomonal suspicion.  -intubated and sedatedl  -daily wean off sedation for neurochecks   - CT temporal bones done   - MRI IAC with contrast IV  - vEEG monitoring  - Maintain seizure and fall precautions  - no AED at this time   - f/u brain MRI  - neurosurgery doesn't consider beneficial acute intervention    CARDIO  #Preoperative risk assessment   No previous symptoms of CHF, no orthopnea, no dyspnea on excertion, no NPD, no B/L LE edema, he walks several blocks without, no problems to walk up stairs. EKG sinus rhythm 96 bmp.   Currently on phenobarbital due to alcohol withdrawal, but otherwise hemodynamically stable without evidence of worsening sepsis    Low risk for a low risk procedure (ENT procedure), RCRI class I Risk       RESPIRATORY  -Intubated for protecting airways in patient with status epilepticus  -wean vent as tolerated  -spontaneous breathing trials daily  -CxR w/o significant findings    GI  #diet  -NPO today    ENDO  None    RENAL  None    HEM/ONC  None    ID  #Left external otitis  s/p left mastoiditis s/p left temporal lobe encephalitis.   - FU ear cultures- showing GPR and GPC in pairs   - ciprodex bilaterally; 5 drops to each ear wick twice a day  - RI IAC to assess for osteo and skull base  - C/w vancomycin 1500mg q12h IV and cefepime 2g q8h IV.  - F/u blood (NGTD), urine (NGTD) and ear cultures (staph. aureus and epidermidis)  - Redose vanc for ear culture    - IAC CT scan w/wo contrast:  necrotizing otitis externa with osteomyelitis and soft tissue extent to the retrocondylar fat and infratemporal fosse.   - No acute ENT intervention at this time  - C/w ciprodex bilaterally; 5 drops to each ear wick twice a day plus corticoids  - WBC elevation 13.5-->20.2    F: none  E: replete PRN  N: NPO for today  DVT: Heparin IV q8h  GI: Pantoprazol 40mg IV q24h    FULL code 52 yo M with PMHx no past medical history of epilepsy, trauma or CNS infection, who presents to the ED with an episode of sudden onset speech disturbance talking incoherently and repetitive and altered behavior while working witnessed, no fever. In the last 2 days much more pain on left ear and headache. Pt uses frequently his swimming pool has had left ear infection for 2 months with hypoacusia treated with long course ciprofloxacin and a week of prednisone. In ED presented, repetitive generalized seizures. 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 Started on vancomycin and Zosyn IV. Admitted to ICU to continue his care, on cefepime and vanc IV (to cover CNS pseudomonal infection) propofol drip and placed on ventilator. On ear cultures found staph. aureus and epidermidis. On 12/5, pt was extubated. On IAC CT scan w/wo contrast necrotizing otitis externa with osteomyelitis and soft tissue extent to the retrocondylar fat and infratemporal fosse. ENT and neurosurgery didn't consider beneficial acute intervention at this point. MRI shows left necrotizing otitis externa, 1 cm abscess in the left temporal lobe with adjacent dural thickening. Neurosurgery  recommended abx per ID recs. Took to ENT OR with bilateral exam of ears, possible myringotomy/tympanostomy tube placement    NEURO  On Precedex   #Delirium tremens  Important alcohol use (3-4 beers per day)  - RASS 1-2  PLAN:  - Precedex infusion on  - Increased thiamine 500mg IV to treat potential Wernicke syndrome  - If agitation, treated with Versed 2mg IV and Haloperidol 5mg IV    #Convulsive status epilepticus  Likely one focal seizure on left temporal lobe with secondarily 4-5 generalized seizures s/p convulsive status epilepticus. 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 Frequent baths, high pseudomonal suspicion. Intubated and sedated for 2 days. CT temporal bones done and MRI IAC with contrast IV showed left necrotizing otitis externa, 1 cm abscess in the left temporal lobe with adjacent dural thickening. vEEG monitoring  PLAN:  - Maintain seizure and fall precautions  - Video EEG when patient more compliant  - Depakote 500mg q12h PO    CARDIO  #Preoperative risk assessment   No previous symptoms of CHF, no orthopnea, no dyspnea on excertion, no NPD, no B/L LE edema, he walks several blocks without, no problems to walk up stairs. EKG sinus rhythm 96 bmp.   Currently on phenobarbital due to alcohol withdrawal, but otherwise hemodynamically stable without evidence of worsening sepsis    Low risk for a low risk procedure (ENT procedure), RCRI class I Risk       RESPIRATORY  Intubated for protecting airways in patient with status epilepticus. Weaned vent as tolerated the second day. 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 1cm abscess. FU ear cultures- showing GPR and GPC in pairs. Blood (NGTD), urine (NGTD) and ear cultures (staph. aureus and epidermidis). IAC CT scan w/wo contrast necrotizing otitis externa with osteomyelitis and soft tissue extent to the retrocondylar fat and infratemporal fosse. MRI shows left necrotizing otitis externa, 1 cm abscess in the left temporal lobe with adjacent dural thickening. ENT and neurosurgery no acute intervention. Recommended Abx as intervention.  PLAN:  - F/u surveillance cultures  - Vanc troughs  - C/w Ciprodex bilaterally; 5 drops to each ear wick twice a day; with Dexamethasone 0.1% 5 drops  - C/w vancomycin 1250mg q12h IV (Goal:  14-17) and cefepime 2g q8h IV.  - OR cultures, please send bacterial, fungal and AFB cultures    ENT  #Bilateral otitis externa, left greater than right  PE does not reveal any granulation tissue in left EAC, however, b/l EACs are edematous and tender to manipulation. Currently on cefepime/vancomycin per ID with ciprodex ear drops b/l with improvement in fevers and WBC. No signs of mastoiditis on clinical examination.  PLAN:  -OR with bilateral exam of ears, possible myringotomy/tympanostomy tube placement  -F/u new left EAC cx  -ENT to follow     F: none  E: replete PRN  N: Regular diet  DVT: Heparin IV q8h  GI: Pantoprazol 40mg IV q24h 50 yo M with PMHx no past medical history of epilepsy, trauma or CNS infection, who presents to the ED with an episode of sudden onset speech disturbance talking incoherently and repetitive and altered behavior while working witnessed, no fever. In the last 2 days much more pain on left ear and headache. Pt uses frequently his swimming pool has had left ear infection for 2 months with hypoacusia treated with long course ciprofloxacin and a week of prednisone. In ED presented, repetitive generalized seizures. 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 Started on vancomycin and Zosyn IV. Admitted to ICU to continue his care, on cefepime and vanc IV (to cover CNS pseudomonal infection) propofol drip and placed on ventilator. On ear cultures found staph. aureus and epidermidis. On 12/5, pt was extubated. On IAC CT scan w/wo contrast necrotizing otitis externa with osteomyelitis and soft tissue extent to the retrocondylar fat and infratemporal fosse. ENT and neurosurgery didn't consider beneficial acute intervention at this point. MRI shows left necrotizing otitis externa, 1 cm abscess in the left temporal lobe with adjacent dural thickening. Neurosurgery  recommended abx per ID recs. Took to ENT OR with bilateral exam of ears, possible myringotomy/tympanostomy tube placement    NEURO  On Precedex   #Delirium tremens  Important alcohol use (3-4 beers per day)  - RASS 1-2  PLAN:  - Precedex infusion on  - Increased thiamine 500mg IV to treat potential Wernicke syndrome  - If agitation, treated with Versed 2mg IV and Haloperidol 5mg IV    #Convulsive status epilepticus  Likely one focal seizure on left temporal lobe with secondarily 4-5 generalized seizures s/p convulsive status epilepticus. 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 Frequent baths, high pseudomonal suspicion. Intubated and sedated for 2 days. CT temporal bones done and MRI IAC with contrast IV showed left necrotizing otitis externa, 1 cm abscess in the left temporal lobe with adjacent dural thickening. vEEG monitoring  PLAN:  - Maintain seizure and fall precautions  - Video EEG when patient more compliant  - Depakote 500mg q12h PO    CARDIO  #Preoperative risk assessment   No previous symptoms of CHF, no orthopnea, no dyspnea on excertion, no NPD, no B/L LE edema, he walks several blocks without, no problems to walk up stairs. EKG sinus rhythm 96 bmp.   Currently on phenobarbital due to alcohol withdrawal, but otherwise hemodynamically stable without evidence of worsening sepsis    Low risk for a low risk procedure (ENT procedure), RCRI class I Risk       RESPIRATORY  Intubated for protecting airways in patient with status epilepticus. Weaned vent as tolerated the second day. 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 1cm abscess. FU ear cultures- showing GPR and GPC in pairs. Blood (NGTD), urine (NGTD) and ear cultures (staph. aureus and epidermidis). IAC CT scan w/wo contrast necrotizing otitis externa with osteomyelitis and soft tissue extent to the retrocondylar fat and infratemporal fosse. MRI shows left necrotizing otitis externa, 1 cm abscess in the left temporal lobe with adjacent dural thickening. ENT and neurosurgery no acute intervention. Recommended Abx as intervention.  PLAN:  - F/u surveillance cultures  - Vanc troughs  - C/w Ciprodex bilaterally; 5 drops to each ear wick twice a day; with Dexamethasone 0.1% 5 drops  - C/w vancomycin 1250mg q12h IV (Goal:  14-17) and cefepime 2g q8h IV.  - OR cultures, please send bacterial, fungal and AFB cultures    ENT  #Bilateral otitis externa, left greater than right  PE does not reveal any granulation tissue in left EAC, however, b/l EACs are edematous and tender to manipulation. Currently on cefepime/vancomycin per ID with ciprodex ear drops b/l with improvement in fevers and WBC. No signs of mastoiditis on clinical examination.  PLAN:  -OR with bilateral exam of ears, possible myringotomy/tympanostomy tube placement  -F/u new left EAC cx  -ENT to follow     F: none  E: replete PRN  N: Regular diet  DVT: Enoxaparin 40mg SQ q24h  GI: Pantoprazol 40mg IV q24h 50 yo M with PMHx ETOH use and polysubstance abuse  Sudden onset speech disturbance talking incoherently and altered behavior s/p generalized seizures.   2 months with hypoacusia, redness and pain treated with long course ciprofloxacin and short of prednisone without resolving symptoms.   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   On ear cultures found MRSA, epidermidis and corynebacterium   Now delirium tremens + ICU delirium of difficult control.   ENT OR in the morning    NEURO  On Precedex   #Delirium tremens  Important alcohol use (3-4 beers per day)  - RASS 1-2  PLAN:  - Precedex infusion on  - Increased thiamine 500mg IV to treat potential Wernicke syndrome  - If agitation, treated with Versed 2mg IV and Haloperidol 5mg IV    #Convulsive status epilepticus  Likely one focal seizure on left temporal lobe with secondarily 4-5 generalized seizures s/p convulsive status epilepticus. 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 Frequent baths, high pseudomonal suspicion. Intubated and sedated for 2 days. CT temporal bones done and MRI IAC with contrast IV showed left necrotizing otitis externa, 1 cm abscess in the left temporal lobe with adjacent dural thickening. vEEG monitoring  PLAN:  - Maintain seizure and fall precautions  - Video EEG when patient more compliant  - Depakote 500mg q12h PO    CARDIO  #Preoperative risk assessment   No previous symptoms of CHF, no orthopnea, no dyspnea on excertion, no NPD, no B/L LE edema, he walks several blocks without, no problems to walk up stairs. EKG sinus rhythm 96 bmp.   Currently on phenobarbital due to alcohol withdrawal, but otherwise hemodynamically stable without evidence of worsening sepsis    Low risk for a low risk procedure (ENT procedure), RCRI class I Risk       RESPIRATORY  Intubated for protecting airways in patient with status epilepticus. Weaned vent as tolerated the second day. 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 1cm abscess. FU ear cultures- showing GPR and GPC in pairs. Blood (NGTD), urine (NGTD) and ear cultures (staph. aureus and epidermidis). IAC CT scan w/wo contrast necrotizing otitis externa with osteomyelitis and soft tissue extent to the retrocondylar fat and infratemporal fosse. MRI shows left necrotizing otitis externa, 1 cm abscess in the left temporal lobe with adjacent dural thickening. ENT and neurosurgery no acute intervention. Recommended Abx as intervention.  PLAN:  - F/u surveillance cultures  - Vanc troughs  - C/w Ciprodex bilaterally; 5 drops to each ear wick twice a day; with Dexamethasone 0.1% 5 drops  - C/w vancomycin 1250mg q12h IV (Goal:  14-17) and cefepime 2g q8h IV.  - OR cultures, please send bacterial, fungal and AFB cultures    ENT  #Bilateral otitis externa, left greater than right  PE does not reveal any granulation tissue in left EAC, however, b/l EACs are edematous and tender to manipulation. Currently on cefepime/vancomycin per ID with ciprodex ear drops b/l with improvement in fevers and WBC. No signs of mastoiditis on clinical examination.  PLAN:  -OR with bilateral exam of ears, possible myringotomy/tympanostomy tube placement  -F/u new left EAC cx  -ENT to follow     F: none  E: replete PRN  N: Regular diet  DVT: Enoxaparin 40mg SQ q24h  GI: Pantoprazol 40mg IV q24h

## 2021-12-09 NOTE — BRIEF OPERATIVE NOTE - OPERATION/FINDINGS
Bilateral EAC with inflammatory changes and keratinized debris. Left worse than right. Cultures taken on both sides for bacteria, fungus and AFB.    Myringotomy on R side. Unable to visualize TM on L.    Sunday placed into deeper EAC bilaterally.

## 2021-12-10 LAB
ALBUMIN SERPL ELPH-MCNC: 3.2 G/DL — LOW (ref 3.3–5)
ALP SERPL-CCNC: 72 U/L — SIGNIFICANT CHANGE UP (ref 40–120)
ALT FLD-CCNC: 56 U/L — HIGH (ref 10–45)
ANION GAP SERPL CALC-SCNC: 10 MMOL/L — SIGNIFICANT CHANGE UP (ref 5–17)
AST SERPL-CCNC: 48 U/L — HIGH (ref 10–40)
BASOPHILS # BLD AUTO: 0.08 K/UL — SIGNIFICANT CHANGE UP (ref 0–0.2)
BASOPHILS NFR BLD AUTO: 0.6 % — SIGNIFICANT CHANGE UP (ref 0–2)
BILIRUB SERPL-MCNC: 0.3 MG/DL — SIGNIFICANT CHANGE UP (ref 0.2–1.2)
BUN SERPL-MCNC: 10 MG/DL — SIGNIFICANT CHANGE UP (ref 7–23)
CALCIUM SERPL-MCNC: 8.8 MG/DL — SIGNIFICANT CHANGE UP (ref 8.4–10.5)
CHLORIDE SERPL-SCNC: 103 MMOL/L — SIGNIFICANT CHANGE UP (ref 96–108)
CO2 SERPL-SCNC: 25 MMOL/L — SIGNIFICANT CHANGE UP (ref 22–31)
CREAT SERPL-MCNC: 0.76 MG/DL — SIGNIFICANT CHANGE UP (ref 0.5–1.3)
EOSINOPHIL # BLD AUTO: 0.35 K/UL — SIGNIFICANT CHANGE UP (ref 0–0.5)
EOSINOPHIL NFR BLD AUTO: 2.8 % — SIGNIFICANT CHANGE UP (ref 0–6)
GLUCOSE BLDC GLUCOMTR-MCNC: 178 MG/DL — HIGH (ref 70–99)
GLUCOSE BLDC GLUCOMTR-MCNC: 93 MG/DL — SIGNIFICANT CHANGE UP (ref 70–99)
GLUCOSE BLDC GLUCOMTR-MCNC: 94 MG/DL — SIGNIFICANT CHANGE UP (ref 70–99)
GLUCOSE BLDC GLUCOMTR-MCNC: 98 MG/DL — SIGNIFICANT CHANGE UP (ref 70–99)
GLUCOSE SERPL-MCNC: 88 MG/DL — SIGNIFICANT CHANGE UP (ref 70–99)
HCT VFR BLD CALC: 38.3 % — LOW (ref 39–50)
HGB BLD-MCNC: 12.9 G/DL — LOW (ref 13–17)
IMM GRANULOCYTES NFR BLD AUTO: 0.8 % — SIGNIFICANT CHANGE UP (ref 0–1.5)
LYMPHOCYTES # BLD AUTO: 17.7 % — SIGNIFICANT CHANGE UP (ref 13–44)
LYMPHOCYTES # BLD AUTO: 2.24 K/UL — SIGNIFICANT CHANGE UP (ref 1–3.3)
MAGNESIUM SERPL-MCNC: 2.2 MG/DL — SIGNIFICANT CHANGE UP (ref 1.6–2.6)
MCHC RBC-ENTMCNC: 30.4 PG — SIGNIFICANT CHANGE UP (ref 27–34)
MCHC RBC-ENTMCNC: 33.7 GM/DL — SIGNIFICANT CHANGE UP (ref 32–36)
MCV RBC AUTO: 90.1 FL — SIGNIFICANT CHANGE UP (ref 80–100)
MONOCYTES # BLD AUTO: 1.31 K/UL — HIGH (ref 0–0.9)
MONOCYTES NFR BLD AUTO: 10.3 % — SIGNIFICANT CHANGE UP (ref 2–14)
NEUTROPHILS # BLD AUTO: 8.59 K/UL — HIGH (ref 1.8–7.4)
NEUTROPHILS NFR BLD AUTO: 67.8 % — SIGNIFICANT CHANGE UP (ref 43–77)
NIGHT BLUE STAIN TISS: SIGNIFICANT CHANGE UP
NRBC # BLD: 0 /100 WBCS — SIGNIFICANT CHANGE UP (ref 0–0)
PHOSPHATE SERPL-MCNC: 3.1 MG/DL — SIGNIFICANT CHANGE UP (ref 2.5–4.5)
PLATELET # BLD AUTO: 318 K/UL — SIGNIFICANT CHANGE UP (ref 150–400)
POTASSIUM SERPL-MCNC: 4 MMOL/L — SIGNIFICANT CHANGE UP (ref 3.5–5.3)
POTASSIUM SERPL-SCNC: 4 MMOL/L — SIGNIFICANT CHANGE UP (ref 3.5–5.3)
PROT SERPL-MCNC: 6.4 G/DL — SIGNIFICANT CHANGE UP (ref 6–8.3)
RBC # BLD: 4.25 M/UL — SIGNIFICANT CHANGE UP (ref 4.2–5.8)
RBC # FLD: 13.6 % — SIGNIFICANT CHANGE UP (ref 10.3–14.5)
SODIUM SERPL-SCNC: 138 MMOL/L — SIGNIFICANT CHANGE UP (ref 135–145)
SPECIMEN SOURCE: SIGNIFICANT CHANGE UP
WBC # BLD: 12.67 K/UL — HIGH (ref 3.8–10.5)
WBC # FLD AUTO: 12.67 K/UL — HIGH (ref 3.8–10.5)

## 2021-12-10 PROCEDURE — 99291 CRITICAL CARE FIRST HOUR: CPT

## 2021-12-10 PROCEDURE — 99231 SBSQ HOSP IP/OBS SF/LOW 25: CPT

## 2021-12-10 PROCEDURE — 99232 SBSQ HOSP IP/OBS MODERATE 35: CPT

## 2021-12-10 RX ORDER — NAFCILLIN 10 G/100ML
2 INJECTION, POWDER, FOR SOLUTION INTRAVENOUS EVERY 4 HOURS
Refills: 0 | Status: DISCONTINUED | OUTPATIENT
Start: 2021-12-10 | End: 2021-12-16

## 2021-12-10 RX ORDER — HALOPERIDOL DECANOATE 100 MG/ML
5 INJECTION INTRAMUSCULAR EVERY 6 HOURS
Refills: 0 | Status: DISCONTINUED | OUTPATIENT
Start: 2021-12-10 | End: 2021-12-14

## 2021-12-10 RX ORDER — THIAMINE MONONITRATE (VIT B1) 100 MG
500 TABLET ORAL EVERY 24 HOURS
Refills: 0 | Status: ACTIVE | OUTPATIENT
Start: 2021-12-12 | End: 2022-11-10

## 2021-12-10 RX ORDER — HALOPERIDOL DECANOATE 100 MG/ML
2 INJECTION INTRAMUSCULAR EVERY 12 HOURS
Refills: 0 | Status: DISCONTINUED | OUTPATIENT
Start: 2021-12-10 | End: 2021-12-12

## 2021-12-10 RX ADMIN — DEXMEDETOMIDINE HYDROCHLORIDE IN 0.9% SODIUM CHLORIDE 4.3 MICROGRAM(S)/KG/HR: 4 INJECTION INTRAVENOUS at 10:06

## 2021-12-10 RX ADMIN — CEFEPIME 100 MILLIGRAM(S): 1 INJECTION, POWDER, FOR SOLUTION INTRAMUSCULAR; INTRAVENOUS at 05:17

## 2021-12-10 RX ADMIN — Medication 166.67 MILLIGRAM(S): at 07:04

## 2021-12-10 RX ADMIN — Medication 105 MILLIGRAM(S): at 22:09

## 2021-12-10 RX ADMIN — HALOPERIDOL DECANOATE 2 MILLIGRAM(S): 100 INJECTION INTRAMUSCULAR at 09:14

## 2021-12-10 RX ADMIN — CEFEPIME 100 MILLIGRAM(S): 1 INJECTION, POWDER, FOR SOLUTION INTRAMUSCULAR; INTRAVENOUS at 21:09

## 2021-12-10 RX ADMIN — CHLORHEXIDINE GLUCONATE 1 APPLICATION(S): 213 SOLUTION TOPICAL at 05:20

## 2021-12-10 RX ADMIN — Medication 1 MILLIGRAM(S): at 11:02

## 2021-12-10 RX ADMIN — Medication 1 PATCH: at 18:04

## 2021-12-10 RX ADMIN — DIVALPROEX SODIUM 500 MILLIGRAM(S): 500 TABLET, DELAYED RELEASE ORAL at 05:17

## 2021-12-10 RX ADMIN — ENOXAPARIN SODIUM 40 MILLIGRAM(S): 100 INJECTION SUBCUTANEOUS at 11:02

## 2021-12-10 RX ADMIN — Medication 105 MILLIGRAM(S): at 13:43

## 2021-12-10 RX ADMIN — HALOPERIDOL DECANOATE 2 MILLIGRAM(S): 100 INJECTION INTRAMUSCULAR at 19:01

## 2021-12-10 RX ADMIN — NAFCILLIN 200 GRAM(S): 10 INJECTION, POWDER, FOR SOLUTION INTRAVENOUS at 21:37

## 2021-12-10 RX ADMIN — Medication 105 MILLIGRAM(S): at 05:44

## 2021-12-10 RX ADMIN — DIVALPROEX SODIUM 500 MILLIGRAM(S): 500 TABLET, DELAYED RELEASE ORAL at 17:06

## 2021-12-10 RX ADMIN — Medication 1 PATCH: at 11:02

## 2021-12-10 RX ADMIN — CEFEPIME 100 MILLIGRAM(S): 1 INJECTION, POWDER, FOR SOLUTION INTRAMUSCULAR; INTRAVENOUS at 13:00

## 2021-12-10 RX ADMIN — QUETIAPINE FUMARATE 25 MILLIGRAM(S): 200 TABLET, FILM COATED ORAL at 03:56

## 2021-12-10 RX ADMIN — DEXMEDETOMIDINE HYDROCHLORIDE IN 0.9% SODIUM CHLORIDE 4.3 MICROGRAM(S)/KG/HR: 4 INJECTION INTRAVENOUS at 00:57

## 2021-12-10 RX ADMIN — NAFCILLIN 200 GRAM(S): 10 INJECTION, POWDER, FOR SOLUTION INTRAVENOUS at 15:15

## 2021-12-10 RX ADMIN — NAFCILLIN 200 GRAM(S): 10 INJECTION, POWDER, FOR SOLUTION INTRAVENOUS at 18:10

## 2021-12-10 RX ADMIN — Medication 1 TABLET(S): at 11:02

## 2021-12-10 NOTE — PROGRESS NOTE ADULT - SUBJECTIVE AND OBJECTIVE BOX
ENT Consult Note    HARRIET GOLDMAN  [id number]    51y Male w/ unremarkable PMHx (hasn't seen a doctor in many years) presents to [facility] as a transfer for evaluation of seizure of unknown origin. ENT consults for evaluation of left ear infection, r/o malignant ear otitis infections. per wife and brother at bedside, pt has been complaining of bilateral ear pain, left greater than right, since late october. pt went to urgent care at that time and was given oral cipro. pt returned to urgent care one week later after finishing his antibiotics without any ear pain relief. he was given oral and otic drops, however, sxs did not resolve. pt was then seen by an ENT on Nov 15, who diagnosed him w/ bilateral otitis externa, and placed ear sunday bilaterally. pt was given ciprodex otic drops and a follow up. Today, wife states  was not acting like his usual self. around noon today, she went to visit him and work and pt seemed very lethargic and was only responding with one word answers. At this time, wife decided to call an ambulance. while being placed in to the ambulance, pt began having is seizure. pt was taken to [facility], and was intubated for airway protection. CT maxillofacial at [place] which showed distal external auditory canal and foci of possible tegmen tympani/mastoideum dehiscence versus severe thinning, concerning for malignant/necrotizing otitis. Per wife, pt had severe otalgia and hearing, but did not have any otorrhea, facial weakness, vertigo, or tinnitus    INTERVAL:    12/4: Self extubation last night with emergent reintubation. Otherwise started on cefepime/vanc. MRI/CT pending. Ear cx growing Gram+ cocci and rods. Low grade fevers overnight with WBC 18.4 (down from 100.2). Sunday in place at bedside. Pt sedated.    12/5: NAEON. Still on vanc/cef and ciprodex eardrops. Afebrile overnight. Ear cx sensitivities pending. CT scan temp bones with similar findings as CT maxillofacial from [place]. WBC downtrending today (down to 13.6). Pt was seen and examined at bedside. Stable exam findings, no mastoid swelling. Sedated and intubated on vent.    12/6: NAEON. On vanc/cef and ciprodex eardrops. Febrile overnight to 102. Ear cx pending sens. CT IAC w/ con with L ZARI, no drainable abscess, no intracranial extension, and R OE. WBC up to 20.3 today from 13.6. Pt's sunday were removed at bedside and both sides were debrided. Still with swelling of b/l EAC (L > R). Sunday reinserted and ciprofloxacin bedside eardrops placed.    12/7: NAEON. MRI shows 1 cm abscess in left temporal lobe. Given small size and absence of neurologic sxs, NSGY recommend c/w IV medications and repeat imaging to evaluation for resolution    12/8: Tachycardic overnight and agitated. On CIWA 8 for agitation, ativan 2x without improvement. Started on phenobarbital protocol with improvement after 3 doses. B/l wrist restraints applied. Otherwise afebrile. On vanc/cef + ciprodex drops. ENT removed sunday and assessed the ear canals with scope. Sunday reapplied.    12/9: NAEON. WBC down 17.8 to 14.4 today. Afebrile overnight. Plan for OR today.    12/10: NAEON. s/p exam under anesthesia of both ear canals, myringotomy on R side (unable to visualize L TM) 12/10. Bilateral EAC inflammation (L>R), keratinized debris. Sunday inserted into deeper aspect of EAC. Afebrile overnight. WBC downtrending (12.7 today). Otherwise still on vanc/cefepime per ID, ciprodex drops into ear canals.      ICU Vital Signs Last 24 Hrs  T(C): 36.3 (10 Dec 2021 01:02), Max: 36.9 (09 Dec 2021 10:24)  T(F): 97.4 (10 Dec 2021 01:02), Max: 98.4 (09 Dec 2021 10:24)  HR: 56 (10 Dec 2021 07:00) (52 - 93)  BP: 101/59 (10 Dec 2021 07:00) (93/52 - 145/77)  BP(mean): 76 (10 Dec 2021 07:00) (67 - 105)  ABP: --  ABP(mean): --  RR: 28 (10 Dec 2021 07:00) (20 - 37)  SpO2: 92% (10 Dec 2021 07:00) (89% - 97%)        PHYSICAL EXAM:    CONSTITUTIONAL: Well nourished, well developed, sedated  HEAD: normocephalic, atraumatic.  EARS  AD: Air>Bone Mastoid non-tender/non-edematous. non-proptotic , non-erythematous right pinna. Debris in EAC likely 2/2 to otic drops. TM intact. no perfs no granulation tissue or masses. Wick in place.  AS: Bone> Air Mastoid non-tender/non-edematous. non-proptotic pinna. Left pinna edema and erythema improved. Wick in place.   Christiansen lateralized to the left  Face: HB 1  NOSE: Normal external nose.   ORAL CAVITY/OROPHARYNX: normal mucosa.   NECK: No cervical lymphadenopathy  RESPIRATORY: intubated connected to vent      EXAM:  CT TEMPORAL BONES                          PROCEDURE DATE:  12/04/2021          INTERPRETATION:  PROCEDURE: CT temporal bones    INDICATION: Severe otitis media with concern for temporal bone osteomyelitis    TECHNIQUE: Contiguous 1 mm thickaxial and modified coronal images were obtained through the temporal bones without the intravenous administration of contrast. Target axial and coronal review images were created through each temporal bone utilizing bone algorithm.    COMPARISON: Maxillofacial CT 12/3/2021    FINDINGS: Target review images of the right temporal bone demonstrates soft tissue filling the osseous segment of the right tympanic membrane. The bony cortex appears intact. The mastoid air cell system to be well developed.There is soft tissue opacification and fluid within right mastoid air cells as well as fluid within the right mastoid antrum. There is soft tissue within the middle ear cavity. The ossicles appear intact without erosive changes or displacement. The scutum is intact. The visualized portions of the right inner appear within normal limits.    Target review images of the left temporal bone demonstrates soft tissue completely filling the left osseous segment of the external auditory canal. There are cortical bony erosive changes involving the walls of bony external canal especially the floor and posterior wall.  There is complete soft tissue opacification of left mastoid air cells. The mastoid septae appear intact. The scutum appears eroded. Therealso is complete soft tissue opacification of the middle ear cavity. There is dehiscence of the tegmen tympani and mastoid roof. The ossicles appear intact without erosive changes or displacement. The visualized portions of the left inner ear appearsintact.    The course and caliber of the internal carotid artery, jugular vein and facial nerve within each temporal bones are unremarkable. The internal auditory canals are symmetric in size and configuration. Neither the cochlear nor vestibular aqueducts are enlarged.    Soft-tissue windows fail to demonstrate intracranial abnormality.    IMPRESSION: Right mastoid disease. Findings suggestive of left malignant otitis externa with left mastoid extension.      EXAM:  CT IAC IC                          PROCEDURE DATE:  12/05/2021          INTERPRETATION:  CT TEMPORAL BONES    Technique: A subcentimeter axial acquisition was performed through the temporal bones and reconstructed at submillimeter thickness and spacing in the axial and coronal planes, and furthermore in oblique planes both parallel and perpendicular to assess the semicircular canals. Each series right and left sides targeted/magnified views.    Contrast: [id number] given IV    Clinical Information: Left malignant otitis externa    Prior Studies: Noncontrast exam 12/04/2021, and contrast-exam 12/03/2021.    Findings:    This is the third consecutive and daily exam. There is no change compared to 12/3/2021 which is also a contrast exam.    A.S.    There is permeative bony erosion involving the anterior wall of the external auditory canal and inferior mastoid cells most compatible with necrotizing otitis media. By definition, this is an osteomyelitis. Remaining mastoids more posteriorly show preserved trabeculae, and the tegmen appears thin throughout, but the postcontrast images when viewed in the coronal plane show no inflammatory dural thickening or any fluid collection to imply intracranial spread of infection. There is complete soft tissue opacification of the ear canal compatible with inflammatory skin thickening.    Middle ear is totally opacified, but the ossicular chain appears grossly preserved. Otic capsule also appears intact, aside from minor site of thinning ofthe superior semicircular canal (series 12, image 84 and series 6, image 42, of likely incidental note. There is no dehiscence of the carotid canal or jugular bulb, and the bony facial nerve is of similar size but its canal margins are somewhat hazy in the setting of mastoiditis. Vestibular aqueduct is not enlarged.    On soft tissue inspection and of most salience, there is cellulitis that extends anteriorly by the fissures of [place] with induration of the retrocondylar fat planes. There is also fat stranding in the upper parapharyngeal space without drainable fluid collection.      A.D.      *  External canal/TM: There is diffuse skin thickening, but without similar bony erosion or extraosseous soft tissue abnormality as on the contralateral side.  *  Mastoid cavity: There is moderate air cell opacification without bony rarefaction as seen on the other side.  *  Temporal bone cortices and Tegmen: There are sites of tegmen thinning, which are not dissimilar to the contralateral side. No elijah or measurable defect.  *  Middle ear/ossicles: Opacified but to a lesser degree than the other side. Ossicular chain appears intact  *  Oval/round windows: Both opacified but nonstenotic  *  Inner ear: Otic capsule appears preserved without evidence of semicircular canal dehiscence or otosclerosis. IACs appear symmetric caliber.  *  Facial nerve: Normal course with intact bony canal  *  ICA/IJV: Normal morphology without dehiscence to the tympanic cavity  *  Vestibular aqueduct: Not enlarged      SOFT TISSUES: On the soft tissue inspection without contrast, there is diffuse left temporal scalp swelling and periauricular swelling compatible with cellulitis secondary to the necrotizing external otitis. There is induration of the retrocondylar fat pads and some haziness in the parapharyngeal and  fat pads. Intracranially, there is preserved contrast filling of the left jugular bulb, sphenoid and transverse sinuses. No empyema identified, nor abscess in the extracranial softtissues below the skull base or laterally at the occipital SCM muscle.    BONY WINDOW: The more central skull base is intact. There is mild inflammatory thickening of the paranasal sinuses. The patient is intubated.      IMPRESSION:    A.S.  Findingsare compatible with necrotizing otitis externa. By definition, this is osteomyelitis, and no change is appreciated since 12/3/2021 which was also done with contrast. There is soft tissue extent to the retrocondylar fat and infratemporal fossa, which **may be of high virulence if the patient is immunocompromised**. No drainable abscess. No intracranial spread appreciated on CT.    A.D.  Lesser findings of both otitis externa and media but without bony erosion or osteomyelitis as seen A.S.        AP: 51 M w/ roughly one month hx of bilateral otitis externa, left greater than right, now presenting w/ seizures. PE does not reveal any granulation tissue in left EAC, however, b/l EACs are edematous and tender to manipulation. Currently on cefepime/vancomycin per ID with ciprodex ear drops b/l with improvement in fevers and WBC. No signs of mastoiditis on clinical examination. S/p EUA, R myringotomy and wick insertion b/l on 12/10.    -F/u OR cx  -Consider heme/onc consult to r/o intrinsic bone conditions  -C/w ciprodex bilaterally; 5 drops to each ear wick 2-3 times a day  -CT temp w/ con with L ZARI, no abscess, no intracranial extension, R OE  -F/u w/ nsgy: IV antibiotic management for temporal lobe abscess  -Vanc/cefepime per ID  -ENT to follow   -Rest of care per primary team    Seen w/ senior resident. Discussed with attending.

## 2021-12-10 NOTE — PROGRESS NOTE ADULT - SUBJECTIVE AND OBJECTIVE BOX
OVERNIGHT EVENTS:   Taken to OR by ENT yesterday, placed myringotomy on R side with bilateral james. Cultures taken, results still pending.      SUBJECTIVE:    Pt seen sitting upright in bed, calm and cooperative on interview. No acute complaints at this time.     OBJECTIVE:  Vital Signs Last 24 Hrs  T(C): 36.4 (10 Dec 2021 09:00), Max: 36.7 (09 Dec 2021 18:00)  T(F): 97.6 (10 Dec 2021 09:00), Max: 98 (09 Dec 2021 18:00)  HR: 63 (10 Dec 2021 12:00) (52 - 93)  BP: 97/52 (10 Dec 2021 12:00) (88/51 - 145/77)  BP(mean): 70 (10 Dec 2021 12:00) (64 - 105)  RR: 33 (10 Dec 2021 12:00) (20 - 37)  SpO2: 93% (10 Dec 2021 12:00) (89% - 97%)    Physical Exam:    Neurologic:  -Mental Status: AAOx3. Speech is fluent with intact naming, repetition, and comprehension, no dysarthria. Follows commands. Attention/concentration intact.   -Cranial Nerves:          II: Visual fields are full to confrontation.          III, IV, VI: EOMI without nystagmus.          V:  Facial sensation V1-V3 equal and intact           VII: Face is symmetric with normal eye closure and smile          VIII: Hearing R>L          IX, X: Uvula is midline and soft palate rises symmetrically          XI: Head turning and shoulder shrug are intact.          XII: Tongue protrudes midline  -Motor: Normal bulk and tone. Strength bilateral upper extremity 5/5, bilateral lower extremities 5/5. No pronator drift. Rapid alternating movements intact and symmetric  -Sensation: Intact to light touch bilaterally.  -Coordination: No dysmetria on finger-to-nose.  -Reflexes: Downgoing toes bilaterally   -Gait: Did not observe.    Labs:                        12.9   12.67 )-----------( [id number]      ( 10 Dec 2021 04:33 )             38.3     12-10    138  |  103  |  10  ----------------------------<  88  4.0   |  25  |  0.76    Ca    8.8      10 Dec 2021 04:33  Phos  3.1     12-10  Mg     2.2     12-10    TPro  6.4  /  Alb  3.2<L>  /  TBili  0.3  /  DBili  x   /  AST  48<H>  /  ALT  56<H>  /  AlkPhos  72  12-10    PT/INR - ( 09 Dec 2021 06:04 )   PT: 13.7 sec;   INR: 1.15       PTT - ( 09 Dec 2021 06:04 )  PTT:29.6 sec  Fingerstick  glucose: POCT Blood Glucose.: 94 mg/dL (10 Dec 2021 11:07)    Culture - Surgical Swab (12.09.21 @ 14:00)   Gram Stain:   Moderate Gram Positive Rods   Few Gram positive cocci in pairs   No WBC's seen.   Specimen Source: .Surgical Swab (4) Swab of Right Ear   Culture Results:   Culture in progress     MEDICATIONS  (STANDING):  cefepime   IVPB 2000 milliGRAM(s) IV Intermittent every 8 hours  chlorhexidine 2% Cloths 1 Application(s) Topical <User Schedule>  CIPROFLOXACIN(CIPRO) OPTHALMIC SOLUTION 5 Drop(s) 5 Drop(s) Both Ears two times a day  dexAMETHasone 0.1% Ophthalmic Solution for OTIC Use 5 Drop(s) Both Ears two times a day  dexMEDEtomidine Infusion 0.2 MICROgram(s)/kG/Hr (4.3 mL/Hr) IV Continuous <Continuous>  dextrose 40% Gel 15 Gram(s) Oral once  dextrose 5%. 1000 milliLiter(s) (50 mL/Hr) IV Continuous <Continuous>  dextrose 50% Injectable 25 Gram(s) IV Push once  diVALproex  milliGRAM(s) Oral two times a day  enoxaparin Injectable 40 milliGRAM(s) SubCutaneous daily  folic acid 1 milliGRAM(s) Oral daily  glucagon  Injectable 1 milliGRAM(s) IntraMuscular once  haloperidol     Tablet 2 milliGRAM(s) Oral every 12 hours  influenza   Vaccine 0.5 milliLiter(s) IntraMuscular once  multivitamin 1 Tablet(s) Oral daily  nicotine - 21 mG/24Hr(s) Patch 1 patch Transdermal daily  thiamine IVPB 500 milliGRAM(s) IV Intermittent every 8 hours  vancomycin  IVPB 1250 milliGRAM(s) IV Intermittent every 8 hours    MEDICATIONS  (PRN):  sodium chloride 0.9% lock flush 10 milliLiter(s) IV Push every 1 hour PRN Pre/post blood products, medications, blood draw, and to maintain line patency      Allergies    No Known Allergies    Intolerances        RADIOLOGY & ADDITIONAL TESTS: Reviewed.                   OVERNIGHT EVENTS:   Taken to OR by ENT yesterday, placed myringotomy on R side with bilateral james. Cultures taken, results still pending.    SUBJECTIVE:    Pt seen sitting upright in bed, calm and cooperative on interview. No acute complaints at this time.     OBJECTIVE:  Vital Signs Last 24 Hrs  T(C): 36.4 (10 Dec 2021 09:00), Max: 36.7 (09 Dec 2021 18:00)  T(F): 97.6 (10 Dec 2021 09:00), Max: 98 (09 Dec 2021 18:00)  HR: 63 (10 Dec 2021 12:00) (52 - 93)  BP: 97/52 (10 Dec 2021 12:00) (88/51 - 145/77)  BP(mean): 70 (10 Dec 2021 12:00) (64 - 105)  RR: 33 (10 Dec 2021 12:00) (20 - 37)  SpO2: 93% (10 Dec 2021 12:00) (89% - 97%)    Physical Exam:    Neurologic:  -Mental Status: AAOx3. Speech is fluent with intact naming, repetition, and comprehension, no dysarthria. Follows commands. Attention/concentration intact.   -Cranial Nerves:          II: Visual fields are full to confrontation.          III, IV, VI: EOMI without nystagmus.          V:  Facial sensation V1-V3 equal and intact           VII: Face is symmetric with normal eye closure and smile          VIII: Hearing R>L          IX, X: Uvula is midline and soft palate rises symmetrically          XI: Head turning and shoulder shrug are intact.          XII: Tongue protrudes midline  -Motor: Normal bulk and tone. Strength bilateral upper extremity 5/5, bilateral lower extremities 5/5. No pronator drift. Rapid alternating movements intact and symmetric  -Sensation: Intact to light touch bilaterally.  -Coordination: No dysmetria on finger-to-nose.  -Reflexes: Downgoing toes bilaterally   -Gait: Did not observe.    Labs:                        12.9   12.67 )-----------( 318      ( 10 Dec 2021 04:33 )             38.3     12-10    138  |  103  |  10  ----------------------------<  88  4.0   |  25  |  0.76    Ca    8.8      10 Dec 2021 04:33  Phos  3.1     12-10  Mg     2.2     12-10    TPro  6.4  /  Alb  3.2<L>  /  TBili  0.3  /  DBili  x   /  AST  48<H>  /  ALT  56<H>  /  AlkPhos  72  12-10    PT/INR - ( 09 Dec 2021 06:04 )   PT: 13.7 sec;   INR: 1.15       PTT - ( 09 Dec 2021 06:04 )  PTT:29.6 sec  Fingerstick  glucose: POCT Blood Glucose.: 94 mg/dL (10 Dec 2021 11:07)    Culture - Surgical Swab (12.09.21 @ 14:00)   Gram Stain:   Moderate Gram Positive Rods   Few Gram positive cocci in pairs   No WBC's seen.   Specimen Source: .Surgical Swab (4) Swab of Right Ear   Culture Results:   Culture in progress     MEDICATIONS  (STANDING):  cefepime   IVPB 2000 milliGRAM(s) IV Intermittent every 8 hours  chlorhexidine 2% Cloths 1 Application(s) Topical <User Schedule>  CIPROFLOXACIN(CIPRO) OPTHALMIC SOLUTION 5 Drop(s) 5 Drop(s) Both Ears two times a day  dexAMETHasone 0.1% Ophthalmic Solution for OTIC Use 5 Drop(s) Both Ears two times a day  dexMEDEtomidine Infusion 0.2 MICROgram(s)/kG/Hr (4.3 mL/Hr) IV Continuous <Continuous>  dextrose 40% Gel 15 Gram(s) Oral once  dextrose 5%. 1000 milliLiter(s) (50 mL/Hr) IV Continuous <Continuous>  dextrose 50% Injectable 25 Gram(s) IV Push once  diVALproex  milliGRAM(s) Oral two times a day  enoxaparin Injectable 40 milliGRAM(s) SubCutaneous daily  folic acid 1 milliGRAM(s) Oral daily  glucagon  Injectable 1 milliGRAM(s) IntraMuscular once  haloperidol     Tablet 2 milliGRAM(s) Oral every 12 hours  influenza   Vaccine 0.5 milliLiter(s) IntraMuscular once  multivitamin 1 Tablet(s) Oral daily  nicotine - 21 mG/24Hr(s) Patch 1 patch Transdermal daily  thiamine IVPB 500 milliGRAM(s) IV Intermittent every 8 hours  vancomycin  IVPB 1250 milliGRAM(s) IV Intermittent every 8 hours    MEDICATIONS  (PRN):  sodium chloride 0.9% lock flush 10 milliLiter(s) IV Push every 1 hour PRN Pre/post blood products, medications, blood draw, and to maintain line patency      Allergies    No Known Allergies    Intolerances        RADIOLOGY & ADDITIONAL TESTS: Reviewed.

## 2021-12-10 NOTE — PROGRESS NOTE ADULT - ASSESSMENT
51y Male w/ unremarkable PMHx (hasn't seen a doctor in many years) presents to [facility] as a transfer for evaluation of seizure of unknown origin found to have temporal bone osteomyelitis,  ID consulted for antibiotic recommendation and management.    MRI 12/6: C/w left necrotizing otitis externa, there is an approximately 1 cm abscess in the left inferolateral temporal lobe.    Recommendations:  -Please continue with Cefepime 2gQH  -Please continue with Vancomycin 1.25gq8, trough prior to fourth dose.  Goal:  14-17  -Will follow up final OR cultures from 12/9/21.     ID to follow, plan discussed with primary team and attending.     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    MRI 12/6: C/w left necrotizing otitis externa, there is an approximately 1 cm abscess in the left inferolateral temporal lobe.    Recommendations:  -Please continue with Cefepime 2gQH  -stop vanco,  - start nafcillin 2g IV q4h to cover MSSA   -Will follow up final OR cultures from 12/9/21.     ID to follow, plan discussed with primary team and attending.     51y Male w/ unremarkable PMHx (hasn't seen a doctor in many years) presents to [facility] as a transfer for evaluation of seizure of unknown origin found to have temporal bone osteomyelitis,  ID consulted for antibiotic recommendation and management.    MRI 12/6: C/w left necrotizing otitis externa, there is an approximately 1 cm abscess in the left inferolateral temporal lobe.    Recommendations:  -Please continue with Cefepime 2gQH  -stop vanco, and start nafcillin 2g IV q4h to cover MSSA   -Will follow up final OR cultures from 12/9/21.     ID to follow, plan discussed with primary team and attending.

## 2021-12-10 NOTE — PROGRESS NOTE ADULT - SUBJECTIVE AND OBJECTIVE BOX
OVERNIGHT EVENTS: rescheduled quetapine given sleeping    SUBJECTIVE / INTERVAL HPI: Patient seen and examined at bedside. Conversational in the morning but still certain apathy. He doesn't answer some questions or takes to much time.    Remaining ROS negative     PHYSICAL EXAM:      VITAL SIGNS:  Vital Signs Last 24 Hrs  T(C): 36.4 (10 Dec 2021 09:00), Max: 36.7 (09 Dec 2021 18:00)  T(F): 97.6 (10 Dec 2021 09:00), Max: 98 (09 Dec 2021 18:00)  HR: 63 (10 Dec 2021 12:00) (52 - 93)  BP: 94/51 (10 Dec 2021 11:00) (88/51 - 145/77)  BP(mean): 69 (10 Dec 2021 11:00) (64 - 105)  RR: 33 (10 Dec 2021 12:00) (20 - 37)  SpO2: 93% (10 Dec 2021 12:00) (89% - 97%)    MEDICATIONS:  MEDICATIONS  (STANDING):  cefepime   IVPB 2000 milliGRAM(s) IV Intermittent every 8 hours  chlorhexidine 2% Cloths 1 Application(s) Topical <User Schedule>  CIPROFLOXACIN(CIPRO) OPTHALMIC SOLUTION 5 Drop(s) 5 Drop(s) Both Ears two times a day  dexAMETHasone 0.1% Ophthalmic Solution for OTIC Use 5 Drop(s) Both Ears two times a day  dexMEDEtomidine Infusion 0.2 MICROgram(s)/kG/Hr (4.3 mL/Hr) IV Continuous <Continuous>  dextrose 40% Gel 15 Gram(s) Oral once  dextrose 5%. 1000 milliLiter(s) (50 mL/Hr) IV Continuous <Continuous>  dextrose 50% Injectable 25 Gram(s) IV Push once  diVALproex  milliGRAM(s) Oral two times a day  enoxaparin Injectable 40 milliGRAM(s) SubCutaneous daily  folic acid 1 milliGRAM(s) Oral daily  glucagon  Injectable 1 milliGRAM(s) IntraMuscular once  haloperidol     Tablet 2 milliGRAM(s) Oral every 12 hours  influenza   Vaccine 0.5 milliLiter(s) IntraMuscular once  insulin regular  human corrective regimen sliding scale   SubCutaneous every 6 hours  multivitamin 1 Tablet(s) Oral daily  nicotine - 21 mG/24Hr(s) Patch 1 patch Transdermal daily  thiamine IVPB 500 milliGRAM(s) IV Intermittent every 8 hours  vancomycin  IVPB 1250 milliGRAM(s) IV Intermittent every 8 hours    MEDICATIONS  (PRN):  sodium chloride 0.9% lock flush 10 milliLiter(s) IV Push every 1 hour PRN Pre/post blood products, medications, blood draw, and to maintain line patency      ALLERGIES:  Allergies    No Known Allergies    Intolerances        LABS:                        12.9   12.67 )-----------( 318      ( 10 Dec 2021 04:33 )             38.3     12-10    138  |  103  |  10  ----------------------------<  88  4.0   |  25  |  0.76    Ca    8.8      10 Dec 2021 04:33  Phos  3.1     12-10  Mg     2.2     12-10    TPro  6.4  /  Alb  3.2<L>  /  TBili  0.3  /  DBili  x   /  AST  48<H>  /  ALT  56<H>  /  AlkPhos  72  12-10    PT/INR - ( 09 Dec 2021 06:04 )   PT: 13.7 sec;   INR: 1.15          PTT - ( 09 Dec 2021 06:04 )  PTT:29.6 sec    CAPILLARY BLOOD GLUCOSE      POCT Blood Glucose.: 94 mg/dL (10 Dec 2021 11:07)      RADIOLOGY & ADDITIONAL TESTS: Reviewed. OVERNIGHT EVENTS: rescheduled quetapine given sleeping    SUBJECTIVE / INTERVAL HPI: Patient seen and examined at bedside. Conversational in the morning but still certain apathy. He doesn't answer some questions or takes to much time.    Remaining ROS negative     PHYSICAL EXAM:  General: NAD, lying in bed, somehow restless, acceptable conversation, still slow l   HEAD:  Atraumatic, normocephalic  EYES: conjunctiva and sclera clear  ENT: Moist mucous membranes, rash on left ear, swollen, and erythematous   NECK: Supple, no JVD  HEART: Regular rate and rhythm, no murmurs, rubs, or gallops  LUNGS: Unlabored respirations.  Clear to auscultation bilaterally, no crackles, wheezing, or rhonchi  ABDOMEN: Soft, nontender, nondistended, +BS  EXTREMITIES: 2+ peripheral pulses bilaterally. No clubbing, cyanosis, or edema  NERVOUS SYSTEM:  Induced coma (miotic pupils minimally reactive to light), no response to pain.  SKIN: No rashes or lesions    VITAL SIGNS:  Vital Signs Last 24 Hrs  T(C): 36.4 (10 Dec 2021 09:00), Max: 36.7 (09 Dec 2021 18:00)  T(F): 97.6 (10 Dec 2021 09:00), Max: 98 (09 Dec 2021 18:00)  HR: 63 (10 Dec 2021 12:00) (52 - 93)  BP: 94/51 (10 Dec 2021 11:00) (88/51 - 145/77)  BP(mean): 69 (10 Dec 2021 11:00) (64 - 105)  RR: 33 (10 Dec 2021 12:00) (20 - 37)  SpO2: 93% (10 Dec 2021 12:00) (89% - 97%)    MEDICATIONS:  MEDICATIONS  (STANDING):  cefepime   IVPB 2000 milliGRAM(s) IV Intermittent every 8 hours  chlorhexidine 2% Cloths 1 Application(s) Topical <User Schedule>  CIPROFLOXACIN(CIPRO) OPTHALMIC SOLUTION 5 Drop(s) 5 Drop(s) Both Ears two times a day  dexAMETHasone 0.1% Ophthalmic Solution for OTIC Use 5 Drop(s) Both Ears two times a day  dexMEDEtomidine Infusion 0.2 MICROgram(s)/kG/Hr (4.3 mL/Hr) IV Continuous <Continuous>  dextrose 40% Gel 15 Gram(s) Oral once  dextrose 5%. 1000 milliLiter(s) (50 mL/Hr) IV Continuous <Continuous>  dextrose 50% Injectable 25 Gram(s) IV Push once  diVALproex  milliGRAM(s) Oral two times a day  enoxaparin Injectable 40 milliGRAM(s) SubCutaneous daily  folic acid 1 milliGRAM(s) Oral daily  glucagon  Injectable 1 milliGRAM(s) IntraMuscular once  haloperidol     Tablet 2 milliGRAM(s) Oral every 12 hours  influenza   Vaccine 0.5 milliLiter(s) IntraMuscular once  insulin regular  human corrective regimen sliding scale   SubCutaneous every 6 hours  multivitamin 1 Tablet(s) Oral daily  nicotine - 21 mG/24Hr(s) Patch 1 patch Transdermal daily  thiamine IVPB 500 milliGRAM(s) IV Intermittent every 8 hours  vancomycin  IVPB 1250 milliGRAM(s) IV Intermittent every 8 hours    MEDICATIONS  (PRN):  sodium chloride 0.9% lock flush 10 milliLiter(s) IV Push every 1 hour PRN Pre/post blood products, medications, blood draw, and to maintain line patency    ALLERGIES:  No Known Allergies    LABS:                        12.9   12.67 )-----------( 318      ( 10 Dec 2021 04:33 )             38.3     12-10    138  |  103  |  10  ----------------------------<  88  4.0   |  25  |  0.76    Ca    8.8      10 Dec 2021 04:33  Phos  3.1     12-10  Mg     2.2     12-10    TPro  6.4  /  Alb  3.2<L>  /  TBili  0.3  /  DBili  x   /  AST  48<H>  /  ALT  56<H>  /  AlkPhos  72  12-10    PT/INR - ( 09 Dec 2021 06:04 )   PT: 13.7 sec;   INR: 1.15        PTT - ( 09 Dec 2021 06:04 )  PTT:29.6 sec    CAPILLARY BLOOD GLUCOSE  POCT Blood Glucose.: 94 mg/dL (10 Dec 2021 11:07)    RADIOLOGY & ADDITIONAL TESTS: Reviewed.

## 2021-12-10 NOTE — PROGRESS NOTE ADULT - ASSESSMENT
52 yo gentleman with PMHx of umbilical hernia (5-6 years ago) and alcohol use disorder (no withdrawal hx) with no past medical history of epilepsy, trauma or CNS infection, who presents due to an episode of sudden onset speech disturbance on 12/3/21 talking incoherently and altered behavior with excessive diaphoresis followed by 4-5 seizures witnessed by EMS and ED found to have necrotizing otitis externa of the left ear currently being treated on IV vancomycin and cefepime.     Epilepsy consulted for seizures. MRI 12/6/21 showed L temporal lobe 1cm abscess. Seizure likely 2/2 L abscess/encephalitis considering current state of necrotizing otitis externa, no seizure hx and no discontinuation of alcohol intake prior to admission.    Plan  - c/w Depakote 500 mg BID (started 12/8)  - Depakote trough level, order for afternoon 12/10  - maintain seizure and fall precautions  - s/p cultures and R myringotomy placement by ENT (12/9)   52 yo gentleman with PMHx of umbilical hernia (5-6 years ago) and alcohol use disorder (no withdrawal hx) with no past medical history of epilepsy, trauma or CNS infection, who presents due to an episode of sudden onset speech disturbance on 12/3/21 talking incoherently and altered behavior with excessive diaphoresis followed by 4-5 seizures witnessed by EMS and ED found to have necrotizing otitis externa of the left ear currently being treated on IV vancomycin and cefepime.     Epilepsy consulted for seizures. MRI 12/6/21 showed L temporal lobe 1cm abscess. Seizure likely 2/2 L abscess/encephalitis considering current state of necrotizing otitis externa, no seizure hx and no discontinuation of alcohol intake prior to admission.    Plan  - c/w Depakote 500 mg BID (started 12/8)  - Depakote trough level, order for afternoon 12/10  - maintain seizure and fall precautions  - s/p cultures and R myringotomy placement by ENT (12/9)  - Maintain seizure and fall precautions   52 yo gentleman with PMHx of umbilical hernia (5-6 years ago) and alcohol use disorder (no withdrawal hx) with no past medical history of epilepsy, trauma or CNS infection, who presents due to an episode of sudden onset speech disturbance on 12/3/21 talking incoherently and altered behavior with excessive diaphoresis followed by 4-5 seizures witnessed by EMS and ED found to have necrotizing otitis externa of the left ear currently being treated on IV vancomycin and cefepime as well as left temporal abscess which is the likely etiology of his seizures.    Plan  - c/w Depakote 500 mg BID (started 12/8), trough level this evening before dose  - s/p cultures and R myringotomy placement by ENT (12/9); defer to ID, ENT, neurosurgery regarding abscess management

## 2021-12-10 NOTE — PROGRESS NOTE ADULT - SUBJECTIVE AND OBJECTIVE BOX
OVERNIGHT EVENTS: NAEON.    SUBJECTIVE / INTERVAL HPI: Patient seen and examined at bedside. He is awake, denying any acute complaints.     VITAL SIGNS:  Vital Signs Last 24 Hrs  T(C): 36.4 (10 Dec 2021 09:00), Max: 36.9 (09 Dec 2021 10:24)  T(F): 97.6 (10 Dec 2021 09:00), Max: 98.4 (09 Dec 2021 10:24)  HR: 61 (10 Dec 2021 09:00) (52 - 93)  BP: 88/51 (10 Dec 2021 09:00) (88/51 - 145/77)  BP(mean): 64 (10 Dec 2021 09:00) (64 - 105)  RR: 24 (10 Dec 2021 09:00) (20 - 37)  SpO2: 91% (10 Dec 2021 09:00) (89% - 97%)    PHYSICAL EXAM:    General: WDWN  HEENT: Left ear swelling slightly tender to palpation.   Neck: supple  Cardiovascular: +S1/S2; RRR  Respiratory: CTA B/L; no W/R/R  Gastrointestinal: soft, NT/ND; +BSx4  Extremities: WWP; no edema, clubbing or cyanosis  Vascular: 2+ radial, DP/PT pulses B/L  Neurological: AAOx3; no focal deficits    MEDICATIONS:  MEDICATIONS  (STANDING):  cefepime   IVPB 2000 milliGRAM(s) IV Intermittent every 8 hours  chlorhexidine 2% Cloths 1 Application(s) Topical <User Schedule>  CIPROFLOXACIN(CIPRO) OPTHALMIC SOLUTION 5 Drop(s) 5 Drop(s) Both Ears two times a day  dexAMETHasone 0.1% Ophthalmic Solution for OTIC Use 5 Drop(s) Both Ears two times a day  dexMEDEtomidine Infusion 0.2 MICROgram(s)/kG/Hr (4.3 mL/Hr) IV Continuous <Continuous>  dextrose 40% Gel 15 Gram(s) Oral once  dextrose 5%. 1000 milliLiter(s) (50 mL/Hr) IV Continuous <Continuous>  dextrose 50% Injectable 25 Gram(s) IV Push once  diVALproex  milliGRAM(s) Oral two times a day  enoxaparin Injectable 40 milliGRAM(s) SubCutaneous daily  folic acid 1 milliGRAM(s) Oral daily  glucagon  Injectable 1 milliGRAM(s) IntraMuscular once  haloperidol     Tablet 2 milliGRAM(s) Oral every 12 hours  influenza   Vaccine 0.5 milliLiter(s) IntraMuscular once  insulin regular  human corrective regimen sliding scale   SubCutaneous every 6 hours  multivitamin 1 Tablet(s) Oral daily  nicotine - 21 mG/24Hr(s) Patch 1 patch Transdermal daily  thiamine IVPB 500 milliGRAM(s) IV Intermittent every 8 hours  vancomycin  IVPB 1250 milliGRAM(s) IV Intermittent every 8 hours    MEDICATIONS  (PRN):  sodium chloride 0.9% lock flush 10 milliLiter(s) IV Push every 1 hour PRN Pre/post blood products, medications, blood draw, and to maintain line patency      ALLERGIES:  Allergies    No Known Allergies    Intolerances        LABS:                        12.9   12.67 )-----------( 318      ( 10 Dec 2021 04:33 )             38.3     12-10    138  |  103  |  10  ----------------------------<  88  4.0   |  25  |  0.76    Ca    8.8      10 Dec 2021 04:33  Phos  3.1     12-10  Mg     2.2     12-10    TPro  6.4  /  Alb  3.2<L>  /  TBili  0.3  /  DBili  x   /  AST  48<H>  /  ALT  56<H>  /  AlkPhos  72  12-10    PT/INR - ( 09 Dec 2021 06:04 )   PT: 13.7 sec;   INR: 1.15          PTT - ( 09 Dec 2021 06:04 )  PTT:29.6 sec    CAPILLARY BLOOD GLUCOSE      POCT Blood Glucose.: 98 mg/dL (10 Dec 2021 06:04)      RADIOLOGY & ADDITIONAL TESTS: Reviewed.    ASSESSMENT:    PLAN:

## 2021-12-10 NOTE — BH CONSULTATION LIAISON PROGRESS NOTE - NSBHASSESSMENTFT_PSY_ALL_CORE
Patient is a 52 yo M with PMHx no past medical history of epilepsy, trauma or CNS infection, who presentsed to the ED [place] with an episode of sudden onset speech disturbance talking incoherently and repetitive and altered behavior while working witnessed, no fever. In ED presented, repetitive generalized seizures. 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 Pt was transferred to [facility] ICU to continue his care, on cefepime and vanc IV (to cover CNS pseudomonal infection) propofol drip and placed on ventilator. On ear cultures found staph. aureus and epidermidis. On 12/5, pt was extubated. On 12/7 patient presented very agitated and verbally abusive. Psychiatry was consulted to evaluate the pt for delirium vs personality disorder. Patient's presentation is consistent with delirium, likely multifactorial due to infection vs withdrawal (pt was driking approx 6 beers per day).   Plan:   -continue standing haldol 2mg po q12h; if pt has breakthrough agitation during the weekend, increase haldol to 2mg q8h  - for breakthrough agitation use haldol 2mg po/im/IV qhs 6; monitor for qtc prolongation  - CIWA for withdrawal  -enhanced observation (low threshold for 1:1 observation)  -CL to follow as needed

## 2021-12-10 NOTE — PROGRESS NOTE ADULT - ASSESSMENT
52 yo M with PMHx ETOH use and polysubstance abuse  Sudden onset speech disturbance talking incoherently and altered behavior s/p generalized seizures.   2 months with hypoacusia, redness and pain treated with long course ciprofloxacin and short of prednisone without resolving symptoms.   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   On R ear OR, ear culture and L ear culture grew MSSA.   Now delirium tremens + ICU delirium of difficult control.   B/L cultures and R myringotomy placement by ENT  Main interventions are: AEDs (Depakote) for epilepsy, haloperidol for agitation and ABx (nafcillin and cefepime) for infection.    NEURO  On Precedex   #Delirium tremens  Important alcohol use (3-4 beers per day)  - RASS 0-1  PLAN:  - Precedex infusion on. Guanfisen if responds to Precedex.  - Increased thiamine 500mg q8h IV to treat potential Wernicke syndrome, start q24h IV on 12/12.  - Standing haloperidol 2mg q12h PO.    #Convulsive status epilepticus  Likely one focal seizure on left temporal lobe with secondarily 4-5 generalized seizures s/p convulsive status epilepticus. 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 Frequent baths, high pseudomonal suspicion. Intubated and sedated for 2 days. CT temporal bones done and MRI IAC with contrast IV showed left necrotizing otitis externa, 1 cm abscess in the left temporal lobe with adjacent dural thickening. vEEG monitoring  PLAN:  - Maintain seizure and fall precautions  - Video EEG when patient more compliant  - c/w Depakote 500 mg BID (started 12/8)  - Depakote trough level, order for afternoon 12/10    CARDIO  #Preoperative cardiovascular risk assessment   No previous symptoms of CHF, no orthopnea, no dyspnea on excertion, no NPD, no B/L LE edema, he walks several blocks without, no problems to walk up stairs. EKG sinus rhythm 96 bmp.   Currently on phenobarbital due to alcohol withdrawal, but otherwise hemodynamically stable without evidence of worsening sepsis    Low risk for a low risk procedure (ENT procedure), RCRI class I Risk       RESPIRATORY  Intubated for protecting airways in patient with status epilepticus. Weaned vent as tolerated the second day. 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 1cm abscess. FU ear cultures- showing GPR and GPC in pairs. Blood (NGTD), urine (NGTD) and ear cultures (staph. aureus and epidermidis). IAC CT scan w/wo contrast necrotizing otitis externa with osteomyelitis and soft tissue extent to the retrocondylar fat and infratemporal fosse. MRI shows left necrotizing otitis externa, 1 cm abscess in the left temporal lobe with adjacent dural thickening. ENT and neurosurgery no acute intervention. Recommended Abx as intervention.  - 12/9 R ear OR culture, 2/8 ear culture and 12/3 L ear culture also grew MSSA. So consider th most likely culprit.     - Staph epi and coryne considered skin contaminant.   PLAN:  - F/u surveillance cultures (OR cultures, please send bacterial, fungal and AFB cultures)  - Vanc troughs  - C/w Ciprodex bilaterally; 5 drops to each ear wick twice a day; with Dexamethasone 0.1% 5 drops  - Recommended stop vanco, start nafcillin 2g IV q4h. Keep cefepime for now until OR culture finalize.     ENT  #Bilateral otitis externa, left greater than right  PE does not reveal any granulation tissue in left EAC, however, b/l EACs are edematous and tender to manipulation. Currently on cefepime/vancomycin per ID with ciprodex ear drops b/l with improvement in fevers and WBC. No signs of mastoiditis on clinical examination.  PLAN:  -OR with cultures and R myringotomy placement by ENT (12/9)  -F/u new left EAC cx  -ENT recommends hem/onc consult to r/o intrinsic bone conditions    F: none  E: replete PRN  N: Regular diet    DVT: Enoxaparin 40mg SQ q24h  GI: Pantoprazol 40mg IV q24h

## 2021-12-11 DIAGNOSIS — Z29.9 ENCOUNTER FOR PROPHYLACTIC MEASURES, UNSPECIFIED: ICD-10-CM

## 2021-12-11 DIAGNOSIS — R74.01 ELEVATION OF LEVELS OF LIVER TRANSAMINASE LEVELS: ICD-10-CM

## 2021-12-11 DIAGNOSIS — G06.0 INTRACRANIAL ABSCESS AND GRANULOMA: ICD-10-CM

## 2021-12-11 DIAGNOSIS — H60.93 UNSPECIFIED OTITIS EXTERNA, BILATERAL: ICD-10-CM

## 2021-12-11 DIAGNOSIS — G40.901 EPILEPSY, UNSPECIFIED, NOT INTRACTABLE, WITH STATUS EPILEPTICUS: ICD-10-CM

## 2021-12-11 DIAGNOSIS — F10.231 ALCOHOL DEPENDENCE WITH WITHDRAWAL DELIRIUM: ICD-10-CM

## 2021-12-11 LAB
-  CEFAZOLIN: SIGNIFICANT CHANGE UP
-  CEFAZOLIN: SIGNIFICANT CHANGE UP
-  CLINDAMYCIN: SIGNIFICANT CHANGE UP
-  CLINDAMYCIN: SIGNIFICANT CHANGE UP
-  ERYTHROMYCIN: SIGNIFICANT CHANGE UP
-  ERYTHROMYCIN: SIGNIFICANT CHANGE UP
-  LINEZOLID: SIGNIFICANT CHANGE UP
-  LINEZOLID: SIGNIFICANT CHANGE UP
-  OXACILLIN: SIGNIFICANT CHANGE UP
-  OXACILLIN: SIGNIFICANT CHANGE UP
-  RIFAMPIN: SIGNIFICANT CHANGE UP
-  RIFAMPIN: SIGNIFICANT CHANGE UP
-  TRIMETHOPRIM/SULFAMETHOXAZOLE: SIGNIFICANT CHANGE UP
-  TRIMETHOPRIM/SULFAMETHOXAZOLE: SIGNIFICANT CHANGE UP
-  VANCOMYCIN: SIGNIFICANT CHANGE UP
-  VANCOMYCIN: SIGNIFICANT CHANGE UP
ALBUMIN SERPL ELPH-MCNC: 3.3 G/DL — SIGNIFICANT CHANGE UP (ref 3.3–5)
ALP SERPL-CCNC: 86 U/L — SIGNIFICANT CHANGE UP (ref 40–120)
ALT FLD-CCNC: 71 U/L — HIGH (ref 10–45)
ANION GAP SERPL CALC-SCNC: 8 MMOL/L — SIGNIFICANT CHANGE UP (ref 5–17)
AST SERPL-CCNC: 64 U/L — HIGH (ref 10–40)
BASOPHILS # BLD AUTO: 0.12 K/UL — SIGNIFICANT CHANGE UP (ref 0–0.2)
BASOPHILS NFR BLD AUTO: 0.7 % — SIGNIFICANT CHANGE UP (ref 0–2)
BILIRUB SERPL-MCNC: 0.4 MG/DL — SIGNIFICANT CHANGE UP (ref 0.2–1.2)
BUN SERPL-MCNC: 10 MG/DL — SIGNIFICANT CHANGE UP (ref 7–23)
CALCIUM SERPL-MCNC: 9.1 MG/DL — SIGNIFICANT CHANGE UP (ref 8.4–10.5)
CHLORIDE SERPL-SCNC: 102 MMOL/L — SIGNIFICANT CHANGE UP (ref 96–108)
CO2 SERPL-SCNC: 26 MMOL/L — SIGNIFICANT CHANGE UP (ref 22–31)
CREAT SERPL-MCNC: 0.9 MG/DL — SIGNIFICANT CHANGE UP (ref 0.5–1.3)
CULTURE RESULTS: SIGNIFICANT CHANGE UP
EOSINOPHIL # BLD AUTO: 0.39 K/UL — SIGNIFICANT CHANGE UP (ref 0–0.5)
EOSINOPHIL NFR BLD AUTO: 2.4 % — SIGNIFICANT CHANGE UP (ref 0–6)
GLUCOSE BLDC GLUCOMTR-MCNC: 93 MG/DL — SIGNIFICANT CHANGE UP (ref 70–99)
GLUCOSE SERPL-MCNC: 86 MG/DL — SIGNIFICANT CHANGE UP (ref 70–99)
HCT VFR BLD CALC: 40.1 % — SIGNIFICANT CHANGE UP (ref 39–50)
HGB BLD-MCNC: 13 G/DL — SIGNIFICANT CHANGE UP (ref 13–17)
IMM GRANULOCYTES NFR BLD AUTO: 0.9 % — SIGNIFICANT CHANGE UP (ref 0–1.5)
LYMPHOCYTES # BLD AUTO: 14.6 % — SIGNIFICANT CHANGE UP (ref 13–44)
LYMPHOCYTES # BLD AUTO: 2.36 K/UL — SIGNIFICANT CHANGE UP (ref 1–3.3)
MAGNESIUM SERPL-MCNC: 2.1 MG/DL — SIGNIFICANT CHANGE UP (ref 1.6–2.6)
MCHC RBC-ENTMCNC: 29 PG — SIGNIFICANT CHANGE UP (ref 27–34)
MCHC RBC-ENTMCNC: 32.4 GM/DL — SIGNIFICANT CHANGE UP (ref 32–36)
MCV RBC AUTO: 89.5 FL — SIGNIFICANT CHANGE UP (ref 80–100)
METHOD TYPE: SIGNIFICANT CHANGE UP
METHOD TYPE: SIGNIFICANT CHANGE UP
MONOCYTES # BLD AUTO: 1.47 K/UL — HIGH (ref 0–0.9)
MONOCYTES NFR BLD AUTO: 9.1 % — SIGNIFICANT CHANGE UP (ref 2–14)
NEUTROPHILS # BLD AUTO: 11.72 K/UL — HIGH (ref 1.8–7.4)
NEUTROPHILS NFR BLD AUTO: 72.3 % — SIGNIFICANT CHANGE UP (ref 43–77)
NRBC # BLD: 0 /100 WBCS — SIGNIFICANT CHANGE UP (ref 0–0)
ORGANISM # SPEC MICROSCOPIC CNT: SIGNIFICANT CHANGE UP
ORGANISM # SPEC MICROSCOPIC CNT: SIGNIFICANT CHANGE UP
PHOSPHATE SERPL-MCNC: 2.6 MG/DL — SIGNIFICANT CHANGE UP (ref 2.5–4.5)
PLATELET # BLD AUTO: 370 K/UL — SIGNIFICANT CHANGE UP (ref 150–400)
POTASSIUM SERPL-MCNC: 3.7 MMOL/L — SIGNIFICANT CHANGE UP (ref 3.5–5.3)
POTASSIUM SERPL-SCNC: 3.7 MMOL/L — SIGNIFICANT CHANGE UP (ref 3.5–5.3)
PROT SERPL-MCNC: 7.1 G/DL — SIGNIFICANT CHANGE UP (ref 6–8.3)
RBC # BLD: 4.48 M/UL — SIGNIFICANT CHANGE UP (ref 4.2–5.8)
RBC # FLD: 14 % — SIGNIFICANT CHANGE UP (ref 10.3–14.5)
SODIUM SERPL-SCNC: 136 MMOL/L — SIGNIFICANT CHANGE UP (ref 135–145)
SPECIMEN SOURCE: SIGNIFICANT CHANGE UP
WBC # BLD: 16.21 K/UL — HIGH (ref 3.8–10.5)
WBC # FLD AUTO: 16.21 K/UL — HIGH (ref 3.8–10.5)

## 2021-12-11 PROCEDURE — 99233 SBSQ HOSP IP/OBS HIGH 50: CPT | Mod: GC

## 2021-12-11 PROCEDURE — 99232 SBSQ HOSP IP/OBS MODERATE 35: CPT

## 2021-12-11 PROCEDURE — 99232 SBSQ HOSP IP/OBS MODERATE 35: CPT | Mod: GC

## 2021-12-11 RX ORDER — POTASSIUM CHLORIDE 20 MEQ
20 PACKET (EA) ORAL ONCE
Refills: 0 | Status: COMPLETED | OUTPATIENT
Start: 2021-12-11 | End: 2021-12-11

## 2021-12-11 RX ADMIN — CEFEPIME 100 MILLIGRAM(S): 1 INJECTION, POWDER, FOR SOLUTION INTRAMUSCULAR; INTRAVENOUS at 05:09

## 2021-12-11 RX ADMIN — HALOPERIDOL DECANOATE 2 MILLIGRAM(S): 100 INJECTION INTRAMUSCULAR at 18:31

## 2021-12-11 RX ADMIN — ENOXAPARIN SODIUM 40 MILLIGRAM(S): 100 INJECTION SUBCUTANEOUS at 12:26

## 2021-12-11 RX ADMIN — DIVALPROEX SODIUM 500 MILLIGRAM(S): 500 TABLET, DELAYED RELEASE ORAL at 05:10

## 2021-12-11 RX ADMIN — NAFCILLIN 200 GRAM(S): 10 INJECTION, POWDER, FOR SOLUTION INTRAVENOUS at 01:01

## 2021-12-11 RX ADMIN — Medication 1 TABLET(S): at 12:26

## 2021-12-11 RX ADMIN — DIVALPROEX SODIUM 500 MILLIGRAM(S): 500 TABLET, DELAYED RELEASE ORAL at 17:46

## 2021-12-11 RX ADMIN — CEFEPIME 100 MILLIGRAM(S): 1 INJECTION, POWDER, FOR SOLUTION INTRAMUSCULAR; INTRAVENOUS at 14:24

## 2021-12-11 RX ADMIN — NAFCILLIN 200 GRAM(S): 10 INJECTION, POWDER, FOR SOLUTION INTRAVENOUS at 14:23

## 2021-12-11 RX ADMIN — Medication 1 MILLIGRAM(S): at 12:26

## 2021-12-11 RX ADMIN — CEFEPIME 100 MILLIGRAM(S): 1 INJECTION, POWDER, FOR SOLUTION INTRAMUSCULAR; INTRAVENOUS at 21:22

## 2021-12-11 RX ADMIN — Medication 1 PATCH: at 18:31

## 2021-12-11 RX ADMIN — HALOPERIDOL DECANOATE 2 MILLIGRAM(S): 100 INJECTION INTRAMUSCULAR at 07:22

## 2021-12-11 RX ADMIN — NAFCILLIN 200 GRAM(S): 10 INJECTION, POWDER, FOR SOLUTION INTRAVENOUS at 22:18

## 2021-12-11 RX ADMIN — CHLORHEXIDINE GLUCONATE 1 APPLICATION(S): 213 SOLUTION TOPICAL at 05:45

## 2021-12-11 RX ADMIN — Medication 1 PATCH: at 06:05

## 2021-12-11 RX ADMIN — NAFCILLIN 200 GRAM(S): 10 INJECTION, POWDER, FOR SOLUTION INTRAVENOUS at 17:46

## 2021-12-11 RX ADMIN — NAFCILLIN 200 GRAM(S): 10 INJECTION, POWDER, FOR SOLUTION INTRAVENOUS at 10:16

## 2021-12-11 RX ADMIN — Medication 105 MILLIGRAM(S): at 06:34

## 2021-12-11 RX ADMIN — Medication 1 PATCH: at 11:54

## 2021-12-11 RX ADMIN — Medication 1 PATCH: at 12:25

## 2021-12-11 RX ADMIN — Medication 20 MILLIEQUIVALENT(S): at 07:21

## 2021-12-11 RX ADMIN — NAFCILLIN 200 GRAM(S): 10 INJECTION, POWDER, FOR SOLUTION INTRAVENOUS at 05:48

## 2021-12-11 NOTE — PROGRESS NOTE ADULT - PROBLEM SELECTOR PLAN 6
F: none  E: replete PRN  N: Regular diet    DVT: Enoxaparin 40mg SQ q24h  GI: Pantoprazol 40mg IV q24h F: none  E: replete PRN  N: Regular diet    DVT: Enoxaparin 40mg SQ q24h

## 2021-12-11 NOTE — PROGRESS NOTE ADULT - SUBJECTIVE AND OBJECTIVE BOX
ENT Consult Note    HARRIET GOLDMAN  [id number]    51y Male w/ unremarkable PMHx (hasn't seen a doctor in many years) presents to [facility] as a transfer for evaluation of seizure of unknown origin. ENT consults for evaluation of left ear infection, r/o malignant ear otitis infections. per wife and brother at bedside, pt has been complaining of bilateral ear pain, left greater than right, since late october. pt went to urgent care at that time and was given oral cipro. pt returned to urgent care one week later after finishing his antibiotics without any ear pain relief. he was given oral and otic drops, however, sxs did not resolve. pt was then seen by an ENT on Nov 15, who diagnosed him w/ bilateral otitis externa, and placed ear sunday bilaterally. pt was given ciprodex otic drops and a follow up. Today, wife states  was not acting like his usual self. around noon today, she went to visit him and work and pt seemed very lethargic and was only responding with one word answers. At this time, wife decided to call an ambulance. while being placed in to the ambulance, pt began having is seizure. pt was taken to [facility], and was intubated for airway protection. CT maxillofacial at [place] which showed distal external auditory canal and foci of possible tegmen tympani/mastoideum dehiscence versus severe thinning, concerning for malignant/necrotizing otitis. Per wife, pt had severe otalgia and hearing, but did not have any otorrhea, facial weakness, vertigo, or tinnitus    INTERVAL:    12/4: Self extubation last night with emergent reintubation. Otherwise started on cefepime/vanc. MRI/CT pending. Ear cx growing Gram+ cocci and rods. Low grade fevers overnight with WBC 18.4 (down from 100.2). Sunday in place at bedside. Pt sedated.    12/5: NAEON. Still on vanc/cef and ciprodex eardrops. Afebrile overnight. Ear cx sensitivities pending. CT scan temp bones with similar findings as CT maxillofacial from [place]. WBC downtrending today (down to 13.6). Pt was seen and examined at bedside. Stable exam findings, no mastoid swelling. Sedated and intubated on vent.    12/6: NAEON. On vanc/cef and ciprodex eardrops. Febrile overnight to 102. Ear cx pending sens. CT IAC w/ con with L ZARI, no drainable abscess, no intracranial extension, and R OE. WBC up to 20.3 today from 13.6. Pt's sunday were removed at bedside and both sides were debrided. Still with swelling of b/l EAC (L > R). Sunday reinserted and ciprofloxacin bedside eardrops placed.    12/7: NAEON. MRI shows 1 cm abscess in left temporal lobe. Given small size and absence of neurologic sxs, NSGY recommend c/w IV medications and repeat imaging to evaluation for resolution    12/8: Tachycardic overnight and agitated. On CIWA 8 for agitation, ativan 2x without improvement. Started on phenobarbital protocol with improvement after 3 doses. B/l wrist restraints applied. Otherwise afebrile. On vanc/cef + ciprodex drops. ENT removed sunday and assessed the ear canals with scope. Sunday reapplied.    12/9: NAEON. WBC down 17.8 to 14.4 today. Afebrile overnight. Plan for OR today.    12/10: NAEON. s/p exam under anesthesia of both ear canals, myringotomy on R side (unable to visualize L TM) 12/10. Bilateral EAC inflammation (L>R), keratinized debris. Sunday inserted into deeper aspect of EAC. Afebrile overnight. WBC downtrending (12.7 today). Otherwise still on vanc/cefepime per ID, ciprodex drops into ear canals.    12/11: NAEON. EAR sunday removed today. pt endorses improvement in b/l ear pain.     ICU Vital Signs Last 24 Hrs  T(C): 37.1 (11 Dec 2021 09:19), Max: 37.5 (10 Dec 2021 22:24)  T(F): 98.8 (11 Dec 2021 09:19), Max: 99.5 (10 Dec 2021 22:24)  HR: 98 (11 Dec 2021 10:00) (63 - 98)  BP: 159/87 (11 Dec 2021 10:00) (94/51 - 159/87)  BP(mean): 114 (11 Dec 2021 10:00) (68 - 114)  ABP: --  ABP(mean): --  RR: 35 (11 Dec 2021 10:00) (15 - 36)  SpO2: 99% (11 Dec 2021 10:00) (92% - 99%)        PHYSICAL EXAM:    CONSTITUTIONAL: Well nourished, well developed, sedated  HEAD: normocephalic, atraumatic.  EARS  AD: Air>Bone Mastoid non-tender/non-edematous. non-proptotic , non-erythematous right pinna. Debris in EAC likely 2/2 to otic drops. TM intact. no perfs no granulation tissue or masses. Wick in place.  AS: Bone> Air Mastoid non-tender/non-edematous. non-proptotic pinna. Left pinna edema and erythema improved. Wick in place.   Christiansen lateralized to the left  Face: HB 1  NOSE: Normal external nose.   ORAL CAVITY/OROPHARYNX: normal mucosa.   NECK: No cervical lymphadenopathy  RESPIRATORY: intubated connected to vent      EXAM:  CT TEMPORAL BONES                          PROCEDURE DATE:  12/04/2021          INTERPRETATION:  PROCEDURE: CT temporal bones    INDICATION: Severe otitis media with concern for temporal bone osteomyelitis    TECHNIQUE: Contiguous 1 mm thickaxial and modified coronal images were obtained through the temporal bones without the intravenous administration of contrast. Target axial and coronal review images were created through each temporal bone utilizing bone algorithm.    COMPARISON: Maxillofacial CT 12/3/2021    FINDINGS: Target review images of the right temporal bone demonstrates soft tissue filling the osseous segment of the right tympanic membrane. The bony cortex appears intact. The mastoid air cell system to be well developed.There is soft tissue opacification and fluid within right mastoid air cells as well as fluid within the right mastoid antrum. There is soft tissue within the middle ear cavity. The ossicles appear intact without erosive changes or displacement. The scutum is intact. The visualized portions of the right inner appear within normal limits.    Target review images of the left temporal bone demonstrates soft tissue completely filling the left osseous segment of the external auditory canal. There are cortical bony erosive changes involving the walls of bony external canal especially the floor and posterior wall.  There is complete soft tissue opacification of left mastoid air cells. The mastoid septae appear intact. The scutum appears eroded. Therealso is complete soft tissue opacification of the middle ear cavity. There is dehiscence of the tegmen tympani and mastoid roof. The ossicles appear intact without erosive changes or displacement. The visualized portions of the left inner ear appearsintact.    The course and caliber of the internal carotid artery, jugular vein and facial nerve within each temporal bones are unremarkable. The internal auditory canals are symmetric in size and configuration. Neither the cochlear nor vestibular aqueducts are enlarged.    Soft-tissue windows fail to demonstrate intracranial abnormality.    IMPRESSION: Right mastoid disease. Findings suggestive of left malignant otitis externa with left mastoid extension.      EXAM:  CT IAC IC                          PROCEDURE DATE:  12/05/2021          INTERPRETATION:  CT TEMPORAL BONES    Technique: A subcentimeter axial acquisition was performed through the temporal bones and reconstructed at submillimeter thickness and spacing in the axial and coronal planes, and furthermore in oblique planes both parallel and perpendicular to assess the semicircular canals. Each series right and left sides targeted/magnified views.    Contrast: [id number] given IV    Clinical Information: Left malignant otitis externa    Prior Studies: Noncontrast exam 12/04/2021, and contrast-exam 12/03/2021.    Findings:    This is the third consecutive and daily exam. There is no change compared to 12/3/2021 which is also a contrast exam.    A.S.    There is permeative bony erosion involving the anterior wall of the external auditory canal and inferior mastoid cells most compatible with necrotizing otitis media. By definition, this is an osteomyelitis. Remaining mastoids more posteriorly show preserved trabeculae, and the tegmen appears thin throughout, but the postcontrast images when viewed in the coronal plane show no inflammatory dural thickening or any fluid collection to imply intracranial spread of infection. There is complete soft tissue opacification of the ear canal compatible with inflammatory skin thickening.    Middle ear is totally opacified, but the ossicular chain appears grossly preserved. Otic capsule also appears intact, aside from minor site of thinning ofthe superior semicircular canal (series 12, image 84 and series 6, image 42, of likely incidental note. There is no dehiscence of the carotid canal or jugular bulb, and the bony facial nerve is of similar size but its canal margins are somewhat hazy in the setting of mastoiditis. Vestibular aqueduct is not enlarged.    On soft tissue inspection and of most salience, there is cellulitis that extends anteriorly by the fissures of [place] with induration of the retrocondylar fat planes. There is also fat stranding in the upper parapharyngeal space without drainable fluid collection.      A.D.      *  External canal/TM: There is diffuse skin thickening, but without similar bony erosion or extraosseous soft tissue abnormality as on the contralateral side.  *  Mastoid cavity: There is moderate air cell opacification without bony rarefaction as seen on the other side.  *  Temporal bone cortices and Tegmen: There are sites of tegmen thinning, which are not dissimilar to the contralateral side. No elijah or measurable defect.  *  Middle ear/ossicles: Opacified but to a lesser degree than the other side. Ossicular chain appears intact  *  Oval/round windows: Both opacified but nonstenotic  *  Inner ear: Otic capsule appears preserved without evidence of semicircular canal dehiscence or otosclerosis. IACs appear symmetric caliber.  *  Facial nerve: Normal course with intact bony canal  *  ICA/IJV: Normal morphology without dehiscence to the tympanic cavity  *  Vestibular aqueduct: Not enlarged      SOFT TISSUES: On the soft tissue inspection without contrast, there is diffuse left temporal scalp swelling and periauricular swelling compatible with cellulitis secondary to the necrotizing external otitis. There is induration of the retrocondylar fat pads and some haziness in the parapharyngeal and  fat pads. Intracranially, there is preserved contrast filling of the left jugular bulb, sphenoid and transverse sinuses. No empyema identified, nor abscess in the extracranial softtissues below the skull base or laterally at the occipital SCM muscle.    BONY WINDOW: The more central skull base is intact. There is mild inflammatory thickening of the paranasal sinuses. The patient is intubated.      IMPRESSION:    A.S.  Findingsare compatible with necrotizing otitis externa. By definition, this is osteomyelitis, and no change is appreciated since 12/3/2021 which was also done with contrast. There is soft tissue extent to the retrocondylar fat and infratemporal fossa, which **may be of high virulence if the patient is immunocompromised**. No drainable abscess. No intracranial spread appreciated on CT.    A.D.  Lesser findings of both otitis externa and media but without bony erosion or osteomyelitis as seen A.S.        AP: 51 M w/ roughly one month hx of bilateral otitis externa, left greater than right, now presenting w/ seizures. PE does not reveal any granulation tissue in left EAC, however, b/l EACs are edematous and tender to manipulation. Currently on cefepime/vancomycin per ID with ciprodex ear drops b/l with improvement in fevers and WBC. No signs of mastoiditis on clinical examination. S/p EUA, R myringotomy and wick insertion b/l on 12/10.    -F/u OR cx  -Consider heme/onc consult to r/o intrinsic bone conditions  -C/w ciprodex bilaterally; 5 drops to each ear wick 2-3 times a day  -CT temp w/ con with L ZARI, no abscess, no intracranial extension, R OE  -F/u w/ nsgy: IV antibiotic management for temporal lobe abscess  -Vanc/cefepime per ID  -ENT to follow   -Rest of care per primary team    Seen w/ senior resident. Discussed with attending.

## 2021-12-11 NOTE — PROGRESS NOTE ADULT - PROBLEM SELECTOR PLAN 2
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 1cm abscess. FU ear cultures- showing GPR and GPC in pairs. Blood (NGTD), urine (NGTD) and ear cultures (staph. aureus and epidermidis). IAC CT scan w/wo contrast necrotizing otitis externa with osteomyelitis and soft tissue extent to the retrocondylar fat and infratemporal fosse. MRI shows left necrotizing otitis externa, 1 cm abscess in the left temporal lobe with adjacent dural thickening. ENT and neurosurgery no acute intervention. Recommended Abx as intervention.  - 12/9 R ear OR culture, 2/8 ear culture and 12/3 L ear culture also grew MSSA. So consider th most likely culprit.     - Staph epi and coryne considered skin contaminant.   PLAN:  - F/u surveillance cultures (OR cultures, please send bacterial, fungal and AFB cultures)  - PICC line in place  - C/w Ciprodex bilaterally; 5 drops to each ear wick twice a day; with Dexamethasone 0.1% 5 drops  - Nafcillin 2g IV q4h (for a total of 6 weeks) and cefepime 2g IV q8h (for now until OR culture finalize). Left greater than right. Improving. No signs of mastoiditis on clinical examination.  PLAN:  -OR with cultures and R myringotomy placement by ENT (12/9)  -F/u new left EAC cx  -ENT recommends hem/onc consult to r/o intrinsic bone conditions Left greater than right. Improving. No signs of mastoiditis on clinical examination.  PLAN:  - c/w ciprodex 5 drops & dexamethasone 0.1% 5 drops to both ears BID  - OR with cultures and R myringotomy placement by ENT (12/9)  - F/u new left EAC cx  - ENT recommends hem/onc consult to r/o intrinsic bone conditions

## 2021-12-11 NOTE — PROGRESS NOTE ADULT - PROBLEM SELECTOR PLAN 4
Likely one focal seizure on left temporal lobe with secondarily 4-5 generalized seizures s/p convulsive status epilepticus. 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 Frequent baths, high pseudomonal suspicion. Intubated and sedated for 2 days. CT temporal bones done and MRI IAC with contrast IV showed left necrotizing otitis externa, 1 cm abscess in the left temporal lobe with adjacent dural thickening. vEEG monitoring  PLAN:  - Maintain seizure and fall precautions  - c/w Depakote 500 mg BID (started 12/8)  - Depakote trough level Likely one focal seizure on left temporal lobe with secondarily 4-5 generalized seizures s/p convulsive status epilepticus. 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 Frequent baths, high pseudomonal suspicion. Intubated and sedated for 2 days. CT temporal bones done and MRI IAC with contrast IV showed left necrotizing otitis externa, 1 cm abscess in the left temporal lobe with adjacent dural thickening. s/p vEEG  PLAN:  - Maintain seizure and fall precautions  - c/w Depakote 500 mg BID (started 12/8)  - Depakote trough level in AM Likely one focal seizure on left temporal lobe with secondarily 4-5 generalized seizures s/p convulsive status epilepticus. 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 Frequent baths, high pseudomonal suspicion. Intubated and sedated for 2 days. CT temporal bones done and MRI IAC with contrast IV showed left necrotizing otitis externa, 1 cm abscess in the left temporal lobe with adjacent dural thickening. s/p vEEG  PLAN:  - Maintain seizure and fall precautions  - c/w Depakote 500 mg BID (started 12/8)  - f/u depakote trough level in AM

## 2021-12-11 NOTE — PROGRESS NOTE ADULT - ASSESSMENT
51M p/w seizure found to have temporal bone OM and necrotizing otitis externa with 1cm abscess in L left infrolateral temporal lobe. s/p Myringotomy on R side on 12/9.  Ear cultures so far growing MSSA and anaeroboccus viridans.    - cont nafcillin 2g IV q4h  - cont cefepime 2g IV q8h for now  - f/u anaerobocus susceptibility  - f/u ear cultures    Team 1 will follow you.  Case d/w primary team.    Laura Valdivia MD, MS  Infectious Disease attending  work cell [phone]   For any questions during evening/weekend/holiday, please page ID on call

## 2021-12-11 NOTE — PROGRESS NOTE ADULT - SUBJECTIVE AND OBJECTIVE BOX
Internal Medicine Progress Note  Chaitanya Shanks, PGY-1  Pager: [phone]    ******INCOMPLETE******    ************ACCEPTANCE NOTE FROM ICU TO [facility]************    HOSPITAL COURSE: 52 yo M with PMHx no past medical history of epilepsy, trauma or CNS infection, who presents to the ED with an episode of sudden onset speech disturbance talking incoherently and repetitive and altered behavior while working witnessed, no fever. In the last 2 days much more pain on left ear and headache. Pt uses frequently his swimming pool has had left ear infection for 2 months with hypoacusia treated with long course ciprofloxacin and a week of prednisone. In ED presented, repetitive generalized seizures. 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 Started on vancomycin and Zosyn IV. Admitted to ICU to continue his care, on cefepime and vanc IV (to cover CNS pseudomonal infection) propofol drip and placed on ventilator. On ear cultures found staph. aureus and epidermidis. On 12/5, pt was extubated. On IAC CT scan w/wo contrast necrotizing otitis externa with osteomyelitis and soft tissue extent to the retrocondylar fat and infratemporal fosse. ENT and neurosurgery didn't consider beneficial acute intervention at this point. MRI shows left necrotizing otitis externa, 1 cm abscess in the left temporal lobe with adjacent dural thickening. No acute intervention as per Neurosurgery or ENT. Neurosurgery recommended abx per ID recs. ID considers MSSA culprit of this infection and adjust iv antibiotherapy to nafcillin and cefepime. EAR james removed today by ENT after improvement in b/l ear pain. Improvement in his delirium tremens and ICU delirium after management with standing Haldol 2mg.    Patient is a 51y old  Male who presents with a chief complaint of Male complaining of seizures. (11 Dec 2021 14:22)    INTERVAL HPI: Patient seen and examined at bedside. Endorses bilateral ear pain, unchanged from prior. Otherwise, no complaints at this time. Patient denies fevers, sweats, chills, SOB, dyspnea, chest pain, nausea/vomiting, diarrhea, constipation, abdominal pain. 12 pt ROS otherwise negative.     REVIEW OF SYSTEMS:  All other review of systems is negative unless indicated above.    OBJECTIVE:  T(C): 36.8 (12-11-21 @ 21:51), Max: 37.5 (12-10-21 @ 22:24)  HR: 75 (12-11-21 @ 18:00) (73 - 98)  BP: 133/74 (12-11-21 @ 18:00) (122/56 - 159/87)  RR: 20 (12-11-21 @ 18:00) (15 - 36)  SpO2: 98% (12-11-21 @ 18:00) (94% - 99%)  Daily     Daily     Physical Exam:  General: NAD, lying in bed, calm, good conversation, still slow  HEAD:  Atraumatic, normocephalic  EYES: conjunctiva and sclera clear  ENT: Moist mucous membranes, rash on left ear, less swollen, and erythematous   NECK: Supple, no JVD  HEART: Regular rate and rhythm, no murmurs, rubs, or gallops  LUNGS: Unlabored respirations.  Clear to auscultation bilaterally, no crackles, wheezing, or rhonchi  ABDOMEN: Soft, nontender, nondistended, +BS  EXTREMITIES: 2+ peripheral pulses bilaterally. No clubbing, cyanosis, or edema  NERVOUS SYSTEM:  Induced coma (miotic pupils minimally reactive to light), no response to pain.  SKIN: No rashes or lesions    Medications:  MEDICATIONS  (STANDING):  cefepime   IVPB 2000 milliGRAM(s) IV Intermittent every 8 hours  chlorhexidine 2% Cloths 1 Application(s) Topical <User Schedule>  CIPROFLOXACIN(CIPRO) OPTHALMIC SOLUTION 5 Drop(s) 5 Drop(s) Both Ears two times a day  dexAMETHasone 0.1% Ophthalmic Solution for OTIC Use 5 Drop(s) Both Ears two times a day  dextrose 40% Gel 15 Gram(s) Oral once  dextrose 5%. 1000 milliLiter(s) (50 mL/Hr) IV Continuous <Continuous>  dextrose 50% Injectable 25 Gram(s) IV Push once  diVALproex  milliGRAM(s) Oral two times a day  enoxaparin Injectable 40 milliGRAM(s) SubCutaneous daily  folic acid 1 milliGRAM(s) Oral daily  glucagon  Injectable 1 milliGRAM(s) IntraMuscular once  haloperidol     Tablet 2 milliGRAM(s) Oral every 12 hours  influenza   Vaccine 0.5 milliLiter(s) IntraMuscular once  multivitamin 1 Tablet(s) Oral daily  nafcillin  IVPB 2 Gram(s) IV Intermittent every 4 hours  nicotine - 21 mG/24Hr(s) Patch 1 patch Transdermal daily    MEDICATIONS  (PRN):  haloperidol    Injectable 5 milliGRAM(s) IV Push every 6 hours PRN Agitation  sodium chloride 0.9% lock flush 10 milliLiter(s) IV Push every 1 hour PRN Pre/post blood products, medications, blood draw, and to maintain line patency      Labs:                        13.0   16.21 )-----------( 370      ( 11 Dec 2021 05:12 )             40.1     12-11    136  |  102  |  10  ----------------------------<  86  3.7   |  26  |  0.90    Ca    9.1      11 Dec 2021 05:12  Phos  2.6     12-11  Mg     2.1     12-11    TPro  7.1  /  Alb  3.3  /  TBili  0.4  /  DBili  x   /  AST  64<H>  /  ALT  71<H>  /  AlkPhos  86  12-11        COVID-19 PCR: NotDetec (08 Dec 2021 17:04)      Radiology: Reviewed Internal Medicine Progress Note  Chaitanya Shanks, PGY-1  Pager: [phone]    ******INCOMPLETE******    ************ACCEPTANCE NOTE FROM ICU TO [facility]************    HOSPITAL COURSE: 52 yo M with PMHx no past medical history of epilepsy, trauma or CNS infection, who presents to the ED with an episode of sudden onset speech disturbance talking incoherently and repetitive and altered behavior while working witnessed, no fever. In the last 2 days much more pain on left ear and headache. Pt uses frequently his swimming pool has had left ear infection for 2 months with hypoacusia treated with long course ciprofloxacin and a week of prednisone. In ED presented, repetitive generalized seizures. 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 Started on vancomycin and Zosyn IV. Admitted to ICU to continue his care, on cefepime and vanc IV (to cover CNS pseudomonal infection) propofol drip and placed on ventilator. On ear cultures found staph. aureus and epidermidis. On 12/5, pt was extubated. On IAC CT scan w/wo contrast necrotizing otitis externa with osteomyelitis and soft tissue extent to the retrocondylar fat and infratemporal fosse. ENT and neurosurgery didn't consider beneficial acute intervention at this point. MRI shows left necrotizing otitis externa, 1 cm abscess in the left temporal lobe with adjacent dural thickening. No acute intervention as per Neurosurgery or ENT. Neurosurgery recommended abx per ID recs. ID considers MSSA culprit of this infection and adjust iv antibiotherapy to nafcillin and cefepime. EAR james removed today by ENT after improvement in b/l ear pain. Improvement in his delirium tremens and ICU delirium after management with standing Haldol 2mg.    Patient is a 51y old  Male who presents with a chief complaint of Male complaining of seizures. (11 Dec 2021 14:22)    INTERVAL HPI: Patient seen and examined at bedside. Endorses improving right ear pain s/p R myringotomy Otherwise, no complaints at this time. Patient denies fevers, sweats, chills, SOB, dyspnea, chest pain, nausea/vomiting, diarrhea, constipation, abdominal pain. 12 pt ROS otherwise negative.     REVIEW OF SYSTEMS:  All other review of systems is negative unless indicated above.    OBJECTIVE:  T(C): 36.8 (12-11-21 @ 21:51), Max: 37.5 (12-10-21 @ 22:24)  HR: 75 (12-11-21 @ 18:00) (73 - 98)  BP: 133/74 (12-11-21 @ 18:00) (122/56 - 159/87)  RR: 20 (12-11-21 @ 18:00) (15 - 36)  SpO2: 98% (12-11-21 @ 18:00) (94% - 99%)  Daily     Daily     Physical Exam:  General: in no acute distress, laying in bed, conversant, mildly slow speech  Eyes: EOMI intact bilaterally. Anicteric sclerae, moist conjunctivae. pupils 3-4mm.  HENT: Moist mucous membranes.  Neck: Trachea midline, supple  Lungs: CTA B/L. No wheezes, rales, or ronchi  Cardiovascular: RRR. No murmurs, rubs, or gallops  Abdomen: Soft, non-tender non-distended; No rebound or guarding  Extremities: Normal range of motion, No clubbing, cyanosis or edema. No calf tenderness bilaterally. 5/5 strength in UE/LE bilaterally. Sensation grossly intact bilaterally. Lower extremities cool to touch (L>R), distal pulses intact.  MSK: No midline bony tenderness. No CVA tenderness bilaterally  Neurological: Alert and oriented x3. CN2-12 intact.  Skin: Warm and dry. No obvious rash. Stage I pressure ulcer along L greater trochanter. No purulence or drainage.    Medications:  MEDICATIONS  (STANDING):  cefepime   IVPB 2000 milliGRAM(s) IV Intermittent every 8 hours  chlorhexidine 2% Cloths 1 Application(s) Topical <User Schedule>  CIPROFLOXACIN(CIPRO) OPTHALMIC SOLUTION 5 Drop(s) 5 Drop(s) Both Ears two times a day  dexAMETHasone 0.1% Ophthalmic Solution for OTIC Use 5 Drop(s) Both Ears two times a day  dextrose 40% Gel 15 Gram(s) Oral once  dextrose 5%. 1000 milliLiter(s) (50 mL/Hr) IV Continuous <Continuous>  dextrose 50% Injectable 25 Gram(s) IV Push once  diVALproex  milliGRAM(s) Oral two times a day  enoxaparin Injectable 40 milliGRAM(s) SubCutaneous daily  folic acid 1 milliGRAM(s) Oral daily  glucagon  Injectable 1 milliGRAM(s) IntraMuscular once  haloperidol     Tablet 2 milliGRAM(s) Oral every 12 hours  influenza   Vaccine 0.5 milliLiter(s) IntraMuscular once  multivitamin 1 Tablet(s) Oral daily  nafcillin  IVPB 2 Gram(s) IV Intermittent every 4 hours  nicotine - 21 mG/24Hr(s) Patch 1 patch Transdermal daily    MEDICATIONS  (PRN):  haloperidol    Injectable 5 milliGRAM(s) IV Push every 6 hours PRN Agitation  sodium chloride 0.9% lock flush 10 milliLiter(s) IV Push every 1 hour PRN Pre/post blood products, medications, blood draw, and to maintain line patency      Labs:                        13.0   16.21 )-----------( 370      ( 11 Dec 2021 05:12 )             40.1     12-11    136  |  102  |  10  ----------------------------<  86  3.7   |  26  |  0.90    Ca    9.1      11 Dec 2021 05:12  Phos  2.6     12-11  Mg     2.1     12-11    TPro  7.1  /  Alb  3.3  /  TBili  0.4  /  DBili  x   /  AST  64<H>  /  ALT  71<H>  /  AlkPhos  86  12-11    COVID-19 PCR: [employer] (08 Dec 2021 17:04)    Radiology: Reviewed Internal Medicine Progress Note  Chaitanya Shanks, PGY-1  Pager: [phone]    ******INCOMPLETE******    ************ACCEPTANCE NOTE FROM ICU TO [facility]************    HOSPITAL COURSE: 52 yo M with PMHx no past medical history of epilepsy, trauma or CNS infection, who presents to the ED with an episode of sudden onset speech disturbance talking incoherently and repetitive and altered behavior while working witnessed, no fever. In the last 2 days much more pain on left ear and headache. Pt uses frequently his swimming pool has had left ear infection for 2 months with hypoacusia treated with long course ciprofloxacin and a week of prednisone. In ED presented, repetitive generalized seizures. 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 Started on vancomycin and Zosyn IV. Admitted to ICU to continue his care, on cefepime and vanc IV (to cover CNS pseudomonal infection) propofol drip and placed on ventilator. On ear cultures found staph. aureus and epidermidis. On 12/5, pt was extubated. On IAC CT scan w/wo contrast necrotizing otitis externa with osteomyelitis and soft tissue extent to the retrocondylar fat and infratemporal fosse. ENT and neurosurgery didn't consider beneficial acute intervention at this point. MRI shows left necrotizing otitis externa, 1 cm abscess in the left temporal lobe with adjacent dural thickening. No acute intervention as per Neurosurgery or ENT. Neurosurgery recommended abx per ID recs. ID considers MSSA culprit of this infection and adjust iv antibiotherapy to nafcillin and cefepime. EAR james removed today by ENT after improvement in b/l ear pain. Improvement in his delirium tremens and ICU delirium after management with standing Haldol 2mg.    Patient is a 51y old  Male who presents with a chief complaint of Male complaining of seizures. (11 Dec 2021 14:22)    INTERVAL HPI: Patient seen and examined at bedside. Endorses improving right ear pain s/p R myringotomy Otherwise, no complaints at this time. Patient denies fevers, sweats, chills, SOB, dyspnea, chest pain, nausea/vomiting, diarrhea, constipation, abdominal pain. 12 pt ROS otherwise negative.     REVIEW OF SYSTEMS:  All other review of systems is negative unless indicated above.    OBJECTIVE:  T(C): 36.8 (12-11-21 @ 21:51), Max: 37.5 (12-10-21 @ 22:24)  HR: 75 (12-11-21 @ 18:00) (73 - 98)  BP: 133/74 (12-11-21 @ 18:00) (122/56 - 159/87)  RR: 20 (12-11-21 @ 18:00) (15 - 36)  SpO2: 98% (12-11-21 @ 18:00) (94% - 99%)  Daily     Daily     Physical Exam:  General: in no acute distress, laying in bed, conversant, mildly slow speech  Eyes: EOMI intact bilaterally. Anicteric sclerae, moist conjunctivae. pupils 3-4mm.  HENT: Moist mucous membranes. External ears without surrounding erythema or tenderness to palpation. Left ear with packing intact, no purulence or sanguinous drainage.  Neck: Trachea midline, supple  Lungs: CTA B/L. No wheezes, rales, or ronchi  Cardiovascular: RRR. No murmurs, rubs, or gallops  Abdomen: Soft, non-tender non-distended; No rebound or guarding  Extremities: Normal range of motion, No clubbing, cyanosis or edema. No calf tenderness bilaterally. 5/5 strength in UE/LE bilaterally. Sensation grossly intact bilaterally. Lower extremities cool to touch (L>R), distal pulses intact.  MSK: No midline bony tenderness. No CVA tenderness bilaterally  Neurological: Alert and oriented x3. CN2-12 intact.  Skin: Warm and dry. No obvious rash. Stage I pressure ulcer along L greater trochanter. No purulence or drainage.    Medications:  MEDICATIONS  (STANDING):  cefepime   IVPB 2000 milliGRAM(s) IV Intermittent every 8 hours  chlorhexidine 2% Cloths 1 Application(s) Topical <User Schedule>  CIPROFLOXACIN(CIPRO) OPTHALMIC SOLUTION 5 Drop(s) 5 Drop(s) Both Ears two times a day  dexAMETHasone 0.1% Ophthalmic Solution for OTIC Use 5 Drop(s) Both Ears two times a day  dextrose 40% Gel 15 Gram(s) Oral once  dextrose 5%. 1000 milliLiter(s) (50 mL/Hr) IV Continuous <Continuous>  dextrose 50% Injectable 25 Gram(s) IV Push once  diVALproex  milliGRAM(s) Oral two times a day  enoxaparin Injectable 40 milliGRAM(s) SubCutaneous daily  folic acid 1 milliGRAM(s) Oral daily  glucagon  Injectable 1 milliGRAM(s) IntraMuscular once  haloperidol     Tablet 2 milliGRAM(s) Oral every 12 hours  influenza   Vaccine 0.5 milliLiter(s) IntraMuscular once  multivitamin 1 Tablet(s) Oral daily  nafcillin  IVPB 2 Gram(s) IV Intermittent every 4 hours  nicotine - 21 mG/24Hr(s) Patch 1 patch Transdermal daily    MEDICATIONS  (PRN):  haloperidol    Injectable 5 milliGRAM(s) IV Push every 6 hours PRN Agitation  sodium chloride 0.9% lock flush 10 milliLiter(s) IV Push every 1 hour PRN Pre/post blood products, medications, blood draw, and to maintain line patency      Labs:                        13.0   16.21 )-----------( 370      ( 11 Dec 2021 05:12 )             40.1     12-11    136  |  102  |  10  ----------------------------<  86  3.7   |  26  |  0.90    Ca    9.1      11 Dec 2021 05:12  Phos  2.6     12-11  Mg     2.1     12-11    TPro  7.1  /  Alb  3.3  /  TBili  0.4  /  DBili  x   /  AST  64<H>  /  ALT  71<H>  /  AlkPhos  86  12-11    COVID-19 PCR: NotDetec (08 Dec 2021 17:04)    Radiology: Reviewed Internal Medicine Progress Note  Chaitanya Shanks, PGY-1  Pager: [phone]    ******INCOMPLETE******    ************ACCEPTANCE NOTE FROM ICU TO [facility]************    HOSPITAL COURSE: 52 yo M with PMHx no past medical history of epilepsy, trauma or CNS infection, who presents to the ED with an episode of sudden onset speech disturbance talking incoherently and repetitive and altered behavior while working witnessed, no fever. In the last 2 days much more pain on left ear and headache. Pt uses frequently his swimming pool has had left ear infection for 2 months with hypoacusia treated with long course ciprofloxacin and a week of prednisone. In ED presented, repetitive generalized seizures. 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 Started on vancomycin and Zosyn IV. Admitted to ICU to continue his care, on cefepime and vanc IV (to cover CNS pseudomonal infection) propofol drip and placed on ventilator. On ear cultures found staph. aureus and epidermidis. On 12/5, pt was extubated. On IAC CT scan w/wo contrast necrotizing otitis externa with osteomyelitis and soft tissue extent to the retrocondylar fat and infratemporal fosse. ENT and neurosurgery didn't consider beneficial acute intervention at this point. MRI shows left necrotizing otitis externa, 1 cm abscess in the left temporal lobe with adjacent dural thickening. No acute intervention as per Neurosurgery or ENT. Neurosurgery recommended abx per ID recs. ID considers MSSA culprit of this infection and adjust iv antibiotherapy to nafcillin and cefepime. EAR james removed today by ENT after improvement in b/l ear pain. Improvement in his delirium tremens and ICU delirium after management with standing Haldol 2mg.    Patient is a 51y old  Male who presents with a chief complaint of Male complaining of seizures. (11 Dec 2021 14:22)    INTERVAL HPI: Patient seen and examined at bedside. Endorses improving bilateral ear pain s/p R myringotomy Otherwise, no complaints at this time. Denies tinnitus. Patient denies fevers, sweats, chills, SOB, dyspnea, chest pain, nausea/vomiting, diarrhea, constipation, abdominal pain. 12 pt ROS otherwise negative.     REVIEW OF SYSTEMS:  All other review of systems is negative unless indicated above.    OBJECTIVE:  T(C): 36.8 (12-11-21 @ 21:51), Max: 37.5 (12-10-21 @ 22:24)  HR: 75 (12-11-21 @ 18:00) (73 - 98)  BP: 133/74 (12-11-21 @ 18:00) (122/56 - 159/87)  RR: 20 (12-11-21 @ 18:00) (15 - 36)  SpO2: 98% (12-11-21 @ 18:00) (94% - 99%)  Daily     Daily     Physical Exam:  General: in no acute distress, laying in bed, conversant, mildly slow speech  Eyes: EOMI intact bilaterally. Anicteric sclerae, moist conjunctivae. pupils 3-4mm.  HENT: Moist mucous membranes. External ears without surrounding erythema or tenderness to palpation. Left ear with packing intact, no purulence or sanguinous drainage.  Neck: Trachea midline, supple  Lungs: CTA B/L. No wheezes, rales, or ronchi  Cardiovascular: RRR. No murmurs, rubs, or gallops  Abdomen: Soft, non-tender non-distended; No rebound or guarding  Extremities: Normal range of motion, No clubbing, cyanosis or edema. No calf tenderness bilaterally. 5/5 strength in UE/LE bilaterally. Sensation grossly intact bilaterally. Lower extremities cool to touch (L>R), distal pulses intact.  MSK: No midline bony tenderness. No CVA tenderness bilaterally  Neurological: Alert and oriented x3. CN2-12 intact.  Skin: Warm and dry. No obvious rash. Stage I pressure ulcer along L greater trochanter. No purulence or drainage.    Medications:  MEDICATIONS  (STANDING):  cefepime   IVPB 2000 milliGRAM(s) IV Intermittent every 8 hours  chlorhexidine 2% Cloths 1 Application(s) Topical <User Schedule>  CIPROFLOXACIN(CIPRO) OPTHALMIC SOLUTION 5 Drop(s) 5 Drop(s) Both Ears two times a day  dexAMETHasone 0.1% Ophthalmic Solution for OTIC Use 5 Drop(s) Both Ears two times a day  dextrose 40% Gel 15 Gram(s) Oral once  dextrose 5%. 1000 milliLiter(s) (50 mL/Hr) IV Continuous <Continuous>  dextrose 50% Injectable 25 Gram(s) IV Push once  diVALproex  milliGRAM(s) Oral two times a day  enoxaparin Injectable 40 milliGRAM(s) SubCutaneous daily  folic acid 1 milliGRAM(s) Oral daily  glucagon  Injectable 1 milliGRAM(s) IntraMuscular once  haloperidol     Tablet 2 milliGRAM(s) Oral every 12 hours  influenza   Vaccine 0.5 milliLiter(s) IntraMuscular once  multivitamin 1 Tablet(s) Oral daily  nafcillin  IVPB 2 Gram(s) IV Intermittent every 4 hours  nicotine - 21 mG/24Hr(s) Patch 1 patch Transdermal daily    MEDICATIONS  (PRN):  haloperidol    Injectable 5 milliGRAM(s) IV Push every 6 hours PRN Agitation  sodium chloride 0.9% lock flush 10 milliLiter(s) IV Push every 1 hour PRN Pre/post blood products, medications, blood draw, and to maintain line patency      Labs:                        13.0   16.21 )-----------( 370      ( 11 Dec 2021 05:12 )             40.1     12-11    136  |  102  |  10  ----------------------------<  86  3.7   |  26  |  0.90    Ca    9.1      11 Dec 2021 05:12  Phos  2.6     12-11  Mg     2.1     12-11    TPro  7.1  /  Alb  3.3  /  TBili  0.4  /  DBili  x   /  AST  64<H>  /  ALT  71<H>  /  AlkPhos  86  12-11    COVID-19 PCR: [employer] (08 Dec 2021 17:04)    Radiology: Reviewed Internal Medicine Progress Note  Chaitanya Shanks, PGY-1  Pager: [phone]    ******INCOMPLETE******    ************ACCEPTANCE NOTE FROM ICU TO [facility]************    HOSPITAL COURSE: 52 yo M with PMHx no past medical history of epilepsy, trauma or CNS infection, who presents to the ED with an episode of sudden onset speech disturbance talking incoherently and repetitive and altered behavior while working witnessed, no fever. In the last 2 days much more pain on left ear and headache. Pt uses frequently his swimming pool has had left ear infection for 2 months with hypoacusia treated with long course ciprofloxacin and a week of prednisone. Presented to ED on 12/3 with repetitive generalized seizures. 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 Started on vancomycin and Zosyn IV. Admitted to ICU to continue his care, on cefepime and vanc IV (to cover CNS pseudomonal infection) propofol drip and placed on ventilator. On ear cultures found staph. aureus and epidermidis. On 12/5, pt was extubated. On IAC CT scan w/wo contrast necrotizing otitis externa with osteomyelitis and soft tissue extent to the retrocondylar fat and infratemporal fosse. MRI shows left necrotizing otitis externa, 1 cm abscess in the left temporal lobe with adjacent dural thickening. No acute intervention as per Neurosurgery or ENT. Neurosurgery recommended abx per ID recs. ID considers MSSA culprit of this infection and abx adjusted to IV nafcillin and cefepime. Ear james removed today by ENT after improvement in b/l ear pain. Improvement in his delirium tremens and ICU delirium after management with standing Haldol 2mg.    Patient is a 51y old  Male who presents with a chief complaint of Male complaining of seizures. (11 Dec 2021 14:22)    INTERVAL HPI: Patient seen and examined at bedside. Endorses improving bilateral ear pain s/p R myringotomy Otherwise, no complaints at this time. Denies tinnitus. Patient denies fevers, sweats, chills, SOB, dyspnea, chest pain, nausea/vomiting, diarrhea, constipation, abdominal pain. 12 pt ROS otherwise negative.     REVIEW OF SYSTEMS:  All other review of systems is negative unless indicated above.    OBJECTIVE:  T(C): 36.8 (12-11-21 @ 21:51), Max: 37.5 (12-10-21 @ 22:24)  HR: 75 (12-11-21 @ 18:00) (73 - 98)  BP: 133/74 (12-11-21 @ 18:00) (122/56 - 159/87)  RR: 20 (12-11-21 @ 18:00) (15 - 36)  SpO2: 98% (12-11-21 @ 18:00) (94% - 99%)  Daily     Daily     Physical Exam:  General: in no acute distress, laying in bed, conversant, mildly slow speech  Eyes: EOMI intact bilaterally. Anicteric sclerae, moist conjunctivae. pupils 3-4mm.  HENT: Moist mucous membranes. External ears without surrounding erythema or tenderness to palpation. Left ear with packing intact, no purulence or sanguinous drainage.  Neck: Trachea midline, supple  Lungs: CTA B/L. No wheezes, rales, or ronchi  Cardiovascular: RRR. No murmurs, rubs, or gallops  Abdomen: Soft, non-tender non-distended; No rebound or guarding  Extremities: Normal range of motion, No clubbing, cyanosis or edema. No calf tenderness bilaterally. 5/5 strength in UE/LE bilaterally. Sensation grossly intact bilaterally. Lower extremities cool to touch (L>R), distal pulses intact.  MSK: No midline bony tenderness. No CVA tenderness bilaterally  Neurological: Alert and oriented x3. CN2-12 intact.  Skin: Warm and dry. No obvious rash. Stage I pressure ulcer along L greater trochanter. No purulence or drainage.    Medications:  MEDICATIONS  (STANDING):  cefepime   IVPB 2000 milliGRAM(s) IV Intermittent every 8 hours  chlorhexidine 2% Cloths 1 Application(s) Topical <User Schedule>  CIPROFLOXACIN(CIPRO) OPTHALMIC SOLUTION 5 Drop(s) 5 Drop(s) Both Ears two times a day  dexAMETHasone 0.1% Ophthalmic Solution for OTIC Use 5 Drop(s) Both Ears two times a day  dextrose 40% Gel 15 Gram(s) Oral once  dextrose 5%. 1000 milliLiter(s) (50 mL/Hr) IV Continuous <Continuous>  dextrose 50% Injectable 25 Gram(s) IV Push once  diVALproex  milliGRAM(s) Oral two times a day  enoxaparin Injectable 40 milliGRAM(s) SubCutaneous daily  folic acid 1 milliGRAM(s) Oral daily  glucagon  Injectable 1 milliGRAM(s) IntraMuscular once  haloperidol     Tablet 2 milliGRAM(s) Oral every 12 hours  influenza   Vaccine 0.5 milliLiter(s) IntraMuscular once  multivitamin 1 Tablet(s) Oral daily  nafcillin  IVPB 2 Gram(s) IV Intermittent every 4 hours  nicotine - 21 mG/24Hr(s) Patch 1 patch Transdermal daily    MEDICATIONS  (PRN):  haloperidol    Injectable 5 milliGRAM(s) IV Push every 6 hours PRN Agitation  sodium chloride 0.9% lock flush 10 milliLiter(s) IV Push every 1 hour PRN Pre/post blood products, medications, blood draw, and to maintain line patency      Labs:                        13.0   16.21 )-----------( 370      ( 11 Dec 2021 05:12 )             40.1     12-11    136  |  102  |  10  ----------------------------<  86  3.7   |  26  |  0.90    Ca    9.1      11 Dec 2021 05:12  Phos  2.6     12-11  Mg     2.1     12-11    TPro  7.1  /  Alb  3.3  /  TBili  0.4  /  DBili  x   /  AST  64<H>  /  ALT  71<H>  /  AlkPhos  86  12-11    COVID-19 PCR: Khaliftedebra (08 Dec 2021 17:04)    Radiology: Reviewed Internal Medicine Progress Note  Chaitanya Shanks, PGY-1  Pager: [phone]    ******INCOMPLETE******    ************ACCEPTANCE NOTE FROM ICU TO [facility]************    HOSPITAL COURSE: 50 yo M with PMHx no past medical history of epilepsy, trauma or CNS infection, who presents to the ED with an episode of sudden onset speech disturbance talking incoherently and repetitive and altered behavior while working witnessed, no fever. In the last 2 days much more pain on left ear and headache. Pt uses frequently his swimming pool has had left ear infection for 2 months with hypoacusia treated with long course ciprofloxacin and a week of prednisone. Presented to ED on 12/3 with repetitive generalized seizures. 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 Started on vancomycin and Zosyn IV. Admitted to ICU to continue his care, on cefepime and vanc IV (to cover CNS pseudomonal infection) propofol drip and placed on ventilator. On ear cultures found staph. aureus and epidermidis. On 12/5, pt was extubated. On IAC CT scan w/wo contrast necrotizing otitis externa with osteomyelitis and soft tissue extent to the retrocondylar fat and infratemporal fosse. MRI shows left necrotizing otitis externa, 1 cm abscess in the left temporal lobe with adjacent dural thickening. No acute intervention as per Neurosurgery or ENT. Neurosurgery recommended abx per ID recs. ID considers MSSA culprit of this infection and abx adjusted to IV nafcillin and cefepime. Ear james removed today by ENT after improvement in b/l ear pain. Improvement in his delirium tremens and ICU delirium after management with standing Haldol 2mg.    Patient is a 51y old  Male who presents with a chief complaint of Male complaining of seizures. (11 Dec 2021 14:22)    INTERVAL HPI: Patient seen and examined at bedside. Endorses improving bilateral ear pain s/p R myringotomy Otherwise, no complaints at this time. Denies tinnitus. Patient denies fevers, sweats, chills, SOB, dyspnea, chest pain, nausea/vomiting, diarrhea, constipation, abdominal pain. 12 pt ROS otherwise negative.     REVIEW OF SYSTEMS:  All other review of systems is negative unless indicated above.    OBJECTIVE:  T(C): 36.8 (12-11-21 @ 21:51), Max: 37.5 (12-10-21 @ 22:24)  HR: 75 (12-11-21 @ 18:00) (73 - 98)  BP: 133/74 (12-11-21 @ 18:00) (122/56 - 159/87)  RR: 20 (12-11-21 @ 18:00) (15 - 36)  SpO2: 98% (12-11-21 @ 18:00) (94% - 99%)  Daily     Daily     Physical Exam:  General: in no acute distress, laying in bed, pleasant, conversant, mildly slow speech  Eyes: EOMI intact bilaterally. Anicteric sclerae, moist conjunctivae. pupils 3-4mm.  HENT: Moist mucous membranes. External ears without surrounding erythema or tenderness to palpation. Left ear with packing intact, no purulence or sanguinous drainage.  Neck: Trachea midline, supple  Lungs: CTA B/L. No wheezes, rales, or ronchi  Cardiovascular: RRR. No murmurs, rubs, or gallops  Abdomen: Soft, non-tender non-distended; No rebound or guarding  Extremities: Normal range of motion, No clubbing, cyanosis or edema. No calf tenderness bilaterally. 5/5 strength in UE/LE bilaterally. Sensation grossly intact bilaterally. Lower extremities cool to touch (L>R), distal pulses intact.  MSK: No midline bony tenderness. No CVA tenderness bilaterally  Neurological: Alert and oriented x3. CN2-12 intact.  Skin: Warm and dry. No obvious rash. Stage I pressure ulcer along L greater trochanter. No purulence or drainage.    Medications:  MEDICATIONS  (STANDING):  cefepime   IVPB 2000 milliGRAM(s) IV Intermittent every 8 hours  chlorhexidine 2% Cloths 1 Application(s) Topical <User Schedule>  CIPROFLOXACIN(CIPRO) OPTHALMIC SOLUTION 5 Drop(s) 5 Drop(s) Both Ears two times a day  dexAMETHasone 0.1% Ophthalmic Solution for OTIC Use 5 Drop(s) Both Ears two times a day  dextrose 40% Gel 15 Gram(s) Oral once  dextrose 5%. 1000 milliLiter(s) (50 mL/Hr) IV Continuous <Continuous>  dextrose 50% Injectable 25 Gram(s) IV Push once  diVALproex  milliGRAM(s) Oral two times a day  enoxaparin Injectable 40 milliGRAM(s) SubCutaneous daily  folic acid 1 milliGRAM(s) Oral daily  glucagon  Injectable 1 milliGRAM(s) IntraMuscular once  haloperidol     Tablet 2 milliGRAM(s) Oral every 12 hours  influenza   Vaccine 0.5 milliLiter(s) IntraMuscular once  multivitamin 1 Tablet(s) Oral daily  nafcillin  IVPB 2 Gram(s) IV Intermittent every 4 hours  nicotine - 21 mG/24Hr(s) Patch 1 patch Transdermal daily    MEDICATIONS  (PRN):  haloperidol    Injectable 5 milliGRAM(s) IV Push every 6 hours PRN Agitation  sodium chloride 0.9% lock flush 10 milliLiter(s) IV Push every 1 hour PRN Pre/post blood products, medications, blood draw, and to maintain line patency      Labs:                        13.0   16.21 )-----------( 370      ( 11 Dec 2021 05:12 )             40.1     12-11    136  |  102  |  10  ----------------------------<  86  3.7   |  26  |  0.90    Ca    9.1      11 Dec 2021 05:12  Phos  2.6     12-11  Mg     2.1     12-11    TPro  7.1  /  Alb  3.3  /  TBili  0.4  /  DBili  x   /  AST  64<H>  /  ALT  71<H>  /  AlkPhos  86  12-11    COVID-19 PCR: NotDetec (08 Dec 2021 17:04)    Radiology: Reviewed Internal Medicine Progress Note  Chaitanya Dimitris, PGY-1  Pager: [phone]    ************ACCEPTANCE NOTE FROM ICU TO [facility]************    HOSPITAL COURSE: 50 yo M with PMHx no past medical history of epilepsy, trauma or CNS infection, who presents to the ED with an episode of sudden onset speech disturbance talking incoherently and repetitive and altered behavior while working witnessed, no fever. In the last 2 days much more pain on left ear and headache. Pt uses frequently his swimming pool has had left ear infection for 2 months with hypoacusia treated with long course ciprofloxacin and a week of prednisone. Presented to ED on 12/3 with repetitive generalized seizures. 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 Started on vancomycin and Zosyn IV. Admitted to ICU to continue his care, on cefepime and vanc IV (to cover CNS pseudomonal infection) propofol drip and placed on ventilator. On ear cultures found staph. aureus and epidermidis. On 12/5, pt was extubated. On IAC CT scan w/wo contrast necrotizing otitis externa with osteomyelitis and soft tissue extent to the retrocondylar fat and infratemporal fosse. MRI shows left necrotizing otitis externa, 1 cm abscess in the left temporal lobe with adjacent dural thickening. No acute intervention as per Neurosurgery or ENT. Neurosurgery recommended abx per ID recs. ID considers MSSA culprit of this infection and abx adjusted to IV nafcillin and cefepime. S/p R myringotomy on 12/9. Ear james removed today by ENT after improvement in b/l ear pain. Taken off of precedex on 12/10 AM. Improvement in his delirium tremens and ICU delirium after management with standing Haldol 2mg.    Patient is a 51y old  Male who presents with a chief complaint of Male complaining of seizures. (11 Dec 2021 14:22)    INTERVAL HPI: Patient seen and examined at bedside. Endorses improving bilateral ear pain s/p R myringotomy Otherwise, no complaints at this time. Denies tinnitus. Patient denies fevers, sweats, chills, SOB, dyspnea, chest pain, nausea/vomiting, diarrhea, constipation, abdominal pain. 12 pt ROS otherwise negative.     REVIEW OF SYSTEMS:  All other review of systems is negative unless indicated above.    OBJECTIVE:  T(C): 36.8 (12-11-21 @ 21:51), Max: 37.5 (12-10-21 @ 22:24)  HR: 75 (12-11-21 @ 18:00) (73 - 98)  BP: 133/74 (12-11-21 @ 18:00) (122/56 - 159/87)  RR: 20 (12-11-21 @ 18:00) (15 - 36)  SpO2: 98% (12-11-21 @ 18:00) (94% - 99%)  Daily     Daily     Physical Exam:  General: in no acute distress, laying in bed, pleasant, conversant, mildly slow speech  Eyes: EOMI intact bilaterally. Anicteric sclerae, moist conjunctivae. pupils 3-4mm.  HENT: Moist mucous membranes. External ears without surrounding erythema or tenderness to palpation. Left ear with packing intact, no purulence or sanguinous drainage.  Neck: Trachea midline, supple  Lungs: CTA B/L. No wheezes, rales, or ronchi  Cardiovascular: RRR. No murmurs, rubs, or gallops  Abdomen: Soft, non-tender non-distended; No rebound or guarding  Extremities: Normal range of motion, No clubbing, cyanosis or edema. No calf tenderness bilaterally. 5/5 strength in UE/LE bilaterally. Sensation grossly intact bilaterally. Lower extremities cool to touch (L>R), distal pulses intact.  MSK: No midline bony tenderness. No CVA tenderness bilaterally  Neurological: Alert and oriented x3. CN2-12 intact.  Skin: Warm and dry. No obvious rash. Stage I pressure ulcer along L greater trochanter. No purulence or drainage.    Medications:  MEDICATIONS  (STANDING):  cefepime   IVPB 2000 milliGRAM(s) IV Intermittent every 8 hours  chlorhexidine 2% Cloths 1 Application(s) Topical <User Schedule>  CIPROFLOXACIN(CIPRO) OPTHALMIC SOLUTION 5 Drop(s) 5 Drop(s) Both Ears two times a day  dexAMETHasone 0.1% Ophthalmic Solution for OTIC Use 5 Drop(s) Both Ears two times a day  dextrose 40% Gel 15 Gram(s) Oral once  dextrose 5%. 1000 milliLiter(s) (50 mL/Hr) IV Continuous <Continuous>  dextrose 50% Injectable 25 Gram(s) IV Push once  diVALproex  milliGRAM(s) Oral two times a day  enoxaparin Injectable 40 milliGRAM(s) SubCutaneous daily  folic acid 1 milliGRAM(s) Oral daily  glucagon  Injectable 1 milliGRAM(s) IntraMuscular once  haloperidol     Tablet 2 milliGRAM(s) Oral every 12 hours  influenza   Vaccine 0.5 milliLiter(s) IntraMuscular once  multivitamin 1 Tablet(s) Oral daily  nafcillin  IVPB 2 Gram(s) IV Intermittent every 4 hours  nicotine - 21 mG/24Hr(s) Patch 1 patch Transdermal daily    MEDICATIONS  (PRN):  haloperidol    Injectable 5 milliGRAM(s) IV Push every 6 hours PRN Agitation  sodium chloride 0.9% lock flush 10 milliLiter(s) IV Push every 1 hour PRN Pre/post blood products, medications, blood draw, and to maintain line patency      Labs:                        13.0   16.21 )-----------( 370      ( 11 Dec 2021 05:12 )             40.1     12-11    136  |  102  |  10  ----------------------------<  86  3.7   |  26  |  0.90    Ca    9.1      11 Dec 2021 05:12  Phos  2.6     12-11  Mg     2.1     12-11    TPro  7.1  /  Alb  3.3  /  TBili  0.4  /  DBili  x   /  AST  64<H>  /  ALT  71<H>  /  AlkPhos  86  12-11    COVID-19 PCR: Khaliftedebra (08 Dec 2021 17:04)    Radiology: Reviewed Internal Medicine Progress Note  Chaitanya Dimitris, PGY-1  Pager: [phone]    ************ACCEPTANCE NOTE FROM ICU TO [facility]************    HOSPITAL COURSE: 52 yo M with PMHx no past medical history of epilepsy, trauma or CNS infection, who presents to the ED with an episode of sudden onset speech disturbance talking incoherently and repetitive and altered behavior while working witnessed, no fever. In the last 2 days much more pain on left ear and headache. Pt uses frequently his swimming pool has had left ear infection for 2 months with hypoacusia treated with long course ciprofloxacin and a week of prednisone. Presented to ED on 12/3 with repetitive generalized seizures. 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 Started on vancomycin and Zosyn IV. Admitted to ICU to continue his care, on cefepime and vanc IV (to cover CNS pseudomonal infection) propofol drip and placed on ventilator. On ear cultures found staph. aureus and epidermidis. On 12/5, pt was extubated. On IAC CT scan w/wo contrast necrotizing otitis externa with osteomyelitis and soft tissue extent to the retrocondylar fat and infratemporal fosse. MRI shows left necrotizing otitis externa, 1 cm abscess in the left temporal lobe with adjacent dural thickening. No acute intervention as per Neurosurgery or ENT. Neurosurgery recommended abx per ID recs. ID considers MSSA culprit of this infection and abx adjusted to IV nafcillin and cefepime. S/p R myringotomy on 12/9. Ear james removed today by ENT after improvement in b/l ear pain. Taken off of precedex on 12/10 AM. Improvement in his delirium tremens and ICU delirium after management with standing Haldol 2mg.    Patient is a 51y old  Male who presents with a chief complaint of Male complaining of seizures. (11 Dec 2021 14:22)    INTERVAL HPI: Patient seen and examined at bedside. Endorses improving bilateral ear pain s/p R myringotomy Otherwise, no complaints at this time. Denies tinnitus. Patient denies fevers, sweats, chills, SOB, dyspnea, chest pain, nausea/vomiting, diarrhea, constipation, abdominal pain. 12 pt ROS otherwise negative.     REVIEW OF SYSTEMS:  All other review of systems is negative unless indicated above.    OBJECTIVE:  T(C): 36.8 (12-11-21 @ 21:51), Max: 37.5 (12-10-21 @ 22:24)  HR: 75 (12-11-21 @ 18:00) (73 - 98)  BP: 133/74 (12-11-21 @ 18:00) (122/56 - 159/87)  RR: 20 (12-11-21 @ 18:00) (15 - 36)  SpO2: 98% (12-11-21 @ 18:00) (94% - 99%)  Daily     Daily     Physical Exam:  General: in no acute distress, laying in bed, pleasant, conversant, mildly slow speech  Eyes: EOMI intact bilaterally. Anicteric sclerae, moist conjunctivae. pupils 3-4mm.  HENT: Moist mucous membranes. External ears without surrounding erythema or tenderness to palpation. Left ear with packing intact, no purulence or sanguinous drainage.  Neck: Trachea midline, supple  Lungs: CTA B/L. No wheezes, rales, or ronchi  Cardiovascular: RRR. No murmurs, rubs, or gallops  Abdomen: Soft, non-tender non-distended; No rebound or guarding  Extremities: Normal range of motion, No clubbing, cyanosis or edema. No calf tenderness bilaterally. 5/5 strength in UE/LE bilaterally. Sensation grossly intact bilaterally. Lower extremities cool to touch (L>R), distal pulses intact.  MSK: No midline bony tenderness. No CVA tenderness bilaterally  Neurological: Alert and oriented x3. CN2-12 intact.  Skin: Warm and dry. No obvious rash. Stage I pressure ulcer along L greater trochanter. No purulence or drainage. R arm PICC in place.    Medications:  MEDICATIONS  (STANDING):  cefepime   IVPB 2000 milliGRAM(s) IV Intermittent every 8 hours  chlorhexidine 2% Cloths 1 Application(s) Topical <User Schedule>  CIPROFLOXACIN(CIPRO) OPTHALMIC SOLUTION 5 Drop(s) 5 Drop(s) Both Ears two times a day  dexAMETHasone 0.1% Ophthalmic Solution for OTIC Use 5 Drop(s) Both Ears two times a day  dextrose 40% Gel 15 Gram(s) Oral once  dextrose 5%. 1000 milliLiter(s) (50 mL/Hr) IV Continuous <Continuous>  dextrose 50% Injectable 25 Gram(s) IV Push once  diVALproex  milliGRAM(s) Oral two times a day  enoxaparin Injectable 40 milliGRAM(s) SubCutaneous daily  folic acid 1 milliGRAM(s) Oral daily  glucagon  Injectable 1 milliGRAM(s) IntraMuscular once  haloperidol     Tablet 2 milliGRAM(s) Oral every 12 hours  influenza   Vaccine 0.5 milliLiter(s) IntraMuscular once  multivitamin 1 Tablet(s) Oral daily  nafcillin  IVPB 2 Gram(s) IV Intermittent every 4 hours  nicotine - 21 mG/24Hr(s) Patch 1 patch Transdermal daily    MEDICATIONS  (PRN):  haloperidol    Injectable 5 milliGRAM(s) IV Push every 6 hours PRN Agitation  sodium chloride 0.9% lock flush 10 milliLiter(s) IV Push every 1 hour PRN Pre/post blood products, medications, blood draw, and to maintain line patency      Labs:                        13.0   16.21 )-----------( 370      ( 11 Dec 2021 05:12 )             40.1     12-11    136  |  102  |  10  ----------------------------<  86  3.7   |  26  |  0.90    Ca    9.1      11 Dec 2021 05:12  Phos  2.6     12-11  Mg     2.1     12-11    TPro  7.1  /  Alb  3.3  /  TBili  0.4  /  DBili  x   /  AST  64<H>  /  ALT  71<H>  /  AlkPhos  86  12-11    COVID-19 PCR: Khaliftedebra (08 Dec 2021 17:04)    Radiology: Reviewed

## 2021-12-11 NOTE — PROGRESS NOTE ADULT - PROBLEM SELECTOR PLAN 5
Mildly trend up of AST/ALT  - Monitor LFTs , ALT 60 as of 12/7/21. Pt with history of heavy alcohol use. No RUQ tenderness on exam.  - continue to monitor , ALT 60 as of 12/7/21. Pt with history of heavy alcohol use. No RUQ tenderness on exam.  - continue to monitor  - f/u hepatitis panel in AM

## 2021-12-11 NOTE — PROGRESS NOTE ADULT - NUTRITIONAL ASSESSMENT
CARDIO  #Preoperative cardiovascular risk assessment   No previous symptoms of CHF, no orthopnea, no dyspnea on excertion, no NPD, no B/L LE edema, he walks several blocks without, no problems to walk up stairs. EKG sinus rhythm 96 bmp.   Currently on phenobarbital due to alcohol withdrawal, but otherwise hemodynamically stable without evidence of worsening sepsis    Low risk for a low risk procedure (ENT procedure), RCRI class I Risk       RESPIRATORY  Intubated for protecting airways in patient with status epilepticus. Weaned vent as tolerated the second day. CxR w/o significant findings

## 2021-12-11 NOTE — PROGRESS NOTE ADULT - PROBLEM SELECTOR PLAN 1
#Bilateral otitis externa, left greater than right  PE does not reveal any granulation tissue in left EAC, however, b/l EACs are edematous and tender to manipulation. Currently on cefepime/vancomycin per ID with ciprodex ear drops b/l with improvement in fevers and WBC. No signs of mastoiditis on clinical examination. EAR james removed today. Pt endorses improvement in b/l ear pain.    PLAN:  -OR with cultures and R myringotomy placement by ENT (12/9)  -F/u new left EAC cx  -ENT recommends hem/onc consult to r/o intrinsic bone conditions #Bilateral otitis externa, left greater than right  Currently on cefepime/vancomycin per ID with ciprodex ear drops b/l with improvement in fevers and WBC. No signs of mastoiditis on clinical examination. EAR james removed. Pt endorses improvement in b/l ear pain.    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 MRI shows left necrotizing otitis externa, 1 cm abscess in the left temporal lobe with adjacent dural thickening. Blood cx (NGTD), urine cx (NGTD) and ear cx (staph. aureus and epidermidis). IAC CT scan w/wo contrast necrotizing otitis externa with osteomyelitis and soft tissue extent to the retrocondylar fat and infratemporal fosse.  ENT and neurosurgery no acute surgical intervention.   - 12/9 R ear OR culture, 2/8 ear culture and 12/3 L ear culture also grew MSSA. (likely culprit)  - Staph epi and coryne considered skin contaminant.   - PICC line in place  PLAN:  - F/u surveillance cultures (OR cultures, please send bacterial, fungal and AFB cultures)  - C/w Ciprodex bilaterally; 5 drops to each ear wick twice a day; with Dexamethasone 0.1% 5 drops  - Nafcillin 2g IV q4h (for a total of 6 weeks) and cefepime 2g IV q8h (for now until OR culture finalize). 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 MRI shows left necrotizing otitis externa, 1 cm abscess in the left temporal lobe with adjacent dural thickening. Blood cx (NGTD), urine cx (NGTD) and ear cx (staph. aureus and epidermidis). IAC CT scan w/wo contrast necrotizing otitis externa with osteomyelitis and soft tissue extent to the retrocondylar fat and infratemporal fosse.  ENT and neurosurgery no acute surgical intervention.   - 12/9 R ear OR culture, 2/8 ear culture and 12/3 L ear culture also grew MSSA. (likely culprit)  - Staph epi and coryne considered skin contaminant.   - PICC line in place  PLAN:  - Nafcillin 2g IV q4h (for a total of 6 weeks), per ID recs  - Cefepime 2g IV q8h (for now until OR culture finalize), per ID recs  - c/w ciprodex 5 drops & dexamethasone 0.1% 5 drops to both ears BID  - F/u surveillance cultures (OR cultures, please send bacterial, fungal and AFB cultures)

## 2021-12-11 NOTE — PROGRESS NOTE ADULT - SUBJECTIVE AND OBJECTIVE BOX
INFECTIOUS DISEASES CONSULT FOLLOW-UP NOTE    INTERVAL HPI/OVERNIGHT EVENTS:      ROS:   Constitutional, eyes, ENT, cardiovascular, respiratory, gastrointestinal, genitourinary, integumentary, neurological, psychiatric and heme/lymph are otherwise negative other than noted above       ANTIBIOTICS/RELEVANT:    MEDICATIONS  (STANDING):  cefepime   IVPB 2000 milliGRAM(s) IV Intermittent every 8 hours  chlorhexidine 2% Cloths 1 Application(s) Topical <User Schedule>  CIPROFLOXACIN(CIPRO) OPTHALMIC SOLUTION 5 Drop(s) 5 Drop(s) Both Ears two times a day  dexAMETHasone 0.1% Ophthalmic Solution for OTIC Use 5 Drop(s) Both Ears two times a day  dextrose 40% Gel 15 Gram(s) Oral once  dextrose 5%. 1000 milliLiter(s) (50 mL/Hr) IV Continuous <Continuous>  dextrose 50% Injectable 25 Gram(s) IV Push once  diVALproex  milliGRAM(s) Oral two times a day  enoxaparin Injectable 40 milliGRAM(s) SubCutaneous daily  folic acid 1 milliGRAM(s) Oral daily  glucagon  Injectable 1 milliGRAM(s) IntraMuscular once  haloperidol     Tablet 2 milliGRAM(s) Oral every 12 hours  influenza   Vaccine 0.5 milliLiter(s) IntraMuscular once  multivitamin 1 Tablet(s) Oral daily  nafcillin  IVPB 2 Gram(s) IV Intermittent every 4 hours  nicotine - 21 mG/24Hr(s) Patch 1 patch Transdermal daily    MEDICATIONS  (PRN):  haloperidol    Injectable 5 milliGRAM(s) IV Push every 6 hours PRN Agitation  sodium chloride 0.9% lock flush 10 milliLiter(s) IV Push every 1 hour PRN Pre/post blood products, medications, blood draw, and to maintain line patency        Vital Signs Last 24 Hrs  T(C): 37.1 (11 Dec 2021 09:19), Max: 37.5 (10 Dec 2021 22:24)  T(F): 98.8 (11 Dec 2021 09:19), Max: 99.5 (10 Dec 2021 22:24)  HR: 98 (11 Dec 2021 10:00) (72 - 98)  BP: 159/87 (11 Dec 2021 10:00) (99/50 - 159/87)  BP(mean): 114 (11 Dec 2021 10:00) (70 - 114)  RR: 35 (11 Dec 2021 10:00) (15 - 36)  SpO2: 99% (11 Dec 2021 10:00) (92% - 99%)    12-10-21 @ 07:01  -  12-11-21 @ 07:00  --------------------------------------------------------  IN: 1294.4 mL / OUT: 2920 mL / NET: -1625.6 mL      PHYSICAL EXAM:  Constitutional: alert, NAD  Eyes: the sclera and conjunctiva were normal.   ENT: the ears and nose were normal in appearance.   Neck: the appearance of the neck was normal and the neck was supple.   Pulmonary: no respiratory distress and lungs were clear to auscultation bilaterally.   Heart: heart rate was normal and rhythm regular, normal S1 and S2  Vascular:. there was no peripheral edema  Abdomen: normal bowel sounds, soft, non-tender        LABS:                        13.0   16.21 )-----------( 370      ( 11 Dec 2021 05:12 )             40.1     12-11    136  |  102  |  10  ----------------------------<  86  3.7   |  26  |  0.90    Ca    9.1      11 Dec 2021 05:12  Phos  2.6     12-11  Mg     2.1     12-11    TPro  7.1  /  Alb  3.3  /  TBili  0.4  /  DBili  x   /  AST  64<H>  /  ALT  71<H>  /  AlkPhos  86  12-11          MICROBIOLOGY:  12/10 R ear AFB: p  12/10 L ear fungal: p  12/10 R ear fungal: p  12/9 R ear: MSSA, anaerococcus vaginalis   12/9 R ear: MSSA  12/8 Ear ear : MSSA  12/3 L ear: MSSA, staph epi, corynebacterium amycolatum     BCx all neg        RADIOLOGY & ADDITIONAL STUDIES:  12/6 MRI  In this patient with known left necrotizing otitis externa, a 1 cm abscess is identified in the left temporal lobe with adjacent dural thickening. Inflammatory changes also present extracranially without abscess formation, as above.

## 2021-12-11 NOTE — PROGRESS NOTE ADULT - ASSESSMENT
50 yo M with PMHx ETOH use and polysubstance abuse  Sudden onset speech disturbance talking incoherently and altered behavior s/p generalized seizures.   2 months with hypoacusia, redness and pain treated with long course ciprofloxacin and short of prednisone without resolving symptoms.   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   On R ear OR, ear culture and L ear culture grew MSSA.   Now delirium tremens + ICU delirium finally controlled.   B/L cultures and R myringotomy placement by ENT, EAR james removed today after ear pain improvement  Main interventions are: AEDs (Depakote) for epilepsy, haloperidol for agitation and ABx (nafcillin and cefepime by PICC line for MSSA infection) for infection. 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 Course complicated by delirium tremens + ICU delirium (now controlled). Now s/p R myringotomy placement by ENT, on Depakote for epilepsy, haloperidol for agitation and ABx (nafcillin and cefepime by PICC line) for MSSA infection.

## 2021-12-11 NOTE — PROGRESS NOTE ADULT - ASSESSMENT
50 yo M with PMHx ETOH use and polysubstance abuse  Sudden onset speech disturbance talking incoherently and altered behavior s/p generalized seizures.   2 months with hypoacusia, redness and pain treated with long course ciprofloxacin and short of prednisone without resolving symptoms.   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   On R ear OR, ear culture and L ear culture grew MSSA.   Now delirium tremens + ICU delirium finally controlled.   B/L cultures and R myringotomy placement by ENT, EAR james removed today after ear pain improvement  Main interventions are: AEDs (Depakote) for epilepsy, haloperidol for agitation and ABx (nafcillin and cefepime by PICC line for MSSA infection) for infection.    NEURO  Off of Precedex   #Delirium tremens + ICU delirium  Important alcohol use (3-4 beers per day)  - RASS 0-1  PLAN:  - Precedex infusion on. Guanfisen if responds to Precedex.  - Increased thiamine 500mg q8h IV to treat potential Wernicke syndrome, start q24h IV on 12/12.  - Standing haloperidol 2mg q12h PO.  - Nicotine patches 21mg q24h.    #Convulsive status epilepticus  Likely one focal seizure on left temporal lobe with secondarily 4-5 generalized seizures s/p convulsive status epilepticus. 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 Frequent baths, high pseudomonal suspicion. Intubated and sedated for 2 days. CT temporal bones done and MRI IAC with contrast IV showed left necrotizing otitis externa, 1 cm abscess in the left temporal lobe with adjacent dural thickening. vEEG monitoring  PLAN:  - Maintain seizure and fall precautions  - c/w Depakote 500 mg BID (started 12/8)  - Depakote trough level    CARDIO  #Preoperative cardiovascular risk assessment   No previous symptoms of CHF, no orthopnea, no dyspnea on excertion, no NPD, no B/L LE edema, he walks several blocks without, no problems to walk up stairs. EKG sinus rhythm 96 bmp.   Currently on phenobarbital due to alcohol withdrawal, but otherwise hemodynamically stable without evidence of worsening sepsis    Low risk for a low risk procedure (ENT procedure), RCRI class I Risk       RESPIRATORY  Intubated for protecting airways in patient with status epilepticus. Weaned vent as tolerated the second day. 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 1cm abscess. FU ear cultures- showing GPR and GPC in pairs. Blood (NGTD), urine (NGTD) and ear cultures (staph. aureus and epidermidis). IAC CT scan w/wo contrast necrotizing otitis externa with osteomyelitis and soft tissue extent to the retrocondylar fat and infratemporal fosse. MRI shows left necrotizing otitis externa, 1 cm abscess in the left temporal lobe with adjacent dural thickening. ENT and neurosurgery no acute intervention. Recommended Abx as intervention.  - 12/9 R ear OR culture, 2/8 ear culture and 12/3 L ear culture also grew MSSA. So consider th most likely culprit.     - Staph epi and coryne considered skin contaminant.   PLAN:  - F/u surveillance cultures (OR cultures, please send bacterial, fungal and AFB cultures)  - PICC line in place  - C/w Ciprodex bilaterally; 5 drops to each ear wick twice a day; with Dexamethasone 0.1% 5 drops  - Nafcillin 2g IV q4h (for a total of 6 weeks) and cefepime 2g IV q8h (for now until OR culture finalize).     ENT  #Bilateral otitis externa, left greater than right  PE does not reveal any granulation tissue in left EAC, however, b/l EACs are edematous and tender to manipulation. Currently on cefepime/vancomycin per ID with ciprodex ear drops b/l with improvement in fevers and WBC. No signs of mastoiditis on clinical examination. EAR james removed today. Pt endorses improvement in b/l ear pain.    PLAN:  -OR with cultures and R myringotomy placement by ENT (12/9)  -F/u new left EAC cx  -ENT recommends hem/onc consult to r/o intrinsic bone conditions    F: none  E: replete PRN  N: Regular diet    DVT: Enoxaparin 40mg SQ q24h  GI: Pantoprazol 40mg IV q24h

## 2021-12-11 NOTE — PROGRESS NOTE ADULT - PROBLEM SELECTOR PLAN 3
#Delirium tremens + ICU delirium  Important alcohol use (3-4 beers per day)  - RASS 0-1  PLAN:  - Precedex infusion on. Guanfisen if responds to Precedex.  - Increased thiamine 500mg q8h IV to treat potential Wernicke syndrome, start q24h IV on 12/12.  - Standing haloperidol 2mg q12h PO.  - Nicotine patches 21mg q24h. #Delirium tremens + ICU delirium  Hx heavy alcohol use (6 beers per day). 1/2 PPD smoker.  - RASS 0-1  PLAN:  - c/w thiamine 500mg q8h IV to treat potential Wernicke syndrome (started q24h IV on 12/12)  - Standing haloperidol 2mg q12h PO.  - Nicotine patches 21mg q24h. #Delirium tremens + ICU delirium  Hx heavy alcohol use (6 beers per day). 1/2 PPD smoker.  - RASS 0-1  PLAN:  - c/w thiamine 500mg q8h IV to treat potential Wernicke syndrome (started q24h IV on 12/12)  - folic acid, multivitamin daily  - Standing haloperidol 2mg q12h PO  - Nicotine patches 21mg q24h. #Delirium tremens + ICU delirium  Hx heavy alcohol use (6 beers per day). 1/2 PPD smoker.  - RASS 0-1  PLAN:  - c/w thiamine 500mg q8h IV to treat potential Wernicke syndrome (started q24h IV on 12/12)  - folic acid, multivitamin daily  - Standing haloperidol 2mg q12h PO        - f/u EKG in AM to monitor QTc  - Nicotine patches 21mg q24h.

## 2021-12-11 NOTE — PROGRESS NOTE ADULT - SUBJECTIVE AND OBJECTIVE BOX
TRANSFER NOTE FROM ICU TO [facility]    HOSPITAL COURSE: 52 yo M with PMHx no past medical history of epilepsy, trauma or CNS infection, who presents to the ED with an episode of sudden onset speech disturbance talking incoherently and repetitive and altered behavior while working witnessed, no fever. In the last 2 days much more pain on left ear and headache. Pt uses frequently his swimming pool has had left ear infection for 2 months with hypoacusia treated with long course ciprofloxacin and a week of prednisone. In ED presented, repetitive generalized seizures. 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 Started on vancomycin and Zosyn IV. Admitted to ICU to continue his care, on cefepime and vanc IV (to cover CNS pseudomonal infection) propofol drip and placed on ventilator. On ear cultures found staph. aureus and epidermidis. On 12/5, pt was extubated. On IAC CT scan w/wo contrast necrotizing otitis externa with osteomyelitis and soft tissue extent to the retrocondylar fat and infratemporal fosse. ENT and neurosurgery didn't consider beneficial acute intervention at this point. MRI shows left necrotizing otitis externa, 1 cm abscess in the left temporal lobe with adjacent dural thickening. No acute intervention as per Neurosurgery or ENT. Neurosurgery recommended abx per ID recs. ID considers MSSA culprit of this infection and adjust iv antibiotherapy to nafcillin and cefepime. EAR james removed today by ENT after improvement in b/l ear pain. Improvement in his delirium tremens and ICU delirium after management with standing Haldol 2mg.    OVERNIGHT EVENTS: NAEO    SUBJECTIVE / INTERVAL HPI: Patient seen and examined at bedside. He hasn't slept and feels sorry about his behavior during admission. He has generalized muscular pain.    Remaining ROS negative     PHYSICAL EXAM:  General: NAD, lying in bed, calm, good conversation, still slow  HEAD:  Atraumatic, normocephalic  EYES: conjunctiva and sclera clear  ENT: Moist mucous membranes, rash on left ear, less swollen, and erythematous   NECK: Supple, no JVD  HEART: Regular rate and rhythm, no murmurs, rubs, or gallops  LUNGS: Unlabored respirations.  Clear to auscultation bilaterally, no crackles, wheezing, or rhonchi  ABDOMEN: Soft, nontender, nondistended, +BS  EXTREMITIES: 2+ peripheral pulses bilaterally. No clubbing, cyanosis, or edema  NERVOUS SYSTEM:  Induced coma (miotic pupils minimally reactive to light), no response to pain.  SKIN: No rashes or lesions    VITAL SIGNS:  Vital Signs Last 24 Hrs  T(C): 37.1 (11 Dec 2021 09:19), Max: 37.5 (10 Dec 2021 22:24)  T(F): 98.8 (11 Dec 2021 09:19), Max: 99.5 (10 Dec 2021 22:24)  HR: 98 (11 Dec 2021 10:00) (70 - 98)  BP: 159/87 (11 Dec 2021 10:00) (99/50 - 159/87)  BP(mean): 114 (11 Dec 2021 10:00) (70 - 114)  RR: 35 (11 Dec 2021 10:00) (15 - 36)  SpO2: 99% (11 Dec 2021 10:00) (92% - 99%)    MEDICATIONS:  MEDICATIONS  (STANDING):  cefepime   IVPB 2000 milliGRAM(s) IV Intermittent every 8 hours  chlorhexidine 2% Cloths 1 Application(s) Topical <User Schedule>  CIPROFLOXACIN(CIPRO) OPTHALMIC SOLUTION 5 Drop(s) 5 Drop(s) Both Ears two times a day  dexAMETHasone 0.1% Ophthalmic Solution for OTIC Use 5 Drop(s) Both Ears two times a day  dextrose 40% Gel 15 Gram(s) Oral once  dextrose 5%. 1000 milliLiter(s) (50 mL/Hr) IV Continuous <Continuous>  dextrose 50% Injectable 25 Gram(s) IV Push once  diVALproex  milliGRAM(s) Oral two times a day  enoxaparin Injectable 40 milliGRAM(s) SubCutaneous daily  folic acid 1 milliGRAM(s) Oral daily  glucagon  Injectable 1 milliGRAM(s) IntraMuscular once  haloperidol     Tablet 2 milliGRAM(s) Oral every 12 hours  influenza   Vaccine 0.5 milliLiter(s) IntraMuscular once  multivitamin 1 Tablet(s) Oral daily  nafcillin  IVPB 2 Gram(s) IV Intermittent every 4 hours  nicotine - 21 mG/24Hr(s) Patch 1 patch Transdermal daily    MEDICATIONS  (PRN):  haloperidol    Injectable 5 milliGRAM(s) IV Push every 6 hours PRN Agitation  sodium chloride 0.9% lock flush 10 milliLiter(s) IV Push every 1 hour PRN Pre/post blood products, medications, blood draw, and to maintain line patency    ALLERGIES:  No Known Allergies    LABS:                        13.0   16.21 )-----------( 370      ( 11 Dec 2021 05:12 )             40.1     12-11    136  |  102  |  10  ----------------------------<  86  3.7   |  26  |  0.90    Ca    9.1      11 Dec 2021 05:12  Phos  2.6     12-11  Mg     2.1     12-11    TPro  7.1  /  Alb  3.3  /  TBili  0.4  /  DBili  x   /  AST  64<H>  /  ALT  71<H>  /  AlkPhos  86  12-11    CAPILLARY BLOOD GLUCOSE  POCT Blood Glucose.: 93 mg/dL (11 Dec 2021 05:52)    RADIOLOGY & ADDITIONAL TESTS: Reviewed.

## 2021-12-12 LAB
-  CEFAZOLIN: SIGNIFICANT CHANGE UP
-  CLINDAMYCIN: SIGNIFICANT CHANGE UP
-  ERYTHROMYCIN: SIGNIFICANT CHANGE UP
-  LINEZOLID: SIGNIFICANT CHANGE UP
-  OXACILLIN: SIGNIFICANT CHANGE UP
-  RIFAMPIN: SIGNIFICANT CHANGE UP
-  TRIMETHOPRIM/SULFAMETHOXAZOLE: SIGNIFICANT CHANGE UP
-  VANCOMYCIN: SIGNIFICANT CHANGE UP
ALBUMIN SERPL ELPH-MCNC: 3.6 G/DL — SIGNIFICANT CHANGE UP (ref 3.3–5)
ALP SERPL-CCNC: 81 U/L — SIGNIFICANT CHANGE UP (ref 40–120)
ALT FLD-CCNC: 58 U/L — HIGH (ref 10–45)
ANION GAP SERPL CALC-SCNC: 11 MMOL/L — SIGNIFICANT CHANGE UP (ref 5–17)
ANISOCYTOSIS BLD QL: SLIGHT — SIGNIFICANT CHANGE UP
AST SERPL-CCNC: 46 U/L — HIGH (ref 10–40)
BASOPHILS # BLD AUTO: 0.26 K/UL — HIGH (ref 0–0.2)
BASOPHILS NFR BLD AUTO: 1.8 % — SIGNIFICANT CHANGE UP (ref 0–2)
BILIRUB SERPL-MCNC: 0.4 MG/DL — SIGNIFICANT CHANGE UP (ref 0.2–1.2)
BUN SERPL-MCNC: 9 MG/DL — SIGNIFICANT CHANGE UP (ref 7–23)
CALCIUM SERPL-MCNC: 8.9 MG/DL — SIGNIFICANT CHANGE UP (ref 8.4–10.5)
CHLORIDE SERPL-SCNC: 102 MMOL/L — SIGNIFICANT CHANGE UP (ref 96–108)
CO2 SERPL-SCNC: 24 MMOL/L — SIGNIFICANT CHANGE UP (ref 22–31)
CREAT SERPL-MCNC: 0.81 MG/DL — SIGNIFICANT CHANGE UP (ref 0.5–1.3)
DACRYOCYTES BLD QL SMEAR: SLIGHT — SIGNIFICANT CHANGE UP
EOSINOPHIL # BLD AUTO: 0.39 K/UL — SIGNIFICANT CHANGE UP (ref 0–0.5)
EOSINOPHIL NFR BLD AUTO: 2.7 % — SIGNIFICANT CHANGE UP (ref 0–6)
GIANT PLATELETS BLD QL SMEAR: PRESENT — SIGNIFICANT CHANGE UP
GLUCOSE SERPL-MCNC: 102 MG/DL — HIGH (ref 70–99)
HAV IGM SER-ACNC: SIGNIFICANT CHANGE UP
HBV CORE IGM SER-ACNC: SIGNIFICANT CHANGE UP
HBV SURFACE AG SER-ACNC: SIGNIFICANT CHANGE UP
HCT VFR BLD CALC: 40.1 % — SIGNIFICANT CHANGE UP (ref 39–50)
HCV AB S/CO SERPL IA: 0.04 S/CO — SIGNIFICANT CHANGE UP
HCV AB SERPL-IMP: SIGNIFICANT CHANGE UP
HGB BLD-MCNC: 13.2 G/DL — SIGNIFICANT CHANGE UP (ref 13–17)
LYMPHOCYTES # BLD AUTO: 1.28 K/UL — SIGNIFICANT CHANGE UP (ref 1–3.3)
LYMPHOCYTES # BLD AUTO: 8.9 % — LOW (ref 13–44)
MACROCYTES BLD QL: SLIGHT — SIGNIFICANT CHANGE UP
MAGNESIUM SERPL-MCNC: 2.2 MG/DL — SIGNIFICANT CHANGE UP (ref 1.6–2.6)
MANUAL SMEAR VERIFICATION: SIGNIFICANT CHANGE UP
MCHC RBC-ENTMCNC: 29.7 PG — SIGNIFICANT CHANGE UP (ref 27–34)
MCHC RBC-ENTMCNC: 32.9 GM/DL — SIGNIFICANT CHANGE UP (ref 32–36)
MCV RBC AUTO: 90.1 FL — SIGNIFICANT CHANGE UP (ref 80–100)
METHOD TYPE: SIGNIFICANT CHANGE UP
MICROCYTES BLD QL: SLIGHT — SIGNIFICANT CHANGE UP
MONOCYTES # BLD AUTO: 1.02 K/UL — HIGH (ref 0–0.9)
MONOCYTES NFR BLD AUTO: 7.1 % — SIGNIFICANT CHANGE UP (ref 2–14)
NEUTROPHILS # BLD AUTO: 11.46 K/UL — HIGH (ref 1.8–7.4)
NEUTROPHILS NFR BLD AUTO: 79.5 % — HIGH (ref 43–77)
OVALOCYTES BLD QL SMEAR: SLIGHT — SIGNIFICANT CHANGE UP
PHOSPHATE SERPL-MCNC: 3 MG/DL — SIGNIFICANT CHANGE UP (ref 2.5–4.5)
PLAT MORPH BLD: ABNORMAL
PLATELET # BLD AUTO: 372 K/UL — SIGNIFICANT CHANGE UP (ref 150–400)
POLYCHROMASIA BLD QL SMEAR: SLIGHT — SIGNIFICANT CHANGE UP
POTASSIUM SERPL-MCNC: 3.9 MMOL/L — SIGNIFICANT CHANGE UP (ref 3.5–5.3)
POTASSIUM SERPL-SCNC: 3.9 MMOL/L — SIGNIFICANT CHANGE UP (ref 3.5–5.3)
PROT SERPL-MCNC: 7.4 G/DL — SIGNIFICANT CHANGE UP (ref 6–8.3)
RBC # BLD: 4.45 M/UL — SIGNIFICANT CHANGE UP (ref 4.2–5.8)
RBC # FLD: 14.4 % — SIGNIFICANT CHANGE UP (ref 10.3–14.5)
RBC BLD AUTO: ABNORMAL
SMUDGE CELLS # BLD: PRESENT — SIGNIFICANT CHANGE UP
SODIUM SERPL-SCNC: 137 MMOL/L — SIGNIFICANT CHANGE UP (ref 135–145)
SPHEROCYTES BLD QL SMEAR: SLIGHT — SIGNIFICANT CHANGE UP
VALPROATE SERPL-MCNC: 52.4 UG/ML — SIGNIFICANT CHANGE UP (ref 50–100)
WBC # BLD: 14.41 K/UL — HIGH (ref 3.8–10.5)
WBC # FLD AUTO: 14.41 K/UL — HIGH (ref 3.8–10.5)

## 2021-12-12 PROCEDURE — 99232 SBSQ HOSP IP/OBS MODERATE 35: CPT

## 2021-12-12 PROCEDURE — 93010 ELECTROCARDIOGRAM REPORT: CPT

## 2021-12-12 PROCEDURE — 99232 SBSQ HOSP IP/OBS MODERATE 35: CPT | Mod: GC

## 2021-12-12 RX ADMIN — NAFCILLIN 200 GRAM(S): 10 INJECTION, POWDER, FOR SOLUTION INTRAVENOUS at 18:58

## 2021-12-12 RX ADMIN — Medication 1 PATCH: at 12:48

## 2021-12-12 RX ADMIN — DIVALPROEX SODIUM 500 MILLIGRAM(S): 500 TABLET, DELAYED RELEASE ORAL at 18:11

## 2021-12-12 RX ADMIN — CEFEPIME 100 MILLIGRAM(S): 1 INJECTION, POWDER, FOR SOLUTION INTRAMUSCULAR; INTRAVENOUS at 14:41

## 2021-12-12 RX ADMIN — Medication 1 PATCH: at 07:54

## 2021-12-12 RX ADMIN — NAFCILLIN 200 GRAM(S): 10 INJECTION, POWDER, FOR SOLUTION INTRAVENOUS at 05:56

## 2021-12-12 RX ADMIN — NAFCILLIN 200 GRAM(S): 10 INJECTION, POWDER, FOR SOLUTION INTRAVENOUS at 22:48

## 2021-12-12 RX ADMIN — Medication 1 TABLET(S): at 12:48

## 2021-12-12 RX ADMIN — NAFCILLIN 200 GRAM(S): 10 INJECTION, POWDER, FOR SOLUTION INTRAVENOUS at 01:39

## 2021-12-12 RX ADMIN — Medication 105 MILLIGRAM(S): at 06:50

## 2021-12-12 RX ADMIN — CEFEPIME 100 MILLIGRAM(S): 1 INJECTION, POWDER, FOR SOLUTION INTRAMUSCULAR; INTRAVENOUS at 22:48

## 2021-12-12 RX ADMIN — Medication 1 MILLIGRAM(S): at 12:48

## 2021-12-12 RX ADMIN — DIVALPROEX SODIUM 500 MILLIGRAM(S): 500 TABLET, DELAYED RELEASE ORAL at 05:38

## 2021-12-12 RX ADMIN — Medication 1 PATCH: at 12:53

## 2021-12-12 RX ADMIN — NAFCILLIN 200 GRAM(S): 10 INJECTION, POWDER, FOR SOLUTION INTRAVENOUS at 09:31

## 2021-12-12 RX ADMIN — CHLORHEXIDINE GLUCONATE 1 APPLICATION(S): 213 SOLUTION TOPICAL at 05:44

## 2021-12-12 RX ADMIN — CEFEPIME 100 MILLIGRAM(S): 1 INJECTION, POWDER, FOR SOLUTION INTRAMUSCULAR; INTRAVENOUS at 06:50

## 2021-12-12 RX ADMIN — Medication 1 PATCH: at 18:14

## 2021-12-12 RX ADMIN — NAFCILLIN 200 GRAM(S): 10 INJECTION, POWDER, FOR SOLUTION INTRAVENOUS at 14:42

## 2021-12-12 RX ADMIN — ENOXAPARIN SODIUM 40 MILLIGRAM(S): 100 INJECTION SUBCUTANEOUS at 12:48

## 2021-12-12 RX ADMIN — HALOPERIDOL DECANOATE 2 MILLIGRAM(S): 100 INJECTION INTRAMUSCULAR at 05:42

## 2021-12-12 NOTE — PROGRESS NOTE ADULT - SUBJECTIVE AND OBJECTIVE BOX
INFECTIOUS DISEASES CONSULT FOLLOW-UP NOTE    INTERVAL HPI/OVERNIGHT EVENTS:  no event overnight  patient out of the ICU, ambulating today  feels a bit confused     ROS:   Constitutional, eyes, ENT, cardiovascular, respiratory, gastrointestinal, genitourinary, integumentary, neurological, psychiatric and heme/lymph are otherwise negative other than noted above       ANTIBIOTICS/RELEVANT:    MEDICATIONS  (STANDING):  cefepime   IVPB 2000 milliGRAM(s) IV Intermittent every 8 hours  chlorhexidine 2% Cloths 1 Application(s) Topical <User Schedule>  CIPROFLOXACIN(CIPRO) OPTHALMIC SOLUTION 5 Drop(s) 5 Drop(s) Both Ears two times a day  dexAMETHasone 0.1% Ophthalmic Solution for OTIC Use 5 Drop(s) Both Ears two times a day  dextrose 40% Gel 15 Gram(s) Oral once  dextrose 5%. 1000 milliLiter(s) (50 mL/Hr) IV Continuous <Continuous>  dextrose 50% Injectable 25 Gram(s) IV Push once  diVALproex  milliGRAM(s) Oral two times a day  enoxaparin Injectable 40 milliGRAM(s) SubCutaneous daily  folic acid 1 milliGRAM(s) Oral daily  glucagon  Injectable 1 milliGRAM(s) IntraMuscular once  influenza   Vaccine 0.5 milliLiter(s) IntraMuscular once  multivitamin 1 Tablet(s) Oral daily  nafcillin  IVPB 2 Gram(s) IV Intermittent every 4 hours  nicotine - 21 mG/24Hr(s) Patch 1 patch Transdermal daily  thiamine IVPB 500 milliGRAM(s) IV Intermittent every 24 hours    MEDICATIONS  (PRN):  haloperidol    Injectable 5 milliGRAM(s) IV Push every 6 hours PRN Agitation  sodium chloride 0.9% lock flush 10 milliLiter(s) IV Push every 1 hour PRN Pre/post blood products, medications, blood draw, and to maintain line patency        Vital Signs Last 24 Hrs  T(C): 36.9 (12 Dec 2021 10:00), Max: 36.9 (12 Dec 2021 04:57)  T(F): 98.4 (12 Dec 2021 10:00), Max: 98.4 (12 Dec 2021 04:57)  HR: 97 (12 Dec 2021 10:00) (75 - 97)  BP: 130/71 (12 Dec 2021 10:00) (130/71 - 143/83)  BP(mean): 90 (11 Dec 2021 18:00) (90 - 90)  RR: 18 (12 Dec 2021 10:00) (18 - 20)  SpO2: 96% (12 Dec 2021 10:00) (96% - 98%)    12-11-21 @ 07:01  -  12-12-21 @ 07:00  --------------------------------------------------------  IN: 250 mL / OUT: 0 mL / NET: 250 mL    12-12-21 @ 07:01  -  12-12-21 @ 12:34  --------------------------------------------------------  IN: 100 mL / OUT: 0 mL / NET: 100 mL      PHYSICAL EXAM:  Constitutional: alert, NAD  Eyes: the sclera and conjunctiva were normal.   ENT: the ears and nose were normal in appearance.   Neck: the appearance of the neck was normal and the neck was supple.   Pulmonary: no respiratory distress and lungs were clear to auscultation bilaterally.   Heart: heart rate was normal and rhythm regular, normal S1 and S2  Abdomen: normal bowel sounds, soft, non-tender          LABS:                        13.2   14.41 )-----------( 372      ( 12 Dec 2021 08:33 )             40.1     12-12    137  |  102  |  9   ----------------------------<  102<H>  3.9   |  24  |  0.81    Ca    8.9      12 Dec 2021 08:33  Phos  3.0     12-12  Mg     2.2     12-12    TPro  7.4  /  Alb  3.6  /  TBili  0.4  /  DBili  x   /  AST  46<H>  /  ALT  58<H>  /  AlkPhos  81  12-12          MICROBIOLOGY:  12/10 R ear AFB: p  12/10 L ear fungal: p  12/10 R ear fungal: p  12/9 R ear: MSSA, anaerococcus vaginalis   12/9 R ear: MSSA  12/8 Ear ear : MSSA  12/3 L ear: MSSA, staph epi, corynebacterium amycolatum     BCx all neg      RADIOLOGY & ADDITIONAL STUDIES:  12/6 MRI  In this patient with known left necrotizing otitis externa, a 1 cm abscess is identified in the left temporal lobe with adjacent dural thickening. Inflammatory changes also present extracranially without abscess formation, as above.

## 2021-12-12 NOTE — PROGRESS NOTE ADULT - ASSESSMENT
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 Course complicated by delirium tremens + ICU delirium (now controlled). Now s/p R myringotomy placement by ENT, on Depakote for epilepsy, haloperidol for agitation and ABx (nafcillin and cefepime by PICC line) for MSSA infection.

## 2021-12-12 NOTE — PROGRESS NOTE ADULT - PROBLEM SELECTOR PLAN 1
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 MRI shows left necrotizing otitis externa, 1 cm abscess in the left temporal lobe with adjacent dural thickening. Blood cx (NGTD), urine cx (NGTD) and ear cx (staph. aureus and epidermidis). IAC CT scan w/wo contrast necrotizing otitis externa with osteomyelitis and soft tissue extent to the retrocondylar fat and infratemporal fosse.  ENT and neurosurgery no acute surgical intervention.   - 12/9 R ear OR culture, 2/8 ear culture and 12/3 L ear culture also grew MSSA. (likely culprit)  - Staph epi and coryne considered skin contaminant.   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 MRI shows left necrotizing otitis externa, 1 cm abscess in the left temporal lobe with adjacent dural thickening. Blood cx (NGTD), urine cx (NGTD) and ear cx (staph. aureus and epidermidis). IAC CT scan w/wo contrast necrotizing otitis externa with osteomyelitis and soft tissue extent to the retrocondylar fat and infratemporal fosse.  ENT and neurosurgery no acute surgical intervention.   - 12/9 R ear OR culture, 2/8 ear culture and 12/3 L ear culture also grew MSSA. (likely culprit)  - Staph epi and coryne considered skin contaminant.   - PICC line in place  PLAN:  - Nafcillin 2g IV q4h (for a total of 6 weeks), per ID recs  - Cefepime 2g IV q8h (for now until OR culture finalize), per ID recs  - c/w ciprodex 5 drops & dexamethasone 0.1% 5 drops to both ears BID

## 2021-12-12 NOTE — CHART NOTE - NSCHARTNOTEFT_GEN_A_CORE
Remains seizure-free.  Valproic acid level 52.4.  Continue Depakote 500 mg BID.  Epilepsy will sign off.  Please call with any questions or concerns.  We will arrange follow up in 6-8 weeks with Dr. Mcgarry.

## 2021-12-12 NOTE — PROGRESS NOTE ADULT - ASSESSMENT
51M p/w seizure found to have temporal bone OM and necrotizing otitis externa with 1cm abscess in L left infrolateral temporal lobe. s/p Myringotomy on R side on 12/9.  Ear cultures so far growing MSSA and anaeroboccus viridans.    - cont nafcillin 2g IV q4h  - cont cefepime 2g IV q8h for now  - f/u anaerobocus susceptibility (requested to micro lab)  - f/u ear cultures  - anticipate 6 weeks of IV abx therapy     Team 1 will follow you.  Case d/w primary team.    Laura Valdivia MD, MS  Infectious Disease attending  work cell [phone]   For any questions during evening/weekend/holiday, please page ID on call

## 2021-12-12 NOTE — PROGRESS NOTE ADULT - PROBLEM SELECTOR PLAN 2
Left greater than right. Improving. No signs of mastoiditis on clinical examination.  PLAN:  - c/w ciprodex 5 drops & dexamethasone 0.1% 5 drops to both ears BID  - OR with cultures and R myringotomy placement by ENT (12/9)  - F/u new left EAC cx  - ENT recommends hem/onc consult to r/o intrinsic bone conditions Left greater than right. Improving. No signs of mastoiditis on clinical examination.  PLAN:  - c/w ciprodex 5 drops & dexamethasone 0.1% 5 drops to both ears BID  - OR with cultures and R myringotomy placement by ENT (12/9)  - F/U cultures of left EAC

## 2021-12-12 NOTE — PROGRESS NOTE ADULT - PROBLEM SELECTOR PLAN 4
Likely one focal seizure on left temporal lobe with secondarily 4-5 generalized seizures s/p convulsive status epilepticus. 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 Frequent baths, high pseudomonal suspicion. Intubated and sedated for 2 days. CT temporal bones done and MRI IAC with contrast IV showed left necrotizing otitis externa, 1 cm abscess in the left temporal lobe with adjacent dural thickening. s/p vEEG  PLAN:  - Maintain seizure and fall precautions  - c/w Depakote 500 mg BID (started 12/8)  - f/u depakote trough level in AM Likely one focal seizure on left temporal lobe with secondarily 4-5 generalized seizures s/p convulsive status epilepticus. 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 Frequent baths, high pseudomonal suspicion. Intubated and sedated for 2 days. CT temporal bones done and MRI IAC with contrast IV showed left necrotizing otitis externa, 1 cm abscess in the left temporal lobe with adjacent dural thickening. s/p vEEG  PLAN:  - Maintain seizure and fall precautions  - c/w Depakote 500 mg BID (started 12/8)  - f/u depakote trough level monday AM

## 2021-12-12 NOTE — PROGRESS NOTE ADULT - SUBJECTIVE AND OBJECTIVE BOX
OVERNIGHT EVENTS:    SUBJECTIVE / INTERVAL HPI: Patient seen and examined at bedside.     VITAL SIGNS:  Vital Signs Last 24 Hrs  T(C): 36.9 (12 Dec 2021 04:57), Max: 37.1 (11 Dec 2021 09:19)  T(F): 98.4 (12 Dec 2021 04:57), Max: 98.8 (11 Dec 2021 09:19)  HR: 95 (12 Dec 2021 04:57) (75 - 98)  BP: 143/83 (12 Dec 2021 04:57) (129/59 - 159/87)  BP(mean): 90 (11 Dec 2021 18:00) (85 - 114)  RR: 19 (12 Dec 2021 04:57) (17 - 36)  SpO2: 97% (12 Dec 2021 04:57) (94% - 99%)    PHYSICAL EXAM:    General: No acute distress  HEENT: NC/AT; PERRL, anicteric sclera; MMM  Neck: supple  Cardiovascular: +S1/S2, RRR, no murmurs, rubs, gallops  Respiratory: CTA B/L; no W/R/R  Gastrointestinal: soft, NT/ND; +BSx4  Extremities: WWP; no edema, clubbing or cyanosis  Vascular: 2+ radial, DP/PT pulses B/L  Neurological: AAOx3; no focal deficits    MEDICATIONS:  MEDICATIONS  (STANDING):  cefepime   IVPB 2000 milliGRAM(s) IV Intermittent every 8 hours  chlorhexidine 2% Cloths 1 Application(s) Topical <User Schedule>  CIPROFLOXACIN(CIPRO) OPTHALMIC SOLUTION 5 Drop(s) 5 Drop(s) Both Ears two times a day  dexAMETHasone 0.1% Ophthalmic Solution for OTIC Use 5 Drop(s) Both Ears two times a day  dextrose 40% Gel 15 Gram(s) Oral once  dextrose 5%. 1000 milliLiter(s) (50 mL/Hr) IV Continuous <Continuous>  dextrose 50% Injectable 25 Gram(s) IV Push once  diVALproex  milliGRAM(s) Oral two times a day  enoxaparin Injectable 40 milliGRAM(s) SubCutaneous daily  folic acid 1 milliGRAM(s) Oral daily  glucagon  Injectable 1 milliGRAM(s) IntraMuscular once  haloperidol     Tablet 2 milliGRAM(s) Oral every 12 hours  influenza   Vaccine 0.5 milliLiter(s) IntraMuscular once  multivitamin 1 Tablet(s) Oral daily  nafcillin  IVPB 2 Gram(s) IV Intermittent every 4 hours  nicotine - 21 mG/24Hr(s) Patch 1 patch Transdermal daily  thiamine IVPB 500 milliGRAM(s) IV Intermittent every 24 hours    MEDICATIONS  (PRN):  haloperidol    Injectable 5 milliGRAM(s) IV Push every 6 hours PRN Agitation  sodium chloride 0.9% lock flush 10 milliLiter(s) IV Push every 1 hour PRN Pre/post blood products, medications, blood draw, and to maintain line patency      ALLERGIES:  Allergies    No Known Allergies    Intolerances        LABS:                        13.0   16.21 )-----------( 370      ( 11 Dec 2021 05:12 )             40.1     12-11    136  |  102  |  10  ----------------------------<  86  3.7   |  26  |  0.90    Ca    9.1      11 Dec 2021 05:12  Phos  2.6     12-11  Mg     2.1     12-11    TPro  7.1  /  Alb  3.3  /  TBili  0.4  /  DBili  x   /  AST  64<H>  /  ALT  71<H>  /  AlkPhos  86  12-11        CAPILLARY BLOOD GLUCOSE      POCT Blood Glucose.: 93 mg/dL (11 Dec 2021 05:52)      RADIOLOGY & ADDITIONAL TESTS: Reviewed.    PLAN:  OVERNIGHT EVENTS: Stepdown to [facility]    SUBJECTIVE / INTERVAL HPI: Patient seen and examined at bedside. Denied fever, chills. Ear pain improved.    VITAL SIGNS:  Vital Signs Last 24 Hrs  T(C): 36.9 (12 Dec 2021 04:57), Max: 37.1 (11 Dec 2021 09:19)  T(F): 98.4 (12 Dec 2021 04:57), Max: 98.8 (11 Dec 2021 09:19)  HR: 95 (12 Dec 2021 04:57) (75 - 98)  BP: 143/83 (12 Dec 2021 04:57) (129/59 - 159/87)  BP(mean): 90 (11 Dec 2021 18:00) (85 - 114)  RR: 19 (12 Dec 2021 04:57) (17 - 36)  SpO2: 97% (12 Dec 2021 04:57) (94% - 99%)    PHYSICAL EXAM:    General: No acute distress  HEENT: NC/AT; PERRL, anicteric sclera; MMM  Neck: supple  Cardiovascular: +S1/S2, RRR, no murmurs, rubs, gallops  Respiratory: CTA B/L; no W/R/R  Gastrointestinal: soft, NT/ND; +BSx4  Extremities: WWP; no edema, clubbing or cyanosis  Vascular: 2+ radial, DP/PT pulses B/L  Neurological: AAOx3; no focal deficits. 5/5 UE 5/5 LE  Skin: Skin: Warm and dry. Feet cool to touch L>R.  No obvious rash. Stage I pressure ulcer along L greater trochanter. No purulence or drainage. R arm PICC in place.    MEDICATIONS:  MEDICATIONS  (STANDING):  cefepime   IVPB 2000 milliGRAM(s) IV Intermittent every 8 hours  chlorhexidine 2% Cloths 1 Application(s) Topical <User Schedule>  CIPROFLOXACIN(CIPRO) OPTHALMIC SOLUTION 5 Drop(s) 5 Drop(s) Both Ears two times a day  dexAMETHasone 0.1% Ophthalmic Solution for OTIC Use 5 Drop(s) Both Ears two times a day  dextrose 40% Gel 15 Gram(s) Oral once  dextrose 5%. 1000 milliLiter(s) (50 mL/Hr) IV Continuous <Continuous>  dextrose 50% Injectable 25 Gram(s) IV Push once  diVALproex  milliGRAM(s) Oral two times a day  enoxaparin Injectable 40 milliGRAM(s) SubCutaneous daily  folic acid 1 milliGRAM(s) Oral daily  glucagon  Injectable 1 milliGRAM(s) IntraMuscular once  haloperidol     Tablet 2 milliGRAM(s) Oral every 12 hours  influenza   Vaccine 0.5 milliLiter(s) IntraMuscular once  multivitamin 1 Tablet(s) Oral daily  nafcillin  IVPB 2 Gram(s) IV Intermittent every 4 hours  nicotine - 21 mG/24Hr(s) Patch 1 patch Transdermal daily  thiamine IVPB 500 milliGRAM(s) IV Intermittent every 24 hours    MEDICATIONS  (PRN):  haloperidol    Injectable 5 milliGRAM(s) IV Push every 6 hours PRN Agitation  sodium chloride 0.9% lock flush 10 milliLiter(s) IV Push every 1 hour PRN Pre/post blood products, medications, blood draw, and to maintain line patency      ALLERGIES:  Allergies    No Known Allergies    Intolerances        LABS:                        13.0   16.21 )-----------( 370      ( 11 Dec 2021 05:12 )             40.1     12-11    136  |  102  |  10  ----------------------------<  86  3.7   |  26  |  0.90    Ca    9.1      11 Dec 2021 05:12  Phos  2.6     12-11  Mg     2.1     12-11    TPro  7.1  /  Alb  3.3  /  TBili  0.4  /  DBili  x   /  AST  64<H>  /  ALT  71<H>  /  AlkPhos  86  12-11        CAPILLARY BLOOD GLUCOSE      POCT Blood Glucose.: 93 mg/dL (11 Dec 2021 05:52)      RADIOLOGY & ADDITIONAL TESTS: Reviewed.    PLAN:

## 2021-12-12 NOTE — PROGRESS NOTE ADULT - PROBLEM SELECTOR PLAN 5
, ALT 60 as of 12/7/21. Pt with history of heavy alcohol use. No RUQ tenderness on exam.  - continue to monitor  - f/u hepatitis panel in AM , ALT 60 as of 12/7/21. Pt with history of heavy alcohol use. No RUQ tenderness on exam.  - Downtrending  - hepatitis panel non reactive

## 2021-12-12 NOTE — PROGRESS NOTE ADULT - PROBLEM SELECTOR PLAN 3
#Delirium tremens + ICU delirium  Hx heavy alcohol use (6 beers per day). 1/2 PPD smoker.  - RASS 0-1  PLAN:  - c/w thiamine 500mg q8h IV to treat potential Wernicke syndrome (started q24h IV on 12/12)  - folic acid, multivitamin daily  - Standing haloperidol 2mg q12h PO        - f/u EKG in AM to monitor QTc  - Nicotine patches 21mg q24h. #Delirium tremens + ICU delirium  Hx heavy alcohol use (6 beers per day). 1/2 PPD smoker.  - RASS 0-1  PLAN:  - c/w thiamine 500mg q8h IV to treat potential Wernicke syndrome (started q24h IV on 12/12)  - folic acid, multivitamin daily  - DC Standing haloperidol  -PRN Haldol 5mg q6 prn for agitation  - f/u EKG in AM to monitor QTc  - Nicotine patches 21mg q24h.

## 2021-12-13 ENCOUNTER — TRANSCRIPTION ENCOUNTER (OUTPATIENT)
Age: 51
End: 2021-12-13

## 2021-12-13 DIAGNOSIS — R45.1 RESTLESSNESS AND AGITATION: ICD-10-CM

## 2021-12-13 PROBLEM — H70.002: Chronic | Status: ACTIVE | Noted: 2021-12-03

## 2021-12-13 PROBLEM — G40.901 EPILEPSY, UNSPECIFIED, NOT INTRACTABLE, WITH STATUS EPILEPTICUS: Chronic | Status: ACTIVE | Noted: 2021-12-03

## 2021-12-13 PROBLEM — F10.10 ALCOHOL ABUSE, UNCOMPLICATED: Chronic | Status: ACTIVE | Noted: 2021-12-03

## 2021-12-13 LAB
ANION GAP SERPL CALC-SCNC: 10 MMOL/L — SIGNIFICANT CHANGE UP (ref 5–17)
BASOPHILS # BLD AUTO: 0.1 K/UL — SIGNIFICANT CHANGE UP (ref 0–0.2)
BASOPHILS NFR BLD AUTO: 0.9 % — SIGNIFICANT CHANGE UP (ref 0–2)
BUN SERPL-MCNC: 12 MG/DL — SIGNIFICANT CHANGE UP (ref 7–23)
CALCIUM SERPL-MCNC: 8.9 MG/DL — SIGNIFICANT CHANGE UP (ref 8.4–10.5)
CHLORIDE SERPL-SCNC: 104 MMOL/L — SIGNIFICANT CHANGE UP (ref 96–108)
CO2 SERPL-SCNC: 24 MMOL/L — SIGNIFICANT CHANGE UP (ref 22–31)
CREAT SERPL-MCNC: 0.85 MG/DL — SIGNIFICANT CHANGE UP (ref 0.5–1.3)
EOSINOPHIL # BLD AUTO: 0.48 K/UL — SIGNIFICANT CHANGE UP (ref 0–0.5)
EOSINOPHIL NFR BLD AUTO: 4.1 % — SIGNIFICANT CHANGE UP (ref 0–6)
GLUCOSE SERPL-MCNC: 98 MG/DL — SIGNIFICANT CHANGE UP (ref 70–99)
HCT VFR BLD CALC: 38.5 % — LOW (ref 39–50)
HGB BLD-MCNC: 12.7 G/DL — LOW (ref 13–17)
IMM GRANULOCYTES NFR BLD AUTO: 1.1 % — SIGNIFICANT CHANGE UP (ref 0–1.5)
LYMPHOCYTES # BLD AUTO: 1.99 K/UL — SIGNIFICANT CHANGE UP (ref 1–3.3)
LYMPHOCYTES # BLD AUTO: 16.9 % — SIGNIFICANT CHANGE UP (ref 13–44)
MAGNESIUM SERPL-MCNC: 2.3 MG/DL — SIGNIFICANT CHANGE UP (ref 1.6–2.6)
MCHC RBC-ENTMCNC: 30.2 PG — SIGNIFICANT CHANGE UP (ref 27–34)
MCHC RBC-ENTMCNC: 33 GM/DL — SIGNIFICANT CHANGE UP (ref 32–36)
MCV RBC AUTO: 91.7 FL — SIGNIFICANT CHANGE UP (ref 80–100)
MONOCYTES # BLD AUTO: 1.45 K/UL — HIGH (ref 0–0.9)
MONOCYTES NFR BLD AUTO: 12.3 % — SIGNIFICANT CHANGE UP (ref 2–14)
NEUTROPHILS # BLD AUTO: 7.6 K/UL — HIGH (ref 1.8–7.4)
NEUTROPHILS NFR BLD AUTO: 64.7 % — SIGNIFICANT CHANGE UP (ref 43–77)
NRBC # BLD: 0 /100 WBCS — SIGNIFICANT CHANGE UP (ref 0–0)
PHOSPHATE SERPL-MCNC: 3.6 MG/DL — SIGNIFICANT CHANGE UP (ref 2.5–4.5)
PLATELET # BLD AUTO: 408 K/UL — HIGH (ref 150–400)
POTASSIUM SERPL-MCNC: 3.8 MMOL/L — SIGNIFICANT CHANGE UP (ref 3.5–5.3)
POTASSIUM SERPL-SCNC: 3.8 MMOL/L — SIGNIFICANT CHANGE UP (ref 3.5–5.3)
RBC # BLD: 4.2 M/UL — SIGNIFICANT CHANGE UP (ref 4.2–5.8)
RBC # FLD: 14.6 % — HIGH (ref 10.3–14.5)
SODIUM SERPL-SCNC: 138 MMOL/L — SIGNIFICANT CHANGE UP (ref 135–145)
VALPROATE SERPL-MCNC: 20.2 UG/ML — LOW (ref 50–100)
WBC # BLD: 11.75 K/UL — HIGH (ref 3.8–10.5)
WBC # FLD AUTO: 11.75 K/UL — HIGH (ref 3.8–10.5)

## 2021-12-13 PROCEDURE — 99233 SBSQ HOSP IP/OBS HIGH 50: CPT | Mod: GC

## 2021-12-13 PROCEDURE — 99232 SBSQ HOSP IP/OBS MODERATE 35: CPT

## 2021-12-13 PROCEDURE — 99231 SBSQ HOSP IP/OBS SF/LOW 25: CPT

## 2021-12-13 RX ORDER — HALOPERIDOL DECANOATE 100 MG/ML
2 INJECTION INTRAMUSCULAR EVERY 12 HOURS
Refills: 0 | Status: DISCONTINUED | OUTPATIENT
Start: 2021-12-13 | End: 2021-12-13

## 2021-12-13 RX ORDER — HALOPERIDOL DECANOATE 100 MG/ML
2 INJECTION INTRAMUSCULAR EVERY 8 HOURS
Refills: 0 | Status: ACTIVE | OUTPATIENT
Start: 2021-12-13 | End: 2022-11-11

## 2021-12-13 RX ORDER — CEFTRIAXONE 500 MG/1
2000 INJECTION, POWDER, FOR SOLUTION INTRAMUSCULAR; INTRAVENOUS EVERY 12 HOURS
Refills: 0 | Status: DISCONTINUED | OUTPATIENT
Start: 2021-12-13 | End: 2021-12-16

## 2021-12-13 RX ADMIN — NAFCILLIN 200 GRAM(S): 10 INJECTION, POWDER, FOR SOLUTION INTRAVENOUS at 06:52

## 2021-12-13 RX ADMIN — CEFEPIME 100 MILLIGRAM(S): 1 INJECTION, POWDER, FOR SOLUTION INTRAMUSCULAR; INTRAVENOUS at 06:52

## 2021-12-13 RX ADMIN — HALOPERIDOL DECANOATE 2 MILLIGRAM(S): 100 INJECTION INTRAMUSCULAR at 17:11

## 2021-12-13 RX ADMIN — ENOXAPARIN SODIUM 40 MILLIGRAM(S): 100 INJECTION SUBCUTANEOUS at 14:43

## 2021-12-13 RX ADMIN — DIVALPROEX SODIUM 500 MILLIGRAM(S): 500 TABLET, DELAYED RELEASE ORAL at 06:52

## 2021-12-13 RX ADMIN — NAFCILLIN 200 GRAM(S): 10 INJECTION, POWDER, FOR SOLUTION INTRAVENOUS at 17:12

## 2021-12-13 RX ADMIN — NAFCILLIN 200 GRAM(S): 10 INJECTION, POWDER, FOR SOLUTION INTRAVENOUS at 14:42

## 2021-12-13 RX ADMIN — Medication 1 TABLET(S): at 14:43

## 2021-12-13 RX ADMIN — NAFCILLIN 200 GRAM(S): 10 INJECTION, POWDER, FOR SOLUTION INTRAVENOUS at 11:07

## 2021-12-13 RX ADMIN — HALOPERIDOL DECANOATE 5 MILLIGRAM(S): 100 INJECTION INTRAMUSCULAR at 07:30

## 2021-12-13 RX ADMIN — Medication 1 PATCH: at 14:43

## 2021-12-13 RX ADMIN — SODIUM CHLORIDE 10 MILLILITER(S): 9 INJECTION INTRAMUSCULAR; INTRAVENOUS; SUBCUTANEOUS at 07:14

## 2021-12-13 RX ADMIN — Medication 1 MILLIGRAM(S): at 17:11

## 2021-12-13 RX ADMIN — DIVALPROEX SODIUM 500 MILLIGRAM(S): 500 TABLET, DELAYED RELEASE ORAL at 17:33

## 2021-12-13 RX ADMIN — Medication 105 MILLIGRAM(S): at 06:52

## 2021-12-13 RX ADMIN — Medication 2 MILLIGRAM(S): at 04:13

## 2021-12-13 RX ADMIN — CEFTRIAXONE 100 MILLIGRAM(S): 500 INJECTION, POWDER, FOR SOLUTION INTRAMUSCULAR; INTRAVENOUS at 14:42

## 2021-12-13 RX ADMIN — NAFCILLIN 200 GRAM(S): 10 INJECTION, POWDER, FOR SOLUTION INTRAVENOUS at 23:20

## 2021-12-13 RX ADMIN — NAFCILLIN 200 GRAM(S): 10 INJECTION, POWDER, FOR SOLUTION INTRAVENOUS at 02:06

## 2021-12-13 RX ADMIN — CHLORHEXIDINE GLUCONATE 1 APPLICATION(S): 213 SOLUTION TOPICAL at 07:32

## 2021-12-13 NOTE — DISCHARGE NOTE PROVIDER - HOSPITAL COURSE
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 S/p delirium tremens + ICU delirium. S/p R myringotomy placement by ENT, on Depakote for epilepsy, ABx (nafcillin and ceftriaxone by PICC line) for MSSA infection. Course C/B agitation and violence which resolved.     Problem List/Main Diagnoses:   #Brain abscess.   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 MRI shows left necrotizing otitis externa, 1 cm abscess in the left temporal lobe with adjacent dural thickening. Blood cx (NGTD), urine cx (NGTD) and ear cx (staph. aureus and epidermidis). IAC CT scan w/wo contrast necrotizing otitis externa with osteomyelitis and soft tissue extent to the retrocondylar fat and infratemporal fosse.  ENT and neurosurgery no acute surgical intervention.   PLAN:  - Nafcillin 2g IV q4h and CXT 2g IV q12h (12/9 - 1/19)  - . F/u Duration with ID  - c/w ciprodex 5 drops & dexamethasone 0.1% 5 drops to both ears BID.    #Bilateral otitis externa.   Left greater than right. Improving. No signs of mastoiditis on clinical examination. OR with cultures and R myringotomy placement by ENT (12/9) grew MSSA, Actinomyces.  - c/w ciprodex 5 drops & dexamethasone 0.1% 5 drops to both ears BID    #Agitation.   Hx heavy alcohol use (6 beers per day). 1/2 PPD smoker. Course c/b Delirium tremens and ICU delirium. With agitation on RMF. s/p high dose thiamine.   PLAN:  - Dc with thiamine 100mg, folate, MV  - Haldol 2mg q8h  -PRN Haldol 5mg q6 prn for agitation  -12/12 QTc 443  - Nicotine patches 21mg q24h.    #Convulsive status epilepticus.   Likely one focal seizure on left temporal lobe with secondarily 4-5 generalized seizures s/p convulsive status epilepticus. 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 Frequent baths, high pseudomonal suspicion. Intubated and sedated for 2 days. CT temporal bones done and MRI IAC with contrast IV showed left necrotizing otitis externa, 1 cm abscess in the left temporal lobe with adjacent dural thickening. s/p vEEG  PLAN:  - Maintain seizure and fall precautions  - c/w Depakote 500 mg BID (started 12/8)    #Transaminitis.   , ALT 60 as of 12/7/21. Pt with history of heavy alcohol use. No RUQ tenderness on exam.  - Downtrending  - hepatitis panel non reactive    #Etoh dependence  -S/p thiamine 500mg x 3 days  -Start 100mg oral thiamine      New medications/therapies: Thiamine 100mg. 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 S/p delirium tremens + ICU delirium. S/p R myringotomy placement by ENT, on Depakote for epilepsy, ABx (nafcillin and ceftriaxone by PICC line) for MSSA infection. Course C/B agitation and violence which resolved.     Problem List/Main Diagnoses:   #Brain abscess.   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 MRI shows left necrotizing otitis externa, 1 cm abscess in the left temporal lobe with adjacent dural thickening. Blood cx (NGTD), urine cx (NGTD) and ear cx (staph. aureus and epidermidis). IAC CT scan w/wo contrast necrotizing otitis externa with osteomyelitis and soft tissue extent to the retrocondylar fat and infratemporal fosse.  ENT and neurosurgery no acute surgical intervention.   PLAN:  - Nafcillin 2g IV q4h and CXT 2g IV q12h (12/9 - 1/19)  - . F/u Duration with ID  - c/w ciprodex 5 drops & dexamethasone 0.1% 5 drops to both ears BID (12/4-12/18)    #Bilateral otitis externa.   Left greater than right. Improving. No signs of mastoiditis on clinical examination. OR with cultures and R myringotomy placement by ENT (12/9) grew MSSA, Actinomyces.  - c/w ciprodex 5 drops & dexamethasone 0.1% 5 drops to both ears BID    #Agitation.   Hx heavy alcohol use (6 beers per day). 1/2 PPD smoker. Course c/b Delirium tremens and ICU delirium. With agitation on RMF. s/p high dose thiamine.   PLAN:  - Dc with thiamine 100mg, folate, MV  - Haldol 2mg q8h  -PRN Haldol 5mg q6 prn for agitation  -12/12 QTc 443  - Nicotine patches 21mg q24h.    #Convulsive status epilepticus.   Likely one focal seizure on left temporal lobe with secondarily 4-5 generalized seizures s/p convulsive status epilepticus. 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 Frequent baths, high pseudomonal suspicion. Intubated and sedated for 2 days. CT temporal bones done and MRI IAC with contrast IV showed left necrotizing otitis externa, 1 cm abscess in the left temporal lobe with adjacent dural thickening. s/p vEEG  PLAN:  - Maintain seizure and fall precautions  - c/w Depakote 500 mg BID (started 12/8)    #Transaminitis.   , ALT 60 as of 12/7/21. Pt with history of heavy alcohol use. No RUQ tenderness on exam.  - Downtrending  - hepatitis panel non reactive    #Etoh dependence  -S/p thiamine 500mg x 3 days  -Start 100mg oral thiamine      New medications/therapies: Thiamine 100mg. 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 S/p delirium tremens + ICU delirium. S/p R myringotomy placement by ENT, on Depakote for epilepsy, ABx (nafcillin and ceftriaxone by PICC line) for MSSA infection. Course C/B agitation and violence which resolved. Patient and patient's wife, after discussion, will pay out of pocket for continued antibiotic treatment.    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 MRI shows left necrotizing otitis externa, 1 cm abscess in the left temporal lobe with adjacent dural thickening. Blood cx (NGTD), urine cx (NGTD) and ear cx (staph. aureus and epidermidis). IAC CT scan w/wo contrast necrotizing otitis externa with osteomyelitis and soft tissue extent to the retrocondylar fat and infratemporal fosse.  ENT and neurosurgery no acute surgical intervention.   PLAN:  - Nafcillin 2g IV q4h and CXT 2g IV q12h (12/9 - 1/19)  - c/w ciprodex 5 drops & dexamethasone 0.1% 5 drops to both ears BID (12/4-12/17)    #Bilateral otitis externa.   Left greater than right. Improving. No signs of mastoiditis on clinical examination. OR with cultures and R myringotomy placement by ENT (12/9) grew MSSA, Actinomyces.  - c/w ciprodex 5 drops & dexamethasone 0.1% 5 drops to both ears BID with last day 12/17    #Agitation.   Hx heavy alcohol use (6 beers per day). 1/2 PPD smoker. Course c/b Delirium tremens and ICU delirium. With agitation on RMF. s/p high dose thiamine. Received PRN Haldol 5mg q6 prn for agitation  PLAN:  - Dc with thiamine 100mg, folate, MV  - D/c with Haldol PO 1mg BID for five days    #Convulsive status epilepticus.   Likely one focal seizure on left temporal lobe with secondarily 4-5 generalized seizures s/p convulsive status epilepticus. 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 Frequent baths, high pseudomonal suspicion. Intubated and sedated for 2 days. CT temporal bones done and MRI IAC with contrast IV showed left necrotizing otitis externa, 1 cm abscess in the left temporal lobe with adjacent dural thickening. s/p vEEG  PLAN:  - c/w Depakote 500 mg BID (started 12/8)    #Transaminitis.   , ALT 60 as of 12/7/21. Pt with history of heavy alcohol use. No RUQ tenderness on exam. Downtrended. Hepatitis panel non reactive    #Etoh dependence  -S/p thiamine 500mg x 3 days  -c/w 100mg oral thiamine      New medications/therapies: Thiamine 100mg. CTX, Nafcillin, Depakote, Ciprodex drops  New lines/hardware: None  Labs to be followed outpatient: Weekly labs: CMP, CBC, ESR, CRP faxed to Dr Valdivia ID office at [phone]  Exam to be followed outpatient: ENT    Discharge plan: discharge to home

## 2021-12-13 NOTE — PROGRESS NOTE ADULT - SUBJECTIVE AND OBJECTIVE BOX
ENT Consult Note    HARRIET GOLDMAN  [id number]    51y Male w/ unremarkable PMHx (hasn't seen a doctor in many years) presents to [facility] as a transfer for evaluation of seizure of unknown origin. ENT consults for evaluation of left ear infection, r/o malignant ear otitis infections. per wife and brother at bedside, pt has been complaining of bilateral ear pain, left greater than right, since late october. pt went to urgent care at that time and was given oral cipro. pt returned to urgent care one week later after finishing his antibiotics without any ear pain relief. he was given oral and otic drops, however, sxs did not resolve. pt was then seen by an ENT on Nov 15, who diagnosed him w/ bilateral otitis externa, and placed ear sunday bilaterally. pt was given ciprodex otic drops and a follow up. Today, wife states  was not acting like his usual self. around noon today, she went to visit him and work and pt seemed very lethargic and was only responding with one word answers. At this time, wife decided to call an ambulance. while being placed in to the ambulance, pt began having is seizure. pt was taken to [facility], and was intubated for airway protection. CT maxillofacial at [facility] which showed distal external auditory canal and foci of possible tegmen tympani/mastoideum dehiscence versus severe thinning, concerning for malignant/necrotizing otitis. Per wife, pt had severe otalgia and hearing, but did not have any otorrhea, facial weakness, vertigo, or tinnitus    INTERVAL:    12/4: Self extubation last night with emergent reintubation. Otherwise started on cefepime/vanc. MRI/CT pending. Ear cx growing Gram+ cocci and rods. Low grade fevers overnight with WBC 18.4 (down from 100.2). Sunday in place at bedside. Pt sedated.    12/5: NAEON. Still on vanc/cef and ciprodex eardrops. Afebrile overnight. Ear cx sensitivities pending. CT scan temp bones with similar findings as CT maxillofacial from [facility]. WBC downtrending today (down to 13.6). Pt was seen and examined at bedside. Stable exam findings, no mastoid swelling. Sedated and intubated on vent.    12/6: NAEON. On vanc/cef and ciprodex eardrops. Febrile overnight to 102. Ear cx pending sens. CT IAC w/ con with L ZARI, no drainable abscess, no intracranial extension, and R OE. WBC up to 20.3 today from 13.6. Pt's sunday were removed at bedside and both sides were debrided. Still with swelling of b/l EAC (L > R). Sunday reinserted and ciprofloxacin bedside eardrops placed.    12/7: NAEON. MRI shows 1 cm abscess in left temporal lobe. Given small size and absence of neurologic sxs, NSGY recommend c/w IV medications and repeat imaging to evaluation for resolution    12/8: Tachycardic overnight and agitated. On CIWA 8 for agitation, ativan 2x without improvement. Started on phenobarbital protocol with improvement after 3 doses. B/l wrist restraints applied. Otherwise afebrile. On vanc/cef + ciprodex drops. ENT removed sunday and assessed the ear canals with scope. Sunday reapplied.    12/9: NAEON. WBC down 17.8 to 14.4 today. Afebrile overnight. Plan for OR today.    12/10: NAEON. s/p exam under anesthesia of both ear canals, myringotomy on R side (unable to visualize L TM) 12/10. Bilateral EAC inflammation (L>R), keratinized debris. Sunday inserted into deeper aspect of EAC. Afebrile overnight. WBC downtrending (12.7 today). Otherwise still on vanc/cefepime per ID, ciprodex drops into ear canals.    12/11: NAEON. EAR sunday removed today. pt endorses improvement in b/l ear pain.     12/13: pt combative overnight. endorses improvement in ear pain    ICU Vital Signs Last 24 Hrs  ICU Vital Signs Last 24 Hrs  T(C): 36.8 (12 Dec 2021 22:52), Max: 36.9 (12 Dec 2021 10:00)  T(F): 98.2 (12 Dec 2021 22:52), Max: 98.4 (12 Dec 2021 10:00)  HR: 87 (12 Dec 2021 22:52) (82 - 98)  BP: 124/70 (12 Dec 2021 22:52) (124/70 - 138/80)  BP(mean): --  ABP: --  ABP(mean): --  RR: 17 (12 Dec 2021 22:52) (17 - 18)  SpO2: 97% (12 Dec 2021 22:52) (96% - 98%)          PHYSICAL EXAM:    CONSTITUTIONAL: Well nourished, well developed, sedated  HEAD: normocephalic, atraumatic.  EARS  AD: Air>Bone Mastoid non-tender/non-edematous. non-proptotic , non-erythematous right pinna. Debris in EAC likely 2/2 to otic drops. TM intact. no perfs no granulation tissue or masses. improvement in exam.  AS: Bone> Air Mastoid non-tender/non-edematous. non-proptotic pinna. Left pinna edema and erythema improved. Wick replaced.   Christiansen lateralized to the left  Face: HB 1  NOSE: Normal external nose.   ORAL CAVITY/OROPHARYNX: normal mucosa.   NECK: No cervical lymphadenopathy  RESPIRATORY: intubated connected to vent      EXAM:  CT TEMPORAL BONES                          PROCEDURE DATE:  12/04/2021          INTERPRETATION:  PROCEDURE: CT temporal bones    INDICATION: Severe otitis media with concern for temporal bone osteomyelitis    TECHNIQUE: Contiguous 1 mm thickaxial and modified coronal images were obtained through the temporal bones without the intravenous administration of contrast. Target axial and coronal review images were created through each temporal bone utilizing bone algorithm.    COMPARISON: Maxillofacial CT 12/3/2021    FINDINGS: Target review images of the right temporal bone demonstrates soft tissue filling the osseous segment of the right tympanic membrane. The bony cortex appears intact. The mastoid air cell system to be well developed.There is soft tissue opacification and fluid within right mastoid air cells as well as fluid within the right mastoid antrum. There is soft tissue within the middle ear cavity. The ossicles appear intact without erosive changes or displacement. The scutum is intact. The visualized portions of the right inner appear within normal limits.    Target review images of the left temporal bone demonstrates soft tissue completely filling the left osseous segment of the external auditory canal. There are cortical bony erosive changes involving the walls of bony external canal especially the floor and posterior wall.  There is complete soft tissue opacification of left mastoid air cells. The mastoid septae appear intact. The scutum appears eroded. Therealso is complete soft tissue opacification of the middle ear cavity. There is dehiscence of the tegmen tympani and mastoid roof. The ossicles appear intact without erosive changes or displacement. The visualized portions of the left inner ear appearsintact.    The course and caliber of the internal carotid artery, jugular vein and facial nerve within each temporal bones are unremarkable. The internal auditory canals are symmetric in size and configuration. Neither the cochlear nor vestibular aqueducts are enlarged.    Soft-tissue windows fail to demonstrate intracranial abnormality.    IMPRESSION: Right mastoid disease. Findings suggestive of left malignant otitis externa with left mastoid extension.      EXAM:  CT IAC IC                          PROCEDURE DATE:  12/05/2021          INTERPRETATION:  CT TEMPORAL BONES    Technique: A subcentimeter axial acquisition was performed through the temporal bones and reconstructed at submillimeter thickness and spacing in the axial and coronal planes, and furthermore in oblique planes both parallel and perpendicular to assess the semicircular canals. Each series right and left sides targeted/magnified views.    Contrast: Isovue-370 given IV    Clinical Information: Left malignant otitis externa    Prior Studies: Noncontrast exam 12/04/2021, and contrast-exam 12/03/2021.    Findings:    This is the third consecutive and daily exam. There is no change compared to 12/3/2021 which is also a contrast exam.    A.S.    There is permeative bony erosion involving the anterior wall of the external auditory canal and inferior mastoid cells most compatible with necrotizing otitis media. By definition, this is an osteomyelitis. Remaining mastoids more posteriorly show preserved trabeculae, and the tegmen appears thin throughout, but the postcontrast images when viewed in the coronal plane show no inflammatory dural thickening or any fluid collection to imply intracranial spread of infection. There is complete soft tissue opacification of the ear canal compatible with inflammatory skin thickening.    Middle ear is totally opacified, but the ossicular chain appears grossly preserved. Otic capsule also appears intact, aside from minor site of thinning ofthe superior semicircular canal (series 12, image 84 and series 6, image 42, of likely incidental note. There is no dehiscence of the carotid canal or jugular bulb, and the bony facial nerve is of similar size but its canal margins are somewhat hazy in the setting of mastoiditis. Vestibular aqueduct is not enlarged.    On soft tissue inspection and of most salience, there is cellulitis that extends anteriorly by the fissures of Santorini with induration of the retrocondylar fat planes. There is also fat stranding in the upper parapharyngeal space without drainable fluid collection.      A.D.      *  External canal/TM: There is diffuse skin thickening, but without similar bony erosion or extraosseous soft tissue abnormality as on the contralateral side.  *  Mastoid cavity: There is moderate air cell opacification without bony rarefaction as seen on the other side.  *  Temporal bone cortices and Tegmen: There are sites of tegmen thinning, which are not dissimilar to the contralateral side. No elijah or measurable defect.  *  Middle ear/ossicles: Opacified but to a lesser degree than the other side. Ossicular chain appears intact  *  Oval/round windows: Both opacified but nonstenotic  *  Inner ear: Otic capsule appears preserved without evidence of semicircular canal dehiscence or otosclerosis. IACs appear symmetric caliber.  *  Facial nerve: Normal course with intact bony canal  *  ICA/IJV: Normal morphology without dehiscence to the tympanic cavity  *  Vestibular aqueduct: Not enlarged      SOFT TISSUES: On the soft tissue inspection without contrast, there is diffuse left temporal scalp swelling and periauricular swelling compatible with cellulitis secondary to the necrotizing external otitis. There is induration of the retrocondylar fat pads and some haziness in the parapharyngeal and  fat pads. Intracranially, there is preserved contrast filling of the left jugular bulb, sphenoid and transverse sinuses. No empyema identified, nor abscess in the extracranial softtissues below the skull base or laterally at the occipital SCM muscle.    BONY WINDOW: The more central skull base is intact. There is mild inflammatory thickening of the paranasal sinuses. The patient is intubated.      IMPRESSION:    A.S.  Findingsare compatible with necrotizing otitis externa. By definition, this is osteomyelitis, and no change is appreciated since 12/3/2021 which was also done with contrast. There is soft tissue extent to the retrocondylar fat and infratemporal fossa, which **may be of high virulence if the patient is immunocompromised**. No drainable abscess. No intracranial spread appreciated on CT.    A.D.  Lesser findings of both otitis externa and media but without bony erosion or osteomyelitis as seen A.S.        AP: 51 M w/ roughly one month hx of bilateral otitis externa, left greater than right, now presenting w/ seizures. PE does not reveal any granulation tissue in left EAC, however, b/l EACs are edematous and tender to manipulation. Currently on cefepime/vancomycin per ID with ciprodex ear drops b/l with improvement in fevers and WBC. No signs of mastoiditis on clinical examination. S/p EUA, R myringotomy and wick insertion b/l on 12/10.    -F/u OR cx  -Consider heme/onc consult to r/o intrinsic bone conditions  -C/w ciprodex bilaterally; 5 drops to each ear wick 2-3 times a day  -CT temp w/ con with L ZARI, no abscess, no intracranial extension, R OE  -F/u w/ nsgy: IV antibiotic management for temporal lobe abscess  -Vanc/cefepime per ID  -ENT to follow   -Rest of care per primary team    Seen w/ senior resident. Discussed with attending.

## 2021-12-13 NOTE — PROGRESS NOTE ADULT - ASSESSMENT
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    MRI 12/6: C/w left necrotizing otitis externa, there is an approximately 1 cm abscess in the left inferolateral temporal lobe.  CX data from 12/9 Right Ear: Staph Aureus, Actinomyces Turicenesis, Anarococcus Vaginalis  Left Ear Pending.     Recommendations:  -Please discontinue Cefepime and START ceftriaxone 2g q 12 hours to cover for anaerococcus vaginalis and actinomyces turicenesis.   -Continue with Nafcillin 2g q 4hours  -Will follow up final OR cultures from 12/9/21.     ID to follow, plan discussed with primary team and attending.

## 2021-12-13 NOTE — BH CONSULTATION LIAISON PROGRESS NOTE - NSBHASSESSMENTFT_PSY_ALL_CORE
51y Male w/ PMHx of umbilical hernia (5-6 years ago) with NO past medical history of epilepsy, trauma or CNS infection (hasn't seen a doctor in many years) presents to [facility] as a transfer from [facility] for evaluation of seizure of unknown origin. Psychiatry consulted to evaluate for delirium vs underlying personality disorder following an overnight episode of agitation and aggressive behavior requiring the patient to be restrained and under 1:1 observation.      Most recent episode of agitation on 12/13; aggressive behavior could have been exacerbated by abruptly stopping haloperidol on 12/12/21.     P:   -Symptoms of agitation, aggression are likely secondary due to delirium of mixed origin - alcohol withdrawal, infectious etiology (necrotizing otitis externa with osteomyelitis)  -Due to ongoing agitation continue standing haldol 2mg po q8h  -For breakthrough agitation use haldol 2mg po/im/IV q6h  -CIWA for withdrawal  Will continue to follow   51y Male w/ PMHx of umbilical hernia (5-6 years ago) with NO past medical history of epilepsy, trauma or CNS infection (hasn't seen a doctor in many years) presents to [facility] as a transfer from [facility] for evaluation of seizure of unknown origin. Psychiatry consulted to evaluate for delirium vs underlying personality disorder following an overnight episode of agitation and aggressive behavior requiring the patient to be restrained and under 1:1 observation.      Most recent episode of agitation on 12/13; aggressive behavior could have been exacerbated by abruptly stopping haloperidol on 12/12/21.     P:   -Symptoms of agitation, aggression are likely secondary due to delirium of mixed origin - alcohol withdrawal, infectious etiology (necrotizing otitis externa with osteomyelitis)  -Due to ongoing agitation continue standing haloperidol 2mg po q8h  -For breakthrough agitation use haldol 2mg po/im/IV q6h  -CIWA for withdrawal  Will continue to follow
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
## 2021-12-13 NOTE — DISCHARGE NOTE PROVIDER - CARE PROVIDERS DIRECT ADDRESSES
,DirectAddress_Unknown ,DirectAddress_Unknown,[email or web address] ,DirectAddress_Unknown,[email or web address],DirectAddress_Unknown ,DirectAddress_Unknown,[email or web address],DirectAddress_Unknown,DirectAddress_Unknown

## 2021-12-13 NOTE — PROGRESS NOTE ADULT - PROBLEM SELECTOR PLAN 5
, ALT 60 as of 12/7/21. Pt with history of heavy alcohol use. No RUQ tenderness on exam.  - Downtrending  - hepatitis panel non reactive

## 2021-12-13 NOTE — DISCHARGE NOTE PROVIDER - CARE PROVIDER_API CALL
Jonny Senior)  Family Medicine  [address] [facility] [place], [address]  [place] [address]  Phone: [phone]  Fax: [phone]  Follow Up Time: 1 week   Jonny Senior)  Family Medicine  [address] [facility], [address]  [place] [address]  Phone: [phone]  Fax: [phone]  Scheduled Appointment: 12/22/2021 01:00 PM    Abraham Lombardi)  Otolaryngology  [address] [place] [address]  Phone: [phone]  Fax: [phone]  Follow Up Time: 1 week   Jonny Senior)  Family Medicine  [address] [facility], [address]  [place] [address]  Phone: [phone]  Fax: [phone]  Scheduled Appointment: 12/22/2021 01:00 PM    Abraham Lombardi)  Otolaryngology  [address] [address]  Phone: [phone]  Fax: [phone]  Follow Up Time: 1 week    Laura Valdivia)  Infectious Disease; Internal Medicine  [address] [place] [address]  Phone: [phone]  Fax: [phone]  Follow Up Time: Routine   Jonny Senior)  Family Medicine  [address] [facility], [address]  [place] [address]  Phone: [phone]  Fax: [phone]  Scheduled Appointment: 12/22/2021 01:00 PM    Abraham Lombardi)  Otolaryngology  [address] [address]  Phone: [phone]  Fax: [phone]  Scheduled Appointment: 12/21/2021 11:45 AM    Laura Valdivia)  Infectious Disease; Internal Medicine  [address] [place] [address]  Phone: [phone]  Fax: [phone]  Scheduled Appointment: 01/11/2022 10:20 AM   Jonny Senior)  Family Medicine  [address] [facility], [address]  [place] [address]  Phone: [phone]  Fax: [phone]  Scheduled Appointment: 12/22/2021 01:00 PM    Abraham Lombardi)  Otolaryngology  [address] [place] [address]  Phone: [phone]  Fax: [phone]  Scheduled Appointment: 12/21/2021 11:45 AM    Laura Valdivia)  Infectious Disease; Internal Medicine  [address], [address]th [place] [address]  Phone: [phone]  Fax: [phone]  Scheduled Appointment: 01/11/2022 10:20 AM    Alexandra Gong)  Psychiatry; Psychosomatic Medicine  [address] [address]  Phone: [phone]  Fax: [phone]  Established Patient  Follow Up Time: 1 month

## 2021-12-13 NOTE — PROGRESS NOTE ADULT - PROBLEM SELECTOR PLAN 3
#Delirium tremens + ICU delirium  Hx heavy alcohol use (6 beers per day). 1/2 PPD smoker.  - RASS 0-1  PLAN:  - c/w thiamine 500mg q8h IV to treat potential Wernicke syndrome (started q24h IV on 12/12)  - folic acid, multivitamin daily  - DC Standing haloperidol  -PRN Haldol 5mg q6 prn for agitation  - f/u EKG in AM to monitor QTc  - Nicotine patches 21mg q24h. Hx heavy alcohol use (6 beers per day). 1/2 PPD smoker. Course c/b Delirium tremens and ICU delirium. With agitation on RMF  PLAN:  - c/w thiamine 500mg q8h IV to treat potential Wernicke syndrome completing today 12/13  - folic acid, multivitamin daily  - Restart standing haldol--now Haldol 2mg q8h  -PRN Haldol 5mg q6 prn for agitation  - f/u EKG tomorrow. 12/12 QTc 443  - Nicotine patches 21mg q24h.

## 2021-12-13 NOTE — DISCHARGE NOTE PROVIDER - NSDCFUSCHEDAPPT_GEN_ALL_CORE_FT
HARRIET GOLDMAN ; 12/22/2021 ; NPP Med GenInt [address] [facility] HARRIET GOLDMAN ; 12/21/2021 ; NPP Otolaryng [address]  HARRIET GOLDMAN ; 12/22/2021 ; NPP Med GenInt [address] [facility] [place]  HARRIET GOLDMAN ; 01/11/2022 ; NP Med  [address]

## 2021-12-13 NOTE — PROGRESS NOTE ADULT - ASSESSMENT
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 Course complicated by delirium tremens + ICU delirium (now controlled). Now s/p R myringotomy placement by ENT, on Depakote for epilepsy, haloperidol for agitation and ABx (nafcillin and cefepime by PICC line) for MSSA infection. 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 S/p delirium tremens + ICU delirium. Now s/p R myringotomy placement by ENT, on Depakote for epilepsy ABx (nafcillin and cefepime by PICC line) for MSSA infection Course C/B agitation and violence pending optimization from psych.

## 2021-12-13 NOTE — CHART NOTE - NSCHARTNOTEFT_GEN_A_CORE
Admitting Diagnosis:   Patient is a 51y old  Male who presents with a chief complaint of Male complaining of seizures. (13 Dec 2021 09:52)      PAST MEDICAL & SURGICAL HISTORY:  Acute mastoiditis, left    Convulsive status epilepticus    Alcohol abuse, daily use    Umbilical hernia      Current Nutrition Order:  Diet, Regular (12-07-21 @ 13:19)      PO Intake: Good (%) [   ]  Fair (50-75%) [   ] Poor (<25%) [ X ]    GI Issues: Denies N/V/C/D  +BM 12/11    Pain: denies pain/ discomfort    Skin Integrity:  Stage 2 pressure injury to left hip  Glynn score: 20  No edema noted    Labs:   12-12    137  |  102  |  9   ----------------------------<  102<H>  3.9   |  24  |  0.81    Ca    8.9      12 Dec 2021 08:33  Phos  3.0     12-12  Mg     2.2     12-12    TPro  7.4  /  Alb  3.6  /  TBili  0.4  /  DBili  x   /  AST  46<H>  /  ALT  58<H>  /  AlkPhos  81  12-12    CAPILLARY BLOOD GLUCOSE  93-12/11  93-12/10  178-12/10  94-12/10      Medications:  MEDICATIONS  (STANDING):  chlorhexidine 2% Cloths 1 Application(s) Topical <User Schedule>  CIPROFLOXACIN(CIPRO) OPTHALMIC SOLUTION 5 Drop(s) 5 Drop(s) Both Ears two times a day  dexAMETHasone 0.1% Ophthalmic Solution for OTIC Use 5 Drop(s) Both Ears two times a day  dextrose 40% Gel 15 Gram(s) Oral once  dextrose 5%. 1000 milliLiter(s) (50 mL/Hr) IV Continuous <Continuous>  dextrose 50% Injectable 25 Gram(s) IV Push once  diVALproex  milliGRAM(s) Oral two times a day  enoxaparin Injectable 40 milliGRAM(s) SubCutaneous daily  folic acid 1 milliGRAM(s) Oral daily  glucagon  Injectable 1 milliGRAM(s) IntraMuscular once  haloperidol     Tablet 2 milliGRAM(s) Oral every 8 hours  influenza   Vaccine 0.5 milliLiter(s) IntraMuscular once  multivitamin 1 Tablet(s) Oral daily  nafcillin  IVPB 2 Gram(s) IV Intermittent every 4 hours  nicotine - 21 mG/24Hr(s) Patch 1 patch Transdermal daily  thiamine IVPB 500 milliGRAM(s) IV Intermittent every 24 hours    MEDICATIONS  (PRN):  haloperidol    Injectable 5 milliGRAM(s) IV Push every 6 hours PRN Agitation  sodium chloride 0.9% lock flush 10 milliLiter(s) IV Push every 1 hour PRN Pre/post blood products, medications, blood draw, and to maintain line patency      Anthropometrics:  Height: 67"   Weight: 189.5 lbs (86 kg)  IBW: 160 lbs+/-10%  %IBW: 118 %  BMI: 28    Weight Change:   Weight on admit 12/3: 189.5 lbs, no new weight obtainer per EMR    Nutrition Focused Physical Exam: Completed [   ]  Not Pertinent [ X  ]      Estimated energy needs: Height 69"; ABW 85.7kg; IBW 72.5; 118%IBW; BMI 27.9  ActualBW used for calculations as pt between % of IBW. Needs estimated for age and adjusted for infection.  Calories: 27-32 kcal/kg = 4553-3169 kcal/day  Protein: 1.0-1.2 g/kg = 86-103g pro/day  Fluids: 30-35 ml/kg = 5171-0302 ml/day    Subjective:   51M p/w seizure found to have temporal bone OM and necrotizing otitis externa with 1cm abscess in L left inferolateral temporal lobe. s/p Myringotomy on R side on 12/9.  Ear cultures so far growing MSSA and anaeroboccus viridans. Pt noted with increased confusion and agitation, required haldol x2. Pt was asleep during assessment. Pt is left to rest d/t agitation episode at previous night. AOx1. Currently on regular diet, NKFA. Team informed pt appetite is poor. As per PCA, pt wife came over yesterday and brought some food and juices. Unable to collect information of how much food was consumed. Attempt to call wife (Roxanne: [phone]) and left message. No reports N/V/C/D. +BM 12/11. Skin with stage 2 pressure injury to left heel. Please see additional nutrition recs below.    Previous Nutrition Diagnosis:  Inadequate energy intake RT intake<EER AEB reported por PO intake in 5 days.    Active [ X  ]      Goal:  Pt will be encouraged to have >50% intake per meal.    Recommendations:  1 Continue regular diet  >> Add 1 Ensure Enlive BID (350 kcal, 20g protein) to promote PO intake  >>Encourage PO intake while awake/alert. Maintain aspiration precautions at all times   >>Monitor PO intake & honor food preferences as available  2. Monitor lytes and replete prn. POC BG q6hrs   3. Cont. micronutrient supplementation  4. Pain and bowel regimens per team      Education: Deferred at this time    Risk Level: High [ X  ] Moderate [   ] Low [   ]

## 2021-12-13 NOTE — PROGRESS NOTE ADULT - PROBLEM SELECTOR PLAN 2
Left greater than right. Improving. No signs of mastoiditis on clinical examination.  PLAN:  - c/w ciprodex 5 drops & dexamethasone 0.1% 5 drops to both ears BID  - OR with cultures and R myringotomy placement by ENT (12/9)  - F/U cultures of left EAC Left greater than right. Improving. No signs of mastoiditis on clinical examination.  PLAN:  - c/w ciprodex 5 drops & dexamethasone 0.1% 5 drops to both ears BID  - OR with cultures and R myringotomy placement by ENT (12/9)

## 2021-12-13 NOTE — DISCHARGE NOTE PROVIDER - ATTENDING DISCHARGE PHYSICAL EXAMINATION:
Physical Exam:  General: NAD  ENMT: neck soft and supple, no lymphadenopathy, ear james removed  Resp: no increased WOB, CTAB  CVS: RRR, no m/r/g, no pitting edema  GI: +BS, nt/nd  Neuro: alert and oriented to person, place and time

## 2021-12-13 NOTE — CHART NOTE - NSCHARTNOTESELECT_GEN_ALL_CORE
Event Note
Event Note
Nutrition Follow Up/Nutrition Services
anti-infective approval/Event Note
Event Note
Nutrition Follow Up/Nutrition Services
Self Extubation

## 2021-12-13 NOTE — DISCHARGE NOTE PROVIDER - NSDCFUADDAPPT_GEN_ALL_CORE_FT
To your appointment with Dr. Senior, please bring insurance card and vaccine card. Arrive 15-20 minutes early for paperwork.  Please bring your Insurance card, Photo ID, Covid-19 vaccination card (if applicable) and Discharge paperwork to the following appointments:    (1) Please follow up with your Otolaryngology Provider, Dr. Abraham Lombardi at [address] [address] on 12/21/2021 at 11:45am.    Appointment was scheduled by Ms. MICHEL Cross, Referral Coordinator.    (3) Please follow up with your Infectious Disease Provider, Dr. Laura Valdivia at [address] Floor, [place] [address] on 01/11/2022 at 10:20am.    Appointment was scheduled by Ms. MICHEL Cross, Referral Coordinator.

## 2021-12-13 NOTE — DISCHARGE NOTE PROVIDER - PROVIDER TOKENS
PROVIDER:[TOKEN:[[id number]],FOLLOWUP:[1 week]] PROVIDER:[TOKEN:[[id number]],SCHEDULEDAPPT:[12/22/2021],SCHEDULEDAPPTTIME:[01:00 PM]],PROVIDER:[TOKEN:[[id number]],FOLLOWUP:[1 week]] PROVIDER:[TOKEN:[[id number]],SCHEDULEDAPPT:[12/22/2021],SCHEDULEDAPPTTIME:[01:00 PM]],PROVIDER:[TOKEN:[[id number]],FOLLOWUP:[1 week]],PROVIDER:[TOKEN:[[id number]],FOLLOWUP:[Routine]] PROVIDER:[TOKEN:[[id number]],SCHEDULEDAPPT:[12/22/2021],SCHEDULEDAPPTTIME:[01:00 PM]],PROVIDER:[TOKEN:[[id number]],SCHEDULEDAPPT:[12/21/2021],SCHEDULEDAPPTTIME:[11:45 AM]],PROVIDER:[TOKEN:[[id number]],SCHEDULEDAPPT:[01/11/2022],SCHEDULEDAPPTTIME:[10:20 AM]] PROVIDER:[TOKEN:[[id number]],SCHEDULEDAPPT:[12/22/2021],SCHEDULEDAPPTTIME:[01:00 PM]],PROVIDER:[TOKEN:[[id number]],SCHEDULEDAPPT:[12/21/2021],SCHEDULEDAPPTTIME:[11:45 AM]],PROVIDER:[TOKEN:[[id number]],SCHEDULEDAPPT:[01/11/2022],SCHEDULEDAPPTTIME:[10:20 AM]],PROVIDER:[TOKEN:[[id number]],FOLLOWUP:[1 month],ESTABLISHEDPATIENT:[T]]

## 2021-12-13 NOTE — PROGRESS NOTE ADULT - PROBLEM SELECTOR PLAN 1
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 MRI shows left necrotizing otitis externa, 1 cm abscess in the left temporal lobe with adjacent dural thickening. Blood cx (NGTD), urine cx (NGTD) and ear cx (staph. aureus and epidermidis). IAC CT scan w/wo contrast necrotizing otitis externa with osteomyelitis and soft tissue extent to the retrocondylar fat and infratemporal fosse.  ENT and neurosurgery no acute surgical intervention.   - 12/9 R ear OR culture, 2/8 ear culture and 12/3 L ear culture also grew MSSA. (likely culprit)  - Staph epi and coryne considered skin contaminant.   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 MRI shows left necrotizing otitis externa, 1 cm abscess in the left temporal lobe with adjacent dural thickening. Blood cx (NGTD), urine cx (NGTD) and ear cx (staph. aureus and epidermidis). IAC CT scan w/wo contrast necrotizing otitis externa with osteomyelitis and soft tissue extent to the retrocondylar fat and infratemporal fosse.  ENT and neurosurgery no acute surgical intervention.   - 12/9 R ear OR culture, 2/8 ear culture and 12/3 L ear culture also grew MSSA. (likely culprit)  - Staph epi and coryne considered skin contaminant.   - PICC line in place  PLAN:  - Nafcillin 2g IV q4h (for a total of 6 weeks), per ID recs  - CXT 2g IV q12h  - c/w ciprodex 5 drops & dexamethasone 0.1% 5 drops to both ears BID

## 2021-12-13 NOTE — PROGRESS NOTE ADULT - PROBLEM SELECTOR PLAN 4
Likely one focal seizure on left temporal lobe with secondarily 4-5 generalized seizures s/p convulsive status epilepticus. 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 Frequent baths, high pseudomonal suspicion. Intubated and sedated for 2 days. CT temporal bones done and MRI IAC with contrast IV showed left necrotizing otitis externa, 1 cm abscess in the left temporal lobe with adjacent dural thickening. s/p vEEG  PLAN:  - Maintain seizure and fall precautions  - c/w Depakote 500 mg BID (started 12/8)  - f/u depakote trough level monday AM

## 2021-12-13 NOTE — BH CONSULTATION LIAISON PROGRESS NOTE - ADDITIONAL DETAILS / COMMENTS
Patient sedated following administration of ativan and haloperidol for mgmt of agitation. 
Patient was visibly agitated. Clinical team members were attempting to redirect patient.

## 2021-12-13 NOTE — DISCHARGE NOTE PROVIDER - NPI NUMBER (FOR SYSADMIN USE ONLY) :
[[id number]] [[id number]],[[id number]] [[id number]],[[id number]],[[id number]] [[id number]],[[id number]],[[id number]],[[id number]]
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
## 2021-12-13 NOTE — DISCHARGE NOTE PROVIDER - NSDCCPCAREPLAN_GEN_ALL_CORE_FT
PRINCIPAL DISCHARGE DIAGNOSIS  Diagnosis: Brain abscess  Assessment and Plan of Treatment:       SECONDARY DISCHARGE DIAGNOSES  Diagnosis: Bilateral otitis externa  Assessment and Plan of Treatment:      PRINCIPAL DISCHARGE DIAGNOSIS  Diagnosis: Brain abscess  Assessment and Plan of Treatment: You presented for seizures and were treated initially in the ICU. Imaging revealed an abscess on the temporal lobe of your brain. This likely caused the seizures. You were given antibiotics and medication to prevent seizure. PICC line was placed already which is how you will receive your antibiotics. You will finish a 6 week course of antibiotics.  Please continue to take the 500mg depakote every 12 hours, the 2mg haloperidol  every 12 hours, with additional haloperidol 2mg every 6 hours as needed. We are also sending to your pharmacy thiamine, folic acid and a multivitamin to take daily. You can purchase nicotine patch over the counter at your pharmacy. Please continue the antibiotics as instructed using your PICC line. Please obtain weekly bloodwork to be faxed to Dr. Valdivia's office at (f) [phone]  The infectious disease office will call you to schedule an appointment for you with Dr. Valdivia in 3 weeks        SECONDARY DISCHARGE DIAGNOSES  Diagnosis: Bilateral otitis externa  Assessment and Plan of Treatment: You had an infection of the external part of your ears which is what was causing your ear pain. This may also have been the source of the infection that involved your brain. The Ear Nose and Throat team placed tubing and packing in your ear which they removed. Please attend your follow up appointment with Dr. Lombardi (ENT). Please continue the antibiotic plan as above. Please continue to use the eardrops 3-4 times a day until your appointment with Dr. Lombardi.     PRINCIPAL DISCHARGE DIAGNOSIS  Diagnosis: Brain abscess  Assessment and Plan of Treatment: You presented for seizures and were treated initially in the ICU. Imaging revealed an abscess on the temporal lobe of your brain. This likely caused the seizures. You were given antibiotics and medication to prevent seizure. PICC line was placed already which is how you will receive your antibiotics. You will finish a 6 week course of antibiotics.  Please continue to take the 500mg depakote every 12 hours, the 2mg haloperidol  every 12 hours, with additional haloperidol 2mg every 6 hours as needed. We are also sending to your pharmacy thiamine, folic acid and a multivitamin to take daily. You can purchase nicotine patch over the counter at your pharmacy. Please continue the antibiotics as instructed using your PICC line. Please obtain weekly bloodwork to be faxed to Dr. Valdivia's office at (f) [phone]  The infectious disease office will call you to schedule an appointment for you with Dr. Valdivia in 3 weeks        SECONDARY DISCHARGE DIAGNOSES  Diagnosis: Bilateral otitis externa  Assessment and Plan of Treatment: You had an infection of the external part of your ears which is what was causing your ear pain. This may also have been the source of the infection that involved your brain. The Ear Nose and Throat team placed tubing and packing in your ear which they removed. Please attend your follow up appointment with Dr. Lombardi (ENT). Please continue the antibiotic plan as above. Please continue to use the eardrops 2 times a day until 12/18.

## 2021-12-13 NOTE — DISCHARGE NOTE PROVIDER - NSDCMRMEDTOKEN_GEN_ALL_CORE_FT
ciprofloxacin 0.2% otic solution: 1 each to each affected ear every 12 hours  ciprofloxacin 500 mg oral tablet: 1 tab(s) orally every 12 hours  predniSONE 20 mg oral tablet: 1 tab(s) orally 2 times a day   cefTRIAXone: 2000 milligram(s) intravenously every 4 hours  Ciprodex 0.3%-0.1% otic suspension: 5 drop(s) in each affected ear 2 times a day   divalproex sodium 500 mg oral delayed release tablet: 1 tab(s) orally 2 times a day  folic acid 1 mg oral tablet: 1 tab(s) orally once a day  freetext medication     -: 5 drop(s) to each affected ear 2 times a day  haloperidol 1 mg oral tablet: 1 tab(s) orally every 12 hours. Take the first tablet at 7 PM tonight on 12/16  Multiple Vitamins oral tablet: 1 tab(s) orally once a day  nafcillin: 2000 milligram(s) intravenously every 4 hours  nicotine 21 mg/24 hr transdermal film, extended release: 1 patch transdermal once a day   thiamine 100 mg oral tablet: 1 tab(s) orally once a day

## 2021-12-14 LAB
-  AMOXICILLIN/CLAVULANIC ACID: SIGNIFICANT CHANGE UP
-  CLINDAMYCIN: SIGNIFICANT CHANGE UP
-  ERTAPENEM: SIGNIFICANT CHANGE UP
-  MEROPENEM: SIGNIFICANT CHANGE UP
-  METRONIDAZOLE: SIGNIFICANT CHANGE UP
-  PIPERACILLIN/TAZOBACTAM: SIGNIFICANT CHANGE UP
ALBUMIN SERPL ELPH-MCNC: 3.5 G/DL — SIGNIFICANT CHANGE UP (ref 3.3–5)
ALP SERPL-CCNC: 77 U/L — SIGNIFICANT CHANGE UP (ref 40–120)
ALT FLD-CCNC: 51 U/L — HIGH (ref 10–45)
ANION GAP SERPL CALC-SCNC: 8 MMOL/L — SIGNIFICANT CHANGE UP (ref 5–17)
AST SERPL-CCNC: 35 U/L — SIGNIFICANT CHANGE UP (ref 10–40)
BASOPHILS # BLD AUTO: 0.11 K/UL — SIGNIFICANT CHANGE UP (ref 0–0.2)
BASOPHILS NFR BLD AUTO: 1 % — SIGNIFICANT CHANGE UP (ref 0–2)
BILIRUB SERPL-MCNC: 0.2 MG/DL — SIGNIFICANT CHANGE UP (ref 0.2–1.2)
BUN SERPL-MCNC: 11 MG/DL — SIGNIFICANT CHANGE UP (ref 7–23)
CALCIUM SERPL-MCNC: 9.2 MG/DL — SIGNIFICANT CHANGE UP (ref 8.4–10.5)
CHLORIDE SERPL-SCNC: 101 MMOL/L — SIGNIFICANT CHANGE UP (ref 96–108)
CO2 SERPL-SCNC: 25 MMOL/L — SIGNIFICANT CHANGE UP (ref 22–31)
CREAT SERPL-MCNC: 0.88 MG/DL — SIGNIFICANT CHANGE UP (ref 0.5–1.3)
CULTURE RESULTS: SIGNIFICANT CHANGE UP
EOSINOPHIL # BLD AUTO: 0.55 K/UL — HIGH (ref 0–0.5)
EOSINOPHIL NFR BLD AUTO: 4.9 % — SIGNIFICANT CHANGE UP (ref 0–6)
GLUCOSE SERPL-MCNC: 116 MG/DL — HIGH (ref 70–99)
HCT VFR BLD CALC: 40.5 % — SIGNIFICANT CHANGE UP (ref 39–50)
HGB BLD-MCNC: 13.2 G/DL — SIGNIFICANT CHANGE UP (ref 13–17)
IMM GRANULOCYTES NFR BLD AUTO: 0.9 % — SIGNIFICANT CHANGE UP (ref 0–1.5)
LYMPHOCYTES # BLD AUTO: 2.41 K/UL — SIGNIFICANT CHANGE UP (ref 1–3.3)
LYMPHOCYTES # BLD AUTO: 21.3 % — SIGNIFICANT CHANGE UP (ref 13–44)
MAGNESIUM SERPL-MCNC: 2.3 MG/DL — SIGNIFICANT CHANGE UP (ref 1.6–2.6)
MCHC RBC-ENTMCNC: 30.2 PG — SIGNIFICANT CHANGE UP (ref 27–34)
MCHC RBC-ENTMCNC: 32.6 GM/DL — SIGNIFICANT CHANGE UP (ref 32–36)
MCV RBC AUTO: 92.7 FL — SIGNIFICANT CHANGE UP (ref 80–100)
METHOD TYPE: SIGNIFICANT CHANGE UP
MONOCYTES # BLD AUTO: 1.07 K/UL — HIGH (ref 0–0.9)
MONOCYTES NFR BLD AUTO: 9.5 % — SIGNIFICANT CHANGE UP (ref 2–14)
NEUTROPHILS # BLD AUTO: 7.06 K/UL — SIGNIFICANT CHANGE UP (ref 1.8–7.4)
NEUTROPHILS NFR BLD AUTO: 62.4 % — SIGNIFICANT CHANGE UP (ref 43–77)
NRBC # BLD: 0 /100 WBCS — SIGNIFICANT CHANGE UP (ref 0–0)
ORGANISM # SPEC MICROSCOPIC CNT: SIGNIFICANT CHANGE UP
PHOSPHATE SERPL-MCNC: 2.8 MG/DL — SIGNIFICANT CHANGE UP (ref 2.5–4.5)
PLATELET # BLD AUTO: 428 K/UL — HIGH (ref 150–400)
POTASSIUM SERPL-MCNC: 4.4 MMOL/L — SIGNIFICANT CHANGE UP (ref 3.5–5.3)
POTASSIUM SERPL-SCNC: 4.4 MMOL/L — SIGNIFICANT CHANGE UP (ref 3.5–5.3)
PROT SERPL-MCNC: 7.2 G/DL — SIGNIFICANT CHANGE UP (ref 6–8.3)
RBC # BLD: 4.37 M/UL — SIGNIFICANT CHANGE UP (ref 4.2–5.8)
RBC # FLD: 14.7 % — HIGH (ref 10.3–14.5)
SODIUM SERPL-SCNC: 134 MMOL/L — LOW (ref 135–145)
SPECIMEN SOURCE: SIGNIFICANT CHANGE UP
VALPROATE SERPL-MCNC: 17.7 UG/ML — LOW (ref 50–100)
WBC # BLD: 11.3 K/UL — HIGH (ref 3.8–10.5)
WBC # FLD AUTO: 11.3 K/UL — HIGH (ref 3.8–10.5)

## 2021-12-14 PROCEDURE — 99231 SBSQ HOSP IP/OBS SF/LOW 25: CPT

## 2021-12-14 PROCEDURE — 99232 SBSQ HOSP IP/OBS MODERATE 35: CPT

## 2021-12-14 PROCEDURE — 99233 SBSQ HOSP IP/OBS HIGH 50: CPT | Mod: GC

## 2021-12-14 RX ORDER — HALOPERIDOL DECANOATE 100 MG/ML
2 INJECTION INTRAMUSCULAR EVERY 8 HOURS
Refills: 0 | Status: DISCONTINUED | OUTPATIENT
Start: 2021-12-14 | End: 2021-12-14

## 2021-12-14 RX ORDER — THIAMINE MONONITRATE (VIT B1) 100 MG
100 TABLET ORAL DAILY
Refills: 0 | Status: DISCONTINUED | OUTPATIENT
Start: 2021-12-14 | End: 2021-12-16

## 2021-12-14 RX ORDER — HALOPERIDOL DECANOATE 100 MG/ML
2 INJECTION INTRAMUSCULAR EVERY 12 HOURS
Refills: 0 | Status: DISCONTINUED | OUTPATIENT
Start: 2021-12-14 | End: 2021-12-14

## 2021-12-14 RX ORDER — HALOPERIDOL DECANOATE 100 MG/ML
2 INJECTION INTRAMUSCULAR EVERY 12 HOURS
Refills: 0 | Status: DISCONTINUED | OUTPATIENT
Start: 2021-12-14 | End: 2021-12-15

## 2021-12-14 RX ORDER — HALOPERIDOL DECANOATE 100 MG/ML
2 INJECTION INTRAMUSCULAR EVERY 6 HOURS
Refills: 0 | Status: DISCONTINUED | OUTPATIENT
Start: 2021-12-14 | End: 2021-12-16

## 2021-12-14 RX ADMIN — Medication 1 TABLET(S): at 12:43

## 2021-12-14 RX ADMIN — NAFCILLIN 200 GRAM(S): 10 INJECTION, POWDER, FOR SOLUTION INTRAVENOUS at 02:22

## 2021-12-14 RX ADMIN — HALOPERIDOL DECANOATE 2 MILLIGRAM(S): 100 INJECTION INTRAMUSCULAR at 01:02

## 2021-12-14 RX ADMIN — HALOPERIDOL DECANOATE 2 MILLIGRAM(S): 100 INJECTION INTRAMUSCULAR at 19:00

## 2021-12-14 RX ADMIN — NAFCILLIN 200 GRAM(S): 10 INJECTION, POWDER, FOR SOLUTION INTRAVENOUS at 22:51

## 2021-12-14 RX ADMIN — NAFCILLIN 200 GRAM(S): 10 INJECTION, POWDER, FOR SOLUTION INTRAVENOUS at 18:05

## 2021-12-14 RX ADMIN — HALOPERIDOL DECANOATE 2 MILLIGRAM(S): 100 INJECTION INTRAMUSCULAR at 09:37

## 2021-12-14 RX ADMIN — DIVALPROEX SODIUM 500 MILLIGRAM(S): 500 TABLET, DELAYED RELEASE ORAL at 18:05

## 2021-12-14 RX ADMIN — CEFTRIAXONE 100 MILLIGRAM(S): 500 INJECTION, POWDER, FOR SOLUTION INTRAMUSCULAR; INTRAVENOUS at 12:43

## 2021-12-14 RX ADMIN — NAFCILLIN 200 GRAM(S): 10 INJECTION, POWDER, FOR SOLUTION INTRAVENOUS at 06:57

## 2021-12-14 RX ADMIN — Medication 1 PATCH: at 12:45

## 2021-12-14 RX ADMIN — Medication 1 PATCH: at 12:47

## 2021-12-14 RX ADMIN — Medication 105 MILLIGRAM(S): at 06:57

## 2021-12-14 RX ADMIN — Medication 1 PATCH: at 18:59

## 2021-12-14 RX ADMIN — CHLORHEXIDINE GLUCONATE 1 APPLICATION(S): 213 SOLUTION TOPICAL at 06:57

## 2021-12-14 RX ADMIN — CEFTRIAXONE 100 MILLIGRAM(S): 500 INJECTION, POWDER, FOR SOLUTION INTRAMUSCULAR; INTRAVENOUS at 01:01

## 2021-12-14 RX ADMIN — Medication 100 MILLIGRAM(S): at 12:43

## 2021-12-14 RX ADMIN — INFLUENZA VIRUS VACCINE 0.5 MILLILITER(S): 15; 15; 15; 15 SUSPENSION INTRAMUSCULAR at 12:43

## 2021-12-14 RX ADMIN — NAFCILLIN 200 GRAM(S): 10 INJECTION, POWDER, FOR SOLUTION INTRAVENOUS at 10:02

## 2021-12-14 RX ADMIN — Medication 1 MILLIGRAM(S): at 12:42

## 2021-12-14 RX ADMIN — DIVALPROEX SODIUM 500 MILLIGRAM(S): 500 TABLET, DELAYED RELEASE ORAL at 06:58

## 2021-12-14 RX ADMIN — ENOXAPARIN SODIUM 40 MILLIGRAM(S): 100 INJECTION SUBCUTANEOUS at 12:43

## 2021-12-14 RX ADMIN — NAFCILLIN 200 GRAM(S): 10 INJECTION, POWDER, FOR SOLUTION INTRAVENOUS at 14:11

## 2021-12-14 NOTE — PROGRESS NOTE ADULT - PROBLEM SELECTOR PLAN 1
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 MRI shows left necrotizing otitis externa, 1 cm abscess in the left temporal lobe with adjacent dural thickening. Blood cx (NGTD), urine cx (NGTD) and ear cx (staph. aureus and epidermidis). IAC CT scan w/wo contrast necrotizing otitis externa with osteomyelitis and soft tissue extent to the retrocondylar fat and infratemporal fosse.  ENT and neurosurgery no acute surgical intervention.   - 12/9 R ear OR culture, 2/8 ear culture and 12/3 L ear culture also grew MSSA. (likely culprit)  - Staph epi and coryne considered skin contaminant.   - PICC line in place  PLAN:  - Nafcillin 2g IV q4h (for a total of 6 weeks), per ID recs  - CXT 2g IV q12h. 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 MRI shows left necrotizing otitis externa, 1 cm abscess in the left temporal lobe with adjacent dural thickening. Blood cx (NGTD), urine cx (NGTD) and ear cx (staph. aureus and epidermidis). IAC CT scan w/wo contrast necrotizing otitis externa with osteomyelitis and soft tissue extent to the retrocondylar fat and infratemporal fosse.  ENT and neurosurgery no acute surgical intervention.   PLAN:  - Nafcillin 2g IV q4h (for a total of 6 weeks), per ID recs  - CXT 2g IV q12h. F/u Duration with ID  - c/w ciprodex 5 drops & dexamethasone 0.1% 5 drops to both ears BID

## 2021-12-14 NOTE — PROGRESS NOTE ADULT - PROBLEM SELECTOR PLAN 4
Likely one focal seizure on left temporal lobe with secondarily 4-5 generalized seizures s/p convulsive status epilepticus. 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 Frequent baths, high pseudomonal suspicion. Intubated and sedated for 2 days. CT temporal bones done and MRI IAC with contrast IV showed left necrotizing otitis externa, 1 cm abscess in the left temporal lobe with adjacent dural thickening. s/p vEEG  PLAN:  - Maintain seizure and fall precautions  - c/w Depakote 500 mg BID (started 12/8)  - f/u depakote trough level monday AM Likely one focal seizure on left temporal lobe with secondarily 4-5 generalized seizures s/p convulsive status epilepticus. 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 Frequent baths, high pseudomonal suspicion. Intubated and sedated for 2 days. CT temporal bones done and MRI IAC with contrast IV showed left necrotizing otitis externa, 1 cm abscess in the left temporal lobe with adjacent dural thickening. s/p vEEG  PLAN:  - Maintain seizure and fall precautions  - c/w Depakote 500 mg BID (started 12/8)

## 2021-12-14 NOTE — PROGRESS NOTE ADULT - SUBJECTIVE AND OBJECTIVE BOX
OVERNIGHT EVENTS: NAEON. No agitation episodes    SUBJECTIVE / INTERVAL HPI: Patient seen and examined at bedside. Denied fever, chills, ear pain.    VITAL SIGNS:  Vital Signs Last 24 Hrs  T(C): 36.7 (14 Dec 2021 09:29), Max: 36.9 (13 Dec 2021 21:57)  T(F): 98 (14 Dec 2021 09:29), Max: 98.4 (13 Dec 2021 21:57)  HR: 78 (14 Dec 2021 09:29) (74 - 78)  BP: 128/73 (14 Dec 2021 09:29) (111/69 - 144/79)  BP(mean): --  RR: 18 (14 Dec 2021 09:29) (18 - 18)  SpO2: 95% (14 Dec 2021 09:29) (95% - 97%)    PHYSICAL EXAM:    General: Sleeping comfortably. Arousable  HEENT: NC/AT; PERRL, anicteric sclera; MMM  Neck: supple  Cardiovascular: +S1/S2, RRR, no murmurs, rubs, gallops  Respiratory: CTA B/L; no W/R/R  Gastrointestinal: soft, NT/ND; +BSx4  Extremities: WWP; no edema, clubbing or cyanosis  Vascular: 2+ radial, DP/PT pulses B/L  Neurological: AAOx3; no focal deficits    MEDICATIONS:  MEDICATIONS  (STANDING):  cefTRIAXone   IVPB 2000 milliGRAM(s) IV Intermittent every 12 hours  chlorhexidine 2% Cloths 1 Application(s) Topical <User Schedule>  CIPROFLOXACIN(CIPRO) OPTHALMIC SOLUTION 5 Drop(s) 5 Drop(s) Both Ears two times a day  dexAMETHasone 0.1% Ophthalmic Solution for OTIC Use 5 Drop(s) Both Ears two times a day  dextrose 40% Gel 15 Gram(s) Oral once  dextrose 5%. 1000 milliLiter(s) (50 mL/Hr) IV Continuous <Continuous>  dextrose 50% Injectable 25 Gram(s) IV Push once  diVALproex  milliGRAM(s) Oral two times a day  enoxaparin Injectable 40 milliGRAM(s) SubCutaneous daily  folic acid 1 milliGRAM(s) Oral daily  glucagon  Injectable 1 milliGRAM(s) IntraMuscular once  haloperidol     Tablet 2 milliGRAM(s) Oral every 12 hours  influenza   Vaccine 0.5 milliLiter(s) IntraMuscular once  multivitamin 1 Tablet(s) Oral daily  nafcillin  IVPB 2 Gram(s) IV Intermittent every 4 hours  nicotine - 21 mG/24Hr(s) Patch 1 patch Transdermal daily    MEDICATIONS  (PRN):  haloperidol    Injectable 2 milliGRAM(s) IntraMuscular every 6 hours PRN agitation  sodium chloride 0.9% lock flush 10 milliLiter(s) IV Push every 1 hour PRN Pre/post blood products, medications, blood draw, and to maintain line patency      ALLERGIES:  Allergies    No Known Allergies    Intolerances        LABS:                        13.2   11.30 )-----------( 428      ( 14 Dec 2021 07:43 )             40.5     12-14    134<L>  |  101  |  11  ----------------------------<  116<H>  4.4   |  25  |  0.88    Ca    9.2      14 Dec 2021 07:43  Phos  2.8     12-14  Mg     2.3     12-14    TPro  7.2  /  Alb  3.5  /  TBili  0.2  /  DBili  x   /  AST  35  /  ALT  51<H>  /  AlkPhos  77  12-14        CAPILLARY BLOOD GLUCOSE          RADIOLOGY & ADDITIONAL TESTS: Reviewed.    PLAN:

## 2021-12-14 NOTE — PROGRESS NOTE ADULT - PROBLEM SELECTOR PLAN 2
Left greater than right. Improving. No signs of mastoiditis on clinical examination.  PLAN:  - c/w ciprodex 5 drops & dexamethasone 0.1% 5 drops to both ears BID  - OR with cultures and R myringotomy placement by ENT (12/9)  -ENT will remove gauze on day of dc Left greater than right. Improving. No signs of mastoiditis on clinical examination. MSSA, Actinomyces on culture.  PLAN:  - c/w ciprodex 5 drops & dexamethasone 0.1% 5 drops to both ears BID  - OR with cultures and R myringotomy placement by ENT (12/9)  -ENT will remove gauze on day of dc

## 2021-12-14 NOTE — BH CONSULTATION LIAISON PROGRESS NOTE - NSBHASSESSMENTFT_PSY_ALL_CORE
51y Male w/ PMHx of umbilical hernia (5-6 years ago) with NO past medical history of epilepsy, trauma or CNS infection (hasn't seen a doctor in many years) presents to [facility] as a transfer from [facility] for evaluation of seizure of unknown origin. Psychiatry consulted to evaluate for delirium vs underlying personality disorder following an overnight episode of agitation and aggressive behavior requiring the patient to be restrained and under 1:1 observation.      No episode of agitation documented o/n. Patient is AOx3, very cooperative and calm.     P:   -Symptoms of agitation, aggression are likely secondary due to delirium of mixed origin - alcohol withdrawal, infectious etiology (necrotizing otitis externa with osteomyelitis)  -Due to ongoing agitation continue standing haloperidol 2mg po q8h  -For breakthrough agitation use haldol 2mg po/im/IV q6h  -CIWA for withdrawal  Will continue to follow   51y Male w/ PMHx of umbilical hernia (5-6 years ago) with NO past medical history of epilepsy, trauma or CNS infection (hasn't seen a doctor in many years) presents to [facility] as a transfer from [facility] for evaluation of seizure of unknown origin. Psychiatry consulted to evaluate for delirium vs underlying personality disorder following an overnight episode of agitation and aggressive behavior requiring the patient to be restrained and under 1:1 observation.      No episode of agitation documented o/n. Patient is AOx3, very cooperative and calm.     P:   -Symptoms of agitation, aggression are likely secondary due to delirium of mixed origin - alcohol withdrawal, infectious etiology (necrotizing otitis externa with osteomyelitis)  -Patient's behavior has improved, can change haldol to 2mg PO BID  -For breakthrough agitation use haldol 2mg po/im/IV q6h  -CIWA for withdrawal  Will continue to follow

## 2021-12-14 NOTE — PROGRESS NOTE ADULT - SUBJECTIVE AND OBJECTIVE BOX
OVERNIGHT EVENTS: no acute events overnight. No agitation episodes    SUBJECTIVE / INTERVAL HPI: Patient seen and examined at bedside. Denied fever, chills, ear pain.    VITAL SIGNS:  T(F): 98.2 (12-14-21 @ 16:09)  HR: 79 (12-14-21 @ 16:09)  BP: 131/79 (12-14-21 @ 16:09)  RR: 19 (12-14-21 @ 16:09)  SpO2: 98% (12-14-21 @ 16:09)  Wt(kg): --    PHYSICAL EXAM:    General: WDWN  HEENT: left mastoid erythematous, non tender to palpation.   Neck: supple  Cardiovascular: +S1/S2; RRR  Respiratory: CTA B/L; no W/R/R  Gastrointestinal: soft, NT/ND; +BSx4  Extremities: WWP; no edema, clubbing or cyanosis  Vascular: 2+ radial, DP/PT pulses B/L  Neurological: AAOx1    MEDICATIONS  (STANDING):  cefTRIAXone   IVPB 2000 milliGRAM(s) IV Intermittent every 12 hours  chlorhexidine 2% Cloths 1 Application(s) Topical <User Schedule>  CIPROFLOXACIN(CIPRO) OPTHALMIC SOLUTION 5 Drop(s) 5 Drop(s) Both Ears two times a day  dexAMETHasone 0.1% Ophthalmic Solution for OTIC Use 5 Drop(s) Both Ears two times a day  dextrose 40% Gel 15 Gram(s) Oral once  dextrose 5%. 1000 milliLiter(s) (50 mL/Hr) IV Continuous <Continuous>  dextrose 50% Injectable 25 Gram(s) IV Push once  diVALproex  milliGRAM(s) Oral two times a day  enoxaparin Injectable 40 milliGRAM(s) SubCutaneous daily  folic acid 1 milliGRAM(s) Oral daily  glucagon  Injectable 1 milliGRAM(s) IntraMuscular once  haloperidol     Tablet 2 milliGRAM(s) Oral every 12 hours  multivitamin 1 Tablet(s) Oral daily  nafcillin  IVPB 2 Gram(s) IV Intermittent every 4 hours  nicotine - 21 mG/24Hr(s) Patch 1 patch Transdermal daily  thiamine 100 milliGRAM(s) Oral daily    MEDICATIONS  (PRN):  haloperidol    Injectable 2 milliGRAM(s) IntraMuscular every 6 hours PRN agitation  sodium chloride 0.9% lock flush 10 milliLiter(s) IV Push every 1 hour PRN Pre/post blood products, medications, blood draw, and to maintain line patency      Allergies    No Known Allergies    Intolerances        LABS:                        13.2   11.30 )-----------( 428      ( 14 Dec 2021 07:43 )             40.5     12-14    134<L>  |  101  |  11  ----------------------------<  116<H>  4.4   |  25  |  0.88    Ca    9.2      14 Dec 2021 07:43  Phos  2.8     12-14  Mg     2.3     12-14    TPro  7.2  /  Alb  3.5  /  TBili  0.2  /  DBili  x   /  AST  35  /  ALT  51<H>  /  AlkPhos  77  12-14          RADIOLOGY & ADDITIONAL TESTS:  Reviewed

## 2021-12-14 NOTE — BH CONSULTATION LIAISON PROGRESS NOTE - NSBHFUPINTERVALHXFT_PSY_A_CORE
Patient was alert, oriented x 3; resting comfortably in chair with 1:1 observer at bedside. Patient states that he is feeling much better today but is confused about the details of his hospital admission - does not recall episodes of agitation. Has a general understanding of having been diagnosed with a severe ear bone infection that will require treatment with long-term abx (shared that the PICC was placed for the abx). Endorses occasionally hearing "yelling/talking/activity in the hallway." Denies visual hallucinations.      Patient has started taking notes to get a better sense of what took place during his admission. He is aware that he was given haldol and feels that the medication did work to keep him calm. Willing to take medications if recommended but thinks that at home he won't be agitated and/or anxious. He feels that he has a good support system at home and will be able to communicate with his family if any negative feelings arise. He confirms that before coming to the hospital he was experiencing some anxiety and frustration because he was in a lot of pain and the infection progressively worsened.

## 2021-12-14 NOTE — PROGRESS NOTE ADULT - PROBLEM SELECTOR PLAN 3
Hx heavy alcohol use (6 beers per day). 1/2 PPD smoker. Course c/b Delirium tremens and ICU delirium. With agitation on RMF  PLAN:  - c/w thiamine 500mg q8h IV to treat potential Wernicke syndrome completing today 12/13  - folic acid, multivitamin daily  - Restart standing haldol--now Haldol 2mg q8h  -PRN Haldol 5mg q6 prn for agitation  - Repeat EKG on 12/14. 12/12 QTc 443  - Nicotine patches 21mg q24h. Hx heavy alcohol use (6 beers per day). 1/2 PPD smoker. Course c/b Delirium tremens and ICU delirium. With agitation on RMF. s/p high dose thiamine.   PLAN:  - Start thiamine 100mg daily  - folic acid, multivitamin daily  - Restart standing haldol--now Haldol 2mg q8h  -PRN Haldol 5mg q6 prn for agitation  - Repeat EKG on 12/14. 12/12 QTc 443  - Nicotine patches 21mg q24h.

## 2021-12-14 NOTE — PROGRESS NOTE ADULT - ASSESSMENT
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 S/p delirium tremens + ICU delirium. Now s/p R myringotomy placement by ENT, on Depakote for epilepsy ABx (nafcillin and cefepime by PICC line) for MSSA infection Course C/B agitation and violence pending optimization from psych.

## 2021-12-14 NOTE — PROGRESS NOTE ADULT - PROBLEM SELECTOR PLAN 5
, ALT 60 as of 12/7/21. Pt with history of heavy alcohol use. No RUQ tenderness on exam.  - Downtrending  - hepatitis panel non reactive , ALT 60 as of 12/7/21. Pt with history of heavy alcohol use. No RUQ tenderness on exam.  - Downtrending  - hepatitis panel non reactive    #Etoh dependence  -S/p thiamine 500mg x 3 days  -Start 100mg oral thiamine

## 2021-12-15 ENCOUNTER — APPOINTMENT (OUTPATIENT)
Dept: OTOLARYNGOLOGY | Facility: CLINIC | Age: 51
End: 2021-12-15

## 2021-12-15 LAB
ANION GAP SERPL CALC-SCNC: 11 MMOL/L — SIGNIFICANT CHANGE UP (ref 5–17)
BASOPHILS # BLD AUTO: 0.16 K/UL — SIGNIFICANT CHANGE UP (ref 0–0.2)
BASOPHILS NFR BLD AUTO: 1.2 % — SIGNIFICANT CHANGE UP (ref 0–2)
BUN SERPL-MCNC: 9 MG/DL — SIGNIFICANT CHANGE UP (ref 7–23)
CALCIUM SERPL-MCNC: 9.7 MG/DL — SIGNIFICANT CHANGE UP (ref 8.4–10.5)
CHLORIDE SERPL-SCNC: 102 MMOL/L — SIGNIFICANT CHANGE UP (ref 96–108)
CO2 SERPL-SCNC: 25 MMOL/L — SIGNIFICANT CHANGE UP (ref 22–31)
CREAT SERPL-MCNC: 0.86 MG/DL — SIGNIFICANT CHANGE UP (ref 0.5–1.3)
CULTURE RESULTS: SIGNIFICANT CHANGE UP
EOSINOPHIL # BLD AUTO: 0.56 K/UL — HIGH (ref 0–0.5)
EOSINOPHIL NFR BLD AUTO: 4.2 % — SIGNIFICANT CHANGE UP (ref 0–6)
GLUCOSE SERPL-MCNC: 111 MG/DL — HIGH (ref 70–99)
HCT VFR BLD CALC: 44.9 % — SIGNIFICANT CHANGE UP (ref 39–50)
HGB BLD-MCNC: 14.7 G/DL — SIGNIFICANT CHANGE UP (ref 13–17)
IMM GRANULOCYTES NFR BLD AUTO: 1.3 % — SIGNIFICANT CHANGE UP (ref 0–1.5)
LYMPHOCYTES # BLD AUTO: 2.83 K/UL — SIGNIFICANT CHANGE UP (ref 1–3.3)
LYMPHOCYTES # BLD AUTO: 21 % — SIGNIFICANT CHANGE UP (ref 13–44)
MAGNESIUM SERPL-MCNC: 2.6 MG/DL — SIGNIFICANT CHANGE UP (ref 1.6–2.6)
MCHC RBC-ENTMCNC: 30.2 PG — SIGNIFICANT CHANGE UP (ref 27–34)
MCHC RBC-ENTMCNC: 32.7 GM/DL — SIGNIFICANT CHANGE UP (ref 32–36)
MCV RBC AUTO: 92.2 FL — SIGNIFICANT CHANGE UP (ref 80–100)
MONOCYTES # BLD AUTO: 1.38 K/UL — HIGH (ref 0–0.9)
MONOCYTES NFR BLD AUTO: 10.2 % — SIGNIFICANT CHANGE UP (ref 2–14)
NEUTROPHILS # BLD AUTO: 8.36 K/UL — HIGH (ref 1.8–7.4)
NEUTROPHILS NFR BLD AUTO: 62.1 % — SIGNIFICANT CHANGE UP (ref 43–77)
NRBC # BLD: 0 /100 WBCS — SIGNIFICANT CHANGE UP (ref 0–0)
ORGANISM # SPEC MICROSCOPIC CNT: SIGNIFICANT CHANGE UP
PHOSPHATE SERPL-MCNC: 2.8 MG/DL — SIGNIFICANT CHANGE UP (ref 2.5–4.5)
PLATELET # BLD AUTO: 532 K/UL — HIGH (ref 150–400)
POTASSIUM SERPL-MCNC: 4.1 MMOL/L — SIGNIFICANT CHANGE UP (ref 3.5–5.3)
POTASSIUM SERPL-SCNC: 4.1 MMOL/L — SIGNIFICANT CHANGE UP (ref 3.5–5.3)
RBC # BLD: 4.87 M/UL — SIGNIFICANT CHANGE UP (ref 4.2–5.8)
RBC # FLD: 14.6 % — HIGH (ref 10.3–14.5)
SODIUM SERPL-SCNC: 138 MMOL/L — SIGNIFICANT CHANGE UP (ref 135–145)
SPECIMEN SOURCE: SIGNIFICANT CHANGE UP
VALPROATE SERPL-MCNC: 43.3 UG/ML — LOW (ref 50–100)
WBC # BLD: 13.47 K/UL — HIGH (ref 3.8–10.5)
WBC # FLD AUTO: 13.47 K/UL — HIGH (ref 3.8–10.5)

## 2021-12-15 PROCEDURE — 99233 SBSQ HOSP IP/OBS HIGH 50: CPT | Mod: GC

## 2021-12-15 PROCEDURE — 99231 SBSQ HOSP IP/OBS SF/LOW 25: CPT

## 2021-12-15 RX ORDER — HALOPERIDOL DECANOATE 100 MG/ML
2 INJECTION INTRAMUSCULAR ONCE
Refills: 0 | Status: COMPLETED | OUTPATIENT
Start: 2021-12-15 | End: 2021-12-15

## 2021-12-15 RX ORDER — HALOPERIDOL DECANOATE 100 MG/ML
1 INJECTION INTRAMUSCULAR EVERY 12 HOURS
Refills: 0 | Status: DISCONTINUED | OUTPATIENT
Start: 2021-12-16 | End: 2021-12-16

## 2021-12-15 RX ADMIN — Medication 1 PATCH: at 12:23

## 2021-12-15 RX ADMIN — Medication 1 TABLET(S): at 12:01

## 2021-12-15 RX ADMIN — NAFCILLIN 200 GRAM(S): 10 INJECTION, POWDER, FOR SOLUTION INTRAVENOUS at 18:04

## 2021-12-15 RX ADMIN — NAFCILLIN 200 GRAM(S): 10 INJECTION, POWDER, FOR SOLUTION INTRAVENOUS at 23:59

## 2021-12-15 RX ADMIN — Medication 1 PATCH: at 12:02

## 2021-12-15 RX ADMIN — ENOXAPARIN SODIUM 40 MILLIGRAM(S): 100 INJECTION SUBCUTANEOUS at 12:02

## 2021-12-15 RX ADMIN — NAFCILLIN 200 GRAM(S): 10 INJECTION, POWDER, FOR SOLUTION INTRAVENOUS at 14:12

## 2021-12-15 RX ADMIN — NAFCILLIN 200 GRAM(S): 10 INJECTION, POWDER, FOR SOLUTION INTRAVENOUS at 06:23

## 2021-12-15 RX ADMIN — HALOPERIDOL DECANOATE 2 MILLIGRAM(S): 100 INJECTION INTRAMUSCULAR at 18:56

## 2021-12-15 RX ADMIN — Medication 100 MILLIGRAM(S): at 12:02

## 2021-12-15 RX ADMIN — HALOPERIDOL DECANOATE 2 MILLIGRAM(S): 100 INJECTION INTRAMUSCULAR at 06:22

## 2021-12-15 RX ADMIN — DIVALPROEX SODIUM 500 MILLIGRAM(S): 500 TABLET, DELAYED RELEASE ORAL at 18:04

## 2021-12-15 RX ADMIN — Medication 1 PATCH: at 19:13

## 2021-12-15 RX ADMIN — Medication 1 MILLIGRAM(S): at 12:01

## 2021-12-15 RX ADMIN — CHLORHEXIDINE GLUCONATE 1 APPLICATION(S): 213 SOLUTION TOPICAL at 06:23

## 2021-12-15 RX ADMIN — CEFTRIAXONE 100 MILLIGRAM(S): 500 INJECTION, POWDER, FOR SOLUTION INTRAMUSCULAR; INTRAVENOUS at 00:15

## 2021-12-15 RX ADMIN — NAFCILLIN 200 GRAM(S): 10 INJECTION, POWDER, FOR SOLUTION INTRAVENOUS at 09:55

## 2021-12-15 RX ADMIN — CEFTRIAXONE 100 MILLIGRAM(S): 500 INJECTION, POWDER, FOR SOLUTION INTRAMUSCULAR; INTRAVENOUS at 12:03

## 2021-12-15 RX ADMIN — DIVALPROEX SODIUM 500 MILLIGRAM(S): 500 TABLET, DELAYED RELEASE ORAL at 06:22

## 2021-12-15 RX ADMIN — NAFCILLIN 200 GRAM(S): 10 INJECTION, POWDER, FOR SOLUTION INTRAVENOUS at 02:40

## 2021-12-15 NOTE — PHYSICAL THERAPY INITIAL EVALUATION ADULT - GAIT DEVIATIONS NOTED, PT EVAL
Pt w/ appropriate delfina/step length, steady gait, no LOB/knee buckling noted; good negotiation through hallway obstacles without gait disturbances noted.

## 2021-12-15 NOTE — PROGRESS NOTE ADULT - SUBJECTIVE AND OBJECTIVE BOX
OVERNIGHT EVENTS: No overnight event.    SUBJECTIVE / INTERVAL HPI: Patient seen and examined at bedside. Patient notes feeling better today. Has not had headache or dizziness for past two days. Denies headache, dizziness, fever, chills, chest pain, SOB, abd pain, LE pain, or any new weakness.    VITAL SIGNS:  Vital Signs Last 24 Hrs  T(C): 36.4 (15 Dec 2021 09:02), Max: 36.8 (14 Dec 2021 16:09)  T(F): 97.6 (15 Dec 2021 09:02), Max: 98.2 (14 Dec 2021 16:09)  HR: 92 (15 Dec 2021 09:02) (62 - 92)  BP: 143/86 (15 Dec 2021 09:02) (131/79 - 143/86)  BP(mean): --  RR: 18 (15 Dec 2021 09:02) (18 - 19)  SpO2: 96% (15 Dec 2021 09:02) (96% - 98%)    PHYSICAL EXAM:    General: Middle aged male lying in bed in no acute distress and appears comfortable  HEENT: NC/AT; PERRL, anicteric sclera; MMM, neck supple  Cardiovascular: +S1/S2, RRR, no murmurs, rubs, gallops  Respiratory: CTA B/L; no W/R/R  Gastrointestinal: soft, NT/ND; +BSx4  Extremities: WWP; no edema, clubbing or cyanosis  Vascular: 2+ radial, DP/PT pulses B/L  Neurological: AAOx3; no focal deficits    MEDICATIONS:  MEDICATIONS  (STANDING):  cefTRIAXone   IVPB 2000 milliGRAM(s) IV Intermittent every 12 hours  chlorhexidine 2% Cloths 1 Application(s) Topical <User Schedule>  CIPROFLOXACIN(CIPRO) OPTHALMIC SOLUTION 5 Drop(s) 5 Drop(s) Both Ears two times a day  dexAMETHasone 0.1% Ophthalmic Solution for OTIC Use 5 Drop(s) Both Ears two times a day  dextrose 40% Gel 15 Gram(s) Oral once  dextrose 5%. 1000 milliLiter(s) (50 mL/Hr) IV Continuous <Continuous>  dextrose 50% Injectable 25 Gram(s) IV Push once  diVALproex  milliGRAM(s) Oral two times a day  enoxaparin Injectable 40 milliGRAM(s) SubCutaneous daily  folic acid 1 milliGRAM(s) Oral daily  glucagon  Injectable 1 milliGRAM(s) IntraMuscular once  haloperidol     Tablet 2 milliGRAM(s) Oral every 12 hours  multivitamin 1 Tablet(s) Oral daily  nafcillin  IVPB 2 Gram(s) IV Intermittent every 4 hours  nicotine - 21 mG/24Hr(s) Patch 1 patch Transdermal daily  thiamine 100 milliGRAM(s) Oral daily    MEDICATIONS  (PRN):  haloperidol    Injectable 2 milliGRAM(s) IntraMuscular every 6 hours PRN agitation  sodium chloride 0.9% lock flush 10 milliLiter(s) IV Push every 1 hour PRN Pre/post blood products, medications, blood draw, and to maintain line patency      ALLERGIES:  Allergies    No Known Allergies    Intolerances        LABS:                        14.7   13.47 )-----------( 532      ( 15 Dec 2021 11:27 )             44.9     12-15    138  |  102  |  9   ----------------------------<  x   4.1   |  25  |  0.86    Ca    9.7      15 Dec 2021 11:27  Phos  2.8     12-15  Mg     2.6     12-15    TPro  7.2  /  Alb  3.5  /  TBili  0.2  /  DBili  x   /  AST  35  /  ALT  51<H>  /  AlkPhos  77  12-14        CAPILLARY BLOOD GLUCOSE          RADIOLOGY & ADDITIONAL TESTS: Reviewed.

## 2021-12-15 NOTE — BH CONSULTATION LIAISON PROGRESS NOTE - NSICDXBHPRIMARYDX_PSY_ALL_CORE
Mixed origin delirium   F05  

## 2021-12-15 NOTE — BH CONSULTATION LIAISON PROGRESS NOTE - NSBHCHARTREVIEWLAB_PSY_A_CORE FT
12.1   14.38 )-----------( 327      ( 09 Dec 2021 06:04 )             36.1     12-09    137  |  101  |  8   ----------------------------<  104<H>  3.1<L>   |  27  |  0.65    Ca    8.7      09 Dec 2021 06:04  Phos  3.0     12-09  Mg     2.0     12-09    TPro  6.6  /  Alb  3.1<L>  /  TBili  0.4  /  DBili  x   /  AST  104<H>  /  ALT  82<H>  /  AlkPhos  71  12-09    PT/INR - ( 09 Dec 2021 06:04 )   PT: 13.7 sec;   INR: 1.15          PTT - ( 09 Dec 2021 06:04 )  PTT:29.6 sec    POCT Blood Glucose.: 97 mg/dL (09 Dec 2021 11:35)
                13.2   11.30 )-----------( 428      ( 14 Dec 2021 07:43 )             40.5     12-14    134<L>  |  101  |  11  ----------------------------<  116<H>  4.4   |  25  |  0.88    Ca    9.2      14 Dec 2021 07:43  Phos  2.8     12-14  Mg     2.3     12-14    TPro  7.2  /  Alb  3.5  /  TBili  0.2  /  DBili  x   /  AST  35  /  ALT  51<H>  /  AlkPhos  77  12-14    
             13.2   14.41 )-----------( 372      ( 12 Dec 2021 08:33 )             40.1     12-12    137  |  102  |  9   ----------------------------<  102<H>  3.9   |  24  |  0.81    Ca    8.9      12 Dec 2021 08:33  Phos  3.0     12-12  Mg     2.2     12-12    TPro  7.4  /  Alb  3.6  /  TBili  0.4  /  DBili  x   /  AST  46<H>  /  ALT  58<H>  /  AlkPhos  81  12-12    
                        13.2   11.30 )-----------( 428      ( 14 Dec 2021 07:43 )             40.5     12-14    134<L>  |  101  |  11  ----------------------------<  116<H>  4.4   |  25  |  0.88    Ca    9.2      14 Dec 2021 07:43  Phos  2.8     12-14  Mg     2.3     12-14    TPro  7.2  /  Alb  3.5  /  TBili  0.2  /  DBili  x   /  AST  35  /  ALT  51<H>  /  AlkPhos  77  12-14

## 2021-12-15 NOTE — BH CONSULTATION LIAISON PROGRESS NOTE - NSBHMSELANG_PSY_A_CORE
No abnormalities noted
No abnormalities noted/Unable to assess
Unable to assess
Unable to assess
No abnormalities noted

## 2021-12-15 NOTE — PROGRESS NOTE ADULT - PROBLEM SELECTOR PLAN 5
, ALT 60 as of 12/7/21. Pt with history of heavy alcohol use. No RUQ tenderness on exam.  - Downtrending  - hepatitis panel non reactive    #Etoh dependence  -S/p thiamine 500mg x 3 days  -Start 100mg oral thiamine

## 2021-12-15 NOTE — PHYSICAL THERAPY INITIAL EVALUATION ADULT - PERTINENT HX OF CURRENT PROBLEM, REHAB EVAL
52 yo M with PMHx of umbilical hernia (5-6 years ago) with no past medical history of epilepsy, trauma or CNS infection, who presents to the ED with an episode of sudden onset at 8:45am speech disturbance talking incoherently and altered behavior while working witnessed by his colleagues. His wife called him by the phone at 9am when she was driving to meet him. When she arrives the patient only can repeat "I know" but he is unable to understand or articulate different speech

## 2021-12-15 NOTE — PHYSICAL THERAPY INITIAL EVALUATION ADULT - ADDITIONAL COMMENTS
Pt currently resides in house w/ wife, 3 NIXON. Pt primarily amb and performs functional mobility tasks independently. Denies falls within past 6 months. Has shower chair available at home but does not use. Denies HHA, wife at home able to assist w/ needs.

## 2021-12-15 NOTE — BH CONSULTATION LIAISON PROGRESS NOTE - NSBHFUPINTERVALHXFT_PSY_A_CORE
Patient alert, oriented x 3; sitting comfortably in chair eating breakfast. Per documentation and report from primary team, there were no episodes of agitation for over 24 hours. Denies pain, verbalizes understanding of treatment course and outpatient plan.

## 2021-12-15 NOTE — BH CONSULTATION LIAISON PROGRESS NOTE - NSBHCHARTREVIEWVS_PSY_A_CORE FT
Vital Signs Last 24 Hrs  T(C): 36.8 (12 Dec 2021 22:52), Max: 36.8 (12 Dec 2021 22:52)  T(F): 98.2 (12 Dec 2021 22:52), Max: 98.2 (12 Dec 2021 22:52)  HR: 87 (12 Dec 2021 22:52) (87 - 98)  BP: 124/70 (12 Dec 2021 22:52) (124/70 - 137/90)  BP(mean): --  RR: 17 (12 Dec 2021 22:52) (17 - 18)  SpO2: 97% (12 Dec 2021 22:52) (97% - 98%)
Vital Signs Last 24 Hrs  T(C): 36.9 (09 Dec 2021 10:24), Max: 36.9 (09 Dec 2021 01:04)  T(F): 98.4 (09 Dec 2021 10:24), Max: 98.4 (09 Dec 2021 01:04)  HR: 93 (09 Dec 2021 14:40) (56 - 98)  BP: 116/56 (09 Dec 2021 14:40) (92/52 - 134/63)  BP(mean): 81 (09 Dec 2021 14:40) (67 - 112)  RR: 32 (09 Dec 2021 14:40) (15 - 46)  SpO2: 91% (09 Dec 2021 13:40) (91% - 96%)
Vital Signs Last 24 Hrs  T(C): 36.4 (10 Dec 2021 09:00), Max: 36.7 (09 Dec 2021 18:00)  T(F): 97.6 (10 Dec 2021 09:00), Max: 98 (09 Dec 2021 18:00)  HR: 63 (10 Dec 2021 12:00) (52 - 93)  BP: 97/52 (10 Dec 2021 12:00) (88/51 - 145/77)  BP(mean): 70 (10 Dec 2021 12:00) (64 - 105)  RR: 33 (10 Dec 2021 12:00) (20 - 37)  SpO2: 93% (10 Dec 2021 12:00) (89% - 97%)
Vital Signs Last 24 Hrs  T(C): 36.7 (14 Dec 2021 09:29), Max: 36.9 (13 Dec 2021 21:57)  T(F): 98 (14 Dec 2021 09:29), Max: 98.4 (13 Dec 2021 21:57)  HR: 78 (14 Dec 2021 09:29) (74 - 78)  BP: 128/73 (14 Dec 2021 09:29) (111/69 - 144/79)  BP(mean): --  RR: 18 (14 Dec 2021 09:29) (18 - 18)  SpO2: 95% (14 Dec 2021 09:29) (95% - 97%)
Vital Signs Last 24 Hrs  T(C): 36.4 (15 Dec 2021 09:02), Max: 36.8 (14 Dec 2021 16:09)  T(F): 97.6 (15 Dec 2021 09:02), Max: 98.2 (14 Dec 2021 16:09)  HR: 92 (15 Dec 2021 09:02) (62 - 92)  BP: 143/86 (15 Dec 2021 09:02) (131/79 - 143/86)  BP(mean): --  RR: 18 (15 Dec 2021 09:02) (18 - 19)  SpO2: 96% (15 Dec 2021 09:02) (96% - 98%)

## 2021-12-15 NOTE — BH CONSULTATION LIAISON PROGRESS NOTE - NSBHASSESSMENTFT_PSY_ALL_CORE
51y Male w/ PMHx of umbilical hernia (5-6 years ago) with NO past medical history of epilepsy, trauma or CNS infection (hasn't seen a doctor in many years) presents to [facility] as a transfer from [facility] for evaluation of seizure of unknown origin. Psychiatry consulted to evaluate for delirium vs underlying personality disorder following multiple episodes of agitation and aggressive behavior requiring the patient to be restrained and under 1:1 observation.      No episode of agitation documented o/n. Patient is AOx3, very cooperative and calm.     P:   -Symptoms of agitation, aggression are likely secondary due to delirium of mixed origin - alcohol withdrawal, infectious etiology (necrotizing otitis externa with osteomyelitis)  -Patient's behavior has improved, starting tomorrow 12/16/21 can change haldol to 1mg PO BID  -Upon discharge, please prescribe haldol 1mg PO BID for an additional 5 days  -For breakthrough agitation use haldol 2mg po/im/IV q6h  Will continue to follow

## 2021-12-15 NOTE — BH CONSULTATION LIAISON PROGRESS NOTE - NSBHFUPINTERVALCCFT_PSY_A_CORE
Follow-up for agitation
Follow up for agitation
Follow-up for agitation, delirium 
Agitated, Aggressive Behavior 
Patient seen at bedside. He presents calm, cooperative. States that he come to the hospital for " an ear and brain infection". He is oriented to person, place and partially to time.

## 2021-12-15 NOTE — BH CONSULTATION LIAISON PROGRESS NOTE - NSICDXBHTERTIARYDX_PSY_ALL_CORE
R/O Personality disorder in adult   F60.9  R/O Alcohol withdrawal   F10.239  

## 2021-12-15 NOTE — BH CONSULTATION LIAISON PROGRESS NOTE - NSBHCONSFOLLOWNEEDS_PSY_ALL_CORE
No psychiatric contraindications to discharge
Needs further psych safety assessment prior to discharge
Needs further psych safety assessment prior to discharge

## 2021-12-15 NOTE — PROGRESS NOTE ADULT - PROBLEM SELECTOR PLAN 4
Likely one focal seizure on left temporal lobe with secondarily 4-5 generalized seizures s/p convulsive status epilepticus. 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 Frequent baths, high pseudomonal suspicion. Intubated and sedated for 2 days. CT temporal bones done and MRI IAC with contrast IV showed left necrotizing otitis externa, 1 cm abscess in the left temporal lobe with adjacent dural thickening. s/p vEEG  PLAN:  - Maintain seizure and fall precautions  - c/w Depakote 500 mg BID (started 12/8)

## 2021-12-15 NOTE — BH CONSULTATION LIAISON PROGRESS NOTE - NSBHMSESPEECH_PSY_A_CORE
Abnormal as indicated, otherwise normal...
Normal volume, rate, productivity, spontaneity and articulation
Non-verbal: unable to assess speech further
Normal volume, rate, productivity, spontaneity and articulation
Normal volume, rate, productivity, spontaneity and articulation

## 2021-12-15 NOTE — BH CONSULTATION LIAISON PROGRESS NOTE - NSICDXBHSECONDARYDX_PSY_ALL_CORE
Bilateral otitis externa   H60.93  Brain abscess   G06.0  

## 2021-12-15 NOTE — BH CONSULTATION LIAISON PROGRESS NOTE - NSBHADMITCOORDWITH_PSY_A_CORE
medical staff
medical staff/outpatient provider/social work/family/Caregiver/other
medical staff

## 2021-12-15 NOTE — PHYSICAL THERAPY INITIAL EVALUATION ADULT - STAIR PATTERN, REHAB EVAL
Pt able to demonstrate negotiating stairs in step over step manner through ascending and descending w/o difficulties.

## 2021-12-15 NOTE — BH CONSULTATION LIAISON PROGRESS NOTE - NSBHPSYCHOLCOGORIENT_PSY_A_CORE
Oriented to time, place, person, situation
Unable to assess
Oriented to time, place, person, situation
Unable to assess
Not fully oriented...

## 2021-12-15 NOTE — BH CONSULTATION LIAISON PROGRESS NOTE - NSBHATTESTSEENBY_PSY_A_CORE
attending Psychiatrist without NP/Trainee
Attending Psychiatrist supervising NP/Trainee, meeting pt...

## 2021-12-15 NOTE — BH CONSULTATION LIAISON PROGRESS NOTE - NSBHADMITIPOBS_PSY_A_CORE
Constant observation
Constant observation
Enhanced supervision
Enhanced supervision
Routine observation

## 2021-12-15 NOTE — BH CONSULTATION LIAISON PROGRESS NOTE - CASE SUMMARY
50 yo M with PMHx no past medical history of epilepsy, trauma or CNS infection, who presentsed to the ED [place] with an episode of sudden onset speech disturbance talking incoherently and repetitive and altered behavior while working witnessed, no fever. In ED presented, repetitive generalized seizures. 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 Pt was transferred to [facility] ICU to continue his care, on cefepime and vanc IV (to cover CNS pseudomonal infection) propofol drip and placed on ventilator. On ear cultures found staph. aureus and epidermidis. On 12/5, pt was extubated. On 12/7 patient presented very agitated and verbally abusive. Psychiatry was consulted to evaluate the pt for delirium vs personality disorder. Patient's presentation was consistent with delirium, likely multifactorial due to infection vs withdrawal. Pt was started on haldol standing with improvement in agitation. On 12/13 am pt had an episode of agitation after haldol was stopped. Today he presents a significant improvement in his mental status, is able to engage in a discussion about his treatment, risks and benefits, timeline for taking haldol,side effects. He presents AAOx3. Plan:=continue haldol 2mg tid today, taper to 2mg q12h tomorrow; see above for other recommendations.
 52 yo M with PMHx no past medical history of epilepsy, trauma or CNS infection, who presentsed to the ED [place] with an episode of sudden onset speech disturbance talking incoherently and repetitive and altered behavior while working witnessed, no fever. In ED presented, repetitive generalized seizures. 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 Pt was transferred to [facility] ICU to continue his care, on cefepime and vanc IV (to cover CNS pseudomonal infection) propofol drip and placed on ventilator. On ear cultures found staph. aureus and epidermidis. On 12/5, pt was extubated. On 12/7 patient presented very agitated and verbally abusive. Psychiatry was consulted to evaluate the pt for delirium vs personality disorder. Patient's presentation was consistent with delirium, likely multifactorial due to infection vs withdrawal. Pt was started on haldol standing with improvement in agitation. On 12/13 am pt had an episode of agitation after haldol was stopped. Today he presents sedated after prns. Plan;= agree with restarting haldol at 2mg po q8h; -prns with haldol 2mg q6h prn; -enhanced observation (low threshold for 1:1); CL to follow
 52 yo M with PMHx no past medical history of epilepsy, trauma or CNS infection, who presentsed to the ED [place] with an episode of sudden onset speech disturbance talking incoherently and repetitive and altered behavior while working witnessed, no fever. In ED presented, repetitive generalized seizures. 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 Pt was transferred to [facility] ICU to continue his care, on cefepime and vanc IV (to cover CNS pseudomonal infection) propofol drip and placed on ventilator. On ear cultures found staph. aureus and epidermidis. On 12/5, pt was extubated. On 12/7 patient presented very agitated and verbally abusive. Psychiatry was consulted to evaluate the pt for delirium vs personality disorder. Patient's presentation was consistent with delirium, likely multifactorial due to infection vs withdrawal. Pt was started on haldol standing with improvement in agitation. On 12/13 am pt had an episode of agitation after haldol was stopped. Today he presents a significant improvement in his mental status, is able to engage in a discussion about his treatment, risks and benefits, timeline for taking haldol,side effects. He presents AAOx3. Can continue to taper the haldol per above recommendations
Patient is a 50 yo M with PMHx no past medical history of epilepsy, trauma or CNS infection, who presentsed to the ED [place] with an episode of sudden onset speech disturbance talking incoherently and repetitive and altered behavior while working witnessed, no fever. In ED presented, repetitive generalized seizures. 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 Pt was transferred to [facility] ICU to continue his care, on cefepime and vanc IV (to cover CNS pseudomonal infection) propofol drip and placed on ventilator. On ear cultures found staph. aureus and epidermidis. On 12/5, pt was extubated. On 12/7 patient presented very agitated and verbally abusive. Psychiatry was consulted to evaluate the pt for delirium vs personality disorder. Patient;s presentation is consistent with delirium, likely multifactorial due to infection vs withdrawal (pt was driking approx 6 beers per day). Plan: -due to ongoing agitation recommend to start standing haldol 2mg po q12h; for breakthrough agitation use haldol 2mg po/im/IV qhs 6; - CIWA for withdrawal-1:1 observation; -CL to follow

## 2021-12-15 NOTE — PROGRESS NOTE ADULT - PROBLEM SELECTOR PLAN 1
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 MRI shows left necrotizing otitis externa, 1 cm abscess in the left temporal lobe with adjacent dural thickening. Blood cx (NGTD), urine cx (NGTD) and ear cx (staph. aureus and epidermidis). IAC CT scan w/wo contrast necrotizing otitis externa with osteomyelitis and soft tissue extent to the retrocondylar fat and infratemporal fosse.  ENT and neurosurgery no acute surgical intervention.   PLAN:  - Nafcillin 2g IV q4h (for a total of 6 weeks) and CXT 2g IV q12h for 6 weeks (12/9 - 1/19) as per ID recs  - f/u ID recs  - Weekly labs: CMP, CBC, ESR, CRP faxed to ID office at [phone]. To follow up with Dr. 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 MRI shows left necrotizing otitis externa, 1 cm abscess in the left temporal lobe with adjacent dural thickening. Blood cx (NGTD), urine cx (NGTD) and ear cx (staph. aureus and epidermidis). IAC CT scan w/wo contrast necrotizing otitis externa with osteomyelitis and soft tissue extent to the retrocondylar fat and infratemporal fosse.  ENT and neurosurgery no acute surgical intervention.   PLAN:  - Nafcillin 2g IV q4h (for a total of 6 weeks) and CXT 2g IV q12h for 6 weeks (12/9 - 1/19) as per ID recs  - f/u ID recs  - Weekly labs: CMP, CBC, ESR, CRP faxed to ID office at [phone]. To follow up with Dr. Valdivia in 3 weeks, ID offic will call patient to schedule  - c/w ciprodex 5 drops & dexamethasone 0.1% 5 drops to both ears BID until 12/17 as per ENT

## 2021-12-15 NOTE — PROGRESS NOTE ADULT - PROBLEM SELECTOR PLAN 3
Hx heavy alcohol use (6 beers per day). 1/2 PPD smoker. Course c/b Delirium tremens and ICU delirium. With agitation on RMF. s/p high dose thiamine.   PLAN:  - Start thiamine 100mg daily  - folic acid, multivitamin daily  - Restart standing haldol--now Haldol 2mg q8h  -PRN Haldol 5mg q6 prn for agitation  - Repeat EKG on 12/14. 12/12 QTc 443  - Nicotine patches 21mg q24h. Hx heavy alcohol use (6 beers per day). 1/2 PPD smoker. Course c/b Delirium tremens and ICU delirium. With agitation on RMF. s/p high dose thiamine.   PLAN:  - Start thiamine 100mg daily  - folic acid, multivitamin daily  - Haldol 2mg q8h with transition to PO 1mg BID 12/16. On d/c haldol PO 1mg BID x5 days  - PRN Haldol 5mg q6 prn for agitation  - Repeat EKG on 12/14. 12/12 QTc 443  - Nicotine patches 21mg q24h.

## 2021-12-15 NOTE — PROGRESS NOTE ADULT - PROBLEM SELECTOR PLAN 2
Left greater than right. Improving. No signs of mastoiditis on clinical examination. MSSA, Actinomyces on culture.  PLAN:  - c/w ciprodex 5 drops & dexamethasone 0.1% 5 drops to both ears BID  - f/u ENT regarding duration of ear drops  - OR with cultures and R myringotomy placement by ENT (12/9)  - 12/11 ENT removed ear james Left greater than right. Improving. No signs of mastoiditis on clinical examination. MSSA, Actinomyces on culture.  PLAN:  - c/w ciprodex 5 drops & dexamethasone 0.1% 5 drops to both ears BID until 12/17  - OR with cultures and R myringotomy placement by ENT (12/9)  - 12/11 ENT removed ear james

## 2021-12-15 NOTE — BH CONSULTATION LIAISON PROGRESS NOTE - CURRENT MEDICATION
MEDICATIONS  (STANDING):  chlorhexidine 2% Cloths 1 Application(s) Topical <User Schedule>  CIPROFLOXACIN(CIPRO) OPTHALMIC SOLUTION 5 Drop(s) 5 Drop(s) Both Ears two times a day  dexAMETHasone 0.1% Ophthalmic Solution for OTIC Use 5 Drop(s) Both Ears two times a day  dextrose 40% Gel 15 Gram(s) Oral once  dextrose 5%. 1000 milliLiter(s) (50 mL/Hr) IV Continuous <Continuous>  dextrose 50% Injectable 25 Gram(s) IV Push once  diVALproex  milliGRAM(s) Oral two times a day  enoxaparin Injectable 40 milliGRAM(s) SubCutaneous daily  folic acid 1 milliGRAM(s) Oral daily  glucagon  Injectable 1 milliGRAM(s) IntraMuscular once  haloperidol     Tablet 2 milliGRAM(s) Oral every 8 hours  influenza   Vaccine 0.5 milliLiter(s) IntraMuscular once  multivitamin 1 Tablet(s) Oral daily  nafcillin  IVPB 2 Gram(s) IV Intermittent every 4 hours  nicotine - 21 mG/24Hr(s) Patch 1 patch Transdermal daily  thiamine IVPB 500 milliGRAM(s) IV Intermittent every 24 hours    MEDICATIONS  (PRN):  haloperidol    Injectable 5 milliGRAM(s) IV Push every 6 hours PRN Agitation  sodium chloride 0.9% lock flush 10 milliLiter(s) IV Push every 1 hour PRN Pre/post blood products, medications, blood draw, and to maintain line patency  
MEDICATIONS  (STANDING):  cefepime   IVPB 2000 milliGRAM(s) IV Intermittent every 8 hours  chlorhexidine 2% Cloths 1 Application(s) Topical <User Schedule>  ciprofloxacin  0.3% Ophthalmic Solution 20 Drop(s) Both EYES once  CIPROFLOXACIN(CIPRO) OPTHALMIC SOLUTION 5 Drop(s) 5 Drop(s) Both Ears two times a day  dexAMETHasone 0.1% Ophthalmic Solution for OTIC Use 5 Drop(s) Both Ears two times a day  dexMEDEtomidine Infusion 0.2 MICROgram(s)/kG/Hr (4.3 mL/Hr) IV Continuous <Continuous>  dextrose 40% Gel 15 Gram(s) Oral once  dextrose 5%. 1000 milliLiter(s) (50 mL/Hr) IV Continuous <Continuous>  dextrose 50% Injectable 25 Gram(s) IV Push once  diVALproex  milliGRAM(s) Oral two times a day  enoxaparin Injectable 40 milliGRAM(s) SubCutaneous daily  folic acid 1 milliGRAM(s) Oral daily  glucagon  Injectable 1 milliGRAM(s) IntraMuscular once  haloperidol    Injectable 5 milliGRAM(s) IntraMuscular once  influenza   Vaccine 0.5 milliLiter(s) IntraMuscular once  insulin regular  human corrective regimen sliding scale   SubCutaneous every 6 hours  LORazepam   Injectable 2 milliGRAM(s) IntraMuscular once  LORazepam   Injectable 2 milliGRAM(s) IV Push once  multivitamin 1 Tablet(s) Oral daily  nicotine - 21 mG/24Hr(s) Patch 1 patch Transdermal daily  thiamine IVPB 500 milliGRAM(s) IV Intermittent every 8 hours  vancomycin  IVPB 1250 milliGRAM(s) IV Intermittent every 12 hours    MEDICATIONS  (PRN):  sodium chloride 0.9% lock flush 10 milliLiter(s) IV Push every 1 hour PRN Pre/post blood products, medications, blood draw, and to maintain line patency  
MEDICATIONS  (STANDING):  cefepime   IVPB 2000 milliGRAM(s) IV Intermittent every 8 hours  chlorhexidine 2% Cloths 1 Application(s) Topical <User Schedule>  CIPROFLOXACIN(CIPRO) OPTHALMIC SOLUTION 5 Drop(s) 5 Drop(s) Both Ears two times a day  dexAMETHasone 0.1% Ophthalmic Solution for OTIC Use 5 Drop(s) Both Ears two times a day  dexMEDEtomidine Infusion 0.2 MICROgram(s)/kG/Hr (4.3 mL/Hr) IV Continuous <Continuous>  dextrose 40% Gel 15 Gram(s) Oral once  dextrose 5%. 1000 milliLiter(s) (50 mL/Hr) IV Continuous <Continuous>  dextrose 50% Injectable 25 Gram(s) IV Push once  diVALproex  milliGRAM(s) Oral two times a day  enoxaparin Injectable 40 milliGRAM(s) SubCutaneous daily  folic acid 1 milliGRAM(s) Oral daily  glucagon  Injectable 1 milliGRAM(s) IntraMuscular once  haloperidol     Tablet 2 milliGRAM(s) Oral every 12 hours  influenza   Vaccine 0.5 milliLiter(s) IntraMuscular once  multivitamin 1 Tablet(s) Oral daily  nicotine - 21 mG/24Hr(s) Patch 1 patch Transdermal daily  thiamine IVPB 500 milliGRAM(s) IV Intermittent every 8 hours  vancomycin  IVPB 1250 milliGRAM(s) IV Intermittent every 8 hours    MEDICATIONS  (PRN):  sodium chloride 0.9% lock flush 10 milliLiter(s) IV Push every 1 hour PRN Pre/post blood products, medications, blood draw, and to maintain line patency  
MEDICATIONS  (STANDING):  cefTRIAXone   IVPB 2000 milliGRAM(s) IV Intermittent every 12 hours  chlorhexidine 2% Cloths 1 Application(s) Topical <User Schedule>  CIPROFLOXACIN(CIPRO) OPTHALMIC SOLUTION 5 Drop(s) 5 Drop(s) Both Ears two times a day  dexAMETHasone 0.1% Ophthalmic Solution for OTIC Use 5 Drop(s) Both Ears two times a day  dextrose 40% Gel 15 Gram(s) Oral once  dextrose 5%. 1000 milliLiter(s) (50 mL/Hr) IV Continuous <Continuous>  dextrose 50% Injectable 25 Gram(s) IV Push once  diVALproex  milliGRAM(s) Oral two times a day  enoxaparin Injectable 40 milliGRAM(s) SubCutaneous daily  folic acid 1 milliGRAM(s) Oral daily  glucagon  Injectable 1 milliGRAM(s) IntraMuscular once  haloperidol     Tablet 2 milliGRAM(s) Oral every 12 hours  influenza   Vaccine 0.5 milliLiter(s) IntraMuscular once  multivitamin 1 Tablet(s) Oral daily  nafcillin  IVPB 2 Gram(s) IV Intermittent every 4 hours  nicotine - 21 mG/24Hr(s) Patch 1 patch Transdermal daily    MEDICATIONS  (PRN):  haloperidol    Injectable 2 milliGRAM(s) IntraMuscular every 6 hours PRN agitation  sodium chloride 0.9% lock flush 10 milliLiter(s) IV Push every 1 hour PRN Pre/post blood products, medications, blood draw, and to maintain line patency  
MEDICATIONS  (STANDING):  cefTRIAXone   IVPB 2000 milliGRAM(s) IV Intermittent every 12 hours  chlorhexidine 2% Cloths 1 Application(s) Topical <User Schedule>  CIPROFLOXACIN(CIPRO) OPTHALMIC SOLUTION 5 Drop(s) 5 Drop(s) Both Ears two times a day  dexAMETHasone 0.1% Ophthalmic Solution for OTIC Use 5 Drop(s) Both Ears two times a day  dextrose 40% Gel 15 Gram(s) Oral once  dextrose 5%. 1000 milliLiter(s) (50 mL/Hr) IV Continuous <Continuous>  dextrose 50% Injectable 25 Gram(s) IV Push once  diVALproex  milliGRAM(s) Oral two times a day  enoxaparin Injectable 40 milliGRAM(s) SubCutaneous daily  folic acid 1 milliGRAM(s) Oral daily  glucagon  Injectable 1 milliGRAM(s) IntraMuscular once  haloperidol     Tablet 2 milliGRAM(s) Oral every 12 hours  multivitamin 1 Tablet(s) Oral daily  nafcillin  IVPB 2 Gram(s) IV Intermittent every 4 hours  nicotine - 21 mG/24Hr(s) Patch 1 patch Transdermal daily  thiamine 100 milliGRAM(s) Oral daily    MEDICATIONS  (PRN):  haloperidol    Injectable 2 milliGRAM(s) IntraMuscular every 6 hours PRN agitation  sodium chloride 0.9% lock flush 10 milliLiter(s) IV Push every 1 hour PRN Pre/post blood products, medications, blood draw, and to maintain line patency

## 2021-12-15 NOTE — BH CONSULTATION LIAISON PROGRESS NOTE - NSBHCONSULTPRIMARYDISCUSSYES_PSY_A_CORE FT
Case discussed with Dr. Chun and Dr. Trevino - both made aware of change to haldol 1mg PO BID starting tomorrow 12/16 and discharge rx recommendations.

## 2021-12-15 NOTE — PROGRESS NOTE ADULT - ASSESSMENT
Pt is a 52 y/o M with PMH heavy EtOH use and polysubstance abuse presenting with sudden onset speech disturbance talking incoherently and altered behavior s/p generalized seizures, found to have left malignant necrotizing external otitis + mastoiditis with osteomyelitis + temporal lobe meningoencephalitis with 1 cm abscess and positive ear culture for MSSA. S/p delirium tremens + ICU delirium. Now s/p R myringotomy placement by ENT, on Depakote for epilepsy ABx (nafcillin and cefepime by PICC line) for MSSA infection Course C/B agitation and violence pending optimization from psych.

## 2021-12-15 NOTE — BH CONSULTATION LIAISON PROGRESS NOTE - NSBHADMITCOUNSEL_PSY_A_CORE
diagnostic results/impressions and/or recommended studies/risks and benefits of treatment options
diagnostic results/impressions and/or recommended studies/risks and benefits of treatment options/risk factor reduction
diagnostic results/impressions and/or recommended studies/risks and benefits of treatment options/risk factor reduction/client/family/caregiver education
diagnostic results/impressions and/or recommended studies/risks and benefits of treatment options/risk factor reduction/client/family/caregiver education/prognosis
diagnostic results/impressions and/or recommended studies/risks and benefits of treatment options/risk factor reduction/client/family/caregiver education

## 2021-12-16 ENCOUNTER — TRANSCRIPTION ENCOUNTER (OUTPATIENT)
Age: 51
End: 2021-12-16

## 2021-12-16 VITALS
HEART RATE: 91 BPM | TEMPERATURE: 98 F | SYSTOLIC BLOOD PRESSURE: 132 MMHG | OXYGEN SATURATION: 97 % | RESPIRATION RATE: 18 BRPM | DIASTOLIC BLOOD PRESSURE: 91 MMHG

## 2021-12-16 LAB
ALBUMIN SERPL ELPH-MCNC: 4.2 G/DL — SIGNIFICANT CHANGE UP (ref 3.3–5)
ALP SERPL-CCNC: 86 U/L — SIGNIFICANT CHANGE UP (ref 40–120)
ALT FLD-CCNC: 44 U/L — SIGNIFICANT CHANGE UP (ref 10–45)
ANION GAP SERPL CALC-SCNC: 12 MMOL/L — SIGNIFICANT CHANGE UP (ref 5–17)
AST SERPL-CCNC: 33 U/L — SIGNIFICANT CHANGE UP (ref 10–40)
BASOPHILS # BLD AUTO: 0.16 K/UL — SIGNIFICANT CHANGE UP (ref 0–0.2)
BASOPHILS NFR BLD AUTO: 1.4 % — SIGNIFICANT CHANGE UP (ref 0–2)
BILIRUB SERPL-MCNC: 0.2 MG/DL — SIGNIFICANT CHANGE UP (ref 0.2–1.2)
BUN SERPL-MCNC: 7 MG/DL — SIGNIFICANT CHANGE UP (ref 7–23)
CALCIUM SERPL-MCNC: 9.8 MG/DL — SIGNIFICANT CHANGE UP (ref 8.4–10.5)
CHLORIDE SERPL-SCNC: 100 MMOL/L — SIGNIFICANT CHANGE UP (ref 96–108)
CO2 SERPL-SCNC: 26 MMOL/L — SIGNIFICANT CHANGE UP (ref 22–31)
CREAT SERPL-MCNC: 0.85 MG/DL — SIGNIFICANT CHANGE UP (ref 0.5–1.3)
EOSINOPHIL # BLD AUTO: 0.41 K/UL — SIGNIFICANT CHANGE UP (ref 0–0.5)
EOSINOPHIL NFR BLD AUTO: 3.5 % — SIGNIFICANT CHANGE UP (ref 0–6)
GLUCOSE SERPL-MCNC: 101 MG/DL — HIGH (ref 70–99)
HCT VFR BLD CALC: 46.8 % — SIGNIFICANT CHANGE UP (ref 39–50)
HGB BLD-MCNC: 15.1 G/DL — SIGNIFICANT CHANGE UP (ref 13–17)
IMM GRANULOCYTES NFR BLD AUTO: 1.2 % — SIGNIFICANT CHANGE UP (ref 0–1.5)
LYMPHOCYTES # BLD AUTO: 2.93 K/UL — SIGNIFICANT CHANGE UP (ref 1–3.3)
LYMPHOCYTES # BLD AUTO: 25.2 % — SIGNIFICANT CHANGE UP (ref 13–44)
MAGNESIUM SERPL-MCNC: 2.5 MG/DL — SIGNIFICANT CHANGE UP (ref 1.6–2.6)
MCHC RBC-ENTMCNC: 30 PG — SIGNIFICANT CHANGE UP (ref 27–34)
MCHC RBC-ENTMCNC: 32.3 GM/DL — SIGNIFICANT CHANGE UP (ref 32–36)
MCV RBC AUTO: 92.9 FL — SIGNIFICANT CHANGE UP (ref 80–100)
MONOCYTES # BLD AUTO: 1.49 K/UL — HIGH (ref 0–0.9)
MONOCYTES NFR BLD AUTO: 12.8 % — SIGNIFICANT CHANGE UP (ref 2–14)
NEUTROPHILS # BLD AUTO: 6.49 K/UL — SIGNIFICANT CHANGE UP (ref 1.8–7.4)
NEUTROPHILS NFR BLD AUTO: 55.9 % — SIGNIFICANT CHANGE UP (ref 43–77)
NRBC # BLD: 0 /100 WBCS — SIGNIFICANT CHANGE UP (ref 0–0)
PHOSPHATE SERPL-MCNC: 3.3 MG/DL — SIGNIFICANT CHANGE UP (ref 2.5–4.5)
PLATELET # BLD AUTO: 577 K/UL — HIGH (ref 150–400)
POTASSIUM SERPL-MCNC: 4 MMOL/L — SIGNIFICANT CHANGE UP (ref 3.5–5.3)
POTASSIUM SERPL-SCNC: 4 MMOL/L — SIGNIFICANT CHANGE UP (ref 3.5–5.3)
PROT SERPL-MCNC: 8.6 G/DL — HIGH (ref 6–8.3)
RBC # BLD: 5.04 M/UL — SIGNIFICANT CHANGE UP (ref 4.2–5.8)
RBC # FLD: 14.7 % — HIGH (ref 10.3–14.5)
SODIUM SERPL-SCNC: 138 MMOL/L — SIGNIFICANT CHANGE UP (ref 135–145)
WBC # BLD: 11.62 K/UL — HIGH (ref 3.8–10.5)
WBC # FLD AUTO: 11.62 K/UL — HIGH (ref 3.8–10.5)

## 2021-12-16 PROCEDURE — 87040 BLOOD CULTURE FOR BACTERIA: CPT

## 2021-12-16 PROCEDURE — 70480 CT ORBIT/EAR/FOSSA W/O DYE: CPT

## 2021-12-16 PROCEDURE — 82962 GLUCOSE BLOOD TEST: CPT

## 2021-12-16 PROCEDURE — 99239 HOSP IP/OBS DSCHRG MGMT >30: CPT | Mod: GC

## 2021-12-16 PROCEDURE — 87206 SMEAR FLUORESCENT/ACID STAI: CPT

## 2021-12-16 PROCEDURE — 87086 URINE CULTURE/COLONY COUNT: CPT

## 2021-12-16 PROCEDURE — 76705 ECHO EXAM OF ABDOMEN: CPT

## 2021-12-16 PROCEDURE — 83036 HEMOGLOBIN GLYCOSYLATED A1C: CPT

## 2021-12-16 PROCEDURE — 87075 CULTR BACTERIA EXCEPT BLOOD: CPT

## 2021-12-16 PROCEDURE — 97161 PT EVAL LOW COMPLEX 20 MIN: CPT

## 2021-12-16 PROCEDURE — A9585: CPT

## 2021-12-16 PROCEDURE — 94002 VENT MGMT INPAT INIT DAY: CPT

## 2021-12-16 PROCEDURE — 93005 ELECTROCARDIOGRAM TRACING: CPT

## 2021-12-16 PROCEDURE — 36415 COLL VENOUS BLD VENIPUNCTURE: CPT

## 2021-12-16 PROCEDURE — 87186 SC STD MICRODIL/AGAR DIL: CPT

## 2021-12-16 PROCEDURE — 86140 C-REACTIVE PROTEIN: CPT

## 2021-12-16 PROCEDURE — 80164 ASSAY DIPROPYLACETIC ACD TOT: CPT

## 2021-12-16 PROCEDURE — 85610 PROTHROMBIN TIME: CPT

## 2021-12-16 PROCEDURE — 36573 INSJ PICC RS&I 5 YR+: CPT

## 2021-12-16 PROCEDURE — 86901 BLOOD TYPING SEROLOGIC RH(D): CPT

## 2021-12-16 PROCEDURE — 85652 RBC SED RATE AUTOMATED: CPT

## 2021-12-16 PROCEDURE — 83735 ASSAY OF MAGNESIUM: CPT

## 2021-12-16 PROCEDURE — 87116 MYCOBACTERIA CULTURE: CPT

## 2021-12-16 PROCEDURE — 87102 FUNGUS ISOLATION CULTURE: CPT

## 2021-12-16 PROCEDURE — 70552 MRI BRAIN STEM W/DYE: CPT

## 2021-12-16 PROCEDURE — 85025 COMPLETE CBC W/AUTO DIFF WBC: CPT

## 2021-12-16 PROCEDURE — 80202 ASSAY OF VANCOMYCIN: CPT

## 2021-12-16 PROCEDURE — 85027 COMPLETE CBC AUTOMATED: CPT

## 2021-12-16 PROCEDURE — 80053 COMPREHEN METABOLIC PANEL: CPT

## 2021-12-16 PROCEDURE — U0003: CPT

## 2021-12-16 PROCEDURE — 94003 VENT MGMT INPAT SUBQ DAY: CPT

## 2021-12-16 PROCEDURE — 80048 BASIC METABOLIC PNL TOTAL CA: CPT

## 2021-12-16 PROCEDURE — 86900 BLOOD TYPING SEROLOGIC ABO: CPT

## 2021-12-16 PROCEDURE — 95700 EEG CONT REC W/VID EEG TECH: CPT

## 2021-12-16 PROCEDURE — 85730 THROMBOPLASTIN TIME PARTIAL: CPT

## 2021-12-16 PROCEDURE — 71045 X-RAY EXAM CHEST 1 VIEW: CPT

## 2021-12-16 PROCEDURE — 95714 VEEG EA 12-26 HR UNMNTR: CPT

## 2021-12-16 PROCEDURE — U0005: CPT

## 2021-12-16 PROCEDURE — 87070 CULTURE OTHR SPECIMN AEROBIC: CPT

## 2021-12-16 PROCEDURE — 86850 RBC ANTIBODY SCREEN: CPT

## 2021-12-16 PROCEDURE — 82803 BLOOD GASES ANY COMBINATION: CPT

## 2021-12-16 PROCEDURE — 84100 ASSAY OF PHOSPHORUS: CPT

## 2021-12-16 PROCEDURE — 90686 IIV4 VACC NO PRSV 0.5 ML IM: CPT

## 2021-12-16 PROCEDURE — 70481 CT ORBIT/EAR/FOSSA W/DYE: CPT

## 2021-12-16 PROCEDURE — 83605 ASSAY OF LACTIC ACID: CPT

## 2021-12-16 PROCEDURE — 80074 ACUTE HEPATITIS PANEL: CPT

## 2021-12-16 PROCEDURE — 87389 HIV-1 AG W/HIV-1&-2 AB AG IA: CPT

## 2021-12-16 RX ORDER — THIAMINE MONONITRATE (VIT B1) 100 MG
1 TABLET ORAL
Qty: 30 | Refills: 0
Start: 2021-12-16 | End: 2022-01-14

## 2021-12-16 RX ORDER — FOLIC ACID 0.8 MG
1 TABLET ORAL
Qty: 30 | Refills: 0
Start: 2021-12-16 | End: 2022-01-14

## 2021-12-16 RX ORDER — HALOPERIDOL DECANOATE 100 MG/ML
1 INJECTION INTRAMUSCULAR
Qty: 11 | Refills: 0
Start: 2021-12-16 | End: 2021-12-21

## 2021-12-16 RX ORDER — NICOTINE POLACRILEX 2 MG
1 GUM BUCCAL
Qty: 7 | Refills: 0
Start: 2021-12-16 | End: 2021-12-22

## 2021-12-16 RX ORDER — NAFCILLIN 10 G/100ML
2000 INJECTION, POWDER, FOR SOLUTION INTRAVENOUS
Qty: 0 | Refills: 0 | DISCHARGE
Start: 2021-12-16 | End: 2022-01-19

## 2021-12-16 RX ORDER — CIPROFLOXACIN LACTATE 400MG/40ML
1 VIAL (ML) INTRAVENOUS
Qty: 0 | Refills: 0 | DISCHARGE

## 2021-12-16 RX ORDER — CIPROFLOXACIN 6 MG/ML
1 SUSPENSION INTRATYMPANIC
Qty: 0 | Refills: 0 | DISCHARGE

## 2021-12-16 RX ORDER — DIVALPROEX SODIUM 500 MG/1
1 TABLET, DELAYED RELEASE ORAL
Qty: 60 | Refills: 0
Start: 2021-12-16 | End: 2022-01-14

## 2021-12-16 RX ORDER — CIPROFLOXACIN AND DEXAMETHASONE 3; 1 MG/ML; MG/ML
5 SUSPENSION/ DROPS AURICULAR (OTIC)
Qty: 10 | Refills: 0
Start: 2021-12-16 | End: 2021-12-16

## 2021-12-16 RX ORDER — CEFTRIAXONE 500 MG/1
2000 INJECTION, POWDER, FOR SOLUTION INTRAMUSCULAR; INTRAVENOUS
Qty: 0 | Refills: 0 | DISCHARGE
Start: 2021-12-16 | End: 2022-01-19

## 2021-12-16 RX ORDER — CIPROFLOXACIN 6 MG/ML
5 SUSPENSION INTRATYMPANIC
Qty: 10 | Refills: 0
Start: 2021-12-16 | End: 2021-12-16

## 2021-12-16 RX ADMIN — NAFCILLIN 200 GRAM(S): 10 INJECTION, POWDER, FOR SOLUTION INTRAVENOUS at 13:53

## 2021-12-16 RX ADMIN — Medication 1 TABLET(S): at 12:58

## 2021-12-16 RX ADMIN — ENOXAPARIN SODIUM 40 MILLIGRAM(S): 100 INJECTION SUBCUTANEOUS at 12:57

## 2021-12-16 RX ADMIN — NAFCILLIN 200 GRAM(S): 10 INJECTION, POWDER, FOR SOLUTION INTRAVENOUS at 09:22

## 2021-12-16 RX ADMIN — Medication 100 MILLIGRAM(S): at 12:58

## 2021-12-16 RX ADMIN — HALOPERIDOL DECANOATE 1 MILLIGRAM(S): 100 INJECTION INTRAMUSCULAR at 06:32

## 2021-12-16 RX ADMIN — Medication 1 MILLIGRAM(S): at 12:58

## 2021-12-16 RX ADMIN — CHLORHEXIDINE GLUCONATE 1 APPLICATION(S): 213 SOLUTION TOPICAL at 06:29

## 2021-12-16 RX ADMIN — DIVALPROEX SODIUM 500 MILLIGRAM(S): 500 TABLET, DELAYED RELEASE ORAL at 06:31

## 2021-12-16 RX ADMIN — NAFCILLIN 200 GRAM(S): 10 INJECTION, POWDER, FOR SOLUTION INTRAVENOUS at 04:12

## 2021-12-16 RX ADMIN — Medication 1 PATCH: at 12:57

## 2021-12-16 RX ADMIN — CEFTRIAXONE 100 MILLIGRAM(S): 500 INJECTION, POWDER, FOR SOLUTION INTRAMUSCULAR; INTRAVENOUS at 12:59

## 2021-12-16 RX ADMIN — Medication 1 PATCH: at 13:43

## 2021-12-16 RX ADMIN — CEFTRIAXONE 100 MILLIGRAM(S): 500 INJECTION, POWDER, FOR SOLUTION INTRAMUSCULAR; INTRAVENOUS at 01:03

## 2021-12-16 NOTE — DISCHARGE NOTE NURSING/CASE MANAGEMENT/SOCIAL WORK - NSDCFUADDAPPT_GEN_ALL_CORE_FT
Please bring your Insurance card, Photo ID, Covid-19 vaccination card (if applicable) and Discharge paperwork to the following appointments:    (1) Please follow up with your Otolaryngology Provider, Dr. Abraham Lombardi at [address] [address] on 12/21/2021 at 11:45am.    Appointment was scheduled by Ms. MICHEL Cross, Referral Coordinator.    (3) Please follow up with your Infectious Disease Provider, Dr. Laura Valdivia at [address], [address] Floor, [place] [address] on 01/11/2022 at 10:20am.    Appointment was scheduled by Ms. MICHEL Cross, Referral Coordinator.

## 2021-12-16 NOTE — PROGRESS NOTE ADULT - REASON FOR ADMISSION
Male complaining of seizures.

## 2021-12-16 NOTE — PROGRESS NOTE ADULT - SUBJECTIVE AND OBJECTIVE BOX
OVERNIGHT EVENTS: No overnight event.    SUBJECTIVE / INTERVAL HPI: Patient seen and examined at bedside. Patients notes feeling anxious to go home but overall feels okay. No other complaint. Denies headache, dizziness, fever, chills, chest pain, SOB, abd pain, LE pain, or any new weakness.    VITAL SIGNS:  Vital Signs Last 24 Hrs  T(C): 36.7 (16 Dec 2021 09:58), Max: 37.1 (15 Dec 2021 21:44)  T(F): 98 (16 Dec 2021 09:58), Max: 98.7 (15 Dec 2021 21:44)  HR: 91 (16 Dec 2021 09:58) (77 - 91)  BP: 132/91 (16 Dec 2021 09:58) (119/72 - 137/73)  BP(mean): --  RR: 18 (16 Dec 2021 09:58) (18 - 18)  SpO2: 97% (16 Dec 2021 09:58) (96% - 97%)    PHYSICAL EXAM:    General: Middle aged male lying in bed in no acute distress and appears comfortable  HEENT: NC/AT; PERRL, anicteric sclera; MMM, neck supple  Cardiovascular: +S1/S2, RRR, no murmurs, rubs, gallops  Respiratory: CTA B/L; no W/R/R  Gastrointestinal: soft, NT/ND; +BSx4  Extremities: WWP; no edema, clubbing or cyanosis  Vascular: 2+ radial, DP/PT pulses B/L  Neurological: AAOx3; no focal deficits    MEDICATIONS:  MEDICATIONS  (STANDING):  cefTRIAXone   IVPB 2000 milliGRAM(s) IV Intermittent every 12 hours  chlorhexidine 2% Cloths 1 Application(s) Topical <User Schedule>  CIPROFLOXACIN(CIPRO) OPTHALMIC SOLUTION 5 Drop(s) 5 Drop(s) Both Ears two times a day  dexAMETHasone 0.1% Ophthalmic Solution for OTIC Use 5 Drop(s) Both Ears two times a day  dextrose 40% Gel 15 Gram(s) Oral once  dextrose 5%. 1000 milliLiter(s) (50 mL/Hr) IV Continuous <Continuous>  dextrose 50% Injectable 25 Gram(s) IV Push once  diVALproex  milliGRAM(s) Oral two times a day  enoxaparin Injectable 40 milliGRAM(s) SubCutaneous daily  folic acid 1 milliGRAM(s) Oral daily  glucagon  Injectable 1 milliGRAM(s) IntraMuscular once  haloperidol     Tablet 1 milliGRAM(s) Oral every 12 hours  multivitamin 1 Tablet(s) Oral daily  nafcillin  IVPB 2 Gram(s) IV Intermittent every 4 hours  nicotine - 21 mG/24Hr(s) Patch 1 patch Transdermal daily  thiamine 100 milliGRAM(s) Oral daily    MEDICATIONS  (PRN):  haloperidol    Injectable 2 milliGRAM(s) IntraMuscular every 6 hours PRN agitation  sodium chloride 0.9% lock flush 10 milliLiter(s) IV Push every 1 hour PRN Pre/post blood products, medications, blood draw, and to maintain line patency      ALLERGIES:  Allergies    No Known Allergies    Intolerances        LABS:                        15.1   11.62 )-----------( 577      ( 16 Dec 2021 07:45 )             46.8     12-16    138  |  100  |  7   ----------------------------<  101<H>  4.0   |  26  |  0.85    Ca    9.8      16 Dec 2021 07:45  Phos  3.3     12-16  Mg     2.5     12-16    TPro  8.6<H>  /  Alb  4.2  /  TBili  0.2  /  DBili  x   /  AST  33  /  ALT  44  /  AlkPhos  86  12-16        CAPILLARY BLOOD GLUCOSE          RADIOLOGY & ADDITIONAL TESTS: Reviewed.

## 2021-12-16 NOTE — PROGRESS NOTE ADULT - ATTENDING COMMENTS
OR cultures and ear cultures grew MSSA and multiple anaerobes including anaerococcus vaginalis, actinomyces turicensis, helicococus spp.    - Discharge patient with nafcillin 2g IV q4h for MSSA and CTX 2g IV q12h for anaerobes   - Duration of antibiotics is 6 weeks (12/9 - 1/19 ) to treat necrotizing otitis externa and temporal bone OM   - Weekly labs: CMP, CBC, ESR, CRP faxed to ID office at [phone]  - Patient to follow up with Dr. valdivia in 3 weeks ([address] [place] [address], [phone]), ID office will call patient to schedule    Thank you for your consult.  Please re-consult us or call us with questions.  Case d/w primary team.    Laura Valdivia MD, MS  Infectious Disease attending  work cell [phone]  For any questions during evening/weekend/holiday, please page ID on call
52 yo M with hx of ETOH use, polysubstance abuse, presents with seizure and sepsis 2/2 L necrotizing otitis externa complicated by 1cm temporal lobe abscess, superficial ear cultures with MRSA, corynebacterium, and staph epi. POD 2 s/p examination of B/L ears under anesthesia and myringotomy of R side w/ MSSA in OR cultures, now on nafcillin and cefepime until cultures finalized. Hospital course complicated by significant agitation/hyperactive delirium now responding well to standing haldol and precedex weaned off. Mental status much improved today - expressing remorse for prior behavior. Will continue with seizure ppx per neurology recs. Stable for transfer to [facility].
Event noted.  By the time I saw him, he was sleeping comfortably on bed.  OR culture now growing actinomyces and anaeroboccus vagilanis in addition to MSSA.  Cont nafcillin to cover MSSA.  Switch cefepime to CTX 2g IV q12h to cover anaerococcus and actinomyces.  f/u susceptibility of these two organisms.  Anticipate 6 weeks of IV abx therapy.    Team 1 will follow you.  Case d/w primary team.    Laura Valdivia MD, MS  Infectious Disease attending  work cell [phone]   For any questions during evening/weekend/holiday, please page ID on call
50 yo M with hx of ETOH use, polysubstance abuse, presents with seizure and sepsis 2/2 L necrotizing otitis externa complicated by 1cm temporal lobe abscess, superficial ear cultures with MRSA, corynebacterium, and staph epi. Continuing with broad spectrum abx for now, vanc  and cefepime. POD 1 s/p examination of B/L ears under anesthesia and myringotomy of R side. Significant agitation/hyperactive delirium yesterday, combative. AMS may be due to CNS infection vs. delirium vs. withdrawal, moderate response to phenobarbital but approached 20mg/kg without significant improvement in agitation suggesting alternative diagnosis. Responding well to standing haldol, will attempt to wean precedex.  Empiric seizure ppx given infection and seizure on admission per neurology. F/U cultures from OR, GPR (suspect corynebacterium given prior cultures) and MSSA noted so far. Will await final growth before altering abx. If able to wean off precedex no longer requires ICU level of care.
Acute onset seizure in setting of ear infection treated as outpt x 2 to 3 months. Active smoker and drinker but apparently has not stopped drinking so DTs unlikely. Concern for temporal lobe involvement leading to seizure from infective erosion of temporal bone on left; ENT on board and wants more imaging. VEEG not done but no clinical evidence of seizures. Extubate after imaging completed as does well on pressure support and has a mental status off sedation.
Acute onset seizure in setting of ear infection treated as outpt x 2 to 3 months. Active smoker and drinker but apparently has not stopped drinking so DTs unlikely. MRI with OM and small abscess; neurosx aware. Had a self extubation event but due to respiratory and hemodynamic stability he was not reintubated. Alert, awake and oriented s/p extubation. CIWA for EtOH w/d; aspiration and seizure precautions.
As above.  More agitated, worsening leukocytosis.  Continues on vanc and cefepime.  Agree with ENT plan for OR.  Please send bacterial, fungal and AFB cultures.  Will follow with you – ID Team 1.  Dr. Valdivia will assume care tomorrow.
As above.  When seen by me this morning, he was O X 3 but somewhat agitated, pulling at EEG leads.  L ear pain has decreased.  External ear appears to be less edematous.  He currently is on broad spectrum therapy for necrotizing/malignant otitis externa with mastoiditis c/b temporal lobe brain abscess.  No surgical plan by ENT.  Now that EAC may be more accessible, would discuss with ENT ability to get deeper cultures.  Though he has been on antibiotics for several days, may help to narrow spectrum.  For now, continue vanc and cefepime.  Will continue to follow with you - team 1.
12/9 R ear OR culture growing MSSA.  12/8 ear culture and 12/3 L ear culture also grew MSSA.  I think MSSA is the most likely culprit.  Stop vanco, start nafcillin 2g IV q4h.  Keep cefepime for now until OR culture finalize.  I think staph epi and coryne are skin contaminant.  f/u OR cultures.    Team 1 will follow you.  Case d/w primary team.    Laura Valdivia MD, MS  Infectious Disease attending  work cell [phone]   For any questions during evening/weekend/holiday, please page ID on call
50 yo M with hx of ETOH use, polysubstance abuse, presents with seizure and sepsis 2/2 L necrotizing otitis externa complicated by 1cm temporal lobe abscess, superficial ear cultures with MRSA, corynebacterium, and staph epi. ID, ENT and neurosurgery following, d/w neurosurgery if prophylactic anti epileptic required in setting of prior seizure prompting admission. C/w broad spectrum antibiotics, vanc subtherapeutic and will increase to q8. PICC line to be placed for prolonged IV abx. Respiratory status stable s/p extubation yesterday. Will resume diet. Mild transaminitis noted, may be seen with cefepime, but continue to monitor and if rising will obtain abd US.
52 yo M with hx of ETOH use, polysubstance abuse, presents with seizure and sepsis 2/2 L necrotizing otitis externa complicated by 1cm temporal lobe abscess, superficial ear cultures with MRSA, corynebacterium, and staph epi. Continuing with broad spectrum abx for now, vanc (dosing adjusted for supratherapeutic level) and cefepime. AMS may be due to CNS infection vs. delirium vs. withdrawal, moderate response to phenobarbital but approaching 20mg/kg without significant improvement in agitation suggesting alternative diagnosis. Very responsive to precedex, this morning afebrile and much more lucid. Able to explain he needs surgery for his ear infection, but became combative and refusing medical care later in morning, waxes and wanes. He lacks capacity to make medical decisions at this time, wife consented for surgery, plan is to go to OR with ENT today for bilateral exam of ears, possible myringotomy/tympanostomy tube placement. Will keep sedated with precedex gtt and PRN versed to allow transportation to OR. Continuing with high dose thiamine for possible WE, less suspicion for cefepime neurotoxcity given improvement in mental status today. Empiric seizure ppx given infection and seizure on admission per neurology.
52 yo M with hx of ETOH use, polysubstance abuse, presents with seizure and sepsis 2/2 L necrotizing otitis externa complicated by 1cm temporal lobe abscess, superficial ear cultures with MRSA, corynebacterium, and staph epi. Worsening delirium overnight, moderate response to phenobarbital but approaching 20mg/kg without significant improvement in agitation suggesting alternative diagnosis. Low grade fever, but otherwise no obvious evidence he is becoming more septic. He is at risk for Wernicke's and will empirically treat with high dose thiamine though MRI of IAC did not obviously show evidence. Risk outweighs benefit at this time. Cefepime neurotoxicity usually associated with hypoactive delirium, but will d/w ID if alternative antibiotic can be used in case it is contributing. EEG was inconclusive for seizures as he did not tolerate wearing EEG, will consult neurology re: empiric anti epileptics in setting of seizure on admission and temporal abscess. Continue with current abx regimen for now via newly placed PICC, clarify length of antibiotics. Continues to have mild transamnitis, will obtain US of abd.
Acute onset seizure in setting of ear infection treated as outpt x 2 to 3 months. Active smoker and drinker but apparently has not stopped drinking so DTs unlikely. Concern for temporal lobe involvement leading to seizure from infective erosion of temporal bone on left; ENT on board. VEEG not done yet due to lack of equipment. C/w abx. Pressure support trials.
Doing well on Depakote, no further seizure activity, no excessive sedation.  Will continue 500 mg BID, check trough before this evening's dose, goal level .  Will follow.
A/P:  1. LEFT necrotizing otitis media and externa, L temporal lobe abscess  2. R otitis externa +MSSA, +actinomyces, +actinomyces turicensis  3. alcohol use disorder with DTs, improved  4. acute agitation with behavioral disturbance due to alcohol use disorder and encephalitis  5. 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
A/P:  1. LEFT necrotizing otitis media and externa, L temporal lobe abscess  2. R otitis externa +MSSA, +actinomyces, +actinomyces turicensis  3. alcohol use disorder with DTs, improved  4. acute agitation with behavioral disturbance due to alcohol use disorder and encephalitis  5. 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
A/P:  1. LEFT necrotizing otitis media and externa, L temporal lobe abscess  2. R otitis externa +MSSA, +actinomyces, +actinomyces turicensis  3. alcohol use disorder with DTs, improved  4. acute agitation with behavioral disturbance  5. 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
Patient seen and examined this morning. Resting comfortably in bed w/o any acute complaints. Pain in his ear has imrpoved and hearing is good. He has not experienced any nausea, vomiting, fever, chills, headache or vertigo. We ambulated in the hallway > 75 ft on flat ground without any difficulty. No orthostatics.     PE findings as above, CIWA 0     A/P:  1. Brain abscess 2/2 b/l OE: on nafcilling/cefepime via PICC line and ciprodex via drops. ENT following, appreicate recs. ID following, abx course to be determined and abx depending on sens of anaerrococcus vaginalis. Repeat MR pending.   2. Delirium; holding haldol. Not agitated, A&Ox3. CIWA 0. PRN haldol available if delirious and not oriented.   3. PT/OT evaluation to determine if any home needs.
A/P:  1. LEFT necrotizing otitis media and externa, L temporal lobe abscess  2. R otitis externa +MSSA, +actinomyces, +actinomyces turicensis  3. alcohol use disorder with DTs, improved  4. acute agitation with behavioral disturbance  5. 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
reviewed pertinent data , chart note  patient seen and examined overnight   history noted as above, pt reports having had untreated "swimmer's ear" for the last year, then went to an urgent care twice and was referred to and seen by an ENT physician as well prior to worsening sxs and current admission.       PE findings as above, pt w/ no signs of ETOH w/d    1. brain abscess/ b/l OE : on nafcillin / cefepime and ciprodex; followup ctxs; followup ENT recs; repeat MRI as recommended, followup w/ neurosurgery, ENT and heme/onc.   2. monitor CIWA, wean off haldol, monitor QTC       rest of  plan as above

## 2021-12-16 NOTE — PROGRESS NOTE ADULT - ATTENDING SUPERVISION STATEMENT
Resident
Resident/Fellow
Resident
Resident/Fellow
Resident/Fellow
Resident
Resident
Student
Resident

## 2021-12-16 NOTE — PROGRESS NOTE ADULT - PROBLEM SELECTOR PLAN 3
Hx heavy alcohol use (6 beers per day). 1/2 PPD smoker. Course c/b Delirium tremens and ICU delirium. With agitation on RMF. s/p high dose thiamine.   PLAN:  - Haldol 2mg q8h with transition today to PO 1mg BID 12/16. On d/c haldol PO 1mg BID x5 days  - c/w thiamine 100mg daily  - folic acid, multivitamin daily  - PRN Haldol 5mg q6 prn for agitation  - Repeat EKG on 12/14. 12/12 QTc 443  - Nicotine patches 21mg q24h.

## 2021-12-16 NOTE — PROGRESS NOTE ADULT - PROVIDER SPECIALTY LIST ADULT
ENT
ENT
Epilepsy
Infectious Disease
Internal Medicine
MICU
ENT
Infectious Disease
Internal Medicine
MICU
ENT
Infectious Disease
Infectious Disease
Internal Medicine
Infectious Disease
Internal Medicine
MICU
Internal Medicine
Internal Medicine

## 2021-12-16 NOTE — DISCHARGE NOTE NURSING/CASE MANAGEMENT/SOCIAL WORK - NSDCVIVACCINE_GEN_ALL_CORE_FT
influenza, injectable, quadrivalent, preservative free; 14-Dec-2021 12:43; Janie Lemus (JANNA); [employer]; [id number]   (Exp. Date: 30-Jun-2022); IntraMuscular; Deltoid Left.; 0.5 milliLiter(s); VIS (VIS Published: 06-Aug-2021, VIS Presented: 14-Dec-2021);

## 2021-12-16 NOTE — DISCHARGE NOTE NURSING/CASE MANAGEMENT/SOCIAL WORK - NSDCPEWEB_GEN_ALL_CORE
[facility] for Tobacco Control website --- [email or web address] website --- [email or web address]

## 2021-12-16 NOTE — PROGRESS NOTE ADULT - PROBLEM SELECTOR PLAN 2
Left greater than right. Improving. No signs of mastoiditis on clinical examination. MSSA, Actinomyces on culture.  PLAN:  - c/w ciprodex 5 drops & dexamethasone 0.1% 5 drops to both ears BID until 12/17  - OR with cultures and R myringotomy placement by ENT (12/9)  - 12/11 ENT removed ear james

## 2021-12-16 NOTE — DISCHARGE NOTE NURSING/CASE MANAGEMENT/SOCIAL WORK - NSDPACMPNY_GEN_ALL_CORE
Discharged from [facility] 45 minutes ago and instructed to follow-up with cardiologist, has scheduled appt for 11/18, states continued dizziness. Spouse

## 2021-12-16 NOTE — DISCHARGE NOTE NURSING/CASE MANAGEMENT/SOCIAL WORK - NSDCPEEMAIL_GEN_ALL_CORE
[facility] for Tobacco Control email [email or web address]

## 2021-12-16 NOTE — PROGRESS NOTE ADULT - PROBLEM SELECTOR PROBLEM 4
Convulsive status epilepticus

## 2021-12-16 NOTE — DISCHARGE NOTE NURSING/CASE MANAGEMENT/SOCIAL WORK - PATIENT PORTAL LINK FT
You can access the FollowMyHealth Patient Portal offered by [facility] by registering at the following website: [email or web address]. By joining [facility] FollowMyHealth portal, you will also be able to view your health information using other applications (apps) compatible with our system.

## 2021-12-16 NOTE — DISCHARGE NOTE NURSING/CASE MANAGEMENT/SOCIAL WORK - NSDCPEFALRISK_GEN_ALL_CORE
For information on Fall & Injury Prevention, visit: [email or web address] OR  [email or web address] OR  https://www.cdc.gov/steadi/patient.html

## 2021-12-16 NOTE — PROGRESS NOTE ADULT - PROBLEM SELECTOR PLAN 1
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 MRI shows left necrotizing otitis externa, 1 cm abscess in the left temporal lobe with adjacent dural thickening. Blood cx (NGTD), urine cx (NGTD) and ear cx (staph. aureus and epidermidis). IAC CT scan w/wo contrast necrotizing otitis externa with osteomyelitis and soft tissue extent to the retrocondylar fat and infratemporal fosse.  ENT and neurosurgery no acute surgical intervention.   PLAN:  - Nafcillin 2g IV q4h (for a total of 6 weeks) and CXT 2g IV q12h for 6 weeks (12/9 - 1/19) as per ID recs  - f/u ID recs  - Weekly labs: CMP, CBC, ESR, CRP faxed to ID office at [phone]. To follow up with Dr. Valdivia in 3 weeks, ID offic will call patient to schedule  - c/w ciprodex 5 drops & dexamethasone 0.1% 5 drops to both ears BID until 12/17 as per ENT

## 2021-12-21 ENCOUNTER — APPOINTMENT (OUTPATIENT)
Dept: OTOLARYNGOLOGY | Facility: CLINIC | Age: 51
End: 2021-12-21
Payer: COMMERCIAL

## 2021-12-21 ENCOUNTER — TRANSCRIPTION ENCOUNTER (OUTPATIENT)
Age: 51
End: 2021-12-21

## 2021-12-21 VITALS — TEMPERATURE: 98.5 F | BODY MASS INDEX: 23.62 KG/M2 | WEIGHT: 165 LBS | HEIGHT: 70 IN

## 2021-12-21 DIAGNOSIS — Z72.89 OTHER PROBLEMS RELATED TO LIFESTYLE: ICD-10-CM

## 2021-12-21 DIAGNOSIS — F17.200 NICOTINE DEPENDENCE, UNSPECIFIED, UNCOMPLICATED: ICD-10-CM

## 2021-12-21 DIAGNOSIS — Z87.898 PERSONAL HISTORY OF OTHER SPECIFIED CONDITIONS: ICD-10-CM

## 2021-12-21 DIAGNOSIS — H61.20 IMPACTED CERUMEN, UNSPECIFIED EAR: ICD-10-CM

## 2021-12-21 PROCEDURE — 69210 REMOVE IMPACTED EAR WAX UNI: CPT

## 2021-12-21 PROCEDURE — 99213 OFFICE O/P EST LOW 20 MIN: CPT | Mod: 25

## 2021-12-21 RX ORDER — MULTIVITAMIN
TABLET ORAL
Refills: 0 | Status: ACTIVE | COMMUNITY

## 2021-12-21 NOTE — HISTORY OF PRESENT ILLNESS
[de-identified] : HARRIET GOLDMAN has a history of Severe bilateral otitis externa recently hospitalized with complications which included a left temporal lobe abscess. He was treated with prolonged intravenous antibiotics. I have seen this patient in consultation in the hospital. An exam under anesthesia revealed only severe ear canal edema with mostly intact. Skin. No mass or granulation tissue was identified. Multiple cultures were taken.\par \par Since hospitalization, the patient has continued on IV antibiotics and otic drops at home. He reports no ear pain or otorrhea. He feels his hearing is somewhat reduced in the left ear but otherwise adequate.\par \par No significant past history of infectious ear disease reported. He does report a history of external otitis, swimmers ear, in the past and active ear canal manipulation with Q-tips.

## 2021-12-21 NOTE — CONSULT LETTER
[Please see my note below.] : Please see my note below. [FreeTextEntry2] : Dear MAYA GOODMAN  [FreeTextEntry1] : Thank you for allowing me to participate in the care of HARRIET GOLDMAN .\par Please see the attached visit note.\par \par \par \par Abraham Lombardi\par Otology\par Medical Director of [facility]\par Department of Otolaryngology\par [facility]

## 2021-12-21 NOTE — PHYSICAL EXAM
[FreeTextEntry1] : Microscopic ear exam with cerumen debridement:\par \par Right ear: Obstructing cerumen was debrided from the ear canal using suction, and curet.  The ear canal was otherwise within normal limits.  The tympanic membrane was intact and noninflamed.\par \par Left ear: Obstructing cerumen was debrided from the ear canal using suction. Significant edema of the ear canal noted based mostly anteriorly and inferiorly. The skin is intact without lesion. [Midline] : trachea located in midline position [Normal] : no rashes

## 2021-12-21 NOTE — REVIEW OF SYSTEMS
Subjective .  Patient with excellent performance status    REASONS FOR FOLLOWUP:    1. 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 female who we are asked to see April 29, 2018 in consultation for hypercalcemia, anemia and radiologic findings consistent with likely malignancy-?  Lymphoma.        She has a significant past medical history of palpitations followed by cardiology.  She presented to the emergency room April 10-14 with chest pain and palpitations and was found to be in A. fib with RVR and also demonstrated electrolyte abnormalities and anemia.  Records demonstrates that her anemia became particularly evident right April 11 ruptured you an H&H of 8.9 28.4 by can 6510, platelet count 212,000 with a normal automated differential.  She underwent GI assessment including upper and lower endoscopy demonstrated hernia, gastritis, esophagitis, diverticulosis but no evidence of acute bleed.  She been placed on Eliquis as result of a relatively high CHADSVASc score.  She had also been found to be hypercalcemic at approximately 11 with HCTZ altered and the patient started on Aldactone.  Additional testing had included a ferritin of 313.1, iron of 32 with TIBC of 222 and iron saturation of 14, occult blood negative per stool testing    She was brought back to the emergency room April 21 with further evidence of hypercalcemia, associated weakness, anorexia, nausea, ataxia and weight loss.  MRI of the brain April 21 was found to be unremarkable. Outpatient intact PTH levels were found to be 7.1(reduced), and an H&H of 10.3 and 32.9, white count 11,090, platelet count 267,000 with a normal differential.  Patient seen by renal medicine with the possibility of Fanconi syndrome raised.  Thereafter PTH hormone was found be less than 2.0, and prophylactic paresis included IgG of 948, IgA of 207, IgM 86, total protein 6.3,  albumin 2.8, no M spike noted, slightly elevated free kappa and lambda light chains with normal ratio.  Repeat testing per iron profile again revealed low serum iron but high serum ferritin and normal CEA, normal AFP, CA-125 of 77.2, CA 19-9 7.8   At this point the reason for her hypercalcemia was thought to be possible medication effect and/or possible malignancy with calcium was running between 12 and 13 mg/dL.    This led to CT scan of chest abdomen pelvis performed April 24 demonstrate extensive metastatic disease throughout the abdomen and pelvis particularly involving the spleen and liver, extensive lymphadenopathy at the abdomen and pelvis with a 4 cm lesion splenic hilum partially encasing the pancreatic tail, pancreatic tail malignancy?,  Gastric primary malignancy? There was an approximately a 5 cm mass along the right wall of the urinary bladder with adjacent adenopathy.  CT of the chest revealed several tiny dense pleural-based nodular opacities-partially calcified granulomas, no mediastinal or hilar adenopathy, enlarged pulmonary arteries consistent with pulmonary arterial hypertension.  His subsequent bone scan performed April 26 revealed prominent uptake in the right frontal bone and right posterior ninth rib and a CT needle biopsy of the liver was obtained April 26 as well.The sample obtained revealed, on flow cytometry examination, a subpopulation of normal B cells that might be  B-cell lymphoma.  This was eventually felt consistent with diffuse large B-cell non-Hodgkin's lymphoma, CD20 positive, double hit (BCL-6 rearrangement 85%, MYC rearrangement 79%), KI-67 4+/4. This led to the patient receiving R CHOP chemotherapy May 04, 2018 followed by Granix.  She is able to be discharged May 16, 2018.    We made plans for her to be seen in follow-up for continued Zometa and a second cycle of CHOP R chemotherapy by May 25 with follow-up PET/CT after 2 cycles of treatment.  She is seen back in office May  18 given Zometa with findings of potential right lower extremity DVT.  Doppler is positive for acute popliteal and calf vein DVT as well as superficial venous thrombosis and the patient was started on Xarelto.  She was seen again May 21 mouth additional redness and swelling per her right elbow and felt to have cellulitis started on Keflex as an outpatient.  She had been taking the Xarelto with some improvement in her right lower extremity.    The patient's next seen family history 25th 2018 and, fortunately, her cellulitis is resolving as well is her thrombosis per right lower extremity, albeit slowly.     As the patient's second cycle of CHOP R she underwent additional scans including repeat PET/CT.  This demonstrates a dramatic response with resolution of splenomegaly and associated hypermetabolic activity, resolution of hypermetabolic liver lesion, lymphadenopathy and bony metastasis as well as reduction left gluteal lesion.  She has near remission after these 2 cycles of chemotherapy and we discussed continuing chemotherapy with total of 6 cycles of CHOP R.  We will plan the additional Zometa today and plan for choose every other cycle at this point.  The patient is next seen August 03, 2018 having done well with treatment except for development of a constipation that was difficult to manage.  It is now resolved but we will be reducing her Oncovin for the remainder of treatments.   The patient went on to take her sixth lack of treatment August 23, 2015.As result of recurrent issues with urinary tract symptoms, urgency incontinence and dysuria the patient was seen by urology September 7.  She underwent additional scans,started Myrbetriq and prophylactic Macrobid.  As she is seen September 24 she actually feels improved neurologically.  We discussed her scans as well and they are improved though not completely normal and close follow-up will be necessary.  Treatment.    The patient is next seen November 26, 2018  "fortunately feeling better in terms of stamina and performance status.  We discussed continuing follow-up with maintenance of report an every 6 weeks and, potentially, using Prolia after bone density is planned as a method also help maintain taking some vigilance per hypercalcemia treatment.  The patient is next assessed February 18, 2019 with an excellent performance status.  She has had no additional issues since last visit we discussed repeat scanning in approximately 3 months which would be 6 months from her previous studies.  The patient proceeded to follow-up scans performed May 2, 2019 finding no additional pathologically enlarged lymph nodes in the chest abdomen or pelvis, spleen with interval decrease in size, multiple low-density lesions in the spleen also reduced from previous.  CMP with no significant abnormality though alk phos was 120.  The patient continues to feel well and just returned from a [place] vacation.  We have discussed the Xgeva today and then switching to Prolia for her osteopenia in 6 months.           Past Medical History:   Diagnosis Date   • Anemia     multifactorial   • Cystitis    • Cystocele     moderate   • Drug therapy    • Dyslipidemia    • Esophageal reflux    • Fatigue    • History of transfusion     no reaction   • Hypercalcemia    • Hypertension    • Major depression, chronic    • Menopause    • Non Hodgkin's lymphoma (CMS/HCC)    • Osteoarthritis    • Osteoporosis    • Palpitations    • Paroxysmal atrial fibrillation (CMS/HCC)    • Post menopausal problems    • RLS (restless legs syndrome)    • Seizure disorder (CMS/HCC)    • Subjective tinnitus    • Vaginal delivery     x2  \"SONYA\"      \"JASON\"   • Vitamin D deficiency        ONCOLOGIC HISTORY:  (History from previous dates can be found in the separate document.)    Current Outpatient Medications on File Prior to Visit   Medication Sig Dispense Refill   • amLODIPine (NORVASC) 10 MG tablet TAKE 1 TABLET BY MOUTH EVERY DAY 90 " tablet 0   • benazepril (LOTENSIN) 40 MG tablet TAKE 1 TABLET BY MOUTH EVERY DAY 90 tablet 0   • escitalopram (LEXAPRO) 20 MG tablet Take 1 tablet by mouth Daily. 90 tablet 1   • folic acid (FOLVITE) 400 MCG tablet Take 400 mcg by mouth daily.     • gabapentin (NEURONTIN) 300 MG capsule TAKE 2 CAPSULES BY MOUTH EVERY EVENING 60 capsule 3   • hydrALAZINE (APRESOLINE) 50 MG tablet Take 1 tablet by mouth 3 (Three) Times a Day. 270 tablet 3   • lidocaine-prilocaine (EMLA) 2.5-2.5 % cream Apply 30 min prior to port access 5 g 2   • melatonin 5 MG tablet tablet Take 5 mg by mouth Every Night.     • pantoprazole (PROTONIX) 40 MG EC tablet TAKE 1 TABLET BY MOUTH EVERY DAY 90 tablet 0   • phenazopyridine (PYRIDIUM) 100 MG tablet Take 1 tablet by mouth 3 (Three) Times a Day As Needed for bladder spasms. 6 tablet 2   • phenytoin (DILANTIN) 100 MG ER capsule TAKE 3 CAPSULES BY MOUTH EVERY NIGHT AT BEDTIME 270 capsule 0   • spironolactone (ALDACTONE) 25 MG tablet Take 1 tablet by mouth Daily. 90 tablet 3   • vitamin B-12 (CYANOCOBALAMIN) 100 MCG tablet Take 100 mcg by mouth Daily.     • XARELTO 20 MG tablet      • nystatin (MYCOSTATIN) 456418 UNIT/GM powder Apply  topically Every 8 (Eight) Hours. 45 g 1   • ondansetron ODT (ZOFRAN-ODT) 8 MG disintegrating tablet Take 1 tablet by mouth Every 8 (Eight) Hours As Needed for Nausea or Vomiting for up to 60 doses. 60 tablet 5   • SENEXON-S 8.6-50 MG per tablet TAKE 2 TABLETS BY MOUTH EVERY EVENING 60 tablet 0     No current facility-administered medications on file prior to visit.        ALLERGIES:     Allergies   Allergen Reactions   • Crab (Diagnostic) Itching and Rash   • Pseudoephedrine Dizziness       Social History     Socioeconomic History   • Marital status:      Spouse name: POOJA   • Number of children: 2   • Years of education: Not on file   • Highest education level: Not on file   Occupational History     Employer: RETIRED   Tobacco Use   • Smoking status: Never  "Smoker   • Smokeless tobacco: Never Used   Substance and Sexual Activity   • Alcohol use: No   • Drug use: No   • Sexual activity: Defer     Birth control/protection: Surgical     Comment:  (Jl)         Cancer-related family history is not on file.     Review of Systems  A comprehensive 14 point review of systems was performed and was negative except as mentioned.    Objective      Vitals:    05/13/19 1006   BP: 148/71   Pulse: 58   Resp: 18   Temp: 97.4 °F (36.3 °C)   TempSrc: Oral   SpO2: 96%   Weight: 66 kg (145 lb 9.6 oz)   Height: 162.6 cm (64.02\")   PainSc: 0-No pain     Current Status 5/13/2019   ECOG score 1       Physical Exam    Constitutional: She is oriented to person, place, and time. She appears well-developed and well-nourished. No distress.   Seated in wheel chair   HENT:   Head: Normocephalic and atraumatic.   Eyes: Pupils are equal, round, and reactive to light.   Pulmonary/Chest: Effort normal. No respiratory distress.   Musculoskeletal: Normal range of motion. She exhibits no current edema  Neurological: She is alert and oriented to person, place, and time.   Skin: Skin is warm and dry. No rash noted.    tenderness.  There is no obvious trauma.    Psychiatric: She has a normal mood and affect.   Vitals reviewed.        RECENT LABS:  Hematology WBC   Date Value Ref Range Status   05/13/2019 5.85 3.40 - 10.80 10*3/mm3 Final     RBC   Date Value Ref Range Status   05/13/2019 3.76 (L) 3.77 - 5.28 10*6/mm3 Final     Hemoglobin   Date Value Ref Range Status   05/13/2019 11.8 (L) 12.0 - 15.9 g/dL Final     Hematocrit   Date Value Ref Range Status   05/13/2019 36.2 34.0 - 46.6 % Final     Platelets   Date Value Ref Range Status   05/13/2019 122 (L) 140 - 450 10*3/mm3 Final      CT CHEST W CONTRAST-, CT ABDOMEN PELVIS W CONTRAST-  5/2/19    FINDINGS: There is no mediastinal or hilar or axillary lymphadenopathy.  There is mild pleural and parenchymal scarring at both lung apices that  is " unchanged. There are no focal infiltrates or discrete lung masses. No  pleural effusions are present. The liver is unremarkable except for a  single tiny cyst in the left lobe. No additional liver lesions are  currently identified. There are multiple low-density masses scattered  throughout the spleen show further interval decrease in size. In  addition the spleen has diminished in size further. The largest splenic  mass currently measures 1.8 cm in diameter. Previously it measured up to  3.2 cm in diameter. The pancreas and kidneys and adrenal glands appear  within normal limits. The gallbladder is absent. Multiple borderline  enlarged periaortic and pericaval lymph nodes in the abdomen are  unchanged. There is no lymphadenopathy in the pelvis. Bone window images  demonstrate no lytic or blastic lesions other than a focal area of dense  sclerosis in the L2 vertebral body that is unchanged and is consistent  with a benign bone island.     IMPRESSION:  No pathologically enlarged lymph nodes remaining within the  chest abdomen or pelvis. The spleen shows interval decrease in size  since the most recent previous CT scan on 09/19/2018. Multiple  low-density masses within the spleen have also decreased in size. No new  abnormalities are identified.    Assessment/Plan     1. 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  · PET scan 5/3/18 with diffuse splenic involvement (SUV 16.9), lymphadenopathy throughout upper abdomen and retroperitoneum (SUV 13.1), right liver lesion 5 cm (SUV 12.8), pelvic lymphadenopathy up to 4 cm, bladder wall thickening with associated hypermetabolism, cervical lymphadenopathy left posterior (SUV 11.2), multiple bone lesions including left intertrochanteric femur, ribs, right scapula, pelvis as well as left gluteal hematoma versus lymphomatous  lesion.  · CT-guided liver biopsy 4/26/18 with diffuse large B-cell non-Hodgkin's lymphoma, CD20 positive, double hit (BCL-6 rearrangement 85%, MYC rearrangement 79%), KI-67 4+/4  · Left cervical excisional biopsy by ENT Dr. Oconnor 5/1/18 confirming high-grade B cell non-Hodgkin's lymphoma, Ki-67 100%, double hit (BCL-6 rearrangement 76%, MYC rearrangement 85%)  · Echocardiogram 4/11/18 with ejection fraction 66.7%  · Right port placement Dr Gill 5/4/18  · Patient not felt to be a candidate for more aggressive chemotherapy due to performance status and age (DA EPOCH-R)  · Therefore treated with R CHOP chemotherapy 5/4/18.  · Followed by granix 300mcg daily beginning 5/6/18 through 5/14/18.  · Today, the patient has increasing WBC related to growth factor use, stable hemoglobin, spontaneous improvement in platelet count.  She is doing quite well following her chemotherapy and is ready to go home.  There is some concern regarding her persistently elevated LDH at 347 today as well as her escalating calcium at 11.9.  She is currently asymptomatic from the calcium and her ionized calcium is stable at 6.8.  Therefore we are going to proceed with discharge home.  She will return to the office later this week on 5/18/18 with repeat labs, nurse practitioner visit, and potential treatment with a third dose of Zometa if her calcium has continued to escalate significantly. She is now seen with plans to begin cycle 2 R CHOP chemotherapy with growth factor support ( Neulasta on body injector).  Will schedule follow-up PET scan after 2 cycles of therapy and see her back in 3 weeks to review her status.  · Patient reviewed June 19 with near resolution of hypermetabolic sites on PET/CT.  Plans made for third cycle of CHOP R, additional Zometa and total of 6 cycles of chemotherapy anticipated.  · Patient seen August 03, 2018 for fifth cycle of chemotherapy-CHOP R, reduction of Oncovin 50%, 6 cycles planned.  Discussed  subsequent CT scan follow-up.  · Follow-up scan September 19 with small low-density liver lesions and splenic lesions are decreased from previous, numerous small mesenteric and RP nodes again stable slightly smaller.  These are discussed September 24 and follow-up scan in 3 months is anticipated  · Patient reviewed November 26 further generally improved, plans made to maintain port, review at 3 months  · Patient assessed February 18, 2019, no evidence of recurrent disease, repeat scans in 3 months plan-6 months from previous  · Patient seen May 13, 2019, no evidence of recurrent disease, follow-up assessment in 6 months planned    2. Myelosuppression with pancytopenia related to chemotherapy:  · Received granix 300mcg daily previously and will need Neulasta-on body this treatment as well as her subsequent cycles.  · Patient seen September 24 and follow-up is planned  · Further improvement noted November 26, 2018, and February 18, and on May 13,  2019      3. Multifactorial anemia:  · Related to anemia chronic disease, chemotherapy, prior iron deficiency.  · Received previous venofer 600mg (5/7 and 5/8) with iron studies from 4/24/18 showing ferritin 411, iron saturation 9%, TIBC 180.  Additional follow-up is planned as she is seen September 24.  Further improvement noted November 26, 2018 and May 13, 2019    4. Hypercalcemia of malignancy:  · Calcium up to 13.8 on 4/21/18 (albumin 3.3).  Intact PTH 8.1, PTH RP less than 2.0  · Received Zometa 4 mg IV 4/25/18.  · Calcium up to 11.3 on 5/7/18 with second dose of Zometa administered 4 mg IV.  · Calcium today has continued to gradually increase up to 11.9 with albumin 3.3.  Ionized calcium however is stable at 6.8 compared to yesterday.  The patient is asymptomatic.  We will not plan to administer a further dose of Zometa just yet and will allow further time for the patient to respond to chemotherapy.  She returned 518 for continued Zometa and when seen May 25 has a  normal calcium level.  · Patient to receive Zometa June 19, subsequently every other cycle, restart low-dose calcium supplementation  · Patient seen August 03, 2018, plans made for Zometa with her final course of chemotherapy August 23, 2018  · Patient scheduled for bone density prior to assessment 3 months following November visit, potential Xgeva and follow-up as she is seen back to step to repeat scans are performed in May 2019.  As she is seen May 13 we do plan the Xgeva and then move to Prolia 6 months later for her subsequent treatment for osteopenia      5.  Paroxysmal atrial fibrillation:  · Recently diagnosed, anticoagulated with Eliquis initially, discontinued due to expected thrombocytopenia from chemotherapy  · Currently in sinus rhythm, continues on Lopressor 50 mg every 12 hours  · Anticoagulation restarted with Xarelto        6. Prior seizure disorder:  · Continues currently on Dilantin 300 mg daily at bedtime and Neurontin 600 mg daily at bedtime, will return to outpatient dose of Neurontin 300 mg daily at bedtime at discharge.      7. GI prophylaxis:  · Protonix 40 mg daily, consider discontinuance in 6 months      8. Venous access:  · Port placement 5/4/18 by Dr. Gill, continue to manage q. 6 weeks      9. DVT- Continue Xarelto 20 mg by mouth daily      Plan:  *Patient will be observed off chemotherapy  *Port flush q. 6 weeks  *Repeat scans, labs, 23 weeks  *MD assessment in 24 weeks, switch to Prolia for her osteopenia                       [Patient Intake Form Reviewed] : Patient intake form was reviewed

## 2021-12-21 NOTE — ASSESSMENT
[FreeTextEntry1] : Clinical improvement in this patient, who was recently hospitalized for severe otitis externa with involvement of the middle ear mastoid, and central nervous system.\par \par He continues on intravenous antibiotics. He is currently minimally symptomatic. I have recommended discontinuing the otic drops for the right ear. I recommend continuing otic drops in the left ear for an additional week. I recommend followup in 2-4 weeks. Audiometry recommended.  He states that he wishes to followup near his home in [facility]. I provided him with referral information.

## 2021-12-22 ENCOUNTER — NON-APPOINTMENT (OUTPATIENT)
Age: 51
End: 2021-12-22

## 2021-12-22 ENCOUNTER — APPOINTMENT (OUTPATIENT)
Dept: INTERNAL MEDICINE | Facility: CLINIC | Age: 51
End: 2021-12-22
Payer: COMMERCIAL

## 2021-12-22 VITALS
WEIGHT: 171 LBS | DIASTOLIC BLOOD PRESSURE: 81 MMHG | HEIGHT: 70 IN | BODY MASS INDEX: 24.48 KG/M2 | SYSTOLIC BLOOD PRESSURE: 137 MMHG | OXYGEN SATURATION: 97 % | HEART RATE: 72 BPM

## 2021-12-22 DIAGNOSIS — F12.90 CANNABIS USE, UNSPECIFIED, UNCOMPLICATED: ICD-10-CM

## 2021-12-22 PROCEDURE — 99386 PREV VISIT NEW AGE 40-64: CPT | Mod: 25

## 2021-12-22 PROCEDURE — 93000 ELECTROCARDIOGRAM COMPLETE: CPT

## 2021-12-22 PROCEDURE — 36415 COLL VENOUS BLD VENIPUNCTURE: CPT

## 2021-12-22 NOTE — PHYSICAL EXAM
[Normal] : normal rate, regular rhythm, normal S1 and S2 and no murmur heard [No Edema] : there was no peripheral edema [Soft] : abdomen soft [Non Tender] : non-tender [Non-distended] : non-distended [Normal Posterior Cervical Nodes] : no posterior cervical lymphadenopathy [Coordination Grossly Intact] : coordination grossly intact [Normal Anterior Cervical Nodes] : no anterior cervical lymphadenopathy [No Focal Deficits] : no focal deficits [Normal Gait] : normal gait [de-identified] : Older appearing [de-identified] : PICC line in PIP place and right arm.  No surrounding redness, swelling or discharge [de-identified] : Forgetful

## 2021-12-22 NOTE — ASSESSMENT
[FreeTextEntry1] : 51 year old male presents for initial annual exam.  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 MRI shows left necrotizing otitis externa, 1 cm abscess in the left \par temporal lobe with adjacent dural thickening. Blood cx (NGTD), urine cx (NGTD) \par and ear cx (staph. aureus and epidermidis). IAC CT scan w/wo contrast necrotizing otitis externa with osteomyelitis and soft tissue extent to the retrocondylar fat and infratemporal fosse. \par - c/w ciprodex 5 drops & dexamethasone 0.1% 5 drops to both ears BID \par (12/4-12/17) \par -cont  Nafcillin 2g IV q4h and CXT 2g IV q12h (12/9 - 1/19) via PICC \par -will need close followup with ID \par \par Bilateral otitis externa.  s/p  R myringotomy placement by ENT (12/9) grew MSSA, Actinomyces. \par - c/w ciprodex 5 drops & dexamethasone 0.1% 5 drops to both ears BID with last \par day 12/17 \par \par Convulsive status epilepticus. s/p vEEG inpatient \par -cont Depakote 500 mg BID\par -will need close neurology follow-up\par \par h/o Etoh dependence s/p withdrawal, delirium tremors.  \par History of alcohol abuse, has been drinking 4-6 beers daily for the last 20 years prior to was drinking hard liquor.  Has started drinking since discharge.  Interested in abstaining at this time\par -cont thimanine PO qd, MV qd\par -Educated on the importance of refraining from alcohol \par -advised to reach out to AA and attend meetings \par -will need to stablish care with psych \par \par h/o nicotine use, stopped to smoke 1-3 cigs per day following discharge, was smoking ~1/2 PPD \par -will need to stop smoking \par -will get tobacco cessation referral when more stable, priority is to ensure he refrain from drinking \par \par Transaminitis, likely due to etoh use.  hepatis panel done in hospital-negative \par -repeat today \par \par Annual \par -Advise to get yearly Flu shot -up to date \par -Advise Yearly Eye exam with Ophthalmologist\par -Advise Yearly Dental exam\par -Educated of the importance of Healthy diet, such as Mediterranean Diet and Exercise, such as walking >20 minutes a day and increasing gradually as tolerated\par \par Preventative screening \par -advised to get colonoscopy for colon cancer screening -will need once more stable \par -will check PSA for prostate cancer screening -labs drawn\par \par f/u in 2 weeks \par \par \par \par \par

## 2021-12-22 NOTE — HEALTH RISK ASSESSMENT
[Current] : Current [No falls in past year] : Patient reported no falls in the past year [0] : 2) Feeling down, depressed, or hopeless: Not at all (0) [[id number]] : 0 [ColonoscopyDate] : NEVER

## 2021-12-22 NOTE — HISTORY OF PRESENT ILLNESS
[de-identified] : 51 year old male presents for initial annual exam.  Accompanied by wife, Roxanne.\par \par Was recently discharged from [facility]\par \par History of alcohol abuse, has been drinking 4-6 beers daily for the last 20 years prior to was drinking hard liquor.  Has started drinking since discharge.  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 S/p delirium tremens + ICU \par delirium. S/p R myringotomy placement by ENT, on Depakote for epilepsy, ABx  (nafcillin and ceftriaxone by PICC line) for MSSA infection. Course C/B agitation and violence which resolved. Patient and patient's wife, after discussion, will pay out of pocket for continued antibiotic treatment. 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 MRI shows left necrotizing otitis externa, 1 cm abscess in the left \par temporal lobe with adjacent dural thickening. Blood cx (NGTD), urine cx (NGTD) \par and ear cx (staph. aureus and epidermidis). IAC CT scan w/wo contrast \par necrotizing otitis externa with osteomyelitis and soft tissue extent to the \par retrocondylar fat and infratemporal fosse.  ENT and neurosurgery no acute \par surgical intervention. \par PLAN: \par - Nafcillin 2g IV q4h and CXT 2g IV q12h (12/9 - 1/19) \par - c/w ciprodex 5 drops & dexamethasone 0.1% 5 drops to both ears BID \par (12/4-12/17) \par \par #Bilateral otitis externa. \par Left greater than right. Improving. No signs of mastoiditis on clinical \par examination. OR with cultures and R myringotomy placement by ENT (12/9) grew \par MSSA, Actinomyces. \par - c/w ciprodex 5 drops & dexamethasone 0.1% 5 drops to both ears BID with last \par day 12/17 \par \par #Agitation. \par Hx heavy alcohol use (6 beers per day). 1/2 PPD smoker. Course c/b Delirium \par tremens and ICU delirium. With agitation on RMF. s/p high dose thiamine. \par Received PRN Haldol 5mg q6 prn for agitation \par PLAN: \par - Dc with thiamine 100mg, folate, MV \par - D/c with Haldol PO 1mg BID for five days \par \par #Convulsive status epilepticus. \par Likely one focal seizure on left temporal lobe with secondarily 4-5 generalized \par seizures s/p convulsive status epilepticus. Likely 2/2 to left temporal lobe \par encephalitis 2/2 to left mastoiditis. Frequent baths, high pseudomonal \par suspicion. Intubated and sedated for 2 days. CT temporal bones done and MRI IAC \par with contrast IV showed left necrotizing otitis externa, 1 cm abscess in the \par left temporal lobe with adjacent dural thickening. s/p vEEG \par PLAN: \par - c/w Depakote 500 mg BID (started 12/8) \par \par #Transaminitis. \par , ALT 60 as of 12/7/21. Pt with history of heavy alcohol use. No RUQ \par tenderness on exam. Downtrended. Hepatitis panel non reactive \par \par #Etoh dependence \par -S/p thiamine 500mg x 3 days \par -c/w 100mg oral thiamine \par \par \par

## 2021-12-30 ENCOUNTER — CLINICAL ADVICE (OUTPATIENT)
Age: 51
End: 2021-12-30

## 2021-12-30 LAB
25(OH)D3 SERPL-MCNC: 19.2 NG/ML
ALBUMIN SERPL ELPH-MCNC: 4 G/DL
ALP BLD-CCNC: 69 U/L
ALT SERPL-CCNC: 20 U/L
ANION GAP SERPL CALC-SCNC: 13 MMOL/L
APPEARANCE: CLEAR
AST SERPL-CCNC: 22 U/L
BACTERIA: NEGATIVE
BASOPHILS # BLD AUTO: 0.07 K/UL
BASOPHILS NFR BLD AUTO: 0.7 %
BILIRUB DIRECT SERPL-MCNC: 0 MG/DL
BILIRUB INDIRECT SERPL-MCNC: NORMAL MG/DL
BILIRUB SERPL-MCNC: <0.2 MG/DL
BILIRUBIN URINE: NEGATIVE
BLOOD URINE: NEGATIVE
BUN SERPL-MCNC: 5 MG/DL
CALCIUM SERPL-MCNC: 9.3 MG/DL
CHLORIDE SERPL-SCNC: 103 MMOL/L
CHOLEST SERPL-MCNC: 165 MG/DL
CO2 SERPL-SCNC: 22 MMOL/L
COLOR: YELLOW
CREAT SERPL-MCNC: 0.88 MG/DL
EOSINOPHIL # BLD AUTO: 0.68 K/UL
EOSINOPHIL NFR BLD AUTO: 6.5 %
ESTIMATED AVERAGE GLUCOSE: 114 MG/DL
FERRITIN SERPL-MCNC: 290 NG/ML
FOLATE SERPL-MCNC: >20 NG/ML
GLUCOSE QUALITATIVE U: NEGATIVE
GLUCOSE SERPL-MCNC: 88 MG/DL
HBA1C MFR BLD HPLC: 5.6 %
HCT VFR BLD CALC: 42.2 %
HDLC SERPL-MCNC: 37 MG/DL
HGB BLD-MCNC: 13.3 G/DL
HYALINE CASTS: 5 /LPF
IMM GRANULOCYTES NFR BLD AUTO: 0.7 %
IRON SATN MFR SERPL: 19 %
IRON SERPL-MCNC: 43 UG/DL
KETONES URINE: NORMAL
LDLC SERPL CALC-MCNC: 106 MG/DL
LEUKOCYTE ESTERASE URINE: ABNORMAL
LYMPHOCYTES # BLD AUTO: 2.3 K/UL
LYMPHOCYTES NFR BLD AUTO: 22.1 %
MAN DIFF?: NORMAL
MCHC RBC-ENTMCNC: 29.9 PG
MCHC RBC-ENTMCNC: 31.5 GM/DL
MCV RBC AUTO: 94.8 FL
MICROSCOPIC-UA: NORMAL
MONOCYTES # BLD AUTO: 1.08 K/UL
MONOCYTES NFR BLD AUTO: 10.4 %
NEUTROPHILS # BLD AUTO: 6.2 K/UL
NEUTROPHILS NFR BLD AUTO: 59.6 %
NITRITE URINE: NEGATIVE
NONHDLC SERPL-MCNC: 129 MG/DL
PH URINE: 6.5
PLATELET # BLD AUTO: 520 K/UL
POTASSIUM SERPL-SCNC: 3.8 MMOL/L
PROT SERPL-MCNC: 6.9 G/DL
PROTEIN URINE: NORMAL
RBC # BLD: 4.45 M/UL
RBC # FLD: 15.5 %
RED BLOOD CELLS URINE: 3 /HPF
SODIUM SERPL-SCNC: 138 MMOL/L
SPECIFIC GRAVITY URINE: 1.02
SQUAMOUS EPITHELIAL CELLS: 2 /HPF
TIBC SERPL-MCNC: 225 UG/DL
TRIGL SERPL-MCNC: 114 MG/DL
TSH SERPL-ACNC: 1.87 UIU/ML
UIBC SERPL-MCNC: 182 UG/DL
UROBILINOGEN URINE: NORMAL
VIT B12 SERPL-MCNC: 739 PG/ML
WBC # FLD AUTO: 10.4 K/UL
WHITE BLOOD CELLS URINE: 24 /HPF

## 2022-01-03 ENCOUNTER — NON-APPOINTMENT (OUTPATIENT)
Age: 52
End: 2022-01-03

## 2022-01-05 ENCOUNTER — NON-APPOINTMENT (OUTPATIENT)
Age: 52
End: 2022-01-05

## 2022-01-05 ENCOUNTER — APPOINTMENT (OUTPATIENT)
Dept: INTERNAL MEDICINE | Facility: CLINIC | Age: 52
End: 2022-01-05
Payer: COMMERCIAL

## 2022-01-05 ENCOUNTER — APPOINTMENT (OUTPATIENT)
Dept: DERMATOLOGY | Facility: CLINIC | Age: 52
End: 2022-01-05
Payer: COMMERCIAL

## 2022-01-05 VITALS
HEIGHT: 70 IN | WEIGHT: 168 LBS | BODY MASS INDEX: 24.05 KG/M2 | SYSTOLIC BLOOD PRESSURE: 158 MMHG | OXYGEN SATURATION: 98 % | DIASTOLIC BLOOD PRESSURE: 84 MMHG | HEART RATE: 83 BPM

## 2022-01-05 VITALS — HEIGHT: 69 IN | WEIGHT: 170 LBS | BODY MASS INDEX: 25.18 KG/M2

## 2022-01-05 DIAGNOSIS — L85.3 XEROSIS CUTIS: ICD-10-CM

## 2022-01-05 DIAGNOSIS — R21 RASH AND OTHER NONSPECIFIC SKIN ERUPTION: ICD-10-CM

## 2022-01-05 PROCEDURE — 99204 OFFICE O/P NEW MOD 45 MIN: CPT | Mod: GC

## 2022-01-05 PROCEDURE — 99214 OFFICE O/P EST MOD 30 MIN: CPT

## 2022-01-05 NOTE — HISTORY OF PRESENT ILLNESS
[de-identified] : 51 year old male with history of heavy alcohol abuse, presents for followup  after initial evaluation following hospitalization \par \par Currently on IV nafcillin and ceftriaxone via PICC for left temporal lobe encephalitis with brain abscess.  \par \par States after seeing me has developed a rash on his bilateral arms tracking up to his shoulders.  States feels like it is getting worse.  Noticed some peeling and dryness on his hands.  Not associated with itching\par Denies any oral ulcers, difficulty swallowing or vomiting.  States has been having diarrhea since being on IV nafcillin and ceftriaxone for the last 2 weeks.\par \par History of alcohol abuse, has been drinking 4-6 beers daily for the last 20 years prior to was drinking hard liquor.  \par And has continued to abstain from alcohol.  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 S/p delirium tremens + ICU \par delirium. S/p R myringotomy placement by ENT, on Depakote for epilepsy, ABx  (nafcillin and ceftriaxone by PICC line) for MSSA infection. Course C/B agitation and violence which resolved. Patient and patient's wife, after discussion, will pay out of pocket for continued antibiotic treatment. 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 MRI shows left necrotizing otitis externa, 1 cm abscess in the left \par temporal lobe with adjacent dural thickening. Blood cx (NGTD), urine cx (NGTD) \par and ear cx (staph. aureus and epidermidis). IAC CT scan w/wo contrast \par necrotizing otitis externa with osteomyelitis and soft tissue extent to the \par retrocondylar fat and infratemporal fosse.  ENT and neurosurgery no acute \par surgical intervention. \par PLAN: \par - Nafcillin 2g IV q4h and CXT 2g IV q12h (12/9 - 1/19) \par - c/w ciprodex 5 drops & dexamethasone 0.1% 5 drops to both ears BID \par (12/4-12/17) \par \par #Bilateral otitis externa. \par Left greater than right. Improving. No signs of mastoiditis on clinical \par examination. OR with cultures and R myringotomy placement by ENT (12/9) grew \par MSSA, Actinomyces. \par - c/w ciprodex 5 drops & dexamethasone 0.1% 5 drops to both ears BID with last \par day 12/17 \par \par #Agitation. \par Hx heavy alcohol use (6 beers per day). 1/2 PPD smoker. Course c/b Delirium \par tremens and ICU delirium. With agitation on RMF. s/p high dose thiamine. \par Received PRN Haldol 5mg q6 prn for agitation \par PLAN: \par - Dc with thiamine 100mg, folate, MV \par - D/c with Haldol PO 1mg BID for five days \par \par #Convulsive status epilepticus. \par Likely one focal seizure on left temporal lobe with secondarily 4-5 generalized \par seizures s/p convulsive status epilepticus. Likely 2/2 to left temporal lobe \par encephalitis 2/2 to left mastoiditis. Frequent baths, high pseudomonal \par suspicion. Intubated and sedated for 2 days. CT temporal bones done and MRI IAC \par with contrast IV showed left necrotizing otitis externa, 1 cm abscess in the \par left temporal lobe with adjacent dural thickening. s/p vEEG \par PLAN: \par - c/w Depakote 500 mg BID (started 12/8) \par \par #Transaminitis. \par , ALT 60 as of 12/7/21. Pt with history of heavy alcohol use. No RUQ \par tenderness on exam. Downtrended. Hepatitis panel non reactive \par \par #Etoh dependence \par -S/p thiamine 500mg x 3 days \par -c/w 100mg oral thiamine \par \par \par

## 2022-01-05 NOTE — ASSESSMENT
[FreeTextEntry1] : 51 year old male presents for initial annual exam.  Accompanied by wife, Roxanne.\par Was recently discharged from [facility]\par \par Rash, worsening with atypical target lesions on bilateral hands, arms and scattered on back on IV nafacillin and ceftriaxone.  No oral mucosal ulcers, blistering or bullous formation. associated with fatigue, diarrhea.   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 MRI shows left necrotizing otitis externa, 1 cm abscess in the left \par temporal lobe with adjacent dural thickening. Blood cx (NGTD), urine cx (NGTD) \par and ear cx (staph. aureus and epidermidis). IAC CT scan w/wo contrast necrotizing otitis externa with osteomyelitis and soft tissue extent to the retrocondylar fat and infratemporal fosse. \par - c/w ciprodex 5 drops & dexamethasone 0.1% 5 drops to both ears BID \par (12/4-12/17) \par -cont  Nafcillin 2g IV q4h and CXT 2g IV q12h (12/9 - 1/19) via PICC \par -will need close followup with ID, Neuro has appt on 1/24\par \par Convulsive status epilepticus. s/p vEEG inpatient \par -cont Depakote 500 mg BID\par -will need close neurology follow-up, appt on 1/24\par \par h/o Etoh dependence s/p withdrawal, delirium tremors.  Has been abstaining since discharge 3 weeks ago\par History of alcohol abuse, has been drinking 4-6 beers daily for the last 20 years prior to was drinking hard liquor.  Has started drinking since discharge.  Interested in abstaining at this time\par -cont thiamine PO qd, MV qd\par -Educated on the importance of refraining from alcohol \par -advised to reach out to AA and attend meetings \par -will need to stablish care with psych \par \par h/o nicotine use, stopped to smoke 1-3 cigs per day following discharge, was smoking ~1/2 PPD \par -will need to stop smoking \par -will get tobacco cessation referral when more stable, priority is to ensure he refrain from drinking \par \par f/u in 1 month \par \par \par \par \par

## 2022-01-08 LAB
CULTURE RESULTS: SIGNIFICANT CHANGE UP
CULTURE RESULTS: SIGNIFICANT CHANGE UP
SPECIMEN SOURCE: SIGNIFICANT CHANGE UP
SPECIMEN SOURCE: SIGNIFICANT CHANGE UP

## 2022-01-11 ENCOUNTER — NON-APPOINTMENT (OUTPATIENT)
Age: 52
End: 2022-01-11

## 2022-01-11 ENCOUNTER — APPOINTMENT (OUTPATIENT)
Dept: INFECTIOUS DISEASE | Facility: CLINIC | Age: 52
End: 2022-01-11

## 2022-01-14 ENCOUNTER — APPOINTMENT (OUTPATIENT)
Dept: INFECTIOUS DISEASE | Facility: CLINIC | Age: 52
End: 2022-01-14
Payer: COMMERCIAL

## 2022-01-14 ENCOUNTER — INPATIENT (INPATIENT)
Facility: HOSPITAL | Age: 52
LOS: 4 days | Discharge: HOME | End: 2022-01-19
Attending: INTERNAL MEDICINE | Admitting: INTERNAL MEDICINE
Payer: COMMERCIAL

## 2022-01-14 VITALS
OXYGEN SATURATION: 97 % | DIASTOLIC BLOOD PRESSURE: 95 MMHG | WEIGHT: 169.98 LBS | RESPIRATION RATE: 19 BRPM | TEMPERATURE: 98 F | SYSTOLIC BLOOD PRESSURE: 165 MMHG | HEART RATE: 75 BPM | HEIGHT: 70 IN

## 2022-01-14 DIAGNOSIS — D70.2 OTHER DRUG-INDUCED AGRANULOCYTOSIS: ICD-10-CM

## 2022-01-14 DIAGNOSIS — E87.6 HYPOKALEMIA: ICD-10-CM

## 2022-01-14 DIAGNOSIS — K42.9 UMBILICAL HERNIA WITHOUT OBSTRUCTION OR GANGRENE: Chronic | ICD-10-CM

## 2022-01-14 DIAGNOSIS — R19.7 DIARRHEA, UNSPECIFIED: ICD-10-CM

## 2022-01-14 LAB
ALBUMIN SERPL ELPH-MCNC: 2.2 G/DL — LOW (ref 3.3–5)
ALP SERPL-CCNC: 103 U/L — SIGNIFICANT CHANGE UP (ref 40–120)
ALT FLD-CCNC: 42 U/L — SIGNIFICANT CHANGE UP (ref 12–78)
ANION GAP SERPL CALC-SCNC: 9 MMOL/L — SIGNIFICANT CHANGE UP (ref 5–17)
ANISOCYTOSIS BLD QL: SLIGHT — SIGNIFICANT CHANGE UP
AST SERPL-CCNC: 32 U/L — SIGNIFICANT CHANGE UP (ref 15–37)
BASOPHILS # BLD AUTO: 0 K/UL — SIGNIFICANT CHANGE UP (ref 0–0.2)
BASOPHILS NFR BLD AUTO: 0 % — SIGNIFICANT CHANGE UP (ref 0–2)
BILIRUB SERPL-MCNC: 0.8 MG/DL — SIGNIFICANT CHANGE UP (ref 0.2–1.2)
BUN SERPL-MCNC: 5 MG/DL — LOW (ref 7–23)
CALCIUM SERPL-MCNC: 8.7 MG/DL — SIGNIFICANT CHANGE UP (ref 8.5–10.1)
CHLORIDE SERPL-SCNC: 101 MMOL/L — SIGNIFICANT CHANGE UP (ref 96–108)
CO2 SERPL-SCNC: 31 MMOL/L — SIGNIFICANT CHANGE UP (ref 22–31)
CREAT SERPL-MCNC: 1 MG/DL — SIGNIFICANT CHANGE UP (ref 0.5–1.3)
DACRYOCYTES BLD QL SMEAR: SLIGHT — SIGNIFICANT CHANGE UP
EOSINOPHIL # BLD AUTO: 1.1 K/UL — HIGH (ref 0–0.5)
EOSINOPHIL NFR BLD AUTO: 25 % — HIGH (ref 0–6)
FLUAV AG NPH QL: SIGNIFICANT CHANGE UP
FLUBV AG NPH QL: SIGNIFICANT CHANGE UP
GLUCOSE SERPL-MCNC: 96 MG/DL — SIGNIFICANT CHANGE UP (ref 70–99)
HCT VFR BLD CALC: 41.3 % — SIGNIFICANT CHANGE UP (ref 39–50)
HGB BLD-MCNC: 14.2 G/DL — SIGNIFICANT CHANGE UP (ref 13–17)
LG PLATELETS BLD QL AUTO: SLIGHT — SIGNIFICANT CHANGE UP
LYMPHOCYTES # BLD AUTO: 1.4 K/UL — SIGNIFICANT CHANGE UP (ref 1–3.3)
LYMPHOCYTES # BLD AUTO: 32 % — SIGNIFICANT CHANGE UP (ref 13–44)
MACROCYTES BLD QL: SLIGHT — SIGNIFICANT CHANGE UP
MAGNESIUM SERPL-MCNC: 2.5 MG/DL — SIGNIFICANT CHANGE UP (ref 1.6–2.6)
MANUAL SMEAR VERIFICATION: SIGNIFICANT CHANGE UP
MCHC RBC-ENTMCNC: 28.9 PG — SIGNIFICANT CHANGE UP (ref 27–34)
MCHC RBC-ENTMCNC: 34.4 GM/DL — SIGNIFICANT CHANGE UP (ref 32–36)
MCV RBC AUTO: 84.1 FL — SIGNIFICANT CHANGE UP (ref 80–100)
MONOCYTES # BLD AUTO: 1.36 K/UL — HIGH (ref 0–0.9)
MONOCYTES NFR BLD AUTO: 31 % — HIGH (ref 2–14)
NEUTROPHILS # BLD AUTO: 0.09 K/UL — LOW (ref 1.8–7.4)
NEUTROPHILS NFR BLD AUTO: 2 % — LOW (ref 43–77)
NRBC # BLD: 0 /100 — SIGNIFICANT CHANGE UP (ref 0–0)
NRBC # BLD: SIGNIFICANT CHANGE UP /100 WBCS (ref 0–0)
OVALOCYTES BLD QL SMEAR: SLIGHT — SIGNIFICANT CHANGE UP
PHOSPHATE SERPL-MCNC: 3.1 MG/DL — SIGNIFICANT CHANGE UP (ref 2.5–4.5)
PLAT MORPH BLD: NORMAL — SIGNIFICANT CHANGE UP
PLATELET # BLD AUTO: 591 K/UL — HIGH (ref 150–400)
PLATELET COUNT - ESTIMATE: NORMAL — SIGNIFICANT CHANGE UP
POIKILOCYTOSIS BLD QL AUTO: SLIGHT — SIGNIFICANT CHANGE UP
POTASSIUM SERPL-MCNC: 2.4 MMOL/L — CRITICAL LOW (ref 3.5–5.3)
POTASSIUM SERPL-SCNC: 2.4 MMOL/L — CRITICAL LOW (ref 3.5–5.3)
PROT SERPL-MCNC: 7.3 GM/DL — SIGNIFICANT CHANGE UP (ref 6–8.3)
RBC # BLD: 4.91 M/UL — SIGNIFICANT CHANGE UP (ref 4.2–5.8)
RBC # FLD: 15.8 % — HIGH (ref 10.3–14.5)
RBC BLD AUTO: SIGNIFICANT CHANGE UP
SARS-COV-2 RNA SPEC QL NAA+PROBE: SIGNIFICANT CHANGE UP
SODIUM SERPL-SCNC: 141 MMOL/L — SIGNIFICANT CHANGE UP (ref 135–145)
VALPROATE SERPL-MCNC: 44 UG/ML — LOW (ref 50–100)
VARIANT LYMPHS # BLD: 10 % — HIGH (ref 0–6)
WBC # BLD: 4.38 K/UL — SIGNIFICANT CHANGE UP (ref 3.8–10.5)
WBC # FLD AUTO: 4.38 K/UL — SIGNIFICANT CHANGE UP (ref 3.8–10.5)

## 2022-01-14 PROCEDURE — 99443: CPT

## 2022-01-14 PROCEDURE — 99285 EMERGENCY DEPT VISIT HI MDM: CPT

## 2022-01-14 PROCEDURE — 93010 ELECTROCARDIOGRAM REPORT: CPT

## 2022-01-14 PROCEDURE — 99222 1ST HOSP IP/OBS MODERATE 55: CPT

## 2022-01-14 PROCEDURE — 74177 CT ABD & PELVIS W/CONTRAST: CPT | Mod: 26,MA

## 2022-01-14 PROCEDURE — 71045 X-RAY EXAM CHEST 1 VIEW: CPT | Mod: 26

## 2022-01-14 RX ORDER — POTASSIUM CHLORIDE 20 MEQ
10 PACKET (EA) ORAL ONCE
Refills: 0 | Status: COMPLETED | OUTPATIENT
Start: 2022-01-14 | End: 2022-01-14

## 2022-01-14 RX ORDER — POTASSIUM CHLORIDE 20 MEQ
40 PACKET (EA) ORAL ONCE
Refills: 0 | Status: COMPLETED | OUTPATIENT
Start: 2022-01-14 | End: 2022-01-14

## 2022-01-14 RX ORDER — CEFTRIAXONE 500 MG/1
2000 INJECTION, POWDER, FOR SOLUTION INTRAMUSCULAR; INTRAVENOUS ONCE
Refills: 0 | Status: COMPLETED | OUTPATIENT
Start: 2022-01-14 | End: 2022-01-14

## 2022-01-14 RX ORDER — IOHEXOL 300 MG/ML
30 INJECTION, SOLUTION INTRAVENOUS ONCE
Refills: 0 | Status: COMPLETED | OUTPATIENT
Start: 2022-01-14 | End: 2022-01-14

## 2022-01-14 RX ADMIN — IOHEXOL 30 MILLILITER(S): 300 INJECTION, SOLUTION INTRAVENOUS at 18:41

## 2022-01-14 RX ADMIN — Medication 100 MILLIEQUIVALENT(S): at 20:35

## 2022-01-14 RX ADMIN — Medication 300 UNIT(S): at 21:23

## 2022-01-14 RX ADMIN — Medication 40 MILLIEQUIVALENT(S): at 20:23

## 2022-01-14 RX ADMIN — Medication 10 MILLIEQUIVALENT(S): at 23:51

## 2022-01-14 RX ADMIN — CEFTRIAXONE 100 MILLIGRAM(S): 500 INJECTION, POWDER, FOR SOLUTION INTRAMUSCULAR; INTRAVENOUS at 18:41

## 2022-01-14 NOTE — H&P ADULT - ASSESSMENT
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 Pt being admitted for acute colitis, Hypokalemia    1) Acute Colitis  - place on Cipro and Flagyl  - f/u stool cultures  - IVF  - colitis on CT only mild, if C diff neg would start on Imodium    2) Hypokalemia  - due to persistent diarrhea  - repleted in ED  - EKG w/ mild U wave  - c/w tele monitoring  - f/u rpt in am    3) Seizure disorder  - c/w Depakote    4) Behavioral changes  - seen by psych on last admx; thought to be related to infec, etoh withdrawal  - c/w Haldol    5) DVT ppx - Lovenox subq

## 2022-01-14 NOTE — H&P ADULT - NSHPPHYSICALEXAM_GEN_ALL_CORE
PHYSICAL EXAM:    Vital Signs Last 24 Hrs  T(C): 36.5 (15 Eduardo 2022 02:08), Max: 36.8 (14 Jan 2022 13:38)  T(F): 97.7 (15 Eduardo 2022 02:08), Max: 98.2 (14 Jan 2022 13:38)  HR: 81 (15 Eduardo 2022 02:08) (73 - 81)  BP: 165/88 (15 Eduardo 2022 02:08) (159/88 - 165/95)  BP(mean): --  RR: 16 (15 Eduardo 2022 02:08) (16 - 19)  SpO2: 97% (15 Eduardo 2022 02:08) (97% - 97%)    GENERAL: Pt lying in bed comfortably in NAD  HEENT:  Atraumatic, EOMI, PERRL, conjunctiva and sclera clear, dry MM  NECK: Supple  CHEST/LUNG: Clear to auscultation bilaterally  HEART: Regular rate and rhythm;   ABDOMEN: Bowel sounds present; Soft, Nontender, Nondistended.   EXTREMITIES:  2+ Peripheral Pulses, brisk capillary refill. No edema  NEUROLOGICAL:  Alert & Oriented X3, No deficits   MSK: FROM x 4 extremities   SKIN: No rashes or lesions

## 2022-01-14 NOTE — DATA REVIEWED
[FreeTextEntry1] : 1/13/22 lab notable for \par K 2.6\par WBC 4.8\par ANC 0.2\par CRP 34\par \par 1/10/22 lab notable for\par 5.5 (ANC 0.2)>12.6<502\par K 2.9\par Cr 0.89\par ESR 33\par CRP 37\par

## 2022-01-14 NOTE — ED PROVIDER NOTE - OBJECTIVE STATEMENT
51 years old male sent here by Dr. Valdivia pt's ID physician c/o low potassium. Pt also c/o nonbloody watery diarrhea x 6 per day for several weeks. Pt was taking Naficillin and Roecephine 2000 mg ivpb for abscess to the left brain after left ear infection in 12/2021. However the doctor stopped Naficillin on 1/12/22. Now pt sts he is getting Rocephine 2 gram bid last dose was this morning. Pt denies headache, dizziness, blurred visions, light sensitivities, focal/distal weakness or numbness, neck/back/calfs pain, cough, sob, chest pain, nausea, vomiting, fever, chills, abd pain, dysuria. Pt did not have covid vaccines.

## 2022-01-14 NOTE — H&P ADULT - NSHPLABSRESULTS_GEN_ALL_CORE
T(C): 36.5 (01-15-22 @ 02:08), Max: 36.8 (01-14-22 @ 13:38)  HR: 81 (01-15-22 @ 02:08) (73 - 81)  BP: 165/88 (01-15-22 @ 02:08) (159/88 - 165/95)  RR: 16 (01-15-22 @ 02:08) (16 - 19)  SpO2: 97% (01-15-22 @ 02:08) (97% - 97%)                        14.2   4.38  )-----------( 591      ( 14 Jan 2022 18:50 )             41.3     01-14    141  |  101  |  5<L>  ----------------------------<  96  2.4<LL>   |  31  |  1.00    Ca    8.7      14 Jan 2022 18:50  Phos  3.1     01-14  Mg     2.5     01-14    TPro  7.3  /  Alb  2.2<L>  /  TBili  0.8  /  DBili  x   /  AST  32  /  ALT  42  /  AlkPhos  103  01-14    LIVER FUNCTIONS - ( 14 Jan 2022 18:50 )  Alb: 2.2 g/dL / Pro: 7.3 gm/dL / ALK PHOS: 103 U/L / ALT: 42 U/L / AST: 32 U/L / GGT: x             < from: CT Abdomen and Pelvis w/ Oral Cont and w/ IV Cont (01.14.22 @ 21:14) >    IMPRESSION:  Mild colitis suggested with areas of slight colonic wall thickening.    --- End of Report ---    < end of copied text >    EKG - NSR at 67, nonsp T changes, mild U waves      acetaminophen     Tablet .. 650 milliGRAM(s) Oral every 6 hours PRN  aluminum hydroxide/magnesium hydroxide/simethicone Suspension 30 milliLiter(s) Oral every 4 hours PRN  ciprofloxacin   IVPB 400 milliGRAM(s) IV Intermittent every 12 hours  ciprofloxacin/dexamethasone Otic Suspension - Peds 5 Drop(s) Right Ear daily  diVALproex  milliGRAM(s) Oral two times a day  folic acid 1 milliGRAM(s) Oral daily  haloperidol     Tablet 1 milliGRAM(s) Oral every 12 hours  metroNIDAZOLE  IVPB      metroNIDAZOLE  IVPB 500 milliGRAM(s) IV Intermittent every 8 hours  multivitamin 1 Tablet(s) Oral daily  ondansetron Injectable 4 milliGRAM(s) IV Push every 8 hours PRN  sodium chloride 0.9%. 1000 milliLiter(s) IV Continuous <Continuous>  thiamine 100 milliGRAM(s) Oral daily

## 2022-01-14 NOTE — H&P ADULT - HISTORY OF PRESENT ILLNESS
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 Pt reports ongoing nonbloody diarrhea (> 6 BMs per day) for the past 3 weeks, associated w/ abdominal cramps. Pt reports he went in for follow up the day prior, has blood work down which showed Low K thus sent in to the ED. Pt denies cp, SOB, fever or chills

## 2022-01-14 NOTE — ASSESSMENT
[FreeTextEntry1] : 51M h/o EtOH use disorder, L malignant/necrotizing otitis externa with mastoid extension and R mastoid disease, L temporal lobe abscess (1cm), temporal bone OM s/p R myringotomy on 12/9/21 currently on IV nafcillin/CTX (12/9/21-1/19/22) p/w management of L malignant necrotizing otitis externa and temporal bone OM. \par \par His ear pain and HA completely resolved and hearing improving.  He clinically responded well to IV abx.  However he is having a lot of side effect - notably diarrhea, and c.diff needs to be ruled out.  He is also neutropenic, likely from CTX.  Hypokalemia is likely from profound diarrhea, and he didn't respond to KCL repletion.  Given ongoing diarrhea in setting of neutropenia, lack of response to PO KCL supplementation, I think he should go to ED for other electrolyte check (K, phos, Mg) with IV repletion.  I think abx should be stopped now - end date is 1/19 anyway.  \par \par - stop IV CTX today, order given to [facility], remove PICC \par - instructed patient to go to the nearby ED (wife plans to take him to [facility])\par - at ED, he will need EKG, labs (K, phos, Mg) with repletion, c.diff rule out\par - MR IAC w/ IV contrast next week (will ask Shaila for PA) to check the resolution of temporal lobe abscess\par - f/u 1 week \par \par

## 2022-01-14 NOTE — ED ADULT NURSE NOTE - CHIEF COMPLAINT QUOTE
Sent by Dr. Valdivia ID for low K 2.6. She wants Mag/Phos/ECG/CDIFF test plz  Neutropenic ANC 0.2  HX ear to brain infection since December 3rd  Right arm PICC line

## 2022-01-14 NOTE — ED ADULT NURSE NOTE - OBJECTIVE STATEMENT
Patient states he was sent over by PMD due to abnormal labs. States his potassium is low. Patient states he had a abscess close to his brain due to a ear infection and has been on antibiotics for a month and has had diarrhea. Has picc to right upper arm

## 2022-01-14 NOTE — ED PROVIDER NOTE - CONSTITUTIONAL, MLM
normal... Well appearing, awake, alert, oriented to person, place, time/situation and in no apparent distress. Speaking in clear full sentences no nasal flaring no shoulders retractions no diaphoresis appears very comfortable sitting up in the stretcher in a bright light room

## 2022-01-14 NOTE — REASON FOR VISIT
[Home] : at home, [unfilled] , at the time of the visit. [Medical Office: ([facility])___] : at the medical office located in  [Spouse] : spouse [Verbal consent obtained from patient] : the patient, [unfilled] [Follow-Up: _____] : a [unfilled] follow-up visit

## 2022-01-14 NOTE — ED ADULT NURSE NOTE - NSIMPLEMENTINTERV_GEN_ALL_ED
Implemented All Universal Safety Interventions:  [place] to call system. Call bell, personal items and telephone within reach. Instruct patient to call for assistance. Room bathroom lighting operational. Non-slip footwear when patient is off stretcher. Physically safe environment: no spills, clutter or unnecessary equipment. Stretcher in lowest position, wheels locked, appropriate side rails in place.

## 2022-01-14 NOTE — ED ADULT TRIAGE NOTE - CHIEF COMPLAINT QUOTE
Sent by Dr. Galvan for low K  HX ear to brain infection since December 3rd  Right arm PICC line Sent by Dr. Valdivia ID for low K 2.6. She wants Mag/Phos/ECG/CDIFF test plz  Neutropenic ANC 0.2  HX ear to brain infection since December 3rd  Right arm PICC line

## 2022-01-14 NOTE — HISTORY OF PRESENT ILLNESS
[FreeTextEntry1] : 51M h/o EtOH use disorder, L malignant/necrotizing otitis externa with mastoid extension and R mastoid disease, L temporal lobe abscess (1cm), temporal bone OM s/p R myringotomy on 12/9/21 currently on IV nafcillin/CTX (12/9/21-1/19/22) p/w management of L malignant necrotizing otitis externa and temporal bone OM. \par \par Patient was initially admitted from 12/3-12/16 for sudden onset of seizure and AMS, found to have L malignant/necrotizing otitis externa with mastoid extension and R mastoid disease, L temporal lobe abscess (1cm), temporal bone OM.  He was seen by NSGY and ENT and underwent R myringotomy on 12/9/21 by ENT.  R ear culture (OR 12/9) grew MSSA, actinomyces turicenesis, Anaerococcus vaginalis, Helcococcus spp, Pravinonas liam. L ear culture (12/3, superficial) grew MSSA, staph epi and Coryne amycolatum. Most likely MSSA is the main causative organism.  Course c/b delirium tremor, ICU psychosis.  ID was consulted, initially treated with vanc/cefepime, then switched to CTX/nafcillin and was discharged home with 6 weeks course of abx (end 1/19/22).  \par \par Since discharge, patient developed rash on his arms and spoke to Dr. Leigh (covering for me) on 1/3.  He was seen by derm, determined that it is likely from eczema and unlikely from drug rush.  On 1/5 wife reported that patient is tolerating abx well. He was then found to have leukocytosis of WBC 15.5 on 1/3 lab. Then on 1/10 his lab was notable for ANC 0.2 (WBC 5.5), K 2.9.  Suspected that this is due to nafcillin/CTX side effect.  Nafcillin was discontinued and KCL 40mEq x 2 prescribed. \par \par Today due to technical difficulties, telehealth visit was converted to telephone visit at the last moment.  Patient said he is tired and having intermittent diarrhea since 5 days after discharge. Says some bad day he has diarrhea 10 times/day (like yesterday) and some good day he has less diarrhea like 5-6 times/day.  he thinks today he is not having  much.  Denied fever/chills, HA, ear pain, n/v, abdominal pain.  He did have abdominal cramp which resolved after stopping navcillin. He thinks his hearing has improved since abx.

## 2022-01-15 LAB
ANION GAP SERPL CALC-SCNC: 10 MMOL/L — SIGNIFICANT CHANGE UP (ref 5–17)
ANION GAP SERPL CALC-SCNC: 7 MMOL/L — SIGNIFICANT CHANGE UP (ref 5–17)
BASOPHILS # BLD AUTO: 0.06 K/UL — SIGNIFICANT CHANGE UP (ref 0–0.2)
BASOPHILS NFR BLD AUTO: 1.2 % — SIGNIFICANT CHANGE UP (ref 0–2)
BUN SERPL-MCNC: 4 MG/DL — LOW (ref 7–23)
BUN SERPL-MCNC: 5 MG/DL — LOW (ref 7–23)
CALCIUM SERPL-MCNC: 8.2 MG/DL — LOW (ref 8.5–10.1)
CALCIUM SERPL-MCNC: 8.3 MG/DL — LOW (ref 8.5–10.1)
CHLORIDE SERPL-SCNC: 101 MMOL/L — SIGNIFICANT CHANGE UP (ref 96–108)
CHLORIDE SERPL-SCNC: 104 MMOL/L — SIGNIFICANT CHANGE UP (ref 96–108)
CO2 SERPL-SCNC: 27 MMOL/L — SIGNIFICANT CHANGE UP (ref 22–31)
CO2 SERPL-SCNC: 28 MMOL/L — SIGNIFICANT CHANGE UP (ref 22–31)
CREAT SERPL-MCNC: 0.97 MG/DL — SIGNIFICANT CHANGE UP (ref 0.5–1.3)
CREAT SERPL-MCNC: 1.09 MG/DL — SIGNIFICANT CHANGE UP (ref 0.5–1.3)
EOSINOPHIL # BLD AUTO: 1.05 K/UL — HIGH (ref 0–0.5)
EOSINOPHIL NFR BLD AUTO: 21.3 % — HIGH (ref 0–6)
GLUCOSE SERPL-MCNC: 104 MG/DL — HIGH (ref 70–99)
GLUCOSE SERPL-MCNC: 113 MG/DL — HIGH (ref 70–99)
HCT VFR BLD CALC: 35.9 % — LOW (ref 39–50)
HGB BLD-MCNC: 12.3 G/DL — LOW (ref 13–17)
IMM GRANULOCYTES NFR BLD AUTO: 1 % — SIGNIFICANT CHANGE UP (ref 0–1.5)
LYMPHOCYTES # BLD AUTO: 1.64 K/UL — SIGNIFICANT CHANGE UP (ref 1–3.3)
LYMPHOCYTES # BLD AUTO: 33.2 % — SIGNIFICANT CHANGE UP (ref 13–44)
MAGNESIUM SERPL-MCNC: 2 MG/DL — SIGNIFICANT CHANGE UP (ref 1.6–2.6)
MAGNESIUM SERPL-MCNC: 2.4 MG/DL — SIGNIFICANT CHANGE UP (ref 1.6–2.6)
MCHC RBC-ENTMCNC: 28.8 PG — SIGNIFICANT CHANGE UP (ref 27–34)
MCHC RBC-ENTMCNC: 34.3 GM/DL — SIGNIFICANT CHANGE UP (ref 32–36)
MCV RBC AUTO: 84.1 FL — SIGNIFICANT CHANGE UP (ref 80–100)
MONOCYTES # BLD AUTO: 2.12 K/UL — HIGH (ref 0–0.9)
MONOCYTES NFR BLD AUTO: 42.9 % — HIGH (ref 2–14)
NEUTROPHILS # BLD AUTO: 0.02 K/UL — LOW (ref 1.8–7.4)
NEUTROPHILS NFR BLD AUTO: 0.4 % — LOW (ref 43–77)
NRBC # BLD: 0 /100 WBCS — SIGNIFICANT CHANGE UP (ref 0–0)
PHOSPHATE SERPL-MCNC: 1.9 MG/DL — LOW (ref 2.5–4.5)
PHOSPHATE SERPL-MCNC: 2.7 MG/DL — SIGNIFICANT CHANGE UP (ref 2.5–4.5)
PLATELET # BLD AUTO: 547 K/UL — HIGH (ref 150–400)
POTASSIUM SERPL-MCNC: 2.1 MMOL/L — CRITICAL LOW (ref 3.5–5.3)
POTASSIUM SERPL-MCNC: 3.2 MMOL/L — LOW (ref 3.5–5.3)
POTASSIUM SERPL-SCNC: 2.1 MMOL/L — CRITICAL LOW (ref 3.5–5.3)
POTASSIUM SERPL-SCNC: 3.2 MMOL/L — LOW (ref 3.5–5.3)
RBC # BLD: 4.27 M/UL — SIGNIFICANT CHANGE UP (ref 4.2–5.8)
RBC # FLD: 15.8 % — HIGH (ref 10.3–14.5)
SODIUM SERPL-SCNC: 138 MMOL/L — SIGNIFICANT CHANGE UP (ref 135–145)
SODIUM SERPL-SCNC: 139 MMOL/L — SIGNIFICANT CHANGE UP (ref 135–145)
WBC # BLD: 4.94 K/UL — SIGNIFICANT CHANGE UP (ref 3.8–10.5)
WBC # FLD AUTO: 4.94 K/UL — SIGNIFICANT CHANGE UP (ref 3.8–10.5)

## 2022-01-15 PROCEDURE — 70552 MRI BRAIN STEM W/DYE: CPT | Mod: 26

## 2022-01-15 PROCEDURE — 99232 SBSQ HOSP IP/OBS MODERATE 35: CPT

## 2022-01-15 RX ORDER — SOD,AMMONIUM,POTASSIUM LACTATE
1 CREAM (GRAM) TOPICAL
Refills: 0 | Status: DISCONTINUED | OUTPATIENT
Start: 2022-01-15 | End: 2022-01-19

## 2022-01-15 RX ORDER — ONDANSETRON 8 MG/1
4 TABLET, FILM COATED ORAL EVERY 8 HOURS
Refills: 0 | Status: DISCONTINUED | OUTPATIENT
Start: 2022-01-15 | End: 2022-01-19

## 2022-01-15 RX ORDER — POTASSIUM CHLORIDE 20 MEQ
10 PACKET (EA) ORAL
Refills: 0 | Status: COMPLETED | OUTPATIENT
Start: 2022-01-15 | End: 2022-01-15

## 2022-01-15 RX ORDER — SODIUM CHLORIDE 9 MG/ML
10 INJECTION INTRAMUSCULAR; INTRAVENOUS; SUBCUTANEOUS
Refills: 0 | Status: DISCONTINUED | OUTPATIENT
Start: 2022-01-15 | End: 2022-01-19

## 2022-01-15 RX ORDER — METRONIDAZOLE 500 MG
500 TABLET ORAL ONCE
Refills: 0 | Status: COMPLETED | OUTPATIENT
Start: 2022-01-15 | End: 2022-01-15

## 2022-01-15 RX ORDER — CIPROFLOXACIN LACTATE 400MG/40ML
400 VIAL (ML) INTRAVENOUS EVERY 12 HOURS
Refills: 0 | Status: DISCONTINUED | OUTPATIENT
Start: 2022-01-15 | End: 2022-01-15

## 2022-01-15 RX ORDER — SODIUM CHLORIDE 9 MG/ML
1000 INJECTION INTRAMUSCULAR; INTRAVENOUS; SUBCUTANEOUS
Refills: 0 | Status: DISCONTINUED | OUTPATIENT
Start: 2022-01-15 | End: 2022-01-15

## 2022-01-15 RX ORDER — METRONIDAZOLE 500 MG
TABLET ORAL
Refills: 0 | Status: DISCONTINUED | OUTPATIENT
Start: 2022-01-15 | End: 2022-01-15

## 2022-01-15 RX ORDER — SODIUM CHLORIDE 9 MG/ML
1000 INJECTION, SOLUTION INTRAVENOUS
Refills: 0 | Status: DISCONTINUED | OUTPATIENT
Start: 2022-01-15 | End: 2022-01-17

## 2022-01-15 RX ORDER — CIPROFLOXACIN AND DEXAMETHASONE 3; 1 MG/ML; MG/ML
5 SUSPENSION/ DROPS AURICULAR (OTIC) DAILY
Refills: 0 | Status: DISCONTINUED | OUTPATIENT
Start: 2022-01-15 | End: 2022-01-15

## 2022-01-15 RX ORDER — ACETAMINOPHEN 500 MG
650 TABLET ORAL EVERY 6 HOURS
Refills: 0 | Status: DISCONTINUED | OUTPATIENT
Start: 2022-01-15 | End: 2022-01-19

## 2022-01-15 RX ORDER — HALOPERIDOL DECANOATE 100 MG/ML
1 INJECTION INTRAMUSCULAR EVERY 12 HOURS
Refills: 0 | Status: DISCONTINUED | OUTPATIENT
Start: 2022-01-15 | End: 2022-01-19

## 2022-01-15 RX ORDER — THIAMINE MONONITRATE (VIT B1) 100 MG
100 TABLET ORAL DAILY
Refills: 0 | Status: DISCONTINUED | OUTPATIENT
Start: 2022-01-15 | End: 2022-01-19

## 2022-01-15 RX ORDER — DIVALPROEX SODIUM 500 MG/1
500 TABLET, DELAYED RELEASE ORAL
Refills: 0 | Status: DISCONTINUED | OUTPATIENT
Start: 2022-01-15 | End: 2022-01-18

## 2022-01-15 RX ORDER — CHLORHEXIDINE GLUCONATE 213 G/1000ML
1 SOLUTION TOPICAL
Refills: 0 | Status: DISCONTINUED | OUTPATIENT
Start: 2022-01-15 | End: 2022-01-16

## 2022-01-15 RX ORDER — METRONIDAZOLE 500 MG
500 TABLET ORAL EVERY 8 HOURS
Refills: 0 | Status: DISCONTINUED | OUTPATIENT
Start: 2022-01-15 | End: 2022-01-15

## 2022-01-15 RX ORDER — POTASSIUM CHLORIDE 20 MEQ
40 PACKET (EA) ORAL EVERY 4 HOURS
Refills: 0 | Status: COMPLETED | OUTPATIENT
Start: 2022-01-15 | End: 2022-01-15

## 2022-01-15 RX ORDER — FOLIC ACID 0.8 MG
1 TABLET ORAL DAILY
Refills: 0 | Status: DISCONTINUED | OUTPATIENT
Start: 2022-01-15 | End: 2022-01-19

## 2022-01-15 RX ORDER — ENOXAPARIN SODIUM 100 MG/ML
40 INJECTION SUBCUTANEOUS DAILY
Refills: 0 | Status: DISCONTINUED | OUTPATIENT
Start: 2022-01-15 | End: 2022-01-19

## 2022-01-15 RX ADMIN — CHLORHEXIDINE GLUCONATE 1 APPLICATION(S): 213 SOLUTION TOPICAL at 13:31

## 2022-01-15 RX ADMIN — SODIUM CHLORIDE 75 MILLILITER(S): 9 INJECTION INTRAMUSCULAR; INTRAVENOUS; SUBCUTANEOUS at 04:05

## 2022-01-15 RX ADMIN — Medication 40 MILLIEQUIVALENT(S): at 17:04

## 2022-01-15 RX ADMIN — DIVALPROEX SODIUM 500 MILLIGRAM(S): 500 TABLET, DELAYED RELEASE ORAL at 02:16

## 2022-01-15 RX ADMIN — SODIUM CHLORIDE 75 MILLILITER(S): 9 INJECTION INTRAMUSCULAR; INTRAVENOUS; SUBCUTANEOUS at 13:08

## 2022-01-15 RX ADMIN — Medication 200 MILLIGRAM(S): at 06:50

## 2022-01-15 RX ADMIN — HALOPERIDOL DECANOATE 1 MILLIGRAM(S): 100 INJECTION INTRAMUSCULAR at 06:49

## 2022-01-15 RX ADMIN — HALOPERIDOL DECANOATE 1 MILLIGRAM(S): 100 INJECTION INTRAMUSCULAR at 17:03

## 2022-01-15 RX ADMIN — DIVALPROEX SODIUM 500 MILLIGRAM(S): 500 TABLET, DELAYED RELEASE ORAL at 17:03

## 2022-01-15 RX ADMIN — Medication 1 TABLET(S): at 11:17

## 2022-01-15 RX ADMIN — Medication 100 MILLIEQUIVALENT(S): at 16:05

## 2022-01-15 RX ADMIN — ENOXAPARIN SODIUM 40 MILLIGRAM(S): 100 INJECTION SUBCUTANEOUS at 11:17

## 2022-01-15 RX ADMIN — Medication 100 MILLIEQUIVALENT(S): at 11:17

## 2022-01-15 RX ADMIN — Medication 100 MILLIGRAM(S): at 02:16

## 2022-01-15 RX ADMIN — Medication 100 MILLIGRAM(S): at 11:17

## 2022-01-15 RX ADMIN — Medication 1 MILLIGRAM(S): at 11:17

## 2022-01-15 RX ADMIN — SODIUM CHLORIDE 100 MILLILITER(S): 9 INJECTION, SOLUTION INTRAVENOUS at 16:04

## 2022-01-15 RX ADMIN — Medication 100 MILLIGRAM(S): at 06:49

## 2022-01-15 RX ADMIN — Medication 100 MILLIEQUIVALENT(S): at 17:03

## 2022-01-15 RX ADMIN — DIVALPROEX SODIUM 500 MILLIGRAM(S): 500 TABLET, DELAYED RELEASE ORAL at 06:48

## 2022-01-15 RX ADMIN — Medication 100 MILLIEQUIVALENT(S): at 12:45

## 2022-01-15 RX ADMIN — Medication 100 MILLIEQUIVALENT(S): at 14:46

## 2022-01-15 RX ADMIN — SODIUM CHLORIDE 75 MILLILITER(S): 9 INJECTION INTRAMUSCULAR; INTRAVENOUS; SUBCUTANEOUS at 08:36

## 2022-01-15 RX ADMIN — Medication 100 MILLIEQUIVALENT(S): at 18:20

## 2022-01-15 RX ADMIN — Medication 40 MILLIEQUIVALENT(S): at 21:52

## 2022-01-15 NOTE — PROGRESS NOTE ADULT - SUBJECTIVE AND OBJECTIVE BOX
Resting in bed NAD no acute events afebrile hemodynamically stable. no new complaints      Constitutional: aao x 3 nad  cv: RRR S1S2  Pulm: cta b/l  GI: soft nontender nondistended + bs   Neuro: CN II-XII grossly intact, moves all extremities   Extremities: no edema or calf tenderness b/l

## 2022-01-15 NOTE — CONSULT NOTE ADULT - SUBJECTIVE AND OBJECTIVE BOX
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 Pt reports ongoing nonbloody diarrhea (> 6 BMs per day) for the past 3 weeks, associated w/ abdominal cramps. Pt reports he went in for follow up the day prior, has blood work down which showed Low K thus sent in to the ED. Pt denies cp, SOB, fever or chills (14 Jan 2022 23:42)      PAST MEDICAL & SURGICAL HISTORY:  Acute mastoiditis, left    Convulsive status epilepticus    Alcohol abuse, daily use    Umbilical hernia        SOCHX:   tobacco,  -  alcohol    FMHX: FA/MO  - contributory       Recent Travel:    Immunizations:    Allergies    No Known Allergies    Intolerances        MEDICATIONS  (STANDING):  chlorhexidine 4% Liquid 1 Application(s) Topical <User Schedule>  diVALproex  milliGRAM(s) Oral two times a day  enoxaparin Injectable 40 milliGRAM(s) SubCutaneous daily  folic acid 1 milliGRAM(s) Oral daily  haloperidol     Tablet 1 milliGRAM(s) Oral every 12 hours  lactated ringers. 1000 milliLiter(s) (100 mL/Hr) IV Continuous <Continuous>  multivitamin 1 Tablet(s) Oral daily  Ofloxacin otic solution 0.3% 5 Drop(s) 5 Drop(s) Right Ear daily  potassium chloride    Tablet ER 40 milliEquivalent(s) Oral every 4 hours  thiamine 100 milliGRAM(s) Oral daily    MEDICATIONS  (PRN):  acetaminophen     Tablet .. 650 milliGRAM(s) Oral every 6 hours PRN Temp greater or equal to 38C (100.4F), Mild Pain (1 - 3)  aluminum hydroxide/magnesium hydroxide/simethicone Suspension 30 milliLiter(s) Oral every 4 hours PRN Dyspepsia  ondansetron Injectable 4 milliGRAM(s) IV Push every 8 hours PRN Nausea and/or Vomiting  sodium chloride 0.9% lock flush 10 milliLiter(s) IV Push every 1 hour PRN Pre/post blood products, medications, blood draw, and to maintain line patency      REVIEW OF SYSTEMS:  CONSTITUTIONAL: No fever, weight loss, or fatigue  EYES: No eye pain, visual disturbances, or discharge  ENMT:  No difficulty hearing, tinnitus, vertigo; No sinus or throat pain  NECK: No pain or stiffness  BREASTS: No pain, masses, or nipple discharge  RESPIRATORY: No cough, wheezing, chills or hemoptysis; No shortness of breath  CARDIOVASCULAR: No chest pain, palpitations, dizziness, or leg swelling  GASTROINTESTINAL: No abdominal or epigastric pain. No nausea, vomiting, or hematemesis; No diarrhea or constipation. No melena or hematochezia.  GENITOURINARY: No dysuria, frequency, hematuria, or incontinence  NEUROLOGICAL: No headaches, memory loss, loss of strength, numbness, or tremors  SKIN: No itching, burning, rashes, or lesions   LYMPH NODES: No enlarged glands  ENDOCRINE: No heat or cold intolerance; No hair loss  MUSCULOSKELETAL: No joint pain or swelling; No muscle, back, or extremity pain  PSYCHIATRIC: No depression, anxiety, mood swings, or difficulty sleeping  HEME/LYMPH: No easy bruising, or bleeding gums  ALLERGY AND IMMUNOLOGIC: No hives or eczema    VITAL SIGNS:    T(C): 36.4 (01-15-22 @ 16:28), Max: 37.1 (01-15-22 @ 08:38)  T(F): 97.6 (01-15-22 @ 16:28), Max: 98.8 (01-15-22 @ 11:37)  HR: 71 (01-15-22 @ 16:28) (71 - 81)  BP: 146/91 (01-15-22 @ 16:28) (146/91 - 165/88)  RR: 16 (01-15-22 @ 16:28) (16 - 17)  SpO2: 96% (01-15-22 @ 16:28) (94% - 97%)    PHYSICAL EXAM:    GENERAL: NAD, well-groomed, well-developed  HEAD:  Atraumatic, Normocephalic  EYES: EOMI, PERRLA, conjunctiva and sclera clear  ENMT: No tonsillar erythema, exudates, or enlargement; Moist mucous membranes, Good dentition, No lesions  NECK: Supple, No JVD, Normal thyroid  NERVOUS SYSTEM:  Alert & Oriented X3, Good concentration; Motor Strength 5/5 B/L upper and lower extremities; DTRs 2+ intact and symmetric  CHEST/LUNG: Clear to auscultation bilaterally; No rales, rhonchi, wheezing bilaterally  HEART: Regular rate and rhythm; No murmurs, rubs, or gallops  ABDOMEN: Soft, Nontender, Nondistended; Bowel sounds present  EXTREMITIES:  2+ Peripheral Pulses, No clubbing, cyanosis, or edema  LYMPH: No lymphadenopathy noted  SKIN: No rashes or lesions  BACK: no pressor sore     LABS:                         12.3   4.94  )-----------( 547      ( 15 Eduardo 2022 12:55 )             35.9     01-15    139  |  101  |  4<L>  ----------------------------<  113<H>  2.1<LL>   |  28  |  0.97    Ca    8.2<L>      15 Eduardo 2022 12:58  Phos  2.7     01-15  Mg     2.0     01-15    TPro  7.3  /  Alb  2.2<L>  /  TBili  0.8  /  DBili  x   /  AST  32  /  ALT  42  /  AlkPhos  103  01-14    LIVER FUNCTIONS - ( 14 Jan 2022 18:50 )  Alb: 2.2 g/dL / Pro: 7.3 gm/dL / ALK PHOS: 103 U/L / ALT: 42 U/L / AST: 32 U/L / GGT: x                                                 Radiology:       HPI:      52 y/o male w/ PMHx of Alcohol/Polysubstance abuse admitted in December for AMS, seizures found w/ 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 Pt reports ongoing nonbloody diarrhea (> 6 BMs per day) for the past 3 weeks, associated w/ abdominal cramps. Pt reports he went in for follow up the day prior, has blood work down which showed Low K thus sent in to the ED. Pt denies cp, SOB, fever or chills (14 Jan 2022 23:42)  i spoke to wife   message left for [facility]     PAST MEDICAL & SURGICAL HISTORY:  Acute mastoiditis, left    Convulsive status epilepticus    Alcohol abuse, daily use    Umbilical hernia        SOCHX: mrijuana   no ivda   tobacco, no -  alcohol    FMHX: FA/MO  non- contributory       Recent Travel:    Immunizations:    Allergies    No Known Allergies    Intolerances        MEDICATIONS  (STANDING):  chlorhexidine 4% Liquid 1 Application(s) Topical <User Schedule>  diVALproex  milliGRAM(s) Oral two times a day  enoxaparin Injectable 40 milliGRAM(s) SubCutaneous daily  folic acid 1 milliGRAM(s) Oral daily  haloperidol     Tablet 1 milliGRAM(s) Oral every 12 hours  lactated ringers. 1000 milliLiter(s) (100 mL/Hr) IV Continuous <Continuous>  multivitamin 1 Tablet(s) Oral daily  Ofloxacin otic solution 0.3% 5 Drop(s) 5 Drop(s) Right Ear daily  potassium chloride    Tablet ER 40 milliEquivalent(s) Oral every 4 hours  thiamine 100 milliGRAM(s) Oral daily    MEDICATIONS  (PRN):  acetaminophen     Tablet .. 650 milliGRAM(s) Oral every 6 hours PRN Temp greater or equal to 38C (100.4F), Mild Pain (1 - 3)  aluminum hydroxide/magnesium hydroxide/simethicone Suspension 30 milliLiter(s) Oral every 4 hours PRN Dyspepsia  ondansetron Injectable 4 milliGRAM(s) IV Push every 8 hours PRN Nausea and/or Vomiting  sodium chloride 0.9% lock flush 10 milliLiter(s) IV Push every 1 hour PRN Pre/post blood products, medications, blood draw, and to maintain line patency      REVIEW OF SYSTEMS:  CONSTITUTIONAL: No fever, weight loss, or fatigue  EYES: No eye pain, visual disturbances, or discharge  ENMT:  No difficulty hearing, tinnitus, vertigo; No sinus or throat pain  NECK: No pain or stiffness  RESPIRATORY: No cough, wheezing, chills or hemoptysis; No shortness of breath  CARDIOVASCULAR: No chest pain, palpitations, dizziness, or leg swelling  GASTROINTESTINAL:abd pain and profuse diarrhea  GENITOURINARY: No dysuria, frequency, hematuria, or incontinence  NEUROLOGICAL:  resolved headaches, memory loss, loss of strength, numbness, or tremors  SKIN: No itching, burning, rashes, or lesions   LYMPH NODES: No enlarged glands  ENDOCRINE: No heat or cold intolerance; No hair loss  MUSCULOSKELETAL: No joint pain or swelling; No muscle, back, or extremity pain  PSYCHIATRIC: No depression, anxiety, mood swings, or difficulty sleeping  HEME/LYMPH: No easy bruising, or bleeding gums  ALLERGY AND IMMUNOLOGIC: No hives or eczema    VITAL SIGNS:    T(C): 36.4 (01-15-22 @ 16:28), Max: 37.1 (01-15-22 @ 08:38)  T(F): 97.6 (01-15-22 @ 16:28), Max: 98.8 (01-15-22 @ 11:37)  HR: 71 (01-15-22 @ 16:28) (71 - 81)  BP: 146/91 (01-15-22 @ 16:28) (146/91 - 165/88)  RR: 16 (01-15-22 @ 16:28) (16 - 17)  SpO2: 96% (01-15-22 @ 16:28) (94% - 97%)    PHYSICAL EXAM:    GENERAL: NAD, well-groomed, well-developed  HEAD:  Atraumatic, Normocephalic  EYES: EOMI, PERRLA, conjunctiva and sclera clear  ENMT: No tonsillar erythema, exudates, or enlargement; Moist mucous membranes, Good dentition, No lesions  NECK: Supple, No JVD, Normal thyroid  NERVOUS SYSTEM:  Alert & Oriented X3, Good concentration; Motor Strength 5/5 B/L upper and lower extremities;  CHEST/LUNG: Clear to auscultation bilaterally; No rales, rhonchi, wheezing bilaterally  HEART: Regular rate and rhythm; No murmurs, rubs, or gallops  ABDOMEN: Soft, Nontender, Nondistended; Bowel sounds present  EXTREMITIES:  2+ Peripheral Pulses, No clubbing, cyanosis, or edema  LYMPH: No lymphadenopathy noted  SKIN: No rashes or lesions  BACK: no pressor sore     LABS:                         12.3   4.94  )-----------( 547      ( 15 Eduardo 2022 12:55 )             35.9     01-15    139  |  101  |  4<L>  ----------------------------<  113<H>  2.1<LL>   |  28  |  0.97    Ca    8.2<L>      15 Eduardo 2022 12:58  Phos  2.7     01-15  Mg     2.0     01-15    TPro  7.3  /  Alb  2.2<L>  /  TBili  0.8  /  DBili  x   /  AST  32  /  ALT  42  /  AlkPhos  103  01-14    LIVER FUNCTIONS - ( 14 Jan 2022 18:50 )  Alb: 2.2 g/dL / Pro: 7.3 gm/dL / ALK PHOS: 103 U/L / ALT: 42 U/L / AST: 32 U/L / GGT: x                                                 Radiology:    < from: MR IAC w/ IV Cont (01.15.22 @ 12:21) >  IMPRESSION:        1. IAC/EAC:  Moderate mucosal thickening/fluid is seen with in the   LEFT mastoid air cells and LEFT middle ear cavity. Decrease in the degree   of enhancement involving the LEFT external auditory canal consistent with   improvement. Decrease in enhancement of the  space musculature   with minimal residual. Minimal residual enhancement about the LEFT TMJ   joint.        2.   Brain:  Interval decrease in the degree of dural enhancement   involving the LEFT lateral temporal lobe and floor of LEFT middle cranial   fossa. The previously noted LEFT temporal lobe abscess has decreased now   measuring 4 mm with minimal restricted diffusion.    < end of copied text >    < from: CT Abdomen and Pelvis w/ Oral Cont and w/ IV Cont (01.14.22 @ 21:14) >    IMPRESSION:  Mild colitis suggested with areas of slight colonic wall thickening.    --- End of Report ---            ELIESER IKM MD; Attending Radiologist  This document has been electronically signed. Jan 14 2022  9:39PM    < end of copied text >

## 2022-01-15 NOTE — PATIENT PROFILE ADULT - FALL HARM RISK - UNIVERSAL INTERVENTIONS
Bed in lowest position, wheels locked, appropriate side rails in place/Call bell, personal items and telephone in reach/Instruct patient to call for assistance before getting out of bed or chair/Non-slip footwear when patient is out of bed/[place] to call system/Physically safe environment - no spills, clutter or unnecessary equipment/Purposeful Proactive Rounding/Room/bathroom lighting operational, light cord in reach

## 2022-01-15 NOTE — PROGRESS NOTE ADULT - ASSESSMENT
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 Pt being admitted for acute colitis, Hypokalemia    Spoke on the phone with patients ID doc from [facility] Dr Valdivia [phone]. patient completed almost 6 w of nafcillin/ceftriaxone (due to stop Jan 19th) but abx were stopped recently due to rocephin induced neutropenia. She recommends stopping abx at this time and getting MR IAC w contrast to determine if abscess resolved. She also states that patient has been refractory to PO potassium and recommends IV repletion.     Acute Colitis  hypokalemia   mastoiditis with osteomyelitis  sz d/o  Behavioral changes  - f/u stool cultures  - IVF  -hold off on abx for now   - colitis on CT only mild, if C diff neg would start on Imodium  - hypo k due to diarrhea. EKG w/ mild U wave IV repletion, f/u bmp this afternoon, Mag ok   -telemetry   -MR IAC w cont   -ID consult  - c/w Depakote  - seen by psych on last admx; behavioral changes thought to be related to infec, etoh withdrawal  - c/w Haldol  - Lovenox subq

## 2022-01-16 LAB
ANION GAP SERPL CALC-SCNC: 5 MMOL/L — SIGNIFICANT CHANGE UP (ref 5–17)
ANION GAP SERPL CALC-SCNC: 5 MMOL/L — SIGNIFICANT CHANGE UP (ref 5–17)
BUN SERPL-MCNC: 4 MG/DL — LOW (ref 7–23)
BUN SERPL-MCNC: 4 MG/DL — LOW (ref 7–23)
C DIFF BY PCR RESULT: SIGNIFICANT CHANGE UP
C DIFF TOX GENS STL QL NAA+PROBE: SIGNIFICANT CHANGE UP
CALCIUM SERPL-MCNC: 8.3 MG/DL — LOW (ref 8.5–10.1)
CALCIUM SERPL-MCNC: 8.6 MG/DL — SIGNIFICANT CHANGE UP (ref 8.5–10.1)
CHLORIDE SERPL-SCNC: 106 MMOL/L — SIGNIFICANT CHANGE UP (ref 96–108)
CHLORIDE SERPL-SCNC: 112 MMOL/L — HIGH (ref 96–108)
CO2 SERPL-SCNC: 27 MMOL/L — SIGNIFICANT CHANGE UP (ref 22–31)
CO2 SERPL-SCNC: 29 MMOL/L — SIGNIFICANT CHANGE UP (ref 22–31)
CREAT SERPL-MCNC: 1 MG/DL — SIGNIFICANT CHANGE UP (ref 0.5–1.3)
CREAT SERPL-MCNC: 1 MG/DL — SIGNIFICANT CHANGE UP (ref 0.5–1.3)
GLUCOSE SERPL-MCNC: 131 MG/DL — HIGH (ref 70–99)
GLUCOSE SERPL-MCNC: 147 MG/DL — HIGH (ref 70–99)
HCT VFR BLD CALC: 36.3 % — LOW (ref 39–50)
HGB BLD-MCNC: 12.4 G/DL — LOW (ref 13–17)
MAGNESIUM SERPL-MCNC: 2 MG/DL — SIGNIFICANT CHANGE UP (ref 1.6–2.6)
MCHC RBC-ENTMCNC: 29 PG — SIGNIFICANT CHANGE UP (ref 27–34)
MCHC RBC-ENTMCNC: 34.2 GM/DL — SIGNIFICANT CHANGE UP (ref 32–36)
MCV RBC AUTO: 84.8 FL — SIGNIFICANT CHANGE UP (ref 80–100)
NRBC # BLD: 0 /100 WBCS — SIGNIFICANT CHANGE UP (ref 0–0)
PLATELET # BLD AUTO: 518 K/UL — HIGH (ref 150–400)
POTASSIUM SERPL-MCNC: 2.6 MMOL/L — CRITICAL LOW (ref 3.5–5.3)
POTASSIUM SERPL-MCNC: 3 MMOL/L — LOW (ref 3.5–5.3)
POTASSIUM SERPL-SCNC: 2.6 MMOL/L — CRITICAL LOW (ref 3.5–5.3)
POTASSIUM SERPL-SCNC: 3 MMOL/L — LOW (ref 3.5–5.3)
RBC # BLD: 4.28 M/UL — SIGNIFICANT CHANGE UP (ref 4.2–5.8)
RBC # FLD: 15.9 % — HIGH (ref 10.3–14.5)
SODIUM SERPL-SCNC: 140 MMOL/L — SIGNIFICANT CHANGE UP (ref 135–145)
SODIUM SERPL-SCNC: 144 MMOL/L — SIGNIFICANT CHANGE UP (ref 135–145)
WBC # BLD: 5.44 K/UL — SIGNIFICANT CHANGE UP (ref 3.8–10.5)
WBC # FLD AUTO: 5.44 K/UL — SIGNIFICANT CHANGE UP (ref 3.8–10.5)

## 2022-01-16 PROCEDURE — 99232 SBSQ HOSP IP/OBS MODERATE 35: CPT

## 2022-01-16 RX ORDER — CHLORHEXIDINE GLUCONATE 213 G/1000ML
1 SOLUTION TOPICAL DAILY
Refills: 0 | Status: DISCONTINUED | OUTPATIENT
Start: 2022-01-16 | End: 2022-01-19

## 2022-01-16 RX ORDER — MAGNESIUM SULFATE 500 MG/ML
1 VIAL (ML) INJECTION ONCE
Refills: 0 | Status: COMPLETED | OUTPATIENT
Start: 2022-01-16 | End: 2022-01-16

## 2022-01-16 RX ORDER — POTASSIUM CHLORIDE 20 MEQ
40 PACKET (EA) ORAL EVERY 4 HOURS
Refills: 0 | Status: COMPLETED | OUTPATIENT
Start: 2022-01-16 | End: 2022-01-16

## 2022-01-16 RX ORDER — LOPERAMIDE HCL 2 MG
2 TABLET ORAL
Refills: 0 | Status: DISCONTINUED | OUTPATIENT
Start: 2022-01-16 | End: 2022-01-19

## 2022-01-16 RX ORDER — POTASSIUM CHLORIDE 20 MEQ
10 PACKET (EA) ORAL
Refills: 0 | Status: DISCONTINUED | OUTPATIENT
Start: 2022-01-16 | End: 2022-01-16

## 2022-01-16 RX ORDER — POTASSIUM CHLORIDE 20 MEQ
10 PACKET (EA) ORAL
Refills: 0 | Status: COMPLETED | OUTPATIENT
Start: 2022-01-16 | End: 2022-01-16

## 2022-01-16 RX ORDER — POTASSIUM CHLORIDE 20 MEQ
40 PACKET (EA) ORAL EVERY 4 HOURS
Refills: 0 | Status: DISCONTINUED | OUTPATIENT
Start: 2022-01-16 | End: 2022-01-16

## 2022-01-16 RX ADMIN — Medication 100 MILLIEQUIVALENT(S): at 12:24

## 2022-01-16 RX ADMIN — HALOPERIDOL DECANOATE 1 MILLIGRAM(S): 100 INJECTION INTRAMUSCULAR at 17:39

## 2022-01-16 RX ADMIN — CHLORHEXIDINE GLUCONATE 1 APPLICATION(S): 213 SOLUTION TOPICAL at 11:16

## 2022-01-16 RX ADMIN — Medication 40 MILLIEQUIVALENT(S): at 10:15

## 2022-01-16 RX ADMIN — HALOPERIDOL DECANOATE 1 MILLIGRAM(S): 100 INJECTION INTRAMUSCULAR at 05:44

## 2022-01-16 RX ADMIN — Medication 1 APPLICATION(S): at 17:41

## 2022-01-16 RX ADMIN — Medication 40 MILLIEQUIVALENT(S): at 13:32

## 2022-01-16 RX ADMIN — Medication 100 GRAM(S): at 17:38

## 2022-01-16 RX ADMIN — Medication 100 MILLIEQUIVALENT(S): at 11:13

## 2022-01-16 RX ADMIN — Medication 1 MILLIGRAM(S): at 11:17

## 2022-01-16 RX ADMIN — DIVALPROEX SODIUM 500 MILLIGRAM(S): 500 TABLET, DELAYED RELEASE ORAL at 05:43

## 2022-01-16 RX ADMIN — SODIUM CHLORIDE 100 MILLILITER(S): 9 INJECTION, SOLUTION INTRAVENOUS at 09:51

## 2022-01-16 RX ADMIN — ENOXAPARIN SODIUM 40 MILLIGRAM(S): 100 INJECTION SUBCUTANEOUS at 11:16

## 2022-01-16 RX ADMIN — Medication 30 MILLILITER(S): at 13:50

## 2022-01-16 RX ADMIN — SODIUM CHLORIDE 100 MILLILITER(S): 9 INJECTION, SOLUTION INTRAVENOUS at 21:55

## 2022-01-16 RX ADMIN — Medication 100 MILLIGRAM(S): at 11:17

## 2022-01-16 RX ADMIN — DIVALPROEX SODIUM 500 MILLIGRAM(S): 500 TABLET, DELAYED RELEASE ORAL at 17:39

## 2022-01-16 RX ADMIN — Medication 100 MILLIEQUIVALENT(S): at 10:16

## 2022-01-16 RX ADMIN — Medication 1 APPLICATION(S): at 05:43

## 2022-01-16 RX ADMIN — Medication 1 TABLET(S): at 11:17

## 2022-01-16 NOTE — PROGRESS NOTE ADULT - ASSESSMENT
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 Pt being admitted for acute colitis, Hypokalemia    Spoke on the phone with patients ID doc from [facility] Dr Valdivia [phone]. patient completed almost 6 w of nafcillin/ceftriaxone (due to stop Jan 19th) but abx were stopped recently due to rocephin induced neutropenia. She recommends stopping abx at this time and getting MR IAC w contrast to determine if abscess resolved. She also states that patient has been refractory to PO potassium and recommends IV repletion.     Acute Colitis  hypokalemia, hypomagnesemia  mastoiditis with osteomyelitis  sz d/o  Behavioral changes  - f/u stool cultures  -Cdiff (-) will start immodium   -ID consult appreciated. will wait for recs regarding MRI result (L temporal lobe abscess decreased to 4 cm). decision to resume abx in light of neutropenia up to ID  -IVF  -hold off on abx for now   -hypo k due to diarrhea worse today, keep repleting K and mag and start immodium.   -telemetry    -c/w Depakote  - seen by psych on last admx; behavioral changes thought to be related to infec, etoh withdrawal  - c/w Haldol  - Lovenox subq

## 2022-01-16 NOTE — PROGRESS NOTE ADULT - SUBJECTIVE AND OBJECTIVE BOX
Resting in bed NAD no acute events afebrile hemodynamically stable. no new complaints. still having a lot of diarrhea     Constitutional: aao x 3 nad  cv: RRR S1S2  Pulm: cta b/l  GI: soft nontender nondistended + bs   Neuro: CN II-XII grossly intact, moves all extremities   Extremities: no edema or calf tenderness b/l

## 2022-01-17 LAB
ALBUMIN SERPL ELPH-MCNC: 2.1 G/DL — LOW (ref 3.3–5)
ALP SERPL-CCNC: 80 U/L — SIGNIFICANT CHANGE UP (ref 40–120)
ALT FLD-CCNC: 38 U/L — SIGNIFICANT CHANGE UP (ref 12–78)
ANION GAP SERPL CALC-SCNC: 5 MMOL/L — SIGNIFICANT CHANGE UP (ref 5–17)
ANION GAP SERPL CALC-SCNC: 6 MMOL/L — SIGNIFICANT CHANGE UP (ref 5–17)
AST SERPL-CCNC: 34 U/L — SIGNIFICANT CHANGE UP (ref 15–37)
BASOPHILS # BLD AUTO: 0.08 K/UL — SIGNIFICANT CHANGE UP (ref 0–0.2)
BASOPHILS NFR BLD AUTO: 1.3 % — SIGNIFICANT CHANGE UP (ref 0–2)
BILIRUB SERPL-MCNC: 0.7 MG/DL — SIGNIFICANT CHANGE UP (ref 0.2–1.2)
BUN SERPL-MCNC: 2 MG/DL — LOW (ref 7–23)
BUN SERPL-MCNC: 4 MG/DL — LOW (ref 7–23)
CALCIUM SERPL-MCNC: 8.3 MG/DL — LOW (ref 8.5–10.1)
CALCIUM SERPL-MCNC: 8.5 MG/DL — SIGNIFICANT CHANGE UP (ref 8.5–10.1)
CHLORIDE SERPL-SCNC: 108 MMOL/L — SIGNIFICANT CHANGE UP (ref 96–108)
CHLORIDE SERPL-SCNC: 109 MMOL/L — HIGH (ref 96–108)
CO2 SERPL-SCNC: 27 MMOL/L — SIGNIFICANT CHANGE UP (ref 22–31)
CO2 SERPL-SCNC: 27 MMOL/L — SIGNIFICANT CHANGE UP (ref 22–31)
CREAT SERPL-MCNC: 0.9 MG/DL — SIGNIFICANT CHANGE UP (ref 0.5–1.3)
CREAT SERPL-MCNC: 1.01 MG/DL — SIGNIFICANT CHANGE UP (ref 0.5–1.3)
CRP SERPL-MCNC: 15 MG/L — HIGH
CULTURE RESULTS: SIGNIFICANT CHANGE UP
EOSINOPHIL # BLD AUTO: 1.31 K/UL — HIGH (ref 0–0.5)
EOSINOPHIL NFR BLD AUTO: 21.8 % — HIGH (ref 0–6)
ERYTHROCYTE [SEDIMENTATION RATE] IN BLOOD: 18 MM/HR — SIGNIFICANT CHANGE UP (ref 0–20)
GLUCOSE SERPL-MCNC: 92 MG/DL — SIGNIFICANT CHANGE UP (ref 70–99)
GLUCOSE SERPL-MCNC: 93 MG/DL — SIGNIFICANT CHANGE UP (ref 70–99)
HCT VFR BLD CALC: 36.1 % — LOW (ref 39–50)
HGB BLD-MCNC: 12 G/DL — LOW (ref 13–17)
IMM GRANULOCYTES NFR BLD AUTO: 0 % — SIGNIFICANT CHANGE UP (ref 0–1.5)
LYMPHOCYTES # BLD AUTO: 2.4 K/UL — SIGNIFICANT CHANGE UP (ref 1–3.3)
LYMPHOCYTES # BLD AUTO: 40 % — SIGNIFICANT CHANGE UP (ref 13–44)
MAGNESIUM SERPL-MCNC: 2.7 MG/DL — HIGH (ref 1.6–2.6)
MCHC RBC-ENTMCNC: 28.2 PG — SIGNIFICANT CHANGE UP (ref 27–34)
MCHC RBC-ENTMCNC: 33.2 GM/DL — SIGNIFICANT CHANGE UP (ref 32–36)
MCV RBC AUTO: 84.9 FL — SIGNIFICANT CHANGE UP (ref 80–100)
MONOCYTES # BLD AUTO: 2.11 K/UL — HIGH (ref 0–0.9)
MONOCYTES NFR BLD AUTO: 35.2 % — HIGH (ref 2–14)
NEUTROPHILS # BLD AUTO: 0.1 K/UL — LOW (ref 1.8–7.4)
NEUTROPHILS NFR BLD AUTO: 1.7 % — LOW (ref 43–77)
NRBC # BLD: 0 /100 WBCS — SIGNIFICANT CHANGE UP (ref 0–0)
PLATELET # BLD AUTO: 548 K/UL — HIGH (ref 150–400)
POTASSIUM SERPL-MCNC: 2.9 MMOL/L — CRITICAL LOW (ref 3.5–5.3)
POTASSIUM SERPL-MCNC: 3.2 MMOL/L — LOW (ref 3.5–5.3)
POTASSIUM SERPL-SCNC: 2.9 MMOL/L — CRITICAL LOW (ref 3.5–5.3)
POTASSIUM SERPL-SCNC: 3.2 MMOL/L — LOW (ref 3.5–5.3)
PROT SERPL-MCNC: 6.6 GM/DL — SIGNIFICANT CHANGE UP (ref 6–8.3)
RBC # BLD: 4.25 M/UL — SIGNIFICANT CHANGE UP (ref 4.2–5.8)
RBC # FLD: 16 % — HIGH (ref 10.3–14.5)
SODIUM SERPL-SCNC: 141 MMOL/L — SIGNIFICANT CHANGE UP (ref 135–145)
SODIUM SERPL-SCNC: 141 MMOL/L — SIGNIFICANT CHANGE UP (ref 135–145)
SPECIMEN SOURCE: SIGNIFICANT CHANGE UP
WBC # BLD: 6 K/UL — SIGNIFICANT CHANGE UP (ref 3.8–10.5)
WBC # FLD AUTO: 6 K/UL — SIGNIFICANT CHANGE UP (ref 3.8–10.5)

## 2022-01-17 PROCEDURE — 99232 SBSQ HOSP IP/OBS MODERATE 35: CPT

## 2022-01-17 RX ORDER — POTASSIUM CHLORIDE 20 MEQ
40 PACKET (EA) ORAL THREE TIMES A DAY
Refills: 0 | Status: DISCONTINUED | OUTPATIENT
Start: 2022-01-17 | End: 2022-01-18

## 2022-01-17 RX ORDER — SODIUM CHLORIDE 9 MG/ML
1000 INJECTION, SOLUTION INTRAVENOUS
Refills: 0 | Status: DISCONTINUED | OUTPATIENT
Start: 2022-01-17 | End: 2022-01-17

## 2022-01-17 RX ORDER — POTASSIUM CHLORIDE 20 MEQ
40 PACKET (EA) ORAL EVERY 4 HOURS
Refills: 0 | Status: COMPLETED | OUTPATIENT
Start: 2022-01-17 | End: 2022-01-17

## 2022-01-17 RX ORDER — MEROPENEM 1 G/30ML
2000 INJECTION INTRAVENOUS EVERY 8 HOURS
Refills: 0 | Status: DISCONTINUED | OUTPATIENT
Start: 2022-01-17 | End: 2022-01-19

## 2022-01-17 RX ORDER — POTASSIUM CHLORIDE 20 MEQ
10 PACKET (EA) ORAL
Refills: 0 | Status: COMPLETED | OUTPATIENT
Start: 2022-01-17 | End: 2022-01-17

## 2022-01-17 RX ORDER — DEXTROSE MONOHYDRATE, SODIUM CHLORIDE, AND POTASSIUM CHLORIDE 50; .745; 4.5 G/1000ML; G/1000ML; G/1000ML
1000 INJECTION, SOLUTION INTRAVENOUS
Refills: 0 | Status: DISCONTINUED | OUTPATIENT
Start: 2022-01-17 | End: 2022-01-18

## 2022-01-17 RX ADMIN — DEXTROSE MONOHYDRATE, SODIUM CHLORIDE, AND POTASSIUM CHLORIDE 80 MILLILITER(S): 50; .745; 4.5 INJECTION, SOLUTION INTRAVENOUS at 13:29

## 2022-01-17 RX ADMIN — CHLORHEXIDINE GLUCONATE 1 APPLICATION(S): 213 SOLUTION TOPICAL at 11:38

## 2022-01-17 RX ADMIN — Medication 100 MILLIEQUIVALENT(S): at 11:37

## 2022-01-17 RX ADMIN — DIVALPROEX SODIUM 500 MILLIGRAM(S): 500 TABLET, DELAYED RELEASE ORAL at 05:47

## 2022-01-17 RX ADMIN — Medication 1 APPLICATION(S): at 05:53

## 2022-01-17 RX ADMIN — ENOXAPARIN SODIUM 40 MILLIGRAM(S): 100 INJECTION SUBCUTANEOUS at 11:37

## 2022-01-17 RX ADMIN — Medication 2 MILLIGRAM(S): at 05:49

## 2022-01-17 RX ADMIN — Medication 40 MILLIEQUIVALENT(S): at 10:11

## 2022-01-17 RX ADMIN — Medication 1 MILLIGRAM(S): at 11:37

## 2022-01-17 RX ADMIN — HALOPERIDOL DECANOATE 1 MILLIGRAM(S): 100 INJECTION INTRAMUSCULAR at 17:39

## 2022-01-17 RX ADMIN — DEXTROSE MONOHYDRATE, SODIUM CHLORIDE, AND POTASSIUM CHLORIDE 80 MILLILITER(S): 50; .745; 4.5 INJECTION, SOLUTION INTRAVENOUS at 23:57

## 2022-01-17 RX ADMIN — MEROPENEM 200 MILLIGRAM(S): 1 INJECTION INTRAVENOUS at 21:55

## 2022-01-17 RX ADMIN — Medication 100 MILLIEQUIVALENT(S): at 13:29

## 2022-01-17 RX ADMIN — MEROPENEM 200 MILLIGRAM(S): 1 INJECTION INTRAVENOUS at 15:20

## 2022-01-17 RX ADMIN — Medication 40 MILLIEQUIVALENT(S): at 22:01

## 2022-01-17 RX ADMIN — DIVALPROEX SODIUM 500 MILLIGRAM(S): 500 TABLET, DELAYED RELEASE ORAL at 17:39

## 2022-01-17 RX ADMIN — Medication 1 APPLICATION(S): at 17:40

## 2022-01-17 RX ADMIN — Medication 1 TABLET(S): at 11:37

## 2022-01-17 RX ADMIN — HALOPERIDOL DECANOATE 1 MILLIGRAM(S): 100 INJECTION INTRAMUSCULAR at 05:48

## 2022-01-17 RX ADMIN — Medication 2 MILLIGRAM(S): at 10:12

## 2022-01-17 RX ADMIN — Medication 40 MILLIEQUIVALENT(S): at 13:29

## 2022-01-17 RX ADMIN — Medication 100 MILLIEQUIVALENT(S): at 10:11

## 2022-01-17 RX ADMIN — Medication 100 MILLIGRAM(S): at 11:37

## 2022-01-17 NOTE — PROGRESS NOTE ADULT - ASSESSMENT
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      12/9 s/p Myringotomy on R side  12/9 Swab right ear: MSSA, Anaerococcus Vaginalis, Actinomyces turiecensis, helicococus spp.    Repeated head MRI:   -Decrease in the degree of enhancement involving the LEFT external auditory canal consistent with improvement.   -Brain: Interval decrease in the degree of dural enhancement involving the LEFT lateral temporal lobe and floor of LEFT middle cranial fossa. The previously noted LEFT temporal lobe abscess has decreased now measuring 4 mm with minimal restricted diffusion.      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      12/9 s/p Myringotomy on R side  12/9 Swab right ear: MSSA, Anaerococcus Vaginalis, Actinomyces turiecensis, helicococus spp.    Repeated head MRI:   -Decrease in the degree of enhancement involving the LEFT external auditory canal consistent with improvement.   -Brain: Interval decrease in the degree of dural enhancement involving the LEFT lateral temporal lobe and floor of LEFT middle cranial fossa. The previously noted LEFT temporal lobe abscess has decreased now measuring 4 mm with minimal restricted diffusion.      Afebrile  No leukocytosis  still with diarrhea-- but better    C diff: negative    -Case discussed with Dr. Valdivia ([phone]) infectious disease following this case.   As per her recommendation will switch patient to Merrem 2g IV q 8 hrs for another 6 weeks  -Upon discharge patient, she should follow up with his primary ID specialist (Dr. 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      12/9 s/p Myringotomy on R side  12/9 Swab right ear: MSSA, Anaerococcus Vaginalis, Actinomyces turiecensis, helicococus spp.    Repeated head MRI:   -Decrease in the degree of enhancement involving the LEFT external auditory canal consistent with improvement.   -Brain: Interval decrease in the degree of dural enhancement involving the LEFT lateral temporal lobe and floor of LEFT middle cranial fossa. The previously noted LEFT temporal lobe abscess has decreased now measuring 4 mm with minimal restricted diffusion.      Afebrile  No leukocytosis  still with diarrhea-- but better    C diff: negative    -Case discussed with Dr. Valdivia ([phone]) infectious disease following this case.   As per her recommendation will switch patient to Merrem 2g IV q 8 hrs for another 6 weeks  -Upon discharge patient should follow up with his primary ID specialist (Dr. Valdivia).  Plan determined by primary ID attending.  -Patient with history of seizure,  recommend Neurology evaluation to monitor antiseizure drugs levels

## 2022-01-17 NOTE — PROGRESS NOTE ADULT - SUBJECTIVE AND OBJECTIVE BOX
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 Pt reports ongoing nonbloody diarrhea (> 6 BMs per day) for the past 3 weeks, associated w/ abdominal cramps. Pt reports he went in for follow up the day prior, has blood work down which showed Low K thus sent in to the ED. Pt denies cp, SOB, fever or chills (14 Jan 2022 23:42)      Allergies    No Known Allergies    Intolerances        MEDICATIONS  (STANDING):  ammonium lactate 12% Lotion 1 Application(s) Topical two times a day  chlorhexidine 2% Cloths 1 Application(s) Topical daily  diVALproex  milliGRAM(s) Oral two times a day  enoxaparin Injectable 40 milliGRAM(s) SubCutaneous daily  folic acid 1 milliGRAM(s) Oral daily  haloperidol     Tablet 1 milliGRAM(s) Oral every 12 hours  lactated ringers 1000 milliLiter(s) (80 mL/Hr) IV Continuous <Continuous>  multivitamin 1 Tablet(s) Oral daily  Ofloxacin otic solution 0.3% 5 Drop(s) 5 Drop(s) Right Ear daily  thiamine 100 milliGRAM(s) Oral daily    MEDICATIONS  (PRN):  acetaminophen     Tablet .. 650 milliGRAM(s) Oral every 6 hours PRN Temp greater or equal to 38C (100.4F), Mild Pain (1 - 3)  aluminum hydroxide/magnesium hydroxide/simethicone Suspension 30 milliLiter(s) Oral every 4 hours PRN Dyspepsia  loperamide 2 milliGRAM(s) Oral two times a day PRN Diarrhea  ondansetron Injectable 4 milliGRAM(s) IV Push every 8 hours PRN Nausea and/or Vomiting  sodium chloride 0.9% lock flush 10 milliLiter(s) IV Push every 1 hour PRN Pre/post blood products, medications, blood draw, and to maintain line patency      REVIEW OF SYSTEMS:    CONSTITUTIONAL: No fever, chills, weight loss, or fatigue  HEENT: No sore throat, runny nose, ear ache  RESPIRATORY: No cough, wheezing, No shortness of breath  CARDIOVASCULAR: No chest pain, palpitations, dizziness  GASTROINTESTINAL: No abdominal pain. No nausea, vomiting, diarrhea  GENITOURINARY: No dysuria, increase frequency, hematuria, or incontinence  NEUROLOGICAL: No headaches, memory loss, loss of strength, numbness, or tremors, no weakness  EXTREMITY: No pedal edema BLE  SKIN: No itching, burning, rashes, or lesions     VITAL SIGNS:  T(C): 37.1 (01-17-22 @ 06:53), Max: 37.1 (01-17-22 @ 06:53)  T(F): 98.7 (01-17-22 @ 06:53), Max: 98.7 (01-17-22 @ 06:53)  HR: 67 (01-17-22 @ 06:53) (67 - 76)  BP: 160/92 (01-17-22 @ 06:53) (136/76 - 160/92)  RR: 17 (01-17-22 @ 06:53) (17 - 17)  SpO2: 93% (01-17-22 @ 06:53) (93% - 100%)  Wt(kg): --    PHYSICAL EXAM:    GENERAL: not in any distress  HEENT: Neck is supple, normocephalic, atraumatic   CHEST/LUNG: Clear to percussion bilaterally; No rales, rhonchi, wheezing  HEART: Regular rate and rhythm; No murmurs, rubs, or gallops  ABDOMEN: Soft, Nontender, Nondistended; Bowel sounds present, no rebound   EXTREMITIES:  2+ Peripheral Pulses, No clubbing, cyanosis, or edema  GENITOURINARY:   SKIN: No rashes or lesions  BACK: no pressor sore   NERVOUS SYSTEM:  Alert & Oriented X3, Good concentration  PSYCH: normal affect     LABS:                         12.0   6.00  )-----------( 548      ( 17 Jan 2022 09:04 )             36.1     01-17    141  |  109<H>  |  2<L>  ----------------------------<  93  2.9<LL>   |  27  |  0.90    Ca    8.5      17 Jan 2022 09:04  Phos  1.9     01-15  Mg     2.7     01-17    TPro  6.6  /  Alb  2.1<L>  /  TBili  0.7  /  DBili  x   /  AST  34  /  ALT  38  /  AlkPhos  80  01-17    LIVER FUNCTIONS - ( 17 Jan 2022 09:04 )  Alb: 2.1 g/dL / Pro: 6.6 gm/dL / ALK PHOS: 80 U/L / ALT: 38 U/L / AST: 34 U/L / GGT: x             C Diff by PCR Result: NotDetec (01-15 @ 01:20)    Culture Results:   No enteric pathogens to date: Final culture pending  No enteric gram negative rods isolated  Moderate Yeast like cells (01-16 @ 01:10)        Radiology:       HPI:      50 y/o male w/ PMHx of Alcohol/Polysubstance abuse admitted in December for AMS, seizures found w/ 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 Pt reports ongoing nonbloody diarrhea (> 6 BMs per day) for the past 3 weeks, associated w/ abdominal cramps. Pt reports he went in for follow up the day prior, has blood work down which showed Low K thus sent in to the ED. Pt denies cp, SOB, fever or chills (14 Jan 2022 23:42)      Allergies    No Known Allergies    Intolerances        MEDICATIONS  (STANDING):  ammonium lactate 12% Lotion 1 Application(s) Topical two times a day  chlorhexidine 2% Cloths 1 Application(s) Topical daily  diVALproex  milliGRAM(s) Oral two times a day  enoxaparin Injectable 40 milliGRAM(s) SubCutaneous daily  folic acid 1 milliGRAM(s) Oral daily  haloperidol     Tablet 1 milliGRAM(s) Oral every 12 hours  lactated ringers 1000 milliLiter(s) (80 mL/Hr) IV Continuous <Continuous>  multivitamin 1 Tablet(s) Oral daily  Ofloxacin otic solution 0.3% 5 Drop(s) 5 Drop(s) Right Ear daily  thiamine 100 milliGRAM(s) Oral daily    MEDICATIONS  (PRN):  acetaminophen     Tablet .. 650 milliGRAM(s) Oral every 6 hours PRN Temp greater or equal to 38C (100.4F), Mild Pain (1 - 3)  aluminum hydroxide/magnesium hydroxide/simethicone Suspension 30 milliLiter(s) Oral every 4 hours PRN Dyspepsia  loperamide 2 milliGRAM(s) Oral two times a day PRN Diarrhea  ondansetron Injectable 4 milliGRAM(s) IV Push every 8 hours PRN Nausea and/or Vomiting  sodium chloride 0.9% lock flush 10 milliLiter(s) IV Push every 1 hour PRN Pre/post blood products, medications, blood draw, and to maintain line patency      REVIEW OF SYSTEMS:    CONSTITUTIONAL: No fever, chills, weight loss, or fatigue  HEENT: No sore throat, runny nose, ear ache  RESPIRATORY: No cough, wheezing, No shortness of breath  CARDIOVASCULAR: No chest pain, palpitations, dizziness  GASTROINTESTINAL: No abdominal pain. No nausea, vomiting, diarrhea  GENITOURINARY: No dysuria, increase frequency, hematuria, or incontinence  NEUROLOGICAL: No headaches, memory loss, loss of strength, numbness, or tremors, no weakness  EXTREMITY: No pedal edema BLE  SKIN: No itching, burning, rashes, or lesions     VITAL SIGNS:  T(C): 37.1 (01-17-22 @ 06:53), Max: 37.1 (01-17-22 @ 06:53)  T(F): 98.7 (01-17-22 @ 06:53), Max: 98.7 (01-17-22 @ 06:53)  HR: 67 (01-17-22 @ 06:53) (67 - 76)  BP: 160/92 (01-17-22 @ 06:53) (136/76 - 160/92)  RR: 17 (01-17-22 @ 06:53) (17 - 17)  SpO2: 93% (01-17-22 @ 06:53) (93% - 100%)  Wt(kg): --    PHYSICAL EXAM:    GENERAL: not in any distress  HEENT: Neck is supple, normocephalic, atraumatic   CHEST/LUNG: Clear to ausculation B/L  HEART: Regular rate and rhythm; No murmurs, rubs, or gallops  ABDOMEN: Soft, Nontender, Nondistended; Bowel sounds present, no rebound   EXTREMITIES:  No edema      LABS:                         12.0   6.00  )-----------( 548      ( 17 Jan 2022 09:04 )             36.1     01-17    141  |  109<H>  |  2<L>  ----------------------------<  93  2.9<LL>   |  27  |  0.90    Ca    8.5      17 Jan 2022 09:04  Phos  1.9     01-15  Mg     2.7     01-17    TPro  6.6  /  Alb  2.1<L>  /  TBili  0.7  /  DBili  x   /  AST  34  /  ALT  38  /  AlkPhos  80  01-17    LIVER FUNCTIONS - ( 17 Jan 2022 09:04 )  Alb: 2.1 g/dL / Pro: 6.6 gm/dL / ALK PHOS: 80 U/L / ALT: 38 U/L / AST: 34 U/L / GGT: x             C Diff by PCR Result: NotDetec (01-15 @ 01:20)    Culture Results:   No enteric pathogens to date: Final culture pending  No enteric gram negative rods isolated  Moderate Yeast like cells (01-16 @ 01:10)        Radiology:

## 2022-01-17 NOTE — PROGRESS NOTE ADULT - ASSESSMENT
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      antibiotic associated diarrhea: immodium helping, continue utilizing prn.  can add banatrol if still having trouble.    hypokalemia, severe: continue tele.  Check potassium levels BID.  Mag is doing fine.  Ordered Potassium 40meq TID standing, added potassium to IVF as well.  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  continue with IV meropenem.  Need to discuss the ear drops / appropriate utilization per pateint.    ? seizure disorder: related to his acute issue I believe.  Will dissuss further with patient.  continue depakote.    vte prophylaxis: lovenox  dispo: home once potassium stabilizes

## 2022-01-17 NOTE — PROGRESS NOTE ADULT - SUBJECTIVE AND OBJECTIVE BOX
s. pt notes no tremors, normal strength.  diarrhea improving with immodium.  tolerating PO.    o.  Vital Signs Last 24 Hrs  T(C): 36.8 (17 Jan 2022 11:12), Max: 37.1 (17 Jan 2022 06:53)  T(F): 98.3 (17 Jan 2022 11:12), Max: 98.7 (17 Jan 2022 06:53)  HR: 68 (17 Jan 2022 11:12) (67 - 72)  BP: 143/82 (17 Jan 2022 11:12) (136/76 - 160/92)  BP(mean): 102 (17 Jan 2022 11:12) (102 - 102)  RR: 17 (17 Jan 2022 11:12) (17 - 17)  SpO2: 96% (17 Jan 2022 11:12) (93% - 97%)    gen nad  abd soft, nt, +BS  cv rrr no LE edema  psyc aaox3    labs reviewed, normal mg, K still low.  CBC Stable.

## 2022-01-18 ENCOUNTER — TRANSCRIPTION ENCOUNTER (OUTPATIENT)
Age: 52
End: 2022-01-18

## 2022-01-18 LAB
ANION GAP SERPL CALC-SCNC: 3 MMOL/L — LOW (ref 5–17)
BUN SERPL-MCNC: 4 MG/DL — LOW (ref 7–23)
CALCIUM SERPL-MCNC: 8.6 MG/DL — SIGNIFICANT CHANGE UP (ref 8.5–10.1)
CHLORIDE SERPL-SCNC: 113 MMOL/L — HIGH (ref 96–108)
CO2 SERPL-SCNC: 27 MMOL/L — SIGNIFICANT CHANGE UP (ref 22–31)
CREAT SERPL-MCNC: 0.99 MG/DL — SIGNIFICANT CHANGE UP (ref 0.5–1.3)
GLUCOSE SERPL-MCNC: 104 MG/DL — HIGH (ref 70–99)
MAGNESIUM SERPL-MCNC: 2.4 MG/DL — SIGNIFICANT CHANGE UP (ref 1.6–2.6)
POTASSIUM SERPL-MCNC: 3.8 MMOL/L — SIGNIFICANT CHANGE UP (ref 3.5–5.3)
POTASSIUM SERPL-MCNC: 3.9 MMOL/L — SIGNIFICANT CHANGE UP (ref 3.5–5.3)
POTASSIUM SERPL-SCNC: 3.8 MMOL/L — SIGNIFICANT CHANGE UP (ref 3.5–5.3)
POTASSIUM SERPL-SCNC: 3.9 MMOL/L — SIGNIFICANT CHANGE UP (ref 3.5–5.3)
SODIUM SERPL-SCNC: 143 MMOL/L — SIGNIFICANT CHANGE UP (ref 135–145)

## 2022-01-18 PROCEDURE — 99232 SBSQ HOSP IP/OBS MODERATE 35: CPT

## 2022-01-18 RX ORDER — POTASSIUM CHLORIDE 20 MEQ
40 PACKET (EA) ORAL
Refills: 0 | Status: DISCONTINUED | OUTPATIENT
Start: 2022-01-18 | End: 2022-01-19

## 2022-01-18 RX ORDER — LOPERAMIDE HCL 2 MG
1 TABLET ORAL
Qty: 36 | Refills: 0
Start: 2022-01-18 | End: 2022-02-04

## 2022-01-18 RX ORDER — LEVETIRACETAM 250 MG/1
500 TABLET, FILM COATED ORAL
Refills: 0 | Status: DISCONTINUED | OUTPATIENT
Start: 2022-01-19 | End: 2022-01-19

## 2022-01-18 RX ORDER — DIVALPROEX SODIUM 500 MG/1
1 TABLET, DELAYED RELEASE ORAL
Qty: 0 | Refills: 0 | DISCHARGE
Start: 2022-01-18

## 2022-01-18 RX ORDER — MEROPENEM 1 G/30ML
2000 INJECTION INTRAVENOUS
Qty: 1 | Refills: 0
Start: 2022-01-18 | End: 2022-02-28

## 2022-01-18 RX ORDER — FOLIC ACID 0.8 MG
1 TABLET ORAL
Qty: 0 | Refills: 0 | DISCHARGE
Start: 2022-01-18

## 2022-01-18 RX ORDER — THIAMINE MONONITRATE (VIT B1) 100 MG
1 TABLET ORAL
Qty: 0 | Refills: 0 | DISCHARGE
Start: 2022-01-18

## 2022-01-18 RX ADMIN — ENOXAPARIN SODIUM 40 MILLIGRAM(S): 100 INJECTION SUBCUTANEOUS at 11:44

## 2022-01-18 RX ADMIN — Medication 100 MILLIGRAM(S): at 11:44

## 2022-01-18 RX ADMIN — HALOPERIDOL DECANOATE 1 MILLIGRAM(S): 100 INJECTION INTRAMUSCULAR at 05:05

## 2022-01-18 RX ADMIN — Medication 1 MILLIGRAM(S): at 11:44

## 2022-01-18 RX ADMIN — CHLORHEXIDINE GLUCONATE 1 APPLICATION(S): 213 SOLUTION TOPICAL at 11:44

## 2022-01-18 RX ADMIN — MEROPENEM 200 MILLIGRAM(S): 1 INJECTION INTRAVENOUS at 22:24

## 2022-01-18 RX ADMIN — DIVALPROEX SODIUM 500 MILLIGRAM(S): 500 TABLET, DELAYED RELEASE ORAL at 05:04

## 2022-01-18 RX ADMIN — HALOPERIDOL DECANOATE 1 MILLIGRAM(S): 100 INJECTION INTRAMUSCULAR at 17:41

## 2022-01-18 RX ADMIN — Medication 1 TABLET(S): at 11:44

## 2022-01-18 RX ADMIN — Medication 40 MILLIEQUIVALENT(S): at 23:59

## 2022-01-18 RX ADMIN — Medication 1 APPLICATION(S): at 17:42

## 2022-01-18 RX ADMIN — DIVALPROEX SODIUM 500 MILLIGRAM(S): 500 TABLET, DELAYED RELEASE ORAL at 17:40

## 2022-01-18 RX ADMIN — Medication 40 MILLIEQUIVALENT(S): at 05:04

## 2022-01-18 RX ADMIN — Medication 40 MILLIEQUIVALENT(S): at 11:44

## 2022-01-18 RX ADMIN — Medication 1 APPLICATION(S): at 05:04

## 2022-01-18 RX ADMIN — Medication 40 MILLIEQUIVALENT(S): at 17:41

## 2022-01-18 RX ADMIN — MEROPENEM 200 MILLIGRAM(S): 1 INJECTION INTRAVENOUS at 14:23

## 2022-01-18 RX ADMIN — MEROPENEM 200 MILLIGRAM(S): 1 INJECTION INTRAVENOUS at 05:59

## 2022-01-18 NOTE — DISCHARGE NOTE PROVIDER - NSDCMRMEDTOKEN_GEN_ALL_CORE_FT
Ciprodex 0.3%-0.1% otic suspension: 5 drop(s) in each affected ear 2 times a day   divalproex sodium 500 mg oral delayed release tablet: 1 tab(s) orally 2 times a day  folic acid 1 mg oral tablet: 1 tab(s) orally once a day  haloperidol 1 mg oral tablet: 1 tab(s) orally every 12 hours. Take the first tablet at 7 PM tonight on 12/16  loperamide 2 mg oral capsule: 1 cap(s) orally 2 times a day, As needed, Diarrhea  meropenem 1000 mg intravenous injection: 2000 milligram(s) intravenously every 8 hours ro 6 weeks (until 2/28/2022)   Multiple Vitamins oral tablet: 1 tab(s) orally once a day  nicotine 21 mg/24 hr transdermal film, extended release: 1 patch transdermal once a day   thiamine 100 mg oral tablet: 1 tab(s) orally once a day   haloperidol 1 mg oral tablet: 1 tab(s) orally every 12 hours. Take the first tablet at 7 PM tonight on 12/16  K-Tab 20 mEq oral tablet, extended release: 1 tab(s) orally 2 times a day  levETIRAcetam 500 mg oral tablet: 1 tab(s) orally 2 times a day  loperamide 2 mg oral capsule: 1 cap(s) orally 2 times a day, As needed, Diarrhea  meropenem 1000 mg intravenous injection: 2000 milligram(s) intravenously every 8 hours ro 6 weeks (until 2/28/2022)   nicotine 21 mg/24 hr transdermal film, extended release: 1 patch transdermal once a day

## 2022-01-18 NOTE — PROGRESS NOTE ADULT - ASSESSMENT
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      12/9 s/p Myringotomy on R side  12/9 Swab right ear: MSSA, Anaerococcus Vaginalis, Actinomyces turiecensis, helicococus spp.    Repeated head MRI:   -Decrease in the degree of enhancement involving the LEFT external auditory canal consistent with improvement.   -Brain: Interval decrease in the degree of dural enhancement involving the LEFT lateral temporal lobe and floor of LEFT middle cranial fossa. The previously noted LEFT temporal lobe abscess has decreased now measuring 4 mm with minimal restricted diffusion.      Afebrile  No leukocytosis      ESR: 18  CRP: 15    C diff: negative    -Case discussed with Dr. Valdivia yesterday ([phone]) infectious disease following this case.   As per her recommendation antibiotics were switch to Merrem 2g IV q 8 hrs for another 6 weeks.  -Upon discharge patient should follow up with his primary ID specialist (Dr. Valdivia).  Plan determined by primary ID attending.  -Patient with history of seizure,  recommend Neurology evaluation to monitor antiseizure drugs levels.    will sign off the case, please reconsult if need it  Thank You.     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      12/9 s/p Myringotomy on R side  12/9 Swab right ear: MSSA, Anaerococcus Vaginalis, Actinomyces turiecensis, helicococus spp.    Repeated head MRI:   -Decrease in the degree of enhancement involving the LEFT external auditory canal consistent with improvement.   -Brain: Interval decrease in the degree of dural enhancement involving the LEFT lateral temporal lobe and floor of LEFT middle cranial fossa. The previously noted LEFT temporal lobe abscess has decreased now measuring 4 mm with minimal restricted diffusion.      Afebrile  No leukocytosis  One episode of diarrhea today      ESR: 18  CRP: 15    C diff: negative    -Case discussed with Dr. Valdivia yesterday ([phone]) infectious disease following this case.   As per her recommendation antibiotics were switch to Merrem 2g IV q 8 hrs for another 6 weeks.  -Upon discharge patient should follow up with his primary ID specialist (Dr. Valdivia).  Plan determined by primary ID attending.  -Patient with history of seizure,  recommend Neurology evaluation to monitor antiseizure drugs levels.    will sign off the case, please reconsult if need it  Thank You.

## 2022-01-18 NOTE — DISCHARGE NOTE PROVIDER - HOSPITAL COURSE
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 Diarrhea secondary to antibiotic use, improved with PRN immodium. Hypokalemia resolved with aggressive supplementation. Patient will need to be discahrged on potassium supplementation. 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 Valdivia. Patient willl continue meropenem for 6weeks with follow up in 1 week with Dr. Valdivia. Stable for discharge home.    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 Improved with aggressive potassium supplementation, IVF, PRN imodium.  Antibiotics were changed to IV meropenem per discussions between our ID and Dr. Laura Valdivia ID at [facility], 6 weeks additional treatment.    With potassium repleted, diarrhea improved, pateint is appropriate for discharge.

## 2022-01-18 NOTE — CONSULT NOTE ADULT - ASSESSMENT
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 Depakote interacts with antibiotics  - neuro stable  - follow up with neurologist once discharged     Thank you for the consult. 
 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

## 2022-01-18 NOTE — DISCHARGE NOTE PROVIDER - NSDCCPCAREPLAN_GEN_ALL_CORE_FT
PRINCIPAL DISCHARGE DIAGNOSIS  Diagnosis: Severe diarrhea  Assessment and Plan of Treatment: secondary to antibiotic use. continue use loperimide as needed for diarrhea.      SECONDARY DISCHARGE DIAGNOSES  Diagnosis: Hypokalemia  Assessment and Plan of Treatment: continue potassiumsupplementation. 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   Continue meropenem 2g every 8 hours for 6 weeks (end date 2/28/2022). Follow up with Dr. Valdivia in 1 week     PRINCIPAL DISCHARGE DIAGNOSIS  Diagnosis: Antibiotic-associated diarrhea  Assessment and Plan of Treatment: Infectious cause of diarrhea ruled out.  Was caused by the anbitiotics.  These were changed.  Take immodium as needed for recurrent diarrhea, which may still happen while youre on antibiotics.      SECONDARY DISCHARGE DIAGNOSES  Diagnosis: Hypokalemia  Assessment and Plan of Treatment: Continue taking your potassium supplements we prescribed you.  You will have bloodwork rechecked in 48 hours and then weekly.  Your regular doctors will monitor your potassium levels.    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   Continue meropenem 2g every 8 hours for 6 weeks (end date 2/28/2022). Follow up with Dr. Valdivia in 1 week    Diagnosis: Seizure disorder  Assessment and Plan of Treatment: We changed your seizure medication to keppra since the meropenem and depakote interact with eachother.  Follow up with neurology as already planned to determine if you need to continue with seizure medications or not, since the seizures were related to your brain infection which is resolving.

## 2022-01-18 NOTE — PROGRESS NOTE ADULT - SUBJECTIVE AND OBJECTIVE BOX
s. eating and drinking well.  diarrhea remains improved.  discussed likely discharge tomorrow.    o.  Vital Signs Last 24 Hrs  T(C): 37.2 (18 Jan 2022 05:00), Max: 37.2 (18 Jan 2022 05:00)  T(F): 98.9 (18 Jan 2022 05:00), Max: 98.9 (18 Jan 2022 05:00)  HR: 71 (18 Jan 2022 05:00) (64 - 71)  BP: 149/- (18 Jan 2022 05:00) (149/- - 165/93)  BP(mean): --  RR: 18 (18 Jan 2022 05:00) (18 - 18)  SpO2: 98% (18 Jan 2022 05:00) (94% - 98%)    gen nad  lungs clear, good air movement  cv rrr no murmurs, no LE Edema, ext warm well perfused  psyc aaox3, appropriate affect    labs show K level of 3.9 much improved

## 2022-01-18 NOTE — PROGRESS NOTE ADULT - REASON FOR ADMISSION
Hypokalemia, Colitis
Hypokalemia
Hypokalemia, Colitis

## 2022-01-18 NOTE — DISCHARGE NOTE PROVIDER - CARE PROVIDER_API CALL
Laura Valdivia)  Infectious Disease; Internal Medicine  [address] [place] [address]  Phone: [phone]  Fax: [phone]  Follow Up Time: 1 week

## 2022-01-18 NOTE — PROGRESS NOTE ADULT - SUBJECTIVE AND OBJECTIVE BOX
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 Pt reports ongoing nonbloody diarrhea (> 6 BMs per day) for the past 3 weeks, associated w/ abdominal cramps. Pt reports he went in for follow up the day prior, has blood work down which showed Low K thus sent in to the ED. Pt denies cp, SOB, fever or chills (14 Jan 2022 23:42)      Allergies    No Known Allergies    Intolerances        MEDICATIONS  (STANDING):  ammonium lactate 12% Lotion 1 Application(s) Topical two times a day  chlorhexidine 2% Cloths 1 Application(s) Topical daily  dextrose 5% + lactated ringers with potassium chloride 20 mEq/L 1000 milliLiter(s) (80 mL/Hr) IV Continuous <Continuous>  diVALproex  milliGRAM(s) Oral two times a day  enoxaparin Injectable 40 milliGRAM(s) SubCutaneous daily  folic acid 1 milliGRAM(s) Oral daily  haloperidol     Tablet 1 milliGRAM(s) Oral every 12 hours  meropenem  IVPB 2000 milliGRAM(s) IV Intermittent every 8 hours  multivitamin 1 Tablet(s) Oral daily  Ofloxacin otic solution 0.3% 5 Drop(s) 5 Drop(s) Right Ear daily  potassium chloride    Tablet ER 40 milliEquivalent(s) Oral four times a day  thiamine 100 milliGRAM(s) Oral daily    MEDICATIONS  (PRN):  acetaminophen     Tablet .. 650 milliGRAM(s) Oral every 6 hours PRN Temp greater or equal to 38C (100.4F), Mild Pain (1 - 3)  aluminum hydroxide/magnesium hydroxide/simethicone Suspension 30 milliLiter(s) Oral every 4 hours PRN Dyspepsia  loperamide 2 milliGRAM(s) Oral two times a day PRN Diarrhea  ondansetron Injectable 4 milliGRAM(s) IV Push every 8 hours PRN Nausea and/or Vomiting  sodium chloride 0.9% lock flush 10 milliLiter(s) IV Push every 1 hour PRN Pre/post blood products, medications, blood draw, and to maintain line patency      REVIEW OF SYSTEMS:    CONSTITUTIONAL: No fever, chills, weight loss, or fatigue  HEENT: No sore throat, runny nose, ear ache  RESPIRATORY: No cough, wheezing, No shortness of breath  CARDIOVASCULAR: No chest pain, palpitations, dizziness  GASTROINTESTINAL: No abdominal pain. No nausea, vomiting, diarrhea  GENITOURINARY: No dysuria, increase frequency, hematuria, or incontinence  NEUROLOGICAL: No headaches, memory loss, loss of strength, numbness, or tremors, no weakness  EXTREMITY: No pedal edema BLE  SKIN: No itching, burning, rashes, or lesions     VITAL SIGNS:  T(C): 37.2 (01-18-22 @ 05:00), Max: 37.2 (01-18-22 @ 05:00)  T(F): 98.9 (01-18-22 @ 05:00), Max: 98.9 (01-18-22 @ 05:00)  HR: 71 (01-18-22 @ 05:00) (64 - 71)  BP: 149/- (01-18-22 @ 05:00) (143/82 - 165/93)  RR: 18 (01-18-22 @ 05:00) (17 - 18)  SpO2: 98% (01-18-22 @ 05:00) (94% - 98%)  Wt(kg): --    PHYSICAL EXAM:    GENERAL: not in any distress  HEENT: Neck is supple, normocephalic, atraumatic   CHEST/LUNG: Clear to ausculation B/L  HEART: Regular rate and rhythm; No murmurs, rubs, or gallops  ABDOMEN: Soft, Nontender, Nondistended; Bowel sounds present, no rebound   EXTREMITIES:  No edema      LABS:                         12.0   6.00  )-----------( 548      ( 17 Jan 2022 09:04 )             36.1     01-18    143  |  113<H>  |  4<L>  ----------------------------<  104<H>  3.9   |  27  |  0.99    Ca    8.6      18 Jan 2022 09:02  Mg     2.4     01-18    TPro  6.6  /  Alb  2.1<L>  /  TBili  0.7  /  DBili  x   /  AST  34  /  ALT  38  /  AlkPhos  80  01-17    LIVER FUNCTIONS - ( 17 Jan 2022 09:04 )  Alb: 2.1 g/dL / Pro: 6.6 gm/dL / ALK PHOS: 80 U/L / ALT: 38 U/L / AST: 34 U/L / GGT: x             Sedimentation Rate, Erythrocyte: 18 mm/hr (01-17 @ 09:04)      C Diff by PCR Result: NotDetec (01-15 @ 01:20)    Culture Results:   Moderate Yeast like cells  No enteric pathogens isolated.  (Stool culture examined for Salmonella,  Shigella, Campylobacter, Aeromonas, Plesiomonas,  Vibrio, E.coli O157 and Yersinia)  No enteric gram negative rods isolated (01-16 @ 01:10)        Radiology:

## 2022-01-18 NOTE — PROGRESS NOTE ADULT - ASSESSMENT
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  Improving with supplementation    antibiotic associated diarrhea: immodium helping, continue utilizing prn.  seems to overall be improved.      hypokalemia, severe: continue tele.  Check potassium levels BID.  Mag is doing fine.  Ordered Potassium 40meq QID standing, can stop IVF today.  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  continue with IV meropenem.  Need to discuss the ear drops / appropriate utilization per patient    ? seizure disorder: related to his acute issue I believe.  continue depakote.    vte prophylaxis: lovenox  dispo: home once potassium remains stable, goal for tomorrow.  no HH needs.

## 2022-01-18 NOTE — DISCHARGE NOTE PROVIDER - ATTENDING DISCHARGE PHYSICAL EXAMINATION:
Vital Signs Last 24 Hrs  T(C): 36.4 (19 Jan 2022 10:48), Max: 37.3 (19 Jan 2022 05:29)  T(F): 97.5 (19 Jan 2022 10:48), Max: 99.2 (19 Jan 2022 05:29)  HR: 64 (19 Jan 2022 10:48) (64 - 78)  BP: 144/75 (19 Jan 2022 10:48) (127/71 - 157/84)  BP(mean): --  RR: 18 (19 Jan 2022 10:48) (18 - 18)  SpO2: 97% (19 Jan 2022 10:48) (96% - 97%)    gen nad, appears comfortable  psyc aaox3, appropriate affect

## 2022-01-19 ENCOUNTER — TRANSCRIPTION ENCOUNTER (OUTPATIENT)
Age: 52
End: 2022-01-19

## 2022-01-19 VITALS
DIASTOLIC BLOOD PRESSURE: 83 MMHG | HEART RATE: 65 BPM | RESPIRATION RATE: 18 BRPM | SYSTOLIC BLOOD PRESSURE: 160 MMHG | OXYGEN SATURATION: 98 % | TEMPERATURE: 98 F

## 2022-01-19 LAB
ANION GAP SERPL CALC-SCNC: 6 MMOL/L — SIGNIFICANT CHANGE UP (ref 5–17)
BUN SERPL-MCNC: 4 MG/DL — LOW (ref 7–23)
CALCIUM SERPL-MCNC: 8.7 MG/DL — SIGNIFICANT CHANGE UP (ref 8.5–10.1)
CHLORIDE SERPL-SCNC: 110 MMOL/L — HIGH (ref 96–108)
CO2 SERPL-SCNC: 25 MMOL/L — SIGNIFICANT CHANGE UP (ref 22–31)
CREAT SERPL-MCNC: 0.91 MG/DL — SIGNIFICANT CHANGE UP (ref 0.5–1.3)
GLUCOSE SERPL-MCNC: 96 MG/DL — SIGNIFICANT CHANGE UP (ref 70–99)
POTASSIUM SERPL-MCNC: 4 MMOL/L — SIGNIFICANT CHANGE UP (ref 3.5–5.3)
POTASSIUM SERPL-SCNC: 4 MMOL/L — SIGNIFICANT CHANGE UP (ref 3.5–5.3)
SODIUM SERPL-SCNC: 141 MMOL/L — SIGNIFICANT CHANGE UP (ref 135–145)

## 2022-01-19 PROCEDURE — 99239 HOSP IP/OBS DSCHRG MGMT >30: CPT

## 2022-01-19 RX ORDER — LEVETIRACETAM 250 MG/1
1 TABLET, FILM COATED ORAL
Qty: 60 | Refills: 1
Start: 2022-01-19 | End: 2022-03-19

## 2022-01-19 RX ORDER — POTASSIUM CHLORIDE 20 MEQ
1 PACKET (EA) ORAL
Qty: 60 | Refills: 0
Start: 2022-01-19 | End: 2022-02-17

## 2022-01-19 RX ORDER — POTASSIUM CHLORIDE 20 MEQ
1 PACKET (EA) ORAL
Qty: 0 | Refills: 0 | DISCHARGE
Start: 2022-01-19

## 2022-01-19 RX ORDER — BNT162B2 0.23 MG/2.25ML
0.3 INJECTION, SUSPENSION INTRAMUSCULAR ONCE
Refills: 0 | Status: COMPLETED | OUTPATIENT
Start: 2022-01-19 | End: 2022-01-19

## 2022-01-19 RX ADMIN — Medication 1 TABLET(S): at 12:45

## 2022-01-19 RX ADMIN — Medication 1 APPLICATION(S): at 05:07

## 2022-01-19 RX ADMIN — MEROPENEM 200 MILLIGRAM(S): 1 INJECTION INTRAVENOUS at 05:06

## 2022-01-19 RX ADMIN — ENOXAPARIN SODIUM 40 MILLIGRAM(S): 100 INJECTION SUBCUTANEOUS at 12:44

## 2022-01-19 RX ADMIN — CHLORHEXIDINE GLUCONATE 1 APPLICATION(S): 213 SOLUTION TOPICAL at 12:46

## 2022-01-19 RX ADMIN — Medication 40 MILLIEQUIVALENT(S): at 05:06

## 2022-01-19 RX ADMIN — BNT162B2 0.3 MILLILITER(S): 0.23 INJECTION, SUSPENSION INTRAMUSCULAR at 13:59

## 2022-01-19 RX ADMIN — LEVETIRACETAM 500 MILLIGRAM(S): 250 TABLET, FILM COATED ORAL at 05:06

## 2022-01-19 RX ADMIN — Medication 100 MILLIGRAM(S): at 12:45

## 2022-01-19 RX ADMIN — HALOPERIDOL DECANOATE 1 MILLIGRAM(S): 100 INJECTION INTRAMUSCULAR at 05:06

## 2022-01-19 RX ADMIN — Medication 1 MILLIGRAM(S): at 12:52

## 2022-01-19 RX ADMIN — MEROPENEM 200 MILLIGRAM(S): 1 INJECTION INTRAVENOUS at 15:48

## 2022-01-19 NOTE — DISCHARGE NOTE NURSING/CASE MANAGEMENT/SOCIAL WORK - NSDCPEFALRISK_GEN_ALL_CORE
For information on Fall & Injury Prevention, visit: [email or web address] OR  [email or web address] OR  https://www.cdc.gov/steadi/patient.html

## 2022-01-19 NOTE — DISCHARGE NOTE NURSING/CASE MANAGEMENT/SOCIAL WORK - PATIENT PORTAL LINK FT
You can access the FollowMyHealth Patient Portal offered by [facility] by registering at the following website: [email or web address]. By joining "[employer]"’s FollowMyHealth portal, you will also be able to view your health information using other applications (apps) compatible with our system.

## 2022-01-19 NOTE — DISCHARGE NOTE NURSING/CASE MANAGEMENT/SOCIAL WORK - NSDCPEWEB_GEN_ALL_CORE
[facility] for Tobacco Control website --- [email or web address] website --- [email or web address]

## 2022-01-19 NOTE — DISCHARGE NOTE NURSING/CASE MANAGEMENT/SOCIAL WORK - NSDCPEEMAIL_GEN_ALL_CORE
[facility] for Tobacco Control email [email or web address]

## 2022-01-19 NOTE — DISCHARGE NOTE NURSING/CASE MANAGEMENT/SOCIAL WORK - NSDCVIVACCINE_GEN_ALL_CORE_FT
influenza, injectable, quadrivalent, preservative free; 14-Dec-2021 12:43; Janie Lemus (JANNA); [employer]; [id number]   (Exp. Date: 30-Jun-2022); IntraMuscular; Deltoid Left.; 0.5 milliLiter(s); VIS (VIS Published: 06-Aug-2021, VIS Presented: 14-Dec-2021);    COVID-19, mRNA, LNP-S, PF, 30 mcg/0.3 mL dose, keri-sucrose (Pfizer); 19-Jan-2022 13:59; Lula Valle (RN); [employer]; [id number] (Exp. Date: 19-Jan-2022); IntraMuscular; Deltoid Left.; 0.3 milliLiter(s);   influenza, injectable, quadrivalent, preservative free; 14-Dec-2021 12:43; Janie Lemus (JANNA); [employer]; [id number]   (Exp. Date: 30-Jun-2022); IntraMuscular; Deltoid Left.; 0.5 milliLiter(s); VIS (VIS Published: 06-Aug-2021, VIS Presented: 14-Dec-2021);

## 2022-01-20 LAB — LACTOFERRIN STL-MCNC: <1 — SIGNIFICANT CHANGE UP (ref 0–7.24)

## 2022-01-21 ENCOUNTER — NON-APPOINTMENT (OUTPATIENT)
Age: 52
End: 2022-01-21

## 2022-01-24 ENCOUNTER — APPOINTMENT (OUTPATIENT)
Dept: NEUROLOGY | Facility: CLINIC | Age: 52
End: 2022-01-24
Payer: COMMERCIAL

## 2022-01-24 VITALS
WEIGHT: 170 LBS | HEART RATE: 70 BPM | HEIGHT: 69 IN | DIASTOLIC BLOOD PRESSURE: 88 MMHG | BODY MASS INDEX: 25.18 KG/M2 | SYSTOLIC BLOOD PRESSURE: 145 MMHG

## 2022-01-24 PROCEDURE — 99214 OFFICE O/P EST MOD 30 MIN: CPT

## 2022-01-24 RX ORDER — DIVALPROEX SODIUM 500 MG/1
500 TABLET, DELAYED RELEASE ORAL TWICE DAILY
Qty: 180 | Refills: 0 | Status: DISCONTINUED | COMMUNITY
Start: 1900-01-01 | End: 2022-01-24

## 2022-01-25 DIAGNOSIS — K52.9 NONINFECTIVE GASTROENTERITIS AND COLITIS, UNSPECIFIED: ICD-10-CM

## 2022-01-25 DIAGNOSIS — E83.42 HYPOMAGNESEMIA: ICD-10-CM

## 2022-01-25 DIAGNOSIS — F10.10 ALCOHOL ABUSE, UNCOMPLICATED: ICD-10-CM
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
## 2022-01-25 DIAGNOSIS — F19.10 OTHER PSYCHOACTIVE SUBSTANCE ABUSE, UNCOMPLICATED: ICD-10-CM

## 2022-01-25 DIAGNOSIS — H70.92 UNSPECIFIED MASTOIDITIS, LEFT EAR: ICD-10-CM

## 2022-01-25 DIAGNOSIS — G40.901 EPILEPSY, UNSPECIFIED, NOT INTRACTABLE, WITH STATUS EPILEPTICUS: ICD-10-CM

## 2022-01-25 DIAGNOSIS — E87.6 HYPOKALEMIA: ICD-10-CM

## 2022-01-25 DIAGNOSIS — K52.1 TOXIC GASTROENTERITIS AND COLITIS: ICD-10-CM

## 2022-01-25 DIAGNOSIS — T36.95XA ADVERSE EFFECT OF UNSPECIFIED SYSTEMIC ANTIBIOTIC, INITIAL ENCOUNTER: ICD-10-CM

## 2022-01-25 NOTE — DISCUSSION/SUMMARY
[FreeTextEntry1] : HARRIET GOLDMAN is a a 52 yo w symptomatic focal epilepsy  now well controlled on Keppra 500 bid.\par it is unclear if he has an underlying epilepsy phenotype and if this is the case he may need to stay longer on AEDs.\par \par Plan: 1. amb 24 hours EEG for interictal abnormalities\par 2. f/u in March. if normal EEG and he is off antibiotics Keppra will be tapered off in 6 months.\par \par \par

## 2022-01-25 NOTE — DISCUSSION/SUMMARY
[FreeTextEntry1] : HARRIET GOLDMAN is a a 50 yo w symptomatic focal epilepsy  now well controlled on Keppra 500 bid.\par it is unclear if he has an underlying epilepsy phenotype and if this is the case he may need to stay longer on AEDs.\par \par Plan: 1. amb 24 hours EEG for interictal abnormalities\par 2. f/u in March. if normal EEG and he is off antibiotics Keppra will be tapered off in 6 months.\par \par \par

## 2022-01-25 NOTE — HISTORY OF PRESENT ILLNESS
[FreeTextEntry1] : *** 01/24/2022 ***\par \par HARRIET GOLDMAN is seen for initial evaluation. his history is as bellow.\par according to his wife he had several focal seizures with secondary generalization while in the acute phase of his infection.\par he was controlled on VPA until meropenem was add. had another seizure.\par VPA switched to keppra and he is stable since. no SE. 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 Improved with aggressive potassium supplementation, IVF, PRN imodium.\par  Antibiotics were changed to IV meropenem per discussions between our ID and\par Dr. Laura Valdivia ID at [facility], 6 weeks additional treatment.\par \par

## 2022-01-25 NOTE — HISTORY OF PRESENT ILLNESS
[FreeTextEntry1] : *** 01/24/2022 ***\par \par HARRIET GOLDMAN is seen for initial evaluation. his history is as bellow.\par according to his wife he had several focal seizures with secondary generalization while in the acute phase of his infection.\par he was controlled on VPA until meropenem was add. had another seizure.\par VPA switched to keppra and he is stable since. no SE. 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 Improved with aggressive potassium supplementation, IVF, PRN imodium.\par  Antibiotics were changed to IV meropenem per discussions between our ID and\par Dr. Laura Valdivia ID at [facility], 6 weeks additional treatment.\par \par

## 2022-01-29 LAB
CULTURE RESULTS: SIGNIFICANT CHANGE UP
SPECIMEN SOURCE: SIGNIFICANT CHANGE UP

## 2022-02-01 ENCOUNTER — NON-APPOINTMENT (OUTPATIENT)
Age: 52
End: 2022-02-01

## 2022-02-17 ENCOUNTER — APPOINTMENT (OUTPATIENT)
Dept: INFECTIOUS DISEASE | Facility: CLINIC | Age: 52
End: 2022-02-17
Payer: COMMERCIAL

## 2022-02-17 DIAGNOSIS — H60.311 DIFFUSE OTITIS EXTERNA, RIGHT EAR: ICD-10-CM

## 2022-02-17 PROCEDURE — 99214 OFFICE O/P EST MOD 30 MIN: CPT | Mod: 95

## 2022-02-18 PROBLEM — H60.311 ACUTE DIFFUSE OTITIS EXTERNA OF RIGHT EAR: Status: ACTIVE | Noted: 2021-12-21

## 2022-02-18 NOTE — PHYSICAL EXAM
[General Appearance - Alert] : alert [General Appearance - In No Acute Distress] : in no acute distress [General Appearance - Well Nourished] : well nourished [Sclera] : the sclera and conjunctiva were normal [Outer Ear] : the ears and nose were normal in appearance [Neck Appearance] : the appearance of the neck was normal [] : no respiratory distress

## 2022-02-24 ENCOUNTER — NON-APPOINTMENT (OUTPATIENT)
Age: 52
End: 2022-02-24

## 2022-02-25 ENCOUNTER — NON-APPOINTMENT (OUTPATIENT)
Age: 52
End: 2022-02-25

## 2022-02-28 NOTE — DATA REVIEWED
[FreeTextEntry1] : 1/15/22 MR IAC\par IMPRESSION:\par     1. IAC/EAC:  Moderate mucosal thickening/fluid is seen with in the \par LEFT mastoid air cells and LEFT middle ear cavity. Decrease in the degree \par of enhancement involving the LEFT external auditory canal consistent with \par improvement. Decrease in enhancement of the  space musculature \par with minimal residual. Minimal residual enhancement about the LEFT TMJ \par joint.\par     2.   Brain:  Interval decrease in the degree of dural enhancement \par involving the LEFT lateral temporal lobe and floor of LEFT middle cranial \par fossa. The previously noted LEFT temporal lobe abscess has decreased now \par measuring 4 mm with minimal restricted diffusion.\par .\par \par 12/6/21 MR IAC\par IMPRESSION:\par In this patient with known left necrotizing otitis externa, a 1 cm abscess is identified in the left temporal lobe with adjacent dural thickening. Inflammatory changes also present extracranially without abscess formation, as above.\par \par

## 2022-02-28 NOTE — REASON FOR VISIT
[Follow-Up: _____] : a [unfilled] follow-up visit [Home] : at home, [unfilled] , at the time of the visit. [Spouse] : spouse [Medical Office: ([facility])___] : at the medical office located in  [Verbal consent obtained from patient] : the patient, [unfilled]

## 2022-02-28 NOTE — HISTORY OF PRESENT ILLNESS
[FreeTextEntry1] : 51M h/o EtOH use disorder, L malignant/necrotizing otitis externa with mastoid extension and R mastoid disease, L temporal lobe abscess (1cm), temporal bone OM s/p R myringotomy on 12/9/21 s/p IV nafcillin/CTX (12/9/21-1/14/22) currently on IV meropenem (1/14-) p/w management of L malignant necrotizing otitis externa and temporal bone OM. \par \par Patient was initially admitted from 12/3-12/16 for sudden onset of seizure and AMS, found to have L malignant/necrotizing otitis externa with mastoid extension and R mastoid disease, L temporal lobe abscess (1cm), temporal bone OM.  He was seen by NSGY and ENT and underwent R myringotomy on 12/9/21 by ENT.  R ear culture (OR 12/9) grew MSSA, actinomyces turicenesis, Anaerococcus vaginalis, Helcococcus spp, Pravinonas liam. L ear culture (12/3, superficial) grew MSSA, staph epi and Coryne amycolatum. Most likely MSSA is the main causative organism.  Course c/b delirium tremor, ICU psychosis.  ID was consulted, initially treated with vanc/cefepime, then switched to CTX/nafcillin and was discharged home with 6 weeks course of abx (end 1/19/22).  \par \par Since discharge, patient developed rash on his arms and spoke to Dr. Leigh (covering for me) on 1/3.  He was seen by derm, determined that it is likely from eczema and unlikely from drug rush.  On 1/5 wife reported that patient is tolerating abx well. He was then found to have leukocytosis of WBC 15.5 on 1/3 lab. Then on 1/10 his lab was notable for ANC 0.2 (WBC 5.5), K 2.9.  Suspected that this is due to nafcillin/CTX side effect.  Nafcillin was discontinued and KCL 40mEq x 2 prescribed.  When he had phone visit on 1/14, he continued to have diarrhea multiple times/day and K was till 2.7.  patient was instructed to go to ED and was admitted to [facility] from 1/14 - 1/19.  He was found to have K 2.2 and was aggressively repleted.  Repeat MRA obtained, showed cecreased yet persistent 5mm temporal lobe abscess.  Due to side effect, abx was switched to meropenem 2g IV q8h and was discharged home after K stabilized and diarrhea resolved. \par \par Today patient had video visit with his wife.  He reports feeling well, just very tired being on abx.  Denied HA, hearing problem, vision change, seizure, n/v/d, abdominal pain , SOB.

## 2022-03-08 ENCOUNTER — APPOINTMENT (OUTPATIENT)
Dept: MRI IMAGING | Facility: CLINIC | Age: 52
End: 2022-03-08
Payer: COMMERCIAL

## 2022-03-08 ENCOUNTER — OUTPATIENT (OUTPATIENT)
Dept: OUTPATIENT SERVICES | Facility: HOSPITAL | Age: 52
LOS: 1 days | End: 2022-03-08
Payer: COMMERCIAL

## 2022-03-08 DIAGNOSIS — H60.312 DIFFUSE OTITIS EXTERNA, LEFT EAR: ICD-10-CM

## 2022-03-08 DIAGNOSIS — K42.9 UMBILICAL HERNIA WITHOUT OBSTRUCTION OR GANGRENE: Chronic | ICD-10-CM

## 2022-03-08 PROCEDURE — 70553 MRI BRAIN STEM W/O & W/DYE: CPT

## 2022-03-08 PROCEDURE — 70553 MRI BRAIN STEM W/O & W/DYE: CPT | Mod: 26

## 2022-03-08 PROCEDURE — A9585: CPT

## 2022-03-09 ENCOUNTER — APPOINTMENT (OUTPATIENT)
Dept: NEUROLOGY | Facility: CLINIC | Age: 52
End: 2022-03-09
Payer: COMMERCIAL

## 2022-03-09 PROCEDURE — 95816 EEG AWAKE AND DROWSY: CPT

## 2022-03-10 ENCOUNTER — NON-APPOINTMENT (OUTPATIENT)
Age: 52
End: 2022-03-10

## 2022-03-10 PROCEDURE — 95700 EEG CONT REC W/VID EEG TECH: CPT

## 2022-03-10 PROCEDURE — 95719 EEG PHYS/QHP EA INCR W/O VID: CPT

## 2022-03-10 PROCEDURE — 95708 EEG WO VID EA 12-26HR UNMNTR: CPT

## 2022-03-16 ENCOUNTER — APPOINTMENT (OUTPATIENT)
Dept: NEUROLOGY | Facility: CLINIC | Age: 52
End: 2022-03-16
Payer: COMMERCIAL

## 2022-03-16 PROCEDURE — 99214 OFFICE O/P EST MOD 30 MIN: CPT | Mod: 95

## 2022-03-21 NOTE — DISCUSSION/SUMMARY
[FreeTextEntry1] : HARRIET GOLDMAN is a a 52 yo w symptomatic focal epilepsy  now well controlled on Keppra 500 bid.\par it is unclear if he has an underlying epilepsy phenotype and if this is the case he may need to stay longer on AEDs.\par \par Plan: 1. amb 24 hours EEG for interictal abnormalities, done normal\par 2. taper keppra over 1 month\par 3. f/u prn\par \par \par

## 2022-03-21 NOTE — REASON FOR VISIT
[Follow-Up: _____] : a [unfilled] follow-up visit [Initial Evaluation] : an initial evaluation [Spouse] : spouse

## 2022-03-21 NOTE — HISTORY OF PRESENT ILLNESS
[FreeTextEntry1] : *** 03/16/2022 ***\par \par Appointment was conducted by video conference in place of cancelled appointment due to heighten concern over coronavirus infection.\par Verbal consent was given on *** 03/16/2022 ***  by the patient, who understand that tele-visits will be charged to insurance and may involve co-pay for patient\par Physician location home\par Patient location home\par Patient on call: Dr. Bhatt ,  spouse, patient\par \par \par HARRIET GOLDMAN is seen for follow up. stable, no seizures, normal EEG.\par \par \par \par *** 01/24/2022 ***\par \par HARRIET GOLDMAN is seen for initial evaluation. his history is as bellow.\par according to his wife he had several focal seizures with secondary generalization while in the acute phase of his infection.\par he was controlled on VPA until meropenem was add. had another seizure.\par VPA switched to keppra and he is stable since. no SE. will be on IV antibiotics till 2/28/2022\par \par \par Hospital Course \par 50 y/o male w/ PMHx of Alcohol/Polysubstance abuse admitted in December to\par 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 Improved with aggressive potassium supplementation, IVF, PRN imodium.\par  Antibiotics were changed to IV meropenem per discussions between our ID and\par Dr. Laura Valdivia ID at [facility], 6 weeks additional treatment.\par \par

## 2023-01-10 ENCOUNTER — APPOINTMENT (OUTPATIENT)
Dept: INTERNAL MEDICINE | Facility: CLINIC | Age: 53
End: 2023-01-10
Payer: COMMERCIAL

## 2023-01-10 ENCOUNTER — NON-APPOINTMENT (OUTPATIENT)
Age: 53
End: 2023-01-10

## 2023-01-10 VITALS
HEART RATE: 70 BPM | SYSTOLIC BLOOD PRESSURE: 162 MMHG | HEIGHT: 69 IN | DIASTOLIC BLOOD PRESSURE: 91 MMHG | BODY MASS INDEX: 25.18 KG/M2 | WEIGHT: 170 LBS

## 2023-01-10 VITALS — DIASTOLIC BLOOD PRESSURE: 78 MMHG | SYSTOLIC BLOOD PRESSURE: 160 MMHG

## 2023-01-10 DIAGNOSIS — H60.542 ACUTE ECZEMATOID OTITIS EXTERNA, LEFT EAR: ICD-10-CM

## 2023-01-10 DIAGNOSIS — L30.9 DERMATITIS, UNSPECIFIED: ICD-10-CM

## 2023-01-10 DIAGNOSIS — R56.9 UNSPECIFIED CONVULSIONS: ICD-10-CM

## 2023-01-10 DIAGNOSIS — H60.312 DIFFUSE OTITIS EXTERNA, LEFT EAR: ICD-10-CM

## 2023-01-10 DIAGNOSIS — F17.200 NICOTINE DEPENDENCE, UNSPECIFIED, UNCOMPLICATED: ICD-10-CM

## 2023-01-10 DIAGNOSIS — Z00.00 ENCOUNTER FOR GENERAL ADULT MEDICAL EXAMINATION W/OUT ABNORMAL FINDINGS: ICD-10-CM

## 2023-01-10 DIAGNOSIS — G06.0 INTRACRANIAL ABSCESS AND GRANULOMA: ICD-10-CM

## 2023-01-10 DIAGNOSIS — F10.10 ALCOHOL ABUSE, UNCOMPLICATED: ICD-10-CM

## 2023-01-10 DIAGNOSIS — I10 ESSENTIAL (PRIMARY) HYPERTENSION: ICD-10-CM

## 2023-01-10 DIAGNOSIS — G40.209 LOCALIZATION-RELATED (FOCAL) (PARTIAL) SYMPTOMATIC EPILEPSY AND EPILEPTIC SYNDROMES WITH COMPLEX PARTIAL SEIZURES, NOT INTRACTABLE, W/OUT STATUS EPILEPTICUS: ICD-10-CM

## 2023-01-10 PROCEDURE — 99396 PREV VISIT EST AGE 40-64: CPT | Mod: 25

## 2023-01-10 PROCEDURE — 93000 ELECTROCARDIOGRAM COMPLETE: CPT | Mod: 59

## 2023-01-10 PROCEDURE — 36415 COLL VENOUS BLD VENIPUNCTURE: CPT

## 2023-01-10 RX ORDER — HALOPERIDOL 1 MG/1
1 TABLET ORAL
Refills: 0 | Status: DISCONTINUED | COMMUNITY
End: 2023-01-10

## 2023-01-10 RX ORDER — TRIAMCINOLONE ACETONIDE 1 MG/G
0.1 OINTMENT TOPICAL
Qty: 1 | Refills: 0 | Status: ACTIVE | COMMUNITY
Start: 2022-01-05 | End: 1900-01-01

## 2023-01-10 RX ORDER — POTASSIUM CHLORIDE 1.5 G/1.58G
20 POWDER, FOR SOLUTION ORAL TWICE DAILY
Qty: 4 | Refills: 0 | Status: DISCONTINUED | COMMUNITY
Start: 2022-01-11 | End: 2023-01-10

## 2023-01-10 RX ORDER — LEVETIRACETAM 500 MG/1
500 TABLET, FILM COATED ORAL TWICE DAILY
Qty: 28 | Refills: 5 | Status: DISCONTINUED | COMMUNITY
Start: 2022-01-24 | End: 2023-01-10

## 2023-01-11 LAB
ALBUMIN SERPL ELPH-MCNC: 4.4 G/DL
ALP BLD-CCNC: 92 U/L
ALT SERPL-CCNC: 22 U/L
ANION GAP SERPL CALC-SCNC: 13 MMOL/L
APPEARANCE: CLEAR
AST SERPL-CCNC: 22 U/L
BACTERIA: NEGATIVE
BASOPHILS # BLD AUTO: 0.1 K/UL
BASOPHILS NFR BLD AUTO: 0.9 %
BILIRUB DIRECT SERPL-MCNC: 0.1 MG/DL
BILIRUB INDIRECT SERPL-MCNC: 0.2 MG/DL
BILIRUB SERPL-MCNC: 0.3 MG/DL
BILIRUBIN URINE: NEGATIVE
BLOOD URINE: ABNORMAL
BUN SERPL-MCNC: 14 MG/DL
CALCIUM SERPL-MCNC: 10 MG/DL
CHLORIDE SERPL-SCNC: 103 MMOL/L
CHOLEST SERPL-MCNC: 199 MG/DL
CO2 SERPL-SCNC: 21 MMOL/L
COLOR: YELLOW
CREAT SERPL-MCNC: 0.91 MG/DL
EGFR: 100 ML/MIN/1.73M2
EOSINOPHIL # BLD AUTO: 0.2 K/UL
EOSINOPHIL NFR BLD AUTO: 1.7 %
ESTIMATED AVERAGE GLUCOSE: 114 MG/DL
FERRITIN SERPL-MCNC: 142 NG/ML
FOLATE SERPL-MCNC: >20 NG/ML
GLUCOSE QUALITATIVE U: NEGATIVE
GLUCOSE SERPL-MCNC: 94 MG/DL
HBA1C MFR BLD HPLC: 5.6 %
HCT VFR BLD CALC: 47.3 %
HDLC SERPL-MCNC: 69 MG/DL
HGB BLD-MCNC: 15.9 G/DL
HYALINE CASTS: 0 /LPF
IMM GRANULOCYTES NFR BLD AUTO: 0.4 %
INR PPP: 0.92 RATIO
IRON SATN MFR SERPL: 30 %
IRON SERPL-MCNC: 101 UG/DL
KETONES URINE: NEGATIVE
LDLC SERPL CALC-MCNC: 95 MG/DL
LEUKOCYTE ESTERASE URINE: NEGATIVE
LYMPHOCYTES # BLD AUTO: 2.59 K/UL
LYMPHOCYTES NFR BLD AUTO: 22.5 %
MAN DIFF?: NORMAL
MCHC RBC-ENTMCNC: 30.6 PG
MCHC RBC-ENTMCNC: 33.6 GM/DL
MCV RBC AUTO: 91 FL
MICROSCOPIC-UA: NORMAL
MONOCYTES # BLD AUTO: 1.2 K/UL
MONOCYTES NFR BLD AUTO: 10.4 %
NEUTROPHILS # BLD AUTO: 7.39 K/UL
NEUTROPHILS NFR BLD AUTO: 64.1 %
NITRITE URINE: NEGATIVE
NONHDLC SERPL-MCNC: 129 MG/DL
PH URINE: 6
PLATELET # BLD AUTO: 314 K/UL
POTASSIUM SERPL-SCNC: 4.4 MMOL/L
PROT SERPL-MCNC: 7.3 G/DL
PROTEIN URINE: NEGATIVE
PSA SERPL-MCNC: 1.14 NG/ML
PT BLD: 10.9 SEC
RBC # BLD: 5.2 M/UL
RBC # FLD: 14.2 %
RED BLOOD CELLS URINE: 2 /HPF
SODIUM SERPL-SCNC: 137 MMOL/L
SPECIFIC GRAVITY URINE: 1.02
SQUAMOUS EPITHELIAL CELLS: 0 /HPF
TIBC SERPL-MCNC: 335 UG/DL
TRIGL SERPL-MCNC: 172 MG/DL
TSH SERPL-ACNC: 2.01 UIU/ML
UIBC SERPL-MCNC: 234 UG/DL
UROBILINOGEN URINE: NORMAL
VIT B12 SERPL-MCNC: 618 PG/ML
WBC # FLD AUTO: 11.53 K/UL
WHITE BLOOD CELLS URINE: 7 /HPF

## 2023-01-11 NOTE — HISTORY OF PRESENT ILLNESS
[de-identified] : 52 year old male 1/2 PPD smoker with history of heavy alcohol abuse, presents for annual exam.  \par \par last time patient was seen was in 12/2021 after hospitalization for Brain abscess, Left temporal lobe encephalitis with brain abscess 2/2 malignant left external otitis s/p left mastoiditis w/ osteomyelitis and meningitis.\par has been following with ID and neuro since last year.  \par \par Today, Has been having ear irritation in the left ear.  Feels like there is fluid inside.  Denies any fever, chills, nausea, vomiting.\par \par History of alcohol abuse, has been drinking 4-6 beers daily for the last 20 years.  stopped for some time after hospitalization but restarted never followed up with AA or other alcohol support groups \par \par BP elevated today\par \par +smoker, ~1/2 PPD \par employed \par  \par \par \par

## 2023-01-11 NOTE — ASSESSMENT
[FreeTextEntry1] : 52 year old male 1/2 PPD smoker with history of heavy alcohol abuse, presents for annual exam.  \par \par Etoh dependence s/p withdrawal, delirium tremors during hospitalization. \par History of alcohol abuse, has been drinking 4-6 beers daily for the last 20 years prior to was drinking hard liquor.  Has started drinking since discharge. not Interested in abstaining at this time\par -restart thiamine PO qd, MV qd\par -Educated on the importance of refraining from alcohol \par -advised to reach out to AA and attend meetings \par -will need to establish care with psych \par \par Nicotine dependence, smoking ~1/2 PPD \par -will need to stop smoking \par -start LD lung CA screening \par \par Elevated BP likely related to below\par -start amlodipine \par \par Annual \par -Advise to get yearly Flu shot -up to date \par -Advise Yearly Skin cancer screening with Dermatologist \par -Advise Yearly Eye exam with Ophthalmologist\par -Advise Yearly Dental exam\par -Educated of the importance of Healthy diet, such as Mediterranean Diet and Exercise, such as walking >20 minutes a day and increasing gradually as tolerated\par \par Preventative screening \par -advised to get colonoscopy for colon cancer screening  -referral given \par -will check PSA for prostate cancer screening -up to date\par \par -covid -up to date, recommend booster \par \par f/u in 2 months for BP check \par \par \par \par \par \par \par

## 2023-01-11 NOTE — PHYSICAL EXAM
[Normal] : normal rate, regular rhythm, normal S1 and S2 and no murmur heard [No Edema] : there was no peripheral edema [Soft] : abdomen soft [Non Tender] : non-tender [Non-distended] : non-distended [Normal Posterior Cervical Nodes] : no posterior cervical lymphadenopathy [Normal Anterior Cervical Nodes] : no anterior cervical lymphadenopathy [Coordination Grossly Intact] : coordination grossly intact [No Focal Deficits] : no focal deficits [Normal Gait] : normal gait [de-identified] : Older appearing [FreeTextEntry1] : Ears: chronic TM changes in left ear.  right TM-normal. middle ear effusion appreciated bilaterally with irritation in canal bilaterally \par Nose: nasal mucosa edematous, clear rhinorrhea, moderate airway obstruction\par No sinus tenderness to percussion\par \par  [de-identified] : PICC line in PIP place and right arm.  No surrounding redness, swelling or discharge [de-identified] : Forgetful

## 2023-01-11 NOTE — HEALTH RISK ASSESSMENT
[Current] : Current [Good] : ~his/her~  mood as  good [Yes] : Yes [No falls in past year] : Patient reported no falls in the past year [0] : 2) Feeling down, depressed, or hopeless: Not at all (0) [PHQ-2 Negative - No further assessment needed] : PHQ-2 Negative - No further assessment needed [Employed] : employed [] :  [Fully functional (bathing, dressing, toileting, transferring, walking, feeding)] : Fully functional (bathing, dressing, toileting, transferring, walking, feeding) [Fully functional (using the telephone, shopping, preparing meals, housekeeping, doing laundry, using] : Fully functional and needs no help or supervision to perform IADLs (using the telephone, shopping, preparing meals, housekeeping, doing laundry, using transportation, managing medications and managing finances) [[id number]] : 0 [Change in mental status noted] : No change in mental status noted [Reports changes in hearing] : Reports no changes in hearing [Reports changes in vision] : Reports no changes in vision [ColonoscopyDate] : NEVER

## 2023-01-18 ENCOUNTER — APPOINTMENT (OUTPATIENT)
Dept: OTOLARYNGOLOGY | Facility: CLINIC | Age: 53
End: 2023-01-18
Payer: COMMERCIAL

## 2023-01-18 VITALS
DIASTOLIC BLOOD PRESSURE: 89 MMHG | WEIGHT: 170 LBS | BODY MASS INDEX: 25.18 KG/M2 | SYSTOLIC BLOOD PRESSURE: 159 MMHG | OXYGEN SATURATION: 97 % | HEART RATE: 68 BPM | HEIGHT: 69 IN

## 2023-01-18 DIAGNOSIS — Z86.69 PERSONAL HISTORY OF OTHER DISEASES OF THE NERVOUS SYSTEM AND SENSE ORGANS: ICD-10-CM

## 2023-01-18 DIAGNOSIS — H61.23 IMPACTED CERUMEN, BILATERAL: ICD-10-CM

## 2023-01-18 DIAGNOSIS — L29.9 PRURITUS, UNSPECIFIED: ICD-10-CM

## 2023-01-18 PROCEDURE — 99204 OFFICE O/P NEW MOD 45 MIN: CPT | Mod: 25

## 2023-01-18 PROCEDURE — 69210 REMOVE IMPACTED EAR WAX UNI: CPT

## 2023-01-18 NOTE — ASSESSMENT
[FreeTextEntry1] : Patient 52-year-old female male who had a history of a intracranial abscess from a mastoiditis back in 2021 at [facility] was originally seen by Dr. Abraham Lombardi.  Surgery was necessary responded to IV antibiotics.  Follows up now 2 years later feeling somewhat clogged in his left ear based on the history was referred here for evaluation on examination he has a Q-tip remnant in his left external auditory canal which was removed this is the side where he had mastoiditis he had wax in the right ear as well he will continue to using some Ciprodex drops because of his history I feel that he should be followed up by one of [place] neuro otologist [place].  I referred him to one of our neuro otologist on staff here at [facility].  Audiogram performed showed essentially equal hearing bilaterally with bilateral dips at 4000 Hz.

## 2023-01-18 NOTE — HISTORY OF PRESENT ILLNESS
[de-identified] : Patient has a history of hospitalization at [facility] for brain abscess/left temporal lobe encephalitis with brain abscess, malignant left external otitis with left mastoiditis. He has been followed by ID and neurology for the last year or so. \par He comes in today with recurrent pressure  and discomfort in both ears. He saw his PCP on January 10th with complaints specifically of left ear irritation and itching. He was given ciprofloxacin drops by the PCP. \par His partner with him today states that his hearing seems more diminished bilaterally over the last few weeks. \par \par History of ALcohol abuse; smoker.

## 2023-01-18 NOTE — END OF VISIT
[FreeTextEntry3] : I, Dr. Sherman personally performed the evaluation and management (E/M) services including all necessary procedures, for this new patient. That E/M includes conducting the clinically appropriate initial history &/or exam, assessing all conditions, and establishing the plan of care. Today, my EDDIE, aDisy Yoon, was here to observe &/or participate in the visit & follow plan of care established by me.\par \par \par

## 2023-01-18 NOTE — CONSULT LETTER
[Dear  ___] : Dear  [unfilled], [Consult Letter:] : I had the pleasure of evaluating your patient, [unfilled]. [Please see my note below.] : Please see my note below. [Consult Closing:] : Thank you very much for allowing me to participate in the care of this patient.  If you have any questions, please do not hesitate to contact me. [Sincerely,] : Sincerely, [FreeTextEntry3] : Chris Sherman MD\par [facility]\par Otolaryngology and Facial Plastics\par [employer], [employer]\par

## 2023-01-18 NOTE — PHYSICAL EXAM
[Midline] : trachea located in midline position [Normal] : no rashes [de-identified] : dry ear canals bilaterally

## 2023-02-06 ENCOUNTER — APPOINTMENT (OUTPATIENT)
Dept: OTOLARYNGOLOGY | Facility: CLINIC | Age: 53
End: 2023-02-06
Payer: COMMERCIAL

## 2023-02-06 VITALS
HEIGHT: 69 IN | SYSTOLIC BLOOD PRESSURE: 143 MMHG | DIASTOLIC BLOOD PRESSURE: 86 MMHG | BODY MASS INDEX: 25.18 KG/M2 | HEART RATE: 64 BPM | WEIGHT: 170 LBS

## 2023-02-06 DIAGNOSIS — H90.A31 MIXED CONDUCTIVE AND SENSORINEURAL HEARING LOSS, UNILATERAL, RIGHT EAR WITH RESTRICTED HEARING ON THE  CONTRALATERAL SIDE: ICD-10-CM

## 2023-02-06 DIAGNOSIS — L02.01 CUTANEOUS ABSCESS OF FACE: ICD-10-CM

## 2023-02-06 PROCEDURE — 36415 COLL VENOUS BLD VENIPUNCTURE: CPT

## 2023-02-06 PROCEDURE — 99214 OFFICE O/P EST MOD 30 MIN: CPT

## 2023-02-06 RX ORDER — CEFTRIAXONE SODIUM 100 G/1
INJECTION, POWDER, FOR SOLUTION INTRAVENOUS
Refills: 0 | Status: DISCONTINUED | COMMUNITY
End: 2023-02-06

## 2023-02-06 RX ORDER — CIPROFLOXACIN AND DEXAMETHASONE 3; 1 MG/ML; MG/ML
0.3-0.1 SUSPENSION/ DROPS AURICULAR (OTIC)
Qty: 1 | Refills: 0 | Status: DISCONTINUED | COMMUNITY
Start: 2021-12-21 | End: 2023-02-06

## 2023-02-06 RX ORDER — CIPROFLOXACIN AND DEXAMETHASONE 3; 1 MG/ML; MG/ML
0.3-0.1 SUSPENSION/ DROPS AURICULAR (OTIC)
Refills: 0 | Status: DISCONTINUED | COMMUNITY
End: 2023-02-06

## 2023-02-06 RX ORDER — CHROMIUM 200 MCG
TABLET ORAL
Refills: 0 | Status: DISCONTINUED | COMMUNITY
End: 2023-02-06

## 2023-02-06 RX ORDER — NICOTINE 21 MG/24HR
21 PATCH, TRANSDERMAL 24 HOURS TRANSDERMAL
Refills: 0 | Status: DISCONTINUED | COMMUNITY
End: 2023-02-06

## 2023-02-06 NOTE — HISTORY OF PRESENT ILLNESS
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 Had bilateral tinnitus and itching - PCP first prescribed Ciprodex and Triamcinolone ointment and then went to Dr. Sherman because of concern secondary to extensive history.  Referred by Dr. Sherman - who cleaned out ears - no longer has itching or tinnitus bilaterally. No otalgia, otorrhea, dizziness or headaches. Has hx of exposure to loud noises (occupational) and no history of head trauma. Last seen ID in March 2022. Last MRI in March. Last ID eval was in hospital - no vision changes - no discharge - last IV abx March 2022.  Last WBC 11.53 Jan 2023.  Had seizures at time of abscess - since neuro has d/c'd meds for seizures & no further events

## 2023-02-06 NOTE — PHYSICAL EXAM
[Midline] : trachea located in midline position [Normal] : orientation to person, place, and time: normal [de-identified] : right dried skin otherwise normal AU

## 2023-02-07 ENCOUNTER — RESULT REVIEW (OUTPATIENT)
Age: 53
End: 2023-02-07

## 2023-02-11 LAB
BASOPHILS # BLD AUTO: 0.08 K/UL
BASOPHILS NFR BLD AUTO: 0.8 %
EOSINOPHIL # BLD AUTO: 0.26 K/UL
EOSINOPHIL NFR BLD AUTO: 2.6 %
ERYTHROCYTE [SEDIMENTATION RATE] IN BLOOD BY WESTERGREN METHOD: 24 MM/HR
HCT VFR BLD CALC: 46.8 %
HGB BLD-MCNC: 15.2 G/DL
IMM GRANULOCYTES NFR BLD AUTO: 0.3 %
LYMPHOCYTES # BLD AUTO: 3.02 K/UL
LYMPHOCYTES NFR BLD AUTO: 29.8 %
MAN DIFF?: NORMAL
MCHC RBC-ENTMCNC: 30.1 PG
MCHC RBC-ENTMCNC: 32.5 GM/DL
MCV RBC AUTO: 92.7 FL
MONOCYTES # BLD AUTO: 0.89 K/UL
MONOCYTES NFR BLD AUTO: 8.8 %
NEUTROPHILS # BLD AUTO: 5.86 K/UL
NEUTROPHILS NFR BLD AUTO: 57.7 %
PLATELET # BLD AUTO: 311 K/UL
RBC # BLD: 5.05 M/UL
RBC # FLD: 14.1 %
WBC # FLD AUTO: 10.14 K/UL

## 2023-02-22 ENCOUNTER — OUTPATIENT (OUTPATIENT)
Dept: OUTPATIENT SERVICES | Facility: HOSPITAL | Age: 53
LOS: 1 days | End: 2023-02-22
Payer: COMMERCIAL

## 2023-02-22 ENCOUNTER — APPOINTMENT (OUTPATIENT)
Dept: MRI IMAGING | Facility: IMAGING CENTER | Age: 53
End: 2023-02-22
Payer: COMMERCIAL

## 2023-02-22 DIAGNOSIS — L02.01 CUTANEOUS ABSCESS OF FACE: ICD-10-CM

## 2023-02-22 DIAGNOSIS — K42.9 UMBILICAL HERNIA WITHOUT OBSTRUCTION OR GANGRENE: Chronic | ICD-10-CM

## 2023-02-22 PROCEDURE — 70553 MRI BRAIN STEM W/O & W/DYE: CPT

## 2023-02-22 PROCEDURE — 70553 MRI BRAIN STEM W/O & W/DYE: CPT | Mod: 26

## 2023-02-22 PROCEDURE — A9585: CPT

## 2023-09-04 ENCOUNTER — RX RENEWAL (OUTPATIENT)
Age: 53
End: 2023-09-04

## 2023-11-08 NOTE — PROGRESS NOTE ADULT - ASSESSMENT
51y Male w/ unremarkable PMHx (hasn't seen a doctor in many years) presents to 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    MRI 12/6: C/w left necrotizing otitis externa, there is an approximately 1 cm abscess in the left inferolateral temporal lobe.  OR CX data from 12/9 MSSA and multiple anaerobes including anaerococcus vaginalis, actinomyces turicensis, helicococus spp.  - Discharge patient with nafcillin 2g IV q4h for MSSA and CTX 2g IV q12h for anaerobes   - Duration of antibiotics is 6 weeks (12/9 - 1/19 ) to treat necrotizing otitis externa and temporal bone OM   - Weekly labs: CMP, CBC, ESR, CRP faxed to ID office at [phone]  - Patient to follow up with Dr. ding in 3 weeks ([address] [place] [address], [phone]), ID office will call patient to schedule    Thank you for your consult.  Please re-consult us or call us with questions.  Case d/w primary team.   c/o high bp 167/105 /headache

## 2024-02-12 ENCOUNTER — RX RENEWAL (OUTPATIENT)
Age: 54
End: 2024-02-12

## 2024-03-04 ENCOUNTER — TRANSCRIPTION ENCOUNTER (OUTPATIENT)
Age: 54
End: 2024-03-04

## 2025-04-02 NOTE — DIETITIAN INITIAL EVALUATION ADULT. - PERSON TAUGHT/METHOD
Procedure:  TRANSPERINEAL ULTRASOUND GUIDED BIOPSY PROSTATE (Perineum)    Relevant Problems   ANESTHESIA (within normal limits)      CARDIO   (+) Benign hypertension      ENDO (within normal limits)      GI/HEPATIC   (+) Gastroesophageal reflux disease (Improved and now off meds since giving up coffee)      PULMONARY   (+) Uncomplicated asthma        Physical Exam    Airway    Mallampati score: I  TM Distance: >3 FB  Neck ROM: full     Dental   No notable dental hx     Cardiovascular  Cardiovascular exam normal    Pulmonary  Pulmonary exam normal     Other Findings        Anesthesia Plan  ASA Score- 2     Anesthesia Type- IV sedation with anesthesia with ASA Monitors.         Additional Monitors:     Airway Plan:            Plan Factors-    Chart reviewed.    Patient summary reviewed.          Obstructive sleep apnea risk education given perioperatively.        Induction- intravenous.    Postoperative Plan-         Informed Consent- Anesthetic plan and risks discussed with patient.        NPO Status:  No vitals data found for the desired time range.         NA @ this time